# Patient Record
Sex: FEMALE | Race: WHITE | NOT HISPANIC OR LATINO | Employment: OTHER | URBAN - METROPOLITAN AREA
[De-identification: names, ages, dates, MRNs, and addresses within clinical notes are randomized per-mention and may not be internally consistent; named-entity substitution may affect disease eponyms.]

---

## 2017-05-08 PROBLEM — M79.7 FIBROMYALGIA: Status: ACTIVE | Noted: 2017-05-08

## 2017-05-08 PROBLEM — R45.89 ANXIETY ABOUT HEALTH: Status: ACTIVE | Noted: 2017-05-08

## 2018-04-02 DIAGNOSIS — M54.50 LOW BACK PAIN: ICD-10-CM

## 2018-04-09 ENCOUNTER — HOSPITAL ENCOUNTER (OUTPATIENT)
Dept: RADIOLOGY | Facility: HOSPITAL | Age: 63
Discharge: HOME | End: 2018-04-09
Attending: OBSTETRICS & GYNECOLOGY
Payer: COMMERCIAL

## 2018-04-09 DIAGNOSIS — M54.50 LOW BACK PAIN: ICD-10-CM

## 2018-04-09 PROCEDURE — 72148 MRI LUMBAR SPINE W/O DYE: CPT | Mod: 26,,, | Performed by: RADIOLOGY

## 2018-04-09 PROCEDURE — 72148 MRI LUMBAR SPINE W/O DYE: CPT | Mod: TC

## 2018-05-04 DIAGNOSIS — M79.7 FIBROMYALGIA: Primary | ICD-10-CM

## 2018-05-08 ENCOUNTER — OFFICE VISIT (OUTPATIENT)
Dept: SURGERY | Facility: CLINIC | Age: 63
End: 2018-05-08
Payer: COMMERCIAL

## 2018-05-08 VITALS
RESPIRATION RATE: 18 BRPM | TEMPERATURE: 97 F | HEIGHT: 59 IN | OXYGEN SATURATION: 95 % | WEIGHT: 155 LBS | DIASTOLIC BLOOD PRESSURE: 74 MMHG | HEART RATE: 71 BPM | BODY MASS INDEX: 31.25 KG/M2 | SYSTOLIC BLOOD PRESSURE: 119 MMHG

## 2018-05-08 DIAGNOSIS — R10.84 GENERALIZED ABDOMINAL PAIN: Primary | ICD-10-CM

## 2018-05-08 PROCEDURE — 99203 OFFICE O/P NEW LOW 30 MIN: CPT | Mod: S$GLB,,, | Performed by: SURGERY

## 2018-05-08 RX ORDER — PANTOPRAZOLE SODIUM 40 MG/1
40 TABLET, DELAYED RELEASE ORAL DAILY
Qty: 30 TABLET | Refills: 11 | Status: SHIPPED | OUTPATIENT
Start: 2018-05-08 | End: 2019-10-28 | Stop reason: SDUPTHER

## 2018-05-16 NOTE — PROGRESS NOTES
Subjective:       Patient ID: Teresa Clay is a 62 y.o. female.    Chief Complaint: Consult (referred by YARI Salazar ABD pain)      HPI:  Ms. Clay presents today as a consult from [facility] with complaints of generalized abdominal pain.  She has a history of diverticulitis requiring antibiotic therapy on several occasions in the past.  Pain is predominantly in the left lower quadrant.  No nausea or vomiting.  No diarrhea or constipation.  No fevers, chills, jaundice, biliuria, acholic, night sweats, weight loss, change in bowel habits, change in stool characteristics, etc.  Pain is described as constant, moderate, dull, and nonradiating.        Allergies & Meds:  Review of patient's allergies indicates:  No Known Allergies    Current Outpatient Prescriptions   Medication Sig Dispense Refill    lisinopril-hydrochlorothiazide (PRINZIDE,ZESTORETIC) 10-12.5 mg per tablet TAKE ONE TABLET BY MOUTH ONCE DAILY 30 tablet 11    metoprolol succinate (TOPROL-XL) 25 MG 24 hr tablet TAKE ONE TABLET BY MOUTH ONCE DAILY 30 tablet 11    pantoprazole (PROTONIX) 40 MG tablet Take 1 tablet (40 mg total) by mouth once daily. Take in the morning before breakfast.  Wait 30 minutes before eating or drinking anything 30 tablet 11     No current facility-administered medications for this visit.        PMFSHx:  Past Medical History:   Diagnosis Date    Diverticulitis     Encounter for long-term (current) use of medications     Hypertension        Past Surgical History:   Procedure Laterality Date    APPENDECTOMY      CHOLECYSTECTOMY      COLONOSCOPY      GALLBLADDER SURGERY      TUBAL LIGATION         Family History   Problem Relation Age of Onset    Diabetes Brother     Cancer Brother        Social History   Substance Use Topics    Smoking status: Current Every Day Smoker     Packs/day: 0.50     Types: Cigarettes    Smokeless tobacco: Never Used    Alcohol use No       Review of Systems   Constitutional:  Negative for appetite change, chills, fatigue, fever and unexpected weight change.   HENT: Negative for congestion, dental problem, ear pain, mouth sores, postnasal drip, rhinorrhea, sore throat, tinnitus, trouble swallowing and voice change.    Eyes: Negative for photophobia, pain, discharge and visual disturbance.   Respiratory: Negative for cough, chest tightness, shortness of breath and wheezing.    Cardiovascular: Negative for chest pain, palpitations and leg swelling.   Gastrointestinal: Negative for abdominal pain, blood in stool, constipation, diarrhea, nausea and vomiting.   Endocrine: Negative for cold intolerance, heat intolerance, polydipsia, polyphagia and polyuria.   Genitourinary: Negative for difficulty urinating, dysuria, flank pain, frequency, hematuria and urgency.   Musculoskeletal: Negative for arthralgias, joint swelling and myalgias.   Skin: Negative for color change and rash.   Allergic/Immunologic: Negative for immunocompromised state.   Neurological: Negative for dizziness, tremors, seizures, syncope, speech difficulty, weakness, numbness and headaches.   Hematological: Negative for adenopathy. Does not bruise/bleed easily.   Psychiatric/Behavioral: Negative for agitation, confusion, hallucinations, self-injury and suicidal ideas. The patient is not nervous/anxious.        Objective:      Physical Exam   Constitutional: She is oriented to person, place, and time. She appears well-developed and well-nourished. She is active.  Non-toxic appearance. No distress.   HENT:   Head: Normocephalic and atraumatic.   Right Ear: Hearing and external ear normal. No drainage or tenderness.   Left Ear: Hearing and external ear normal. No drainage or tenderness.   Nose: Nose normal. No rhinorrhea. No epistaxis.   Mouth/Throat: Uvula is midline, oropharynx is clear and moist and mucous membranes are normal. Mucous membranes are not pale, not dry and not cyanotic. No oropharyngeal exudate.   Eyes:  Conjunctivae and lids are normal. Pupils are equal, round, and reactive to light. Right eye exhibits no discharge and no exudate. Left eye exhibits no discharge and no exudate. Right conjunctiva is not injected. Right eye exhibits no nystagmus. Left eye exhibits no nystagmus.   Neck: Trachea normal, full passive range of motion without pain and phonation normal. No JVD present. Carotid bruit is not present. No tracheal deviation present. No thyroid mass and no thyromegaly present.   Cardiovascular: Normal rate, regular rhythm, S1 normal, S2 normal, normal heart sounds and intact distal pulses.  PMI is not displaced.  Exam reveals no gallop and no friction rub.    No murmur heard.  Pulmonary/Chest: Effort normal and breath sounds normal. No accessory muscle usage. No respiratory distress. She exhibits no mass, no tenderness and no crepitus. Right breast exhibits no inverted nipple, no mass, no nipple discharge, no skin change and no tenderness. Left breast exhibits no inverted nipple, no mass, no nipple discharge, no skin change and no tenderness. Breasts are symmetrical.   Abdominal: Soft. Normal appearance and bowel sounds are normal. She exhibits no distension, no fluid wave, no abdominal bruit and no mass. There is no hepatosplenomegaly. There is no tenderness. There is no rebound, no guarding and negative Martin's sign. No hernia. Hernia confirmed negative in the right inguinal area and confirmed negative in the left inguinal area.   Genitourinary: Rectum normal and vagina normal. There is no rash or lesion on the right labia. There is no rash or lesion on the left labia. Right adnexum displays no mass. Left adnexum displays no mass. No vaginal discharge found.   Musculoskeletal:        Cervical back: Normal.        Thoracic back: Normal.        Lumbar back: Normal.        Right upper arm: Normal.        Left upper arm: Normal.        Right forearm: Normal.        Left forearm: Normal.        Right hand: Normal.         Left hand: Normal.        Right upper leg: Normal.        Left upper leg: Normal.        Right lower leg: Normal.        Left lower leg: Normal.        Right foot: Normal.        Left foot: Normal.   Lymphadenopathy:        Head (right side): No submental, no submandibular and no posterior auricular adenopathy present.        Head (left side): No submental, no submandibular and no posterior auricular adenopathy present.     She has no cervical adenopathy.     She has no axillary adenopathy.        Right: No inguinal and no supraclavicular adenopathy present.        Left: No inguinal and no supraclavicular adenopathy present.   Neurological: She is alert and oriented to person, place, and time. She has normal strength. No cranial nerve deficit or sensory deficit. Coordination and gait normal. GCS eye subscore is 4. GCS verbal subscore is 5. GCS motor subscore is 6.   Skin: Skin is warm, dry and intact. No rash noted. No cyanosis. Nails show no clubbing.   Psychiatric: She has a normal mood and affect. Her speech is normal. Judgment and thought content normal. Her mood appears not anxious. Her affect is not inappropriate. She is not actively hallucinating. She does not exhibit a depressed mood.         Test Results:    Assessment:     Generalized abdominal pain.  Differential diagnosis includes but is not limited to reflux disease, gastritis, ulcer disease, inflammatory bowel disease, diverticulitis, diverticulosis, pancreatitis, etc.  Further evaluation warranted with laboratory studies and a CT scan.  Further management will depend upon these results.  1. Generalized abdominal pain        Plan:   Generalized abdominal pain  -     CT Abdomen Pelvis With Contrast; Future; Expected date: 05/08/2018  -     CBC auto differential; Future; Expected date: 05/08/2018  -     Comprehensive metabolic panel; Future; Expected date: 05/08/2018  -     Amylase; Future; Expected date: 05/08/2018  -     Lipase; Future; Expected  date: 05/08/2018    Other orders  -     pantoprazole (PROTONIX) 40 MG tablet; Take 1 tablet (40 mg total) by mouth once daily. Take in the morning before breakfast.  Wait 30 minutes before eating or drinking anything  Dispense: 30 tablet; Refill: 11        Follow-up in about 2 weeks (around 5/22/2018) for reviewing of CT results and lab results.

## 2018-05-21 ENCOUNTER — DOCUMENTATION ONLY (OUTPATIENT)
Dept: REHABILITATION | Facility: HOSPITAL | Age: 63
End: 2018-05-21

## 2018-05-21 ENCOUNTER — OFFICE VISIT (OUTPATIENT)
Dept: PODIATRY | Facility: CLINIC | Age: 63
End: 2018-05-21
Payer: COMMERCIAL

## 2018-05-21 ENCOUNTER — HOSPITAL ENCOUNTER (OUTPATIENT)
Dept: RADIOLOGY | Facility: HOSPITAL | Age: 63
Discharge: HOME | End: 2018-05-21
Attending: SURGERY
Payer: COMMERCIAL

## 2018-05-21 VITALS
TEMPERATURE: 96 F | HEART RATE: 73 BPM | HEIGHT: 59 IN | DIASTOLIC BLOOD PRESSURE: 62 MMHG | WEIGHT: 155 LBS | SYSTOLIC BLOOD PRESSURE: 104 MMHG | BODY MASS INDEX: 31.25 KG/M2

## 2018-05-21 DIAGNOSIS — G57.51 TARSAL TUNNEL SYNDROME OF RIGHT SIDE: ICD-10-CM

## 2018-05-21 DIAGNOSIS — M72.2 PLANTAR FASCIITIS: Primary | ICD-10-CM

## 2018-05-21 DIAGNOSIS — R10.84 GENERALIZED ABDOMINAL PAIN: ICD-10-CM

## 2018-05-21 PROCEDURE — 25500020 PHARM REV CODE 255: Performed by: SURGERY

## 2018-05-21 PROCEDURE — 74177 CT ABD & PELVIS W/CONTRAST: CPT | Mod: TC

## 2018-05-21 PROCEDURE — 99203 OFFICE O/P NEW LOW 30 MIN: CPT | Mod: S$PBB,,, | Performed by: PODIATRIST

## 2018-05-21 PROCEDURE — 99213 OFFICE O/P EST LOW 20 MIN: CPT | Mod: 25,PBBFAC | Performed by: PODIATRIST

## 2018-05-21 PROCEDURE — 99999 PR PBB SHADOW E&M-EST. PATIENT-LVL III: CPT | Mod: 25,PBBFAC,, | Performed by: PODIATRIST

## 2018-05-21 PROCEDURE — 74177 CT ABD & PELVIS W/CONTRAST: CPT | Mod: 26,,, | Performed by: RADIOLOGY

## 2018-05-21 RX ORDER — DICLOFENAC SODIUM 75 MG/1
75 TABLET, DELAYED RELEASE ORAL 2 TIMES DAILY
Qty: 60 TABLET | Refills: 0 | Status: SHIPPED | OUTPATIENT
Start: 2018-05-21 | End: 2018-06-20

## 2018-05-21 RX ADMIN — IOHEXOL 75 ML: 350 INJECTION, SOLUTION INTRAVENOUS at 10:05

## 2018-05-21 RX ADMIN — IOHEXOL 30 ML: 300 INJECTION, SOLUTION INTRAVENOUS at 10:05

## 2018-05-21 NOTE — LETTER
May 24, 2018      MD Wilfrid Atkins III[address]  [facility] [place] [address]           [facility] - Podiatry/Wound [facility]  [address] [facility] [place] [address]  Phone: [phone]  Fax: [phone]          Patient: Teresa Clay   MR Number: [id number]   YOB: 1955   Date of Visit: 5/21/2018       Dear Dr. Willie Jerome III:    Thank you for referring Teresa Clay to me for evaluation. Attached you will find relevant portions of my assessment and plan of care.    If you have questions, please do not hesitate to call me. I look forward to following Teresa Clay along with you.    Sincerely,    Bertha Harris  CC:  No Recipients    If you would like to receive this communication electronically, please contact [email or web address] or [phone] to request more information on [employer] access.    For providers and/or their staff who would like to refer a patient to [facility], please contact us through our one-stop-shop provider referral line, [facility], at [phone].    If you feel you have received this communication in error or would no longer like to receive these types of communications, please e-mail [email or web address]

## 2018-05-21 NOTE — PT/OT/SLP PROGRESS
Patient scheduled for Physical Therapy Evaluation this morning at 10:00 am. Patient did not show-up for her appointment, and did not call to cancel/reschedule.

## 2018-05-22 ENCOUNTER — OFFICE VISIT (OUTPATIENT)
Dept: SURGERY | Facility: CLINIC | Age: 63
End: 2018-05-22
Payer: COMMERCIAL

## 2018-05-22 VITALS
TEMPERATURE: 97 F | DIASTOLIC BLOOD PRESSURE: 71 MMHG | WEIGHT: 155 LBS | HEART RATE: 68 BPM | SYSTOLIC BLOOD PRESSURE: 117 MMHG | HEIGHT: 59 IN | RESPIRATION RATE: 18 BRPM | OXYGEN SATURATION: 92 % | BODY MASS INDEX: 31.25 KG/M2

## 2018-05-22 DIAGNOSIS — K57.92 DIVERTICULITIS: ICD-10-CM

## 2018-05-22 DIAGNOSIS — K29.50 CHRONIC GASTRITIS WITHOUT BLEEDING, UNSPECIFIED GASTRITIS TYPE: Primary | ICD-10-CM

## 2018-05-22 PROCEDURE — 99213 OFFICE O/P EST LOW 20 MIN: CPT | Mod: S$GLB,,, | Performed by: SURGERY

## 2018-05-22 NOTE — PROGRESS NOTES
"Subjective:       Patient ID: Teresa Clay is a 62 y.o. female.    Chief Complaint: Follow-up (ct and labs 5-21-18) and Results      HPI:  Ms. Clay returns today with no complaints.  She was last seen on 5/8/18 with left lower quadrant abdominal pain.  Laboratory studies and a CT scan were ordered.  Protonix was prescribed.  In the interval since her last visit the pain has completely resolved.  Pain resolved about two or three days after beginning the Protonix.  No nausea or vomiting.  No fevers, chills, jaundice, biliuria, acholia, diarrhea, constipation, melena, hematochezia, hematemesis, change in bowel habits, change in stool characteristics, etc.  Today she feels "great".        Allergies & Meds:  Review of patient's allergies indicates:   Allergen Reactions    Codeine        Current Outpatient Prescriptions   Medication Sig Dispense Refill    lisinopril-hydrochlorothiazide (PRINZIDE,ZESTORETIC) 10-12.5 mg per tablet TAKE ONE TABLET BY MOUTH ONCE DAILY 30 tablet 11    metoprolol succinate (TOPROL-XL) 25 MG 24 hr tablet TAKE ONE TABLET BY MOUTH ONCE DAILY 30 tablet 11    pantoprazole (PROTONIX) 40 MG tablet Take 1 tablet (40 mg total) by mouth once daily. Take in the morning before breakfast.  Wait 30 minutes before eating or drinking anything 30 tablet 11    diclofenac (VOLTAREN) 75 MG EC tablet Take 1 tablet (75 mg total) by mouth 2 (two) times daily. 60 tablet 0     No current facility-administered medications for this visit.        PMFSHx:  Past medical history, surgical history, family history, social history reviewed and no changes noted.        Review of Systems   Constitutional: Negative for appetite change, chills, fatigue, fever and unexpected weight change.   HENT: Negative for congestion, dental problem, ear pain, mouth sores, postnasal drip, rhinorrhea, sore throat, tinnitus, trouble swallowing and voice change.    Eyes: Negative for photophobia, pain, discharge and visual disturbance. "   Respiratory: Negative for cough, chest tightness, shortness of breath and wheezing.    Cardiovascular: Negative for chest pain, palpitations and leg swelling.   Gastrointestinal: Negative for abdominal pain, blood in stool, constipation, diarrhea, nausea and vomiting.   Endocrine: Negative for cold intolerance, heat intolerance, polydipsia, polyphagia and polyuria.   Genitourinary: Negative for difficulty urinating, dysuria, flank pain, frequency, hematuria and urgency.   Musculoskeletal: Negative for arthralgias, joint swelling and myalgias.   Skin: Negative for color change and rash.   Allergic/Immunologic: Negative for immunocompromised state.   Neurological: Negative for dizziness, tremors, seizures, syncope, speech difficulty, weakness, numbness and headaches.   Hematological: Negative for adenopathy. Does not bruise/bleed easily.   Psychiatric/Behavioral: Negative for agitation, confusion, hallucinations, self-injury and suicidal ideas. The patient is not nervous/anxious.        Objective:      Physical Exam   Constitutional: She appears well-developed and well-nourished.  Non-toxic appearance. She does not appear ill. No distress.   Cardiovascular: Normal rate, regular rhythm and normal heart sounds.  PMI is not displaced.  Exam reveals no gallop.    No murmur heard.  Pulmonary/Chest: Effort normal and breath sounds normal. No accessory muscle usage. No tachypnea. No respiratory distress.   Abdominal: Soft. Normal appearance and bowel sounds are normal. There is no tenderness. No hernia.   Skin: Skin is warm, dry and intact. No rash noted.         Test Results:  Lab results were reviewed from yesterday.  CBC shows no evidence of leukocytosis, anemia, or thrombocytopenia.  Electrolytes revealed a mild hypokalemia and a mild hypercarbia.  BUN and creatinine showed no evidence of renal dysfunction.  Glucose was minimally elevated but without gross suspicion of diabetes.  Liver profile showed no evidence of  hepatocellular disease or biliary obstruction.  Amylase and lipase showed no evidence of pancreatitis.    CT scan of the abdomen and pelvis with contrast films and report reviewed from 5/21/18.  There was evidence of hepatosteatosis, postoperative changes consistent with a cholecystectomy, and mild uncomplicated diverticulitis.        Assessment:        Chronic gastritis, improved with PPI therapy.  Diverticulitis, spontaneously resolved.  Clinically there is no indication for antibiotic therapy as the diverticulitis appears clinically to have resolved and is now likely diverticulosis only.      1. Chronic gastritis without bleeding, unspecified gastritis type    2. Diverticulitis        Plan:   Chronic gastritis without bleeding, unspecified gastritis type    Diverticulitis        Follow-up for any concerns or questions as needed, resume care with PCP.    Counseling/Medical Decision Making:      Ms. Clay and I had a long conversation regarding the diagnosis of diverticulosis and diverticulitis. The [employer] publication pertaining to the subject was provided to her as well. The complications of diverticulosis including diverticulitis and hemorrhage were discussed in great detail. The signs and symptoms of these complications were explained explicitly including but not limited to: bleeding, abdominal pain, fever, nausea, vomiting, diarrhea, constipation, etc. She was instructed to seek immediate medical attention should any of the signs or symptoms develop. The importance of avoiding constipation was explained. The benefits of a 40 g high fiber diet, fiber supplements, stool softeners, and increased free water intake were also explained. Questions were solicited and answered. Entire conversation was held in layman's terms. At the conclusion of the conversation she voiced complete understanding of all we discussed and satisfaction that all questions were fully answered.

## 2018-05-27 NOTE — PROGRESS NOTES
Subjective:       Patient ID: Teresa Clay is a 62 y.o. female.    Chief Complaint: Foot Pain    HPI patient presents with a complaint of bilateral foot pain ×6 months right greater than left.  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  Patient states her first steps out of bed in the morning are extremely painful and uncomfortable or after she's been on her feet for long period of the day she has discomfort.  Review of Systems   Musculoskeletal: Positive for arthralgias and joint swelling.   All other systems reviewed and are negative.      Objective:      Foot Exam    General  General Appearance: appears stated age and healthy   Orientation: alert and oriented to person, place, and time   Affect: appropriate   Gait: unimpaired       Right Foot/Ankle     Inspection and Palpation  Ecchymosis: none  Tenderness: plantar fascia   Swelling: none   Arch: normal  Hammertoes: absent  Claw Toes: absent  Hallux valgus: no  Hallux limitus: no  Skin Exam: erythema;     Neurovascular  Dorsalis pedis: 2+  Posterior tibial: 2+  Saphenous nerve sensation: normal  Tibial nerve sensation: normal  Superficial peroneal nerve sensation: normal  Deep peroneal nerve sensation: normal  Sural nerve sensation: normal  Achilles reflex: 2+  Babinski reflex: 2+      Left Foot/Ankle      Inspection and Palpation  Ecchymosis: none  Tenderness: plantar fascia   Swelling: none   Arch: normal  Hammertoes: absent  Claw toes: absent  Hallux valgus: no  Hallux limitus: no  Skin Exam: erythema;     Neurovascular  Dorsalis pedis: 2+  Posterior tibial: 2+  Saphenous nerve sensation: normal  Tibial nerve sensation: normal  Superficial peroneal nerve sensation: normal  Deep peroneal nerve sensation: normal  Sural nerve sensation: normal  Achilles reflex: 2+  Babinski reflex: 2+              Assessment:       1.  Plantar fasciitis    2. Tarsal tunnel syndrome of right side        Plan:       The etiology and pathology were discussed in detail with the patient in regards to plantar fasciitis. Educational material regarding plantar fasciitis was discussed. Patient was advised to discontinue all barefoot walking and start wearing power step arch supports which the patient was recommended to purchase at all times of weightbearing. Patient is to begin wearing her shoes upon first gets out of bed in the morning.patient was advised that the reason that she has plantar fasciitis is her significantly elevated arch is obviously she's not getting enough arch support I did recommend power step arch supports for the patient these are the one at all times weight-bearing.  Patient is having findings of plantar fasciitis bilateral right greater than left start patient with power step control arch supports to be worn at all times of weightbearing and started the patient on diclofenac and see how the patient is doing at follow-up in 3 weeks she does have significantly elevated arches and may require additional arch support.

## 2018-06-18 ENCOUNTER — OFFICE VISIT (OUTPATIENT)
Dept: PODIATRY | Facility: CLINIC | Age: 63
End: 2018-06-18
Payer: COMMERCIAL

## 2018-06-18 VITALS
SYSTOLIC BLOOD PRESSURE: 113 MMHG | RESPIRATION RATE: 18 BRPM | HEART RATE: 72 BPM | DIASTOLIC BLOOD PRESSURE: 65 MMHG | TEMPERATURE: 96 F

## 2018-06-18 DIAGNOSIS — M72.2 PLANTAR FASCIITIS: Primary | ICD-10-CM

## 2018-06-18 DIAGNOSIS — M79.671 FOOT PAIN, BILATERAL: ICD-10-CM

## 2018-06-18 DIAGNOSIS — M79.672 FOOT PAIN, BILATERAL: ICD-10-CM

## 2018-06-18 PROCEDURE — 96372 THER/PROPH/DIAG INJ SC/IM: CPT | Mod: S$PBB,,, | Performed by: PODIATRIST

## 2018-06-18 PROCEDURE — 99999 PR PBB SHADOW E&M-EST. PATIENT-LVL III: CPT | Mod: 25,PBBFAC,, | Performed by: PODIATRIST

## 2018-06-18 PROCEDURE — 99213 OFFICE O/P EST LOW 20 MIN: CPT | Mod: 25,S$PBB,, | Performed by: PODIATRIST

## 2018-06-18 PROCEDURE — 99213 OFFICE O/P EST LOW 20 MIN: CPT | Mod: 25,PBBFAC | Performed by: PODIATRIST

## 2018-06-18 PROCEDURE — 96372 THER/PROPH/DIAG INJ SC/IM: CPT | Mod: PBBFAC

## 2018-06-18 RX ORDER — BETAMETHASONE SODIUM PHOSPHATE AND BETAMETHASONE ACETATE 3; 3 MG/ML; MG/ML
18 INJECTION, SUSPENSION INTRA-ARTICULAR; INTRALESIONAL; INTRAMUSCULAR; SOFT TISSUE
Status: COMPLETED | OUTPATIENT
Start: 2018-06-18 | End: 2018-06-18

## 2018-06-18 RX ADMIN — BETAMETHASONE SODIUM PHOSPHATE AND BETAMETHASONE ACETATE 18 MG: 3; 3 INJECTION, SUSPENSION INTRA-ARTICULAR; INTRALESIONAL; INTRAMUSCULAR at 02:06

## 2018-06-19 ENCOUNTER — TELEPHONE (OUTPATIENT)
Dept: PODIATRY | Facility: CLINIC | Age: 63
End: 2018-06-19

## 2018-06-19 NOTE — TELEPHONE ENCOUNTER
----- Message from Sherley Olivier sent at 6/19/2018  1:53 PM CDT -----  Contact: self [phone]  She wants to know if it is usual for her face and neck to be red after receiving the steroid shot yesterday.  Please call her.  Thank you!

## 2018-06-19 NOTE — TELEPHONE ENCOUNTER
Spoke with patient.  C/O redness in face, coughing up mucus and slight headache.  Pt denied trouble breathing or itching. Notified Dr. Ca.  Advised pt to take OTC benadryl as directed and OTC tylenol or ibuprofen for headache.  Pt advised she would  meds on the way home from work today.  Asked patient to call office back with any further concerns or worsening conditions.

## 2018-06-21 ENCOUNTER — TELEPHONE (OUTPATIENT)
Dept: PODIATRY | Facility: CLINIC | Age: 63
End: 2018-06-21

## 2018-06-21 RX ORDER — TRAMADOL HYDROCHLORIDE 50 MG/1
50 TABLET ORAL EVERY 12 HOURS PRN
Qty: 40 TABLET | Refills: 1 | Status: SHIPPED | OUTPATIENT
Start: 2018-06-21 | End: 2018-07-01

## 2018-06-21 NOTE — TELEPHONE ENCOUNTER
Pts  came in stating pt was still flushed from steroid injection.  Called pt, no answer and left vm to return call to office.  Add betamethasone injection to list of allergies?

## 2018-06-22 NOTE — PROGRESS NOTES
Subjective:       Patient ID: Teresa Clay is a 62 y.o. female.    Chief Complaint: Follow-up    HPI patient presents with a complaint of bilateral foot pain ×6 months right greater than left.  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  Patient states her first steps out of bed in the morning are extremely painful and uncomfortable or after she's been on her feet for long period of the day she has discomfort.  Review of Systems   Musculoskeletal: Positive for arthralgias and joint swelling.   All other systems reviewed and are negative.      Objective:      Foot Exam    General  General Appearance: appears stated age and healthy   Orientation: alert and oriented to person, place, and time   Affect: appropriate   Gait: unimpaired       Right Foot/Ankle     Inspection and Palpation  Ecchymosis: none  Tenderness: plantar fascia   Swelling: none   Arch: normal  Hammertoes: absent  Claw Toes: absent  Hallux valgus: no  Hallux limitus: no  Skin Exam: erythema;     Neurovascular  Dorsalis pedis: 2+  Posterior tibial: 2+  Saphenous nerve sensation: normal  Tibial nerve sensation: normal  Superficial peroneal nerve sensation: normal  Deep peroneal nerve sensation: normal  Sural nerve sensation: normal  Achilles reflex: 2+  Babinski reflex: 2+      Left Foot/Ankle      Inspection and Palpation  Ecchymosis: none  Tenderness: plantar fascia   Swelling: none   Arch: normal  Hammertoes: absent  Claw toes: absent  Hallux valgus: no  Hallux limitus: no  Skin Exam: erythema;     Neurovascular  Dorsalis pedis: 2+  Posterior tibial: 2+  Saphenous nerve sensation: normal  Tibial nerve sensation: normal  Superficial peroneal nerve sensation: normal  Deep peroneal nerve sensation: normal  Sural nerve sensation: normal  Achilles reflex: 2+  Babinski reflex: 2+              Assessment:       1.  Plantar fasciitis    2. Foot pain, bilateral        Plan:         On evaluation patient states that she has had some improvement in her plantar fascial pain in the tarsal tunnel pain right foot however she still having considerable discomfort.  Following examination I have recommended adding additional arch support to the patient's power steps of added a soft blue arch pad to the plantar arch bilateral additionally I did administer Stephanie tone injection 3 cc IM to the patient patient was not able to take the diclofenac due to diverticulitis I feel that the IM steroid injection should significantly help to reduce her inflammation plan follow-up will be 2-3 weeks.  I did subsequently give the patient a prescription for Ultram due to the continued discomfort she is having.

## 2018-07-16 ENCOUNTER — OFFICE VISIT (OUTPATIENT)
Dept: PODIATRY | Facility: CLINIC | Age: 63
End: 2018-07-16
Payer: COMMERCIAL

## 2018-07-16 VITALS
HEART RATE: 70 BPM | BODY MASS INDEX: 30.04 KG/M2 | HEIGHT: 59 IN | SYSTOLIC BLOOD PRESSURE: 109 MMHG | WEIGHT: 149 LBS | DIASTOLIC BLOOD PRESSURE: 57 MMHG

## 2018-07-16 DIAGNOSIS — M72.2 PLANTAR FASCIITIS: Primary | ICD-10-CM

## 2018-07-16 DIAGNOSIS — G57.50 TARSAL TUNNEL SYNDROME, UNSPECIFIED LATERALITY: ICD-10-CM

## 2018-07-16 PROCEDURE — 99999 PR PBB SHADOW E&M-EST. PATIENT-LVL III: CPT | Mod: PBBFAC,,, | Performed by: PODIATRIST

## 2018-07-16 PROCEDURE — 99213 OFFICE O/P EST LOW 20 MIN: CPT | Mod: S$PBB,,, | Performed by: PODIATRIST

## 2018-07-16 PROCEDURE — 99213 OFFICE O/P EST LOW 20 MIN: CPT | Mod: PBBFAC | Performed by: PODIATRIST

## 2018-07-16 RX ORDER — GABAPENTIN 300 MG/1
300 CAPSULE ORAL NIGHTLY
Qty: 30 CAPSULE | Refills: 3 | Status: SHIPPED | OUTPATIENT
Start: 2018-07-16 | End: 2018-12-15

## 2018-07-20 PROBLEM — M72.2 PLANTAR FASCIITIS: Status: ACTIVE | Noted: 2018-07-20

## 2018-07-21 NOTE — PROGRESS NOTES
Subjective:       Patient ID: Teresa Clay is a 62 y.o. female.    Chief Complaint: Follow-up; Foot Pain; and Foot Problem    Foot Pain   Associated symptoms include arthralgias and joint swelling.    patient presents with a complaint of bilateral foot pain ×6 months right greater than left.  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  Patient states her first steps out of bed in the morning are extremely painful and uncomfortable or after she's been on her feet for long period of the day she has discomfort.  Review of Systems   Musculoskeletal: Positive for arthralgias and joint swelling.   All other systems reviewed and are negative.      Objective:      Foot Exam    General  General Appearance: appears stated age and healthy   Orientation: alert and oriented to person, place, and time   Affect: appropriate   Gait: unimpaired       Right Foot/Ankle     Inspection and Palpation  Ecchymosis: none  Tenderness: plantar fascia   Swelling: none   Arch: normal  Hammertoes: absent  Claw Toes: absent  Hallux valgus: no  Hallux limitus: no  Skin Exam: erythema;     Neurovascular  Dorsalis pedis: 2+  Posterior tibial: 2+  Saphenous nerve sensation: normal  Tibial nerve sensation: normal  Superficial peroneal nerve sensation: normal  Deep peroneal nerve sensation: normal  Sural nerve sensation: normal  Achilles reflex: 2+  Babinski reflex: 2+      Left Foot/Ankle      Inspection and Palpation  Ecchymosis: none  Tenderness: plantar fascia   Swelling: none   Arch: normal  Hammertoes: absent  Claw toes: absent  Hallux valgus: no  Hallux limitus: no  Skin Exam: erythema;     Neurovascular  Dorsalis pedis: 2+  Posterior tibial: 2+  Saphenous nerve sensation: normal  Tibial nerve sensation: normal  Superficial peroneal nerve sensation: normal  Deep peroneal nerve sensation: normal  Sural  nerve sensation: normal  Achilles reflex: 2+  Babinski reflex: 2+              Assessment:       1. Plantar fasciitis    2. Tarsal tunnel syndrome, unspecified laterality        Plan:           Patient presents for follow-up plantar fasciitis bilateral right greater than left with associated tarsal tunnel syndrome right.  Patient states that her feet are cramping a lot at night she is having a lot of discomfort at night she relates the recent injection we gave her did help quite a bit.  Patient was advised I would recommend additional arch support to her power steps I want see how she responds to this I feel that these will help quite a bit to reduce her discomfort I have also started the patient on gabapentin 300 mg at bedtime to help with the cramping that she is having at night I am going to see the patient for follow-up in 3 weeks she has been advised to contact me with any problems questions or concerns prior to follow-up.

## 2018-07-26 ENCOUNTER — HOSPITAL ENCOUNTER (EMERGENCY)
Facility: HOSPITAL | Age: 63
Discharge: HOME | End: 2018-07-26
Attending: EMERGENCY MEDICINE
Payer: COMMERCIAL

## 2018-07-26 VITALS
SYSTOLIC BLOOD PRESSURE: 128 MMHG | DIASTOLIC BLOOD PRESSURE: 73 MMHG | HEIGHT: 59 IN | OXYGEN SATURATION: 97 % | BODY MASS INDEX: 30.04 KG/M2 | TEMPERATURE: 98 F | HEART RATE: 74 BPM | WEIGHT: 149 LBS | RESPIRATION RATE: 16 BRPM

## 2018-07-26 DIAGNOSIS — M54.2 NECK PAIN: Primary | ICD-10-CM

## 2018-07-26 PROCEDURE — 25000242 PHARM REV CODE 250 ALT 637 W/ HCPCS: Performed by: EMERGENCY MEDICINE

## 2018-07-26 PROCEDURE — 94640 AIRWAY INHALATION TREATMENT: CPT

## 2018-07-26 PROCEDURE — 99284 EMERGENCY DEPT VISIT MOD MDM: CPT | Mod: 25

## 2018-07-26 PROCEDURE — 63600175 PHARM REV CODE 636 W HCPCS: Performed by: EMERGENCY MEDICINE

## 2018-07-26 PROCEDURE — 72050 X-RAY EXAM NECK SPINE 4/5VWS: CPT | Mod: TC,FY

## 2018-07-26 PROCEDURE — 72050 X-RAY EXAM NECK SPINE 4/5VWS: CPT | Mod: 26,,, | Performed by: RADIOLOGY

## 2018-07-26 PROCEDURE — 94761 N-INVAS EAR/PLS OXIMETRY MLT: CPT

## 2018-07-26 RX ORDER — METHYLPREDNISOLONE 4 MG/1
TABLET ORAL
Qty: 1 PACKAGE | Refills: 0 | Status: SHIPPED | OUTPATIENT
Start: 2018-07-26 | End: 2018-07-26

## 2018-07-26 RX ORDER — METHYLPREDNISOLONE 4 MG/1
TABLET ORAL
Qty: 1 PACKAGE | Refills: 0 | Status: SHIPPED | OUTPATIENT
Start: 2018-07-26 | End: 2018-08-16

## 2018-07-26 RX ORDER — TIZANIDINE 4 MG/1
4 TABLET ORAL EVERY 8 HOURS PRN
Qty: 12 TABLET | Refills: 0 | Status: SHIPPED | OUTPATIENT
Start: 2018-07-26 | End: 2018-12-15

## 2018-07-26 RX ORDER — PREDNISONE 10 MG/1
60 TABLET ORAL
Status: COMPLETED | OUTPATIENT
Start: 2018-07-26 | End: 2018-07-26

## 2018-07-26 RX ORDER — IPRATROPIUM BROMIDE AND ALBUTEROL SULFATE 2.5; .5 MG/3ML; MG/3ML
3 SOLUTION RESPIRATORY (INHALATION)
Status: COMPLETED | OUTPATIENT
Start: 2018-07-26 | End: 2018-07-26

## 2018-07-26 RX ADMIN — PREDNISONE 60 MG: 10 TABLET ORAL at 05:07

## 2018-07-26 RX ADMIN — IPRATROPIUM BROMIDE AND ALBUTEROL SULFATE 3 ML: .5; 3 SOLUTION RESPIRATORY (INHALATION) at 05:07
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
## 2018-07-26 NOTE — DISCHARGE INSTRUCTIONS
Return to ER if condition changes or worsens.  Take medications as directed.  Follow up with her primary care provider in 2-3 days if symptoms not relieved.

## 2018-07-27 NOTE — ED NOTES
Reviewed discharge plan with pt. Pt verbalizes understanding of discharge instructions. Pt appears in no acute distress. resp even and unlabored. Pt ambulatory to discharge window.

## 2018-07-30 ENCOUNTER — OFFICE VISIT (OUTPATIENT)
Dept: PODIATRY | Facility: CLINIC | Age: 63
End: 2018-07-30
Payer: COMMERCIAL

## 2018-07-30 VITALS
SYSTOLIC BLOOD PRESSURE: 101 MMHG | HEART RATE: 77 BPM | WEIGHT: 149 LBS | TEMPERATURE: 97 F | DIASTOLIC BLOOD PRESSURE: 53 MMHG | HEIGHT: 59 IN | BODY MASS INDEX: 30.04 KG/M2

## 2018-07-30 DIAGNOSIS — M79.7 FIBROMYALGIA: ICD-10-CM

## 2018-07-30 DIAGNOSIS — G25.81 RESTLESS LEG SYNDROME: ICD-10-CM

## 2018-07-30 DIAGNOSIS — M72.2 PLANTAR FASCIITIS: Primary | ICD-10-CM

## 2018-07-30 PROCEDURE — 99999 PR PBB SHADOW E&M-EST. PATIENT-LVL III: CPT | Mod: PBBFAC,,, | Performed by: PODIATRIST

## 2018-07-30 PROCEDURE — 99213 OFFICE O/P EST LOW 20 MIN: CPT | Mod: S$PBB,,, | Performed by: PODIATRIST

## 2018-07-30 PROCEDURE — 99213 OFFICE O/P EST LOW 20 MIN: CPT | Mod: PBBFAC | Performed by: PODIATRIST

## 2018-08-04 NOTE — PROGRESS NOTES
Subjective:       Patient ID: Teresa Clay is a 62 y.o. female.    Chief Complaint: Foot Pain and Foot Problem    Foot Pain   Associated symptoms include arthralgias and joint swelling.    patient presents with a complaint of bilateral foot pain ×6 months right greater than left.  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  Patient states her first steps out of bed in the morning are extremely painful and uncomfortable or after she's been on her feet for long period of the day she has discomfort.  Review of Systems   Musculoskeletal: Positive for arthralgias and joint swelling.   All other systems reviewed and are negative.      Objective:      Foot Exam    General  General Appearance: appears stated age and healthy   Orientation: alert and oriented to person, place, and time   Affect: appropriate   Gait: unimpaired       Right Foot/Ankle     Inspection and Palpation  Ecchymosis: none  Tenderness: plantar fascia   Swelling: none   Arch: normal  Hammertoes: absent  Claw Toes: absent  Hallux valgus: no  Hallux limitus: no  Skin Exam: erythema;     Neurovascular  Dorsalis pedis: 2+  Posterior tibial: 2+  Saphenous nerve sensation: normal  Tibial nerve sensation: normal  Superficial peroneal nerve sensation: normal  Deep peroneal nerve sensation: normal  Sural nerve sensation: normal  Achilles reflex: 2+  Babinski reflex: 2+      Left Foot/Ankle      Inspection and Palpation  Ecchymosis: none  Tenderness: plantar fascia   Swelling: none   Arch: normal  Hammertoes: absent  Claw toes: absent  Hallux valgus: no  Hallux limitus: no  Skin Exam: erythema;     Neurovascular  Dorsalis pedis: 2+  Posterior tibial: 2+  Saphenous nerve sensation: normal  Tibial nerve sensation: normal  Superficial peroneal nerve sensation: normal  Deep peroneal nerve sensation: normal  Sural nerve  sensation: normal  Achilles reflex: 2+  Babinski reflex: 2+              Assessment:       1. Plantar fasciitis    2. Fibromyalgia    3. Restless leg syndrome        Plan:           Patient presents for follow-up plantar fasciitis bilateral.    Patient states that actually her left foot has begun to bother her more than the right which is different the patient's right foot had been worse now with the left.  Patient states the gabapentin 300 mg at bedtime is helping a little bit but not a lot.  Patient relates she still having a lot of tingling at bedtime she is also having cramping in her legs.  Patient states the arch supports have helped quite a bit and are very comfortable.  On evaluation the patient's arch supports appear to be fitting the patient very well giving her enough support I did make some minor adjustments to them however I have recommended increasing the patient scab a bent pen she has been taking 300 mg at bedtime I am going to increase this to 600 mg at bedtime and I want see how she responds to this I have advised her if the 300 was not enough and the 600 is too much she will likely need a prescription for 400 mg Lyrica gabapentin but I want to see how she does with the 600 mg 1st at night feel this will likely be the correct dose for the patient.  Total face-to-face time including discussion evaluation treatment and adjustments arch supports equaled 20 min..

## 2018-08-08 ENCOUNTER — TELEPHONE (OUTPATIENT)
Dept: PODIATRY | Facility: CLINIC | Age: 63
End: 2018-08-08

## 2018-08-08 DIAGNOSIS — M72.2 PLANTAR FASCIITIS: Primary | ICD-10-CM

## 2018-08-08 RX ORDER — TRAMADOL HYDROCHLORIDE 50 MG/1
50 TABLET ORAL EVERY 6 HOURS PRN
Qty: 40 TABLET | Refills: 2 | Status: SHIPPED | OUTPATIENT
Start: 2018-08-08 | End: 2018-08-18

## 2018-08-08 NOTE — TELEPHONE ENCOUNTER
Pt is requesting refill on tramadol. Pt last seen 7/30 med last filled in June for 10 days do you want to refill

## 2018-12-15 ENCOUNTER — HOSPITAL ENCOUNTER (EMERGENCY)
Facility: HOSPITAL | Age: 63
Discharge: HOME | End: 2018-12-15
Attending: FAMILY MEDICINE
Payer: COMMERCIAL

## 2018-12-15 VITALS
SYSTOLIC BLOOD PRESSURE: 118 MMHG | TEMPERATURE: 98 F | WEIGHT: 149 LBS | OXYGEN SATURATION: 95 % | HEIGHT: 59 IN | DIASTOLIC BLOOD PRESSURE: 67 MMHG | HEART RATE: 60 BPM | BODY MASS INDEX: 30.04 KG/M2 | RESPIRATION RATE: 16 BRPM

## 2018-12-15 DIAGNOSIS — M62.838 MUSCLE SPASM: ICD-10-CM

## 2018-12-15 PROCEDURE — 96372 THER/PROPH/DIAG INJ SC/IM: CPT

## 2018-12-15 PROCEDURE — 25000003 PHARM REV CODE 250: Performed by: FAMILY MEDICINE

## 2018-12-15 PROCEDURE — 99284 EMERGENCY DEPT VISIT MOD MDM: CPT | Mod: 25

## 2018-12-15 PROCEDURE — 63600175 PHARM REV CODE 636 W HCPCS: Performed by: FAMILY MEDICINE

## 2018-12-15 RX ORDER — KETOROLAC TROMETHAMINE 30 MG/ML
30 INJECTION, SOLUTION INTRAMUSCULAR; INTRAVENOUS
Status: COMPLETED | OUTPATIENT
Start: 2018-12-15 | End: 2018-12-15

## 2018-12-15 RX ORDER — LORAZEPAM 0.5 MG/1
TABLET ORAL
Status: DISCONTINUED
Start: 2018-12-15 | End: 2018-12-15 | Stop reason: HOSPADM

## 2018-12-15 RX ORDER — IBUPROFEN 800 MG/1
800 TABLET ORAL 3 TIMES DAILY
COMMUNITY
End: 2019-06-18

## 2018-12-15 RX ORDER — KETOROLAC TROMETHAMINE 10 MG/1
10 TABLET, FILM COATED ORAL EVERY 6 HOURS PRN
Qty: 12 TABLET | Refills: 0 | Status: SHIPPED | OUTPATIENT
Start: 2018-12-15 | End: 2019-02-01

## 2018-12-15 RX ORDER — LORAZEPAM 0.5 MG/1
0.5 TABLET ORAL
Status: COMPLETED | OUTPATIENT
Start: 2018-12-15 | End: 2018-12-15

## 2018-12-15 RX ORDER — BACLOFEN 10 MG/1
10 TABLET ORAL 3 TIMES DAILY PRN
Qty: 15 TABLET | Refills: 11 | Status: ON HOLD | OUTPATIENT
Start: 2018-12-15 | End: 2019-02-04

## 2018-12-15 RX ADMIN — LORAZEPAM 0.5 MG: 0.5 TABLET ORAL at 07:12

## 2018-12-15 RX ADMIN — KETOROLAC TROMETHAMINE 30 MG: 30 INJECTION, SOLUTION INTRAMUSCULAR at 07:12

## 2018-12-16 NOTE — DISCHARGE INSTRUCTIONS
TakeMedications as directed.  Return to the ER at any time if condition changes or worsens.  May use gentle massage and warm moist heat to area to help relax muscle and increase speed of healing.  Follow-up with primary care provider in the next 3-4 days for recheck.

## 2018-12-16 NOTE — ED NOTES
DISCHARGE INSTRUCTIONS GIVEN, DISCUSSED MEDICATIONS AND FOLLOW UP CARE, PATIENT VERBALIZED UNDERSTANDING, NO QUESTIONS OR COMPLAINTS AT TIME, ENCOURAGED TO RETURN FOR NEW OR WORSENING SYMPTOMS. PATIENT ESCORTED TO REGISTRATION W/O MAGALI. XIN HUSAIN RN

## 2018-12-16 NOTE — ED TRIAGE NOTES
Patient reports left scapular pain x 3 days. Pain has moves to left chest wall and under the left breast this am.   Patient rates the pain as a 7/10 level.

## 2018-12-16 NOTE — ED PROVIDER NOTES
Encounter Date: 12/15/2018       History     Chief Complaint   Patient presents with    Shoulder Pain     Patient complains of pain to left shoulder blade area for the past 3-4 days, states pain began to radiate around the left side of her chest today and she became concerned and presented to the ED.  Denies any nausea or vomiting.  Denies any shortness of breath.  Denies any dizziness or palpitations.  No rash.  Denies fever or chills          Review of patient's allergies indicates:   Allergen Reactions    Betamethasone acet,sod phos     Codeine      Past Medical History:   Diagnosis Date    Diverticulitis     Encounter for long-term (current) use of medications     Hypertension      Past Surgical History:   Procedure Laterality Date    APPENDECTOMY      CHOLECYSTECTOMY      COLONOSCOPY      GALLBLADDER SURGERY      TUBAL LIGATION       Family History   Problem Relation Age of Onset    Diabetes Brother     Cancer Brother      Social History     Tobacco Use    Smoking status: Current Every Day Smoker     Packs/day: 0.50     Types: Cigarettes    Smokeless tobacco: Never Used   Substance Use Topics    Alcohol use: No    Drug use: No     Review of Systems   Constitutional: Negative.    HENT: Negative.    Eyes: Negative.    Respiratory: Negative.    Cardiovascular: Negative.    Gastrointestinal: Negative.    Endocrine: Negative.    Genitourinary: Negative.    Musculoskeletal:        Refer to HPI   Allergic/Immunologic: Negative.    Neurological: Negative.    Hematological: Negative.    Psychiatric/Behavioral: Negative.        Physical Exam     Initial Vitals [12/15/18 1828]   BP Pulse Resp Temp SpO2   119/75 70 18 98.4 °F (36.9 °C) 97 %      MAP       --         Physical Exam    Nursing note and vitals reviewed.  Constitutional: She appears well-developed and well-nourished. She is not diaphoretic. No distress.   HENT:   Head: Normocephalic and atraumatic.   Eyes: Conjunctivae and EOM are normal.  Pupils are equal, round, and reactive to light.   Neck: Normal range of motion. Neck supple.   Cardiovascular: Normal rate, regular rhythm, normal heart sounds and intact distal pulses. Exam reveals no gallop and no friction rub.    No murmur heard.  Pulmonary/Chest: Breath sounds normal. No respiratory distress. She has no wheezes. She has no rales.   Abdominal: Soft. Bowel sounds are normal. She exhibits no distension. There is no tenderness.   Musculoskeletal: Normal range of motion. She exhibits tenderness. She exhibits no edema.   Tenderness noted over left trapezius/rhomboid area.  No rash noted. Significant spasm over left trapezius and rhomboid area as well.   Neurological: She is alert and oriented to person, place, and time. She has normal strength.   Skin: Skin is warm and dry. Capillary refill takes less than 2 seconds. No rash noted. No erythema.   Psychiatric: She has a normal mood and affect. Her behavior is normal. Judgment and thought content normal.         ED Course   Procedures  Labs Reviewed - No data to display  EKG Readings: (Independently Interpreted)   Rhythm: Normal Sinus Rhythm. Ectopy: No Ectopy. Conduction: Normal. ST Segments: Normal ST Segments. T Waves: Normal. Clinical Impression: Normal Sinus Rhythm       Imaging Results    None          Medical Decision Making:   Clinical Tests:   Medical Tests: Reviewed  ED Management:  Explained situation at length with both patient and her , patient relates that she has taken Ativan in the past for this and this has worked well for her.    2000-pt states discomfort is diminished.                       Clinical Impression:   The encounter diagnosis was Muscle spasm.                             Stefani Valle MD  12/15/18 1959

## 2019-01-07 ENCOUNTER — TELEPHONE (OUTPATIENT)
Dept: PAIN MEDICINE | Facility: CLINIC | Age: 64
End: 2019-01-07

## 2019-01-07 NOTE — TELEPHONE ENCOUNTER
----- Message from Diya Price LPN sent at 1/7/2019  3:25 PM CST -----  Contact: pt       ----- Message -----  From: Jennifer De  Sent: 1/7/2019  11:48 AM  To: [facility] Staff    Name of Who is Calling: LIA TIERNEY [[id number]]    What is the request in detail: Patient is requesting to speak with staff in regards to a referrel that was sent over in regards to pain management....Please contact to further discuss and advise      Can the clinic reply by [facility]: No     [facility]: [phone]

## 2019-01-15 ENCOUNTER — OFFICE VISIT (OUTPATIENT)
Dept: PAIN MEDICINE | Facility: CLINIC | Age: 64
End: 2019-01-15
Payer: COMMERCIAL

## 2019-01-15 VITALS
WEIGHT: 149 LBS | SYSTOLIC BLOOD PRESSURE: 130 MMHG | HEIGHT: 59 IN | BODY MASS INDEX: 30.04 KG/M2 | HEART RATE: 57 BPM | DIASTOLIC BLOOD PRESSURE: 70 MMHG

## 2019-01-15 DIAGNOSIS — M79.7 FIBROMYALGIA: ICD-10-CM

## 2019-01-15 DIAGNOSIS — M54.16 LUMBAR RADICULITIS: ICD-10-CM

## 2019-01-15 DIAGNOSIS — M51.36 DDD (DEGENERATIVE DISC DISEASE), LUMBAR: Primary | ICD-10-CM

## 2019-01-15 PROCEDURE — 99204 PR OFFICE/OUTPT VISIT, NEW, LEVL IV, 45-59 MIN: ICD-10-PCS | Mod: S$GLB,,, | Performed by: ANESTHESIOLOGY

## 2019-01-15 PROCEDURE — 99999 PR PBB SHADOW E&M-EST. PATIENT-LVL III: ICD-10-PCS | Mod: PBBFAC,,, | Performed by: ANESTHESIOLOGY

## 2019-01-15 PROCEDURE — 99999 PR PBB SHADOW E&M-EST. PATIENT-LVL III: CPT | Mod: PBBFAC,,, | Performed by: ANESTHESIOLOGY

## 2019-01-15 PROCEDURE — 99204 OFFICE O/P NEW MOD 45 MIN: CPT | Mod: S$GLB,,, | Performed by: ANESTHESIOLOGY

## 2019-01-15 RX ORDER — DICLOFENAC SODIUM 10 MG/G
GEL TOPICAL
Qty: 1 TUBE | Refills: 2 | Status: SHIPPED | OUTPATIENT
Start: 2019-01-15 | End: 2019-03-28 | Stop reason: SDUPTHER

## 2019-01-15 RX ORDER — DICLOFENAC SODIUM 10 MG/G
GEL TOPICAL
Refills: 0 | COMMUNITY
Start: 2018-12-06 | End: 2019-01-15 | Stop reason: SDUPTHER

## 2019-01-15 RX ORDER — FLUTICASONE PROPIONATE 50 MCG
SPRAY, SUSPENSION (ML) NASAL
COMMUNITY
End: 2021-11-01

## 2019-01-15 NOTE — PROGRESS NOTES
This note was completed with dictation software and grammatical errors may exist.    Referring Physician: Mariel Canela    PCP: Willie Jerome III, MD      CC:  Low back and leg pain    HPI:   Teresa Clay is a 63 y.o. female referred to us low back leg pain.  Patient with significant history of fibromyalgia, generalized anxiety.  She presents to us lower back pain calf so.  Pain worsens with sitting, standing, bending, flexing, lifting getting up.  Pain improves with rest.  She has tried physical therapy with benefit.  She recently had a lumbar MRI.  She has not had any interventional procedures.  She takes NSAIDs Voltaren gel, Baclofen.  She denies any worsening weakness.  No bowel.    ROS:  CONSTITUTIONAL: No fevers, chills, night sweats, wt. loss, appetite changes  SKIN: no rashes or itching  ENT: No headaches, head trauma, vision changes, or eye pain  LYMPH NODES: None noticed   CV: No chest pain, palpitations.   RESP: No shortness of breath, dyspnea on exertion, cough, wheezing, or hemoptysis  GI: No nausea, emesis, diarrhea, constipation, melena, hematochezia, pain.    : No dysuria, hematuria, urgency, or frequency   HEME: No easy bruising, bleeding problems  PSYCHIATRIC: No depression, anxiety, psychosis, hallucinations.  NEURO: No seizures, memory loss, dizziness or difficulty sleeping  MSK:  Positive HPI      Past Medical History:   Diagnosis Date    Diverticulitis     Encounter for long-term (current) use of medications     Hypertension      Past Surgical History:   Procedure Laterality Date    APPENDECTOMY      CHOLECYSTECTOMY      COLONOSCOPY      GALLBLADDER SURGERY      TUBAL LIGATION       Family History   Problem Relation Age of Onset    Diabetes Brother     Cancer Brother      Social History     Socioeconomic History    Marital status:      Spouse name: None    Number of children: None    Years of education: None    Highest education level: None   Social Needs     "Financial resource strain: None    Food insecurity - worry: None    Food insecurity - inability: None    Transportation needs - medical: None    Transportation needs - non-medical: None   Occupational History    None   Tobacco Use    Smoking status: Current Every Day Smoker     Packs/day: 0.50     Types: Cigarettes    Smokeless tobacco: Never Used   Substance and Sexual Activity    Alcohol use: No    Drug use: No    Sexual activity: None   Other Topics Concern    None   Social History Narrative    None         Medications/Allergies: See med card    Vitals:    01/15/19 0905   BP: 130/70   Pulse: (!) 57   Weight: 67.6 kg (149 lb)   Height: 4' 11" (1.499 m)   PainSc:   7   PainLoc: Back         Physical exam:    GENERAL: A and O x3, the patient appears well groomed and is in no acute distress.  Skin: No rashes or obvious lesions  HEENT: normocephalic, atraumatic  CARDIOVASCULAR:  Palpable peripheral pulses  LUNGS: easy work of breathing  ABDOMEN: soft, nontender   UPPER EXTREMITIES: Normal alignment, normal range of motion, no atrophy, no skin changes,  hair growth and nail growth normal and equal bilaterally. No swelling, no tenderness.    LOWER EXTREMITIES:  Normal alignment, normal range of motion, no atrophy, no skin changes,  hair growth and nail growth normal and equal bilaterally. No swelling, no tenderness.    LUMBAR SPINE  Lumbar spine: ROM is full with flexion extension and oblique extension with moderate increased pain.    Osman's test causes no increased pain on either side.    Supine straight leg raise is negative bilaterally.  +femoral stretch bilaterally  Internal and external rotation of the hip causes no increased pain on either side.  Myofascial exam: Moderate tenderness to palpation across lumbar paraspinous muscles.      MENTAL STATUS: normal orientation, speech, language, and fund of knowledge for social situation.  Emotional state appropriate.    CRANIAL NERVES:  II:  PERRL " bilaterally,   III,IV,VI: EOMI.    V:  Facial sensation equal bilaterally  VII:  Facial motor function normal.  VIII:  Hearing equal to finger rub bilaterally  IX/X: Gag normal, palate symmetric  XI:  Shoulder shrug equal, head turn equal  XII:  Tongue midline without fasciculations      MOTOR: Tone and bulk: normal bilateral upper and lower Strength: normal   Delt Bi Tri WE WF     R 5 5 5 5 5 5   L 5 5 5 5 5 5     IP ADD ABD Quad TA Gas HAM  R 5 5 5 5 5 5 5  L 5 5 5 5 5 5 5    SENSATION: Light touch and pinprick intact bilaterally  REFLEXES: normal, symmetric, nonbrisk.  Toes down, no clonus. No hoffmans.  GAIT: normal rise, base, steps, and arm swing.        Imaging:  MRI L-spine 4/2018  Moderate disc desiccation is present.  There is mild loss of disc height throughout the lumbar spine.  Moderate facet arthropathy is present from L2 through S1.    L4-5: There is moderate broad-based posterior disc bulging, with associated annular fissure of the disc.  This along with bilateral facet arthropathy contributes to moderate spinal canal narrowing and mild bilateral neuroforaminal narrowing.    L5-S1: Minimal broad-based posterior bulging of the disc is present without spinal canal or neuroforaminal narrowing.    Assessment:  Patient referred low and leg pain  1. DDD (degenerative disc disease), lumbar    2. Lumbar radiculitis    3. Fibromyalgia          Plan:  1. I have stressed the importance of physical activity and exercise to improve overall health  2. Reviewed pertinent imaging and records with patient  3. I think that the patient's back pain and radicular leg symptoms are due to degenerative disc disease and have recommended a lumbar epidural steroid injection to the Bilateral L4-5 level(s).  4. Follow up after procedure      Thank you for referring this interesting patient, and I look forward to continuing to collaborate in her care.

## 2019-01-18 DIAGNOSIS — M54.16 LUMBAR RADICULOPATHY: Primary | ICD-10-CM

## 2019-02-04 ENCOUNTER — HOSPITAL ENCOUNTER (OUTPATIENT)
Facility: AMBULARY SURGERY CENTER | Age: 64
Discharge: HOME | End: 2019-02-04
Attending: ANESTHESIOLOGY | Admitting: ANESTHESIOLOGY
Payer: COMMERCIAL

## 2019-02-04 DIAGNOSIS — M54.16 LUMBAR RADICULITIS: Primary | ICD-10-CM

## 2019-02-04 PROCEDURE — 99152 PR MOD CONSCIOUS SEDATION, SAME PHYS, 5+ YRS, FIRST 15 MIN: ICD-10-PCS | Mod: S$GLB,,, | Performed by: ANESTHESIOLOGY

## 2019-02-04 PROCEDURE — 64483 NJX AA&/STRD TFRM EPI L/S 1: CPT | Mod: 50,S$GLB,, | Performed by: ANESTHESIOLOGY

## 2019-02-04 PROCEDURE — 64483 PR EPIDURAL INJ, ANES/STEROID, TRANSFORAMINAL, LUMB/SACR, SNGL LEVL: ICD-10-PCS | Mod: 50,S$GLB,, | Performed by: ANESTHESIOLOGY

## 2019-02-04 PROCEDURE — 99152 MOD SED SAME PHYS/QHP 5/>YRS: CPT | Mod: S$GLB,,, | Performed by: ANESTHESIOLOGY

## 2019-02-04 PROCEDURE — 64483 NJX AA&/STRD TFRM EPI L/S 1: CPT | Mod: RT | Performed by: ANESTHESIOLOGY

## 2019-02-04 RX ORDER — MIDAZOLAM HYDROCHLORIDE 1 MG/ML
INJECTION INTRAMUSCULAR; INTRAVENOUS
Status: SHIPPED | OUTPATIENT
Start: 2019-02-04

## 2019-02-04 RX ORDER — FENTANYL CITRATE 50 UG/ML
INJECTION, SOLUTION INTRAMUSCULAR; INTRAVENOUS
Status: SHIPPED | OUTPATIENT
Start: 2019-02-04

## 2019-02-04 RX ORDER — DEXAMETHASONE SODIUM PHOSPHATE 10 MG/ML
INJECTION INTRAMUSCULAR; INTRAVENOUS
Status: DISCONTINUED | OUTPATIENT
Start: 2019-02-04 | End: 2019-02-04 | Stop reason: HOSPADM

## 2019-02-04 RX ORDER — MIDAZOLAM HYDROCHLORIDE 1 MG/ML
INJECTION INTRAMUSCULAR; INTRAVENOUS
Status: DISCONTINUED
Start: 2019-02-04 | End: 2019-02-04 | Stop reason: HOSPADM

## 2019-02-04 RX ORDER — FENTANYL CITRATE 50 UG/ML
INJECTION, SOLUTION INTRAMUSCULAR; INTRAVENOUS
Status: DISCONTINUED
Start: 2019-02-04 | End: 2019-02-04 | Stop reason: HOSPADM

## 2019-02-04 RX ORDER — BUPIVACAINE HYDROCHLORIDE 2.5 MG/ML
INJECTION, SOLUTION EPIDURAL; INFILTRATION; INTRACAUDAL
Status: DISCONTINUED | OUTPATIENT
Start: 2019-02-04 | End: 2019-02-04 | Stop reason: HOSPADM

## 2019-02-04 RX ORDER — LIDOCAINE HYDROCHLORIDE 10 MG/ML
INJECTION, SOLUTION EPIDURAL; INFILTRATION; INTRACAUDAL; PERINEURAL
Status: DISCONTINUED | OUTPATIENT
Start: 2019-02-04 | End: 2019-02-04 | Stop reason: HOSPADM

## 2019-02-04 RX ORDER — DEXAMETHASONE SODIUM PHOSPHATE 10 MG/ML
INJECTION INTRAMUSCULAR; INTRAVENOUS
Status: DISCONTINUED
Start: 2019-02-04 | End: 2019-02-04 | Stop reason: HOSPADM

## 2019-02-04 RX ORDER — SODIUM CHLORIDE, SODIUM LACTATE, POTASSIUM CHLORIDE, CALCIUM CHLORIDE 600; 310; 30; 20 MG/100ML; MG/100ML; MG/100ML; MG/100ML
INJECTION, SOLUTION INTRAVENOUS ONCE AS NEEDED
Status: COMPLETED | OUTPATIENT
Start: 2019-02-04 | End: 2019-02-04

## 2019-02-04 RX ADMIN — SODIUM CHLORIDE, SODIUM LACTATE, POTASSIUM CHLORIDE, CALCIUM CHLORIDE: 600; 310; 30; 20 INJECTION, SOLUTION INTRAVENOUS at 12:02

## 2019-02-04 NOTE — OP NOTE
PROCEDURE DATE: 2/4/2019    PROCEDURE: Bilateral L4-5 transforaminal epidural steroid injection under fluoroscopy    DIAGNOSIS: Lumbar disc displacement without myelopathy  Post op diagnosis: Same    PHYSICIAN: Trey Dueñas MD    MEDICATIONS INJECTED:  Dexamethasone 5mg (0.5ml) and 1.5ml 0.25% bupivicaine at each nerve root.     LOCAL ANESTHETIC INJECTED:  Lidocaine 1%. 2 ml per site.    SEDATION MEDICATIONS: RN Iv sedation    ESTIMATED BLOOD LOSS:  None    COMPLICATIONS:  None    TECHNIQUE:   A time-out was taken to identify patient and procedure side prior to starting the procedure. The patient was placed in a prone position, prepped and draped in the usual sterile fashion using ChloraPrep and sterile towels.  The area to be injected was determined under fluoroscopic guidance in AP and oblique view.  Local anesthetic was given by raising a wheal and going down to the hub of a 25-gauge 1.5 inch needle.  In oblique view, a 3.5 inch 22-gauge bent-tip spinal needle was introduced towards 6 oclock position of the pedicle of each above named nerve root level.  The needle was walked medially then hinged into the neural foramen and position was confirmed in AP and lateral views.  1ml contrast dye was injected to confirm appropriate placement and that there was no vascular uptake.  After negative aspiration for blood or CSF, the medication was then injected. This was performed at the bilateral L4-5 level(s). The patient tolerated the procedure well.    The patient was monitored after the procedure.  Patient was given post procedure and discharge instructions to follow at home. The patient was discharged in a stable condition.

## 2019-02-04 NOTE — DISCHARGE SUMMARY
[facility]  Discharge Note  Short Stay    Admit Date: 2/4/2019    Discharge Date and Time: 2/4/2019    Attending Physician: Trey Dueñas MD     Discharge Provider: Trey Dueñas    Diagnoses:  Active Hospital Problems    Diagnosis  POA    *Lumbar radiculitis [M54.16]  Yes      Resolved Hospital Problems   No resolved problems to display.       Hospital Course: Lumbar DEAN  Discharged Condition: Good    Final Diagnoses:   Active Hospital Problems    Diagnosis  POA    *Lumbar radiculitis [M54.16]  Yes      Resolved Hospital Problems   No resolved problems to display.       Disposition: Home or Self Care    Follow up/Patient Instructions:    Medications:  Reconciled Home Medications:      Medication List      CONTINUE taking these medications    diclofenac sodium 1 % Gel  Commonly known as:  VOLTAREN  APPLY 4 GRAMS TOPICALLY  AA QID AS DIRECTED     fluticasone 50 mcg/actuation nasal spray  Commonly known as:  FLONASE  fluticasone 50 mcg/actuation nasal spray,suspension     ibuprofen 800 MG tablet  Commonly known as:  ADVIL,MOTRIN  Take 800 mg by mouth 3 (three) times daily.     lisinopril-hydrochlorothiazide 10-12.5 mg per tablet  Commonly known as:  PRINZIDE,ZESTORETIC  TAKE ONE TABLET BY MOUTH ONCE DAILY     metoprolol succinate 25 MG 24 hr tablet  Commonly known as:  TOPROL-XL  TAKE ONE TABLET BY MOUTH ONCE DAILY     pantoprazole 40 MG tablet  Commonly known as:  PROTONIX  Take 1 tablet (40 mg total) by mouth once daily. Take in the morning before breakfast.  Wait 30 minutes before eating or drinking anything          Discharge Procedure Orders   Call MD for:  temperature >100.4     Call MD for:  persistent nausea and vomiting or diarrhea     Call MD for:  severe uncontrolled pain     Call MD for:  redness, tenderness, or signs of infection (pain, swelling, redness, odor or green/yellow discharge around incision site)     Call MD for:  difficulty breathing or increased cough     Call MD for:  severe  persistent headache        Follow up with MD in 2-3 weeks    Discharge Procedure Orders (must include Diet, Follow-up, Activity):   Discharge Procedure Orders (must include Diet, Follow-up, Activity)   Call MD for:  temperature >100.4     Call MD for:  persistent nausea and vomiting or diarrhea     Call MD for:  severe uncontrolled pain     Call MD for:  redness, tenderness, or signs of infection (pain, swelling, redness, odor or green/yellow discharge around incision site)     Call MD for:  difficulty breathing or increased cough     Call MD for:  severe persistent headache

## 2019-02-06 ENCOUNTER — HOSPITAL ENCOUNTER (EMERGENCY)
Facility: HOSPITAL | Age: 64
Discharge: HOME | End: 2019-02-06
Attending: INTERNAL MEDICINE
Payer: COMMERCIAL

## 2019-02-06 ENCOUNTER — NURSE TRIAGE (OUTPATIENT)
Dept: ADMINISTRATIVE | Facility: CLINIC | Age: 64
End: 2019-02-06

## 2019-02-06 VITALS
BODY MASS INDEX: 30.24 KG/M2 | TEMPERATURE: 98 F | WEIGHT: 150 LBS | HEART RATE: 59 BPM | HEIGHT: 59 IN | SYSTOLIC BLOOD PRESSURE: 132 MMHG | OXYGEN SATURATION: 92 % | RESPIRATION RATE: 18 BRPM | DIASTOLIC BLOOD PRESSURE: 74 MMHG

## 2019-02-06 DIAGNOSIS — R42 VERTIGO: ICD-10-CM

## 2019-02-06 PROCEDURE — 25000003 PHARM REV CODE 250: Performed by: INTERNAL MEDICINE

## 2019-02-06 PROCEDURE — 99283 EMERGENCY DEPT VISIT LOW MDM: CPT

## 2019-02-06 RX ORDER — MECLIZINE HCL 12.5 MG 12.5 MG/1
25 TABLET ORAL
Status: COMPLETED | OUTPATIENT
Start: 2019-02-06 | End: 2019-02-06

## 2019-02-06 RX ADMIN — MECLIZINE HCL 25 MG: 12.5 TABLET ORAL at 01:02

## 2019-02-06 NOTE — TELEPHONE ENCOUNTER
"trans foraminal epidural steroid injection on Monday.  She is having dizziness and feeling funny to the point where she's leaving work early.  Blood pressure is wnl for her.      Reason for Disposition   RN needs further essential information from caller in order to complete triage    Answer Assessment - Initial Assessment Questions  1. REASON FOR CALL or QUESTION: "What is your reason for calling today?" or "How can I best help you?" or "What question do you have that I can help answer?"      Caller is not with the patient now, he will have her calll me when she arrives at home    Protocols used: ST INFORMATION ONLY CALL-A-AH      "

## 2019-02-06 NOTE — ED TRIAGE NOTES
Patient arrives to ER#5 via gurney by [facility]. Patient complains of dizziness when she went to work this morning. Patient reports she had epidural injection to her back on Monday in [place]. Denies any pain at this time. Respirations even and unlabored. No distress noted.

## 2019-02-06 NOTE — TELEPHONE ENCOUNTER
"  Reason for Disposition   New neurologic deficit that is present now: * Weakness of the face, arm, or leg on one side of the body * Numbness of the face, arm, or leg on one side of the body * Loss of speech or garbled speech    Protocols used: ST DIZZINESS-A-OH     states pt c/o dizziness, described as "feel weird" that began today after 9:30 today.   states she doesn't think pt has room spinning sensation b/c she drove herself home from work. Pt received injection from pain management Monday.  states pt is sensitive to pain medication. Pt c/o L arm numbness that began "a little while ago."  advised per protocol and  verbalizes understanding.   "

## 2019-02-06 NOTE — ED PROVIDER NOTES
Encounter Date: 2/6/2019       History     Chief Complaint   Patient presents with    Dizziness     ONSET THIS MORNING WHILE AT WORK. PATIENT REPORTS EPIDURAL INJECTION TO BACK ON MONDAY IN [place].      Patient onset of dizziness this morning while at work.  She had a spinal epidural yesterday with administration of medication which probably included steroids and other pain meds.  She also had pain meds administered peripherally.  She woke this morning and went about her normal activity was at work when she had a sense of the room moving.  She was off balance.  She called her provider who performed the epidural and they recommended she come to the ER just to get checked out.  She has no other signs and symptoms of organ systems contributing to vertigo.  No headache. She has no loss of motor or neurological function otherwise.          Review of patient's allergies indicates:   Allergen Reactions    Betamethasone acet,sod phos     Codeine      Past Medical History:   Diagnosis Date    Chronic back pain     Diverticulitis     Encounter for long-term (current) use of medications     Hypertension      Past Surgical History:   Procedure Laterality Date    APPENDECTOMY      CHOLECYSTECTOMY      COLONOSCOPY      GALLBLADDER SURGERY      Injection,steroid,epidural,transforaminal approach L4-5 Bilateral 2/4/2019    [facility] at [facility]    TUBAL LIGATION       Family History   Problem Relation Age of Onset    Diabetes Brother     Cancer Brother      Social History     Tobacco Use    Smoking status: Current Every Day Smoker     Packs/day: 0.50     Types: Cigarettes    Smokeless tobacco: Never Used   Substance Use Topics    Alcohol use: No    Drug use: No     Review of Systems   Constitutional: Negative for fever.   HENT: Negative for sore throat.    Respiratory: Negative for shortness of breath.    Cardiovascular: Negative for chest pain.   Gastrointestinal: Negative for nausea.    Genitourinary: Negative for dysuria.   Musculoskeletal: Negative for back pain.   Skin: Negative for rash.   Neurological: Positive for dizziness. Negative for weakness.   Hematological: Does not bruise/bleed easily.   All other systems reviewed and are negative.      Physical Exam     Initial Vitals [02/06/19 1231]   BP Pulse Resp Temp SpO2   132/74 60 18 98.4 °F (36.9 °C) (!) 94 %      MAP       --         Physical Exam    Nursing note and vitals reviewed.  Constitutional: Vital signs are normal. She appears well-developed and well-nourished. She is active and cooperative.   HENT:   Head: Normocephalic and atraumatic.   Right Ear: External ear normal.   Left Ear: External ear normal.   Nose: Nose normal.   Mouth/Throat: Oropharynx is clear and moist.   Eyes: Conjunctivae, EOM and lids are normal. Pupils are equal, round, and reactive to light. Lids are everted and swept, no foreign bodies found.   Neck: Trachea normal, normal range of motion and full passive range of motion without pain. Neck supple.   Cardiovascular: Normal rate, regular rhythm, S1 normal, S2 normal, normal heart sounds, intact distal pulses and normal pulses.  No extrasystoles are present.    Pulmonary/Chest: Breath sounds normal.   Abdominal: Soft. Normal appearance and bowel sounds are normal.   Musculoskeletal: Normal range of motion.   Neurological: She is alert and oriented to person, place, and time. She has normal strength and normal reflexes. GCS score is 15. GCS eye subscore is 4. GCS verbal subscore is 5. GCS motor subscore is 6.   Patient neurological is negative. She has full range of motion she has no nystagmus no some pupillary abnormalities she has normal coordination is walking normally.   Skin: Skin is warm, dry and intact. Capillary refill takes less than 2 seconds.   Psychiatric: She has a normal mood and affect. Her speech is normal and behavior is normal. Judgment and thought content normal. Cognition and memory are  normal.         ED Course   Procedures  Labs Reviewed - No data to display  EKG Readings: (Independently Interpreted)   EKG performed per the ambulance was normal at was reviewed his 12 lead and no abnormalities normal sinus rhythm has with no ST-T changes or arrhythmias.       Imaging Results    None          Medical Decision Making:   ED Management:  Patient presents with vertigo.  Physical exam and contributory findings were negative. Is felt the patient as having sequelae to her medications yesterday.  She has used Antivert p.r.n. and it should resolve.                      Clinical Impression:   The encounter diagnosis was Vertigo.      Disposition:   Disposition: Discharged  Condition: Stable                        Conrad Abdullahi MD  02/06/19 0420

## 2019-02-07 VITALS
TEMPERATURE: 98 F | HEART RATE: 68 BPM | SYSTOLIC BLOOD PRESSURE: 105 MMHG | DIASTOLIC BLOOD PRESSURE: 64 MMHG | HEIGHT: 59 IN | OXYGEN SATURATION: 91 % | WEIGHT: 149 LBS | RESPIRATION RATE: 20 BRPM | BODY MASS INDEX: 30.04 KG/M2

## 2019-02-12 DIAGNOSIS — Z12.39 SCREENING BREAST EXAMINATION: Primary | ICD-10-CM

## 2019-02-12 DIAGNOSIS — Z72.0 TOBACCO ABUSE: ICD-10-CM

## 2019-02-15 ENCOUNTER — HOSPITAL ENCOUNTER (OUTPATIENT)
Dept: RADIOLOGY | Facility: HOSPITAL | Age: 64
Discharge: HOME | End: 2019-02-15
Attending: NURSE PRACTITIONER
Payer: COMMERCIAL

## 2019-02-15 VITALS — BODY MASS INDEX: 30.24 KG/M2 | WEIGHT: 150 LBS | HEIGHT: 59 IN

## 2019-02-15 DIAGNOSIS — Z72.0 TOBACCO ABUSE: ICD-10-CM

## 2019-02-15 DIAGNOSIS — Z12.39 SCREENING BREAST EXAMINATION: ICD-10-CM

## 2019-02-15 PROCEDURE — 77067 MAMMO DIGITAL SCREENING BILAT WITH CAD: ICD-10-PCS | Mod: 26,,, | Performed by: RADIOLOGY

## 2019-02-15 PROCEDURE — 77067 SCR MAMMO BI INCL CAD: CPT | Mod: TC

## 2019-02-15 PROCEDURE — 71046 XR CHEST PA AND LATERAL: ICD-10-PCS | Mod: 26,,, | Performed by: RADIOLOGY

## 2019-02-15 PROCEDURE — 71046 X-RAY EXAM CHEST 2 VIEWS: CPT | Mod: 26,,, | Performed by: RADIOLOGY

## 2019-02-15 PROCEDURE — 77067 SCR MAMMO BI INCL CAD: CPT | Mod: 26,,, | Performed by: RADIOLOGY

## 2019-02-15 PROCEDURE — 71046 X-RAY EXAM CHEST 2 VIEWS: CPT | Mod: TC,FY

## 2019-02-25 ENCOUNTER — TELEPHONE (OUTPATIENT)
Dept: PAIN MEDICINE | Facility: CLINIC | Age: 64
End: 2019-02-25

## 2019-02-25 NOTE — TELEPHONE ENCOUNTER
Spoke with  Obed advised him that the Pain Management staff is gone for the day and that this message would be addressed in the AM he voiced understanding would like a call back in the morning.

## 2019-02-25 NOTE — TELEPHONE ENCOUNTER
----- Message from Juan Carlos Fregoso sent at 2/25/2019  4:24 PM CST -----  Contact: Gustavo Clay (Spouse)  Type: Needs Medical Advice    Who Called:  Gustavo Clay (Spouse)  Symptoms (please be specific):  Bilateral leg and right hip pain  Pharmacy name and phone #:    [facility] [address] [place] [address] AT [address]  [address] [place] [id number]  Phone: [phone] Fax: [phone]  Best Call Back Number: [phone] or [phone]  Additional Information: Patient had injection on 2/4/19 and she is still experiencing severe pain. Please advise patient

## 2019-02-26 ENCOUNTER — TELEPHONE (OUTPATIENT)
Dept: PAIN MEDICINE | Facility: CLINIC | Age: 64
End: 2019-02-26

## 2019-02-26 NOTE — TELEPHONE ENCOUNTER
Pt stated she had 100% pain relief for 2 weeks following injection on 2/4/19. She now rates her pain a 7/10 and requesting a second injection.

## 2019-03-01 DIAGNOSIS — M54.16 LUMBAR RADICULOPATHY: Primary | ICD-10-CM

## 2019-03-13 ENCOUNTER — TELEPHONE (OUTPATIENT)
Dept: PAIN MEDICINE | Facility: CLINIC | Age: 64
End: 2019-03-13

## 2019-03-13 RX ORDER — TRAMADOL HYDROCHLORIDE 50 MG/1
50 TABLET ORAL EVERY 6 HOURS PRN
Qty: 40 TABLET | Refills: 0 | Status: SHIPPED | OUTPATIENT
Start: 2019-03-13 | End: 2019-03-28

## 2019-03-13 NOTE — TELEPHONE ENCOUNTER
----- Message from Patricia Rosario sent at 3/13/2019 10:55 AM CDT -----  Type: Needs Medical Advice    Who Called:  Patient  Best Call Back Number: [phone] work  Additional Information: patient states her insurance will not cover her injection stating it is to soon and would like a return call to be advised what she can do until they will cover it.     Thank you

## 2019-03-13 NOTE — TELEPHONE ENCOUNTER
Informed patient that Rx was sent to the pharmacy and that it does require a PA. Informed patient that PA was submitted and I am waiting on a response. Patient accepted and voiced understanding.

## 2019-03-14 ENCOUNTER — TELEPHONE (OUTPATIENT)
Dept: PAIN MEDICINE | Facility: CLINIC | Age: 64
End: 2019-03-14

## 2019-03-27 ENCOUNTER — TELEPHONE (OUTPATIENT)
Dept: PAIN MEDICINE | Facility: CLINIC | Age: 64
End: 2019-03-27

## 2019-03-27 NOTE — TELEPHONE ENCOUNTER
Spoke with patients  and he stated his wife is c/o neck pain. This is a new problem and she will need to be seen in the office. She will call back if she wants to be seen tomorrow.

## 2019-03-27 NOTE — TELEPHONE ENCOUNTER
----- Message from Jaison Pemberton sent at 3/27/2019 12:26 PM CDT -----  Contact: pt , Gustavo  Type: Needs Medical Advice    Who Called:  Gustavo  Symptoms (please be specific):  Neck pain (muscle spasm / possible pinched nerve)  How long has patient had these symptoms:  This morning (ongoing pain in shoulder area)  Pharmacy name and phone #:    [facility] [address] [place] [address] AT [facility] [address]  [address] [place] [id number]  Phone: [phone] Fax: [phone]    Best Call Back Number: [phone]  Additional Information: pt is requesting a call back to discuss. Pt is not taking any Advil  ~

## 2019-03-28 ENCOUNTER — OFFICE VISIT (OUTPATIENT)
Dept: PAIN MEDICINE | Facility: CLINIC | Age: 64
End: 2019-03-28
Payer: COMMERCIAL

## 2019-03-28 VITALS
HEIGHT: 59 IN | SYSTOLIC BLOOD PRESSURE: 140 MMHG | BODY MASS INDEX: 30.04 KG/M2 | WEIGHT: 149 LBS | HEART RATE: 68 BPM | DIASTOLIC BLOOD PRESSURE: 78 MMHG

## 2019-03-28 DIAGNOSIS — M79.7 FIBROMYALGIA: ICD-10-CM

## 2019-03-28 DIAGNOSIS — M54.2 CERVICALGIA: Primary | ICD-10-CM

## 2019-03-28 DIAGNOSIS — M54.16 LUMBAR RADICULITIS: ICD-10-CM

## 2019-03-28 DIAGNOSIS — M50.30 DDD (DEGENERATIVE DISC DISEASE), CERVICAL: ICD-10-CM

## 2019-03-28 DIAGNOSIS — M51.36 DDD (DEGENERATIVE DISC DISEASE), LUMBAR: ICD-10-CM

## 2019-03-28 PROCEDURE — 96372 PR INJECTION,THERAP/PROPH/DIAG2ST, IM OR SUBCUT: ICD-10-PCS | Mod: S$GLB,,, | Performed by: ANESTHESIOLOGY

## 2019-03-28 PROCEDURE — 99999 PR PBB SHADOW E&M-EST. PATIENT-LVL IV: ICD-10-PCS | Mod: PBBFAC,,, | Performed by: ANESTHESIOLOGY

## 2019-03-28 PROCEDURE — 99214 OFFICE O/P EST MOD 30 MIN: CPT | Mod: 25,S$GLB,, | Performed by: ANESTHESIOLOGY

## 2019-03-28 PROCEDURE — 99214 PR OFFICE/OUTPT VISIT, EST, LEVL IV, 30-39 MIN: ICD-10-PCS | Mod: 25,S$GLB,, | Performed by: ANESTHESIOLOGY

## 2019-03-28 PROCEDURE — 99999 PR PBB SHADOW E&M-EST. PATIENT-LVL IV: CPT | Mod: PBBFAC,,, | Performed by: ANESTHESIOLOGY

## 2019-03-28 PROCEDURE — 96372 THER/PROPH/DIAG INJ SC/IM: CPT | Mod: S$GLB,,, | Performed by: ANESTHESIOLOGY

## 2019-03-28 RX ORDER — METOPROLOL TARTRATE 25 MG/1
TABLET, FILM COATED ORAL
Status: ON HOLD | COMMUNITY
Start: 2019-02-11 | End: 2019-05-06

## 2019-03-28 RX ORDER — HYDROCHLOROTHIAZIDE 25 MG/1
TABLET ORAL
Refills: 2 | COMMUNITY
Start: 2019-03-18 | End: 2021-01-19

## 2019-03-28 RX ORDER — KETOROLAC TROMETHAMINE 30 MG/ML
30 INJECTION, SOLUTION INTRAMUSCULAR; INTRAVENOUS ONCE
Status: COMPLETED | OUTPATIENT
Start: 2019-03-28 | End: 2019-03-28

## 2019-03-28 RX ORDER — BUPROPION HYDROCHLORIDE 150 MG/1
TABLET, EXTENDED RELEASE ORAL
Refills: 2 | COMMUNITY
Start: 2019-02-15 | End: 2019-05-27 | Stop reason: SDUPTHER

## 2019-03-28 RX ORDER — ALBUTEROL SULFATE 90 UG/1
AEROSOL, METERED RESPIRATORY (INHALATION)
COMMUNITY
Start: 2019-02-15 | End: 2019-07-16 | Stop reason: SDUPTHER

## 2019-03-28 RX ORDER — ALBUTEROL SULFATE 90 UG/1
AEROSOL, METERED RESPIRATORY (INHALATION)
COMMUNITY
Start: 2019-02-15 | End: 2019-09-30

## 2019-03-28 RX ORDER — TIOTROPIUM BROMIDE 18 UG/1
1 CAPSULE ORAL; RESPIRATORY (INHALATION)
COMMUNITY
Start: 2019-02-15

## 2019-03-28 RX ORDER — BUPROPION HYDROCHLORIDE 150 MG/1
TABLET, EXTENDED RELEASE ORAL
COMMUNITY
Start: 2019-02-15 | End: 2019-09-30

## 2019-03-28 RX ORDER — ERGOCALCIFEROL 1.25 MG/1
1 CAPSULE ORAL
COMMUNITY
Start: 2019-02-13 | End: 2020-03-25 | Stop reason: SDUPTHER

## 2019-03-28 RX ORDER — TRAMADOL HYDROCHLORIDE 50 MG/1
50 TABLET ORAL EVERY 8 HOURS PRN
Qty: 90 TABLET | Refills: 1 | Status: SHIPPED | OUTPATIENT
Start: 2019-03-28 | End: 2019-07-22

## 2019-03-28 RX ORDER — DICLOFENAC SODIUM 10 MG/G
GEL TOPICAL
Qty: 2 TUBE | Refills: 2 | Status: SHIPPED | OUTPATIENT
Start: 2019-03-28 | End: 2019-07-22 | Stop reason: SDUPTHER

## 2019-03-28 RX ORDER — LISINOPRIL 10 MG/1
TABLET ORAL
Refills: 2 | Status: ON HOLD | COMMUNITY
Start: 2019-02-11 | End: 2019-05-06

## 2019-03-28 RX ADMIN — KETOROLAC TROMETHAMINE 30 MG: 30 INJECTION, SOLUTION INTRAMUSCULAR; INTRAVENOUS at 11:03

## 2019-03-28 NOTE — PROGRESS NOTES
This note was completed with dictation software and grammatical errors may exist.    Referring Physician: No ref. provider found    PCP: Maite Villatoro NP      CC: neck pain,  Low back and leg pain    Interval history: Patient returns to our clinic. She is s/p bilateral L4-5 TFESI on 2/4/19 and reports 50% relief from the procedure.  She is scheduled for repeat procedure for further compounded benefit. She presents to us with worsening neck that has increased in the past 2 weeks.  She states having history of cervical DDD.  She has not tried PT for her neck.  She has not had any interventions.  Denies any radicular arm pain.  No weakness. No bowel or bladder changes.  Prior HPI:   Teresa Clay is a 63 y.o. female referred to us low back leg pain.  Patient with significant history of fibromyalgia, generalized anxiety.  She presents to us lower back pain calf so.  Pain worsens with sitting, standing, bending, flexing, lifting getting up.  Pain improves with rest.  She has tried physical therapy with benefit.  She recently had a lumbar MRI.  She has not had any interventional procedures.  She takes NSAIDs Voltaren gel, Baclofen.  She denies any worsening weakness.  No bowel.    Interventional history: s/p bilateral L4-5 TFESI on 2/4/19 with 50% relief    ROS:  CONSTITUTIONAL: No fevers, chills, night sweats, wt. loss, appetite changes  SKIN: no rashes or itching  ENT: No headaches, head trauma, vision changes, or eye pain  LYMPH NODES: None noticed   CV: No chest pain, palpitations.   RESP: No shortness of breath, dyspnea on exertion, cough, wheezing, or hemoptysis  GI: No nausea, emesis, diarrhea, constipation, melena, hematochezia, pain.    : No dysuria, hematuria, urgency, or frequency   HEME: No easy bruising, bleeding problems  PSYCHIATRIC: No depression, anxiety, psychosis, hallucinations.  NEURO: No seizures, memory loss, dizziness or difficulty sleeping  MSK:  Positive HPI      Past Medical History:  "  Diagnosis Date    Chronic back pain     Diverticulitis     Encounter for long-term (current) use of medications     Hypertension      Past Surgical History:   Procedure Laterality Date    APPENDECTOMY      CHOLECYSTECTOMY      COLONOSCOPY      GALLBLADDER SURGERY      Injection,steroid,epidural,transforaminal approach L4-5 Bilateral 2/4/2019    Performed by [facility]as [facility] at [facility] OR    TUBAL LIGATION       Family History   Problem Relation Age of Onset    Diabetes Brother     Cancer Brother     Breast cancer Maternal Aunt      Social History     Socioeconomic History    Marital status:      Spouse name: None    Number of children: None    Years of education: None    Highest education level: None   Occupational History    None   Social Needs    Financial resource strain: None    Food insecurity:     Worry: None     Inability: None    Transportation needs:     Medical: None     Non-medical: None   Tobacco Use    Smoking status: Current Every Day Smoker     Packs/day: 0.50     Types: Cigarettes    Smokeless tobacco: Never Used   Substance and Sexual Activity    Alcohol use: No    Drug use: No    Sexual activity: Yes   Lifestyle    Physical activity:     Days per week: None     Minutes per session: None    Stress: None   Relationships    Social connections:     Talks on phone: None     Gets together: None     Attends Congregational service: None     Active member of club or organization: None     Attends meetings of clubs or organizations: None     Relationship status: None    Intimate partner violence:     Fear of current or ex partner: None     Emotionally abused: None     Physically abused: None     Forced sexual activity: None   Other Topics Concern    None   Social History Narrative    None         Medications/Allergies: See med card    Vitals:    03/28/19 1053   BP: (!) 140/78   Pulse: 68   Weight: 67.6 kg (149 lb)   Height: 4' 11" (1.499 m)   PainSc:   8   PainLoc: Neck "         Physical exam:    GENERAL: A and O x3, the patient appears well groomed and is in no acute distress.  Skin: No rashes or obvious lesions  HEENT: normocephalic, atraumatic  CARDIOVASCULAR:  Palpable peripheral pulses  LUNGS: easy work of breathing  ABDOMEN: soft, nontender   UPPER EXTREMITIES: Normal alignment, normal range of motion, no atrophy, no skin changes,  hair growth and nail growth normal and equal bilaterally. No swelling, no tenderness.    LOWER EXTREMITIES:  Normal alignment, normal range of motion, no atrophy, no skin changes,  hair growth and nail growth normal and equal bilaterally. No swelling, no tenderness.    Cervical Spine:  Cervical spine: ROM is full in flexion, extension and lateral rotation with moderate increased pain.  Spurling's maneuver causes no neck pain to either side.  Myofascial exam: MOderate Tenderness to palpation across cervical paraspinous region bilaterally.    LUMBAR SPINE  Lumbar spine: ROM is full with flexion extension and oblique extension with moderate increased pain.    Osman's test causes no increased pain on either side.    Supine straight leg raise is negative bilaterally.  +femoral stretch bilaterally  Internal and external rotation of the hip causes no increased pain on either side.  Myofascial exam: Moderate tenderness to palpation across lumbar paraspinous muscles.      MENTAL STATUS: normal orientation, speech, language, and fund of knowledge for social situation.  Emotional state appropriate.    CRANIAL NERVES:  II:  PERRL bilaterally,   III,IV,VI: EOMI.    V:  Facial sensation equal bilaterally  VII:  Facial motor function normal.  VIII:  Hearing equal to finger rub bilaterally  IX/X: Gag normal, palate symmetric  XI:  Shoulder shrug equal, head turn equal  XII:  Tongue midline without fasciculations      MOTOR: Tone and bulk: normal bilateral upper and lower Strength: normal    Delt Bi Tri WE WF     R 5 5 5 5 5 5   L 5 5 5 5 5 5     IP ADD ABD Quad TA Gas HAM  R 5 5 5 5 5 5 5  L 5 5 5 5 5 5 5    SENSATION: Light touch and pinprick intact bilaterally  REFLEXES: normal, symmetric, nonbrisk.  Toes down, no clonus. No hoffmans.  GAIT: normal rise, base, steps, and arm swing.        Imaging:  Xray C-spine 7/2018  1. Straightening of the normal cervical lordosis.  2. Mild multilevel degenerative disc disease resulting in mild to moderate bilateral neural foraminal narrowing from C3 through C6.  3. No significant prevertebral soft tissue swelling.    MRI L-spine 4/2018  Moderate disc desiccation is present.  There is mild loss of disc height throughout the lumbar spine.  Moderate facet arthropathy is present from L2 through S1.    L4-5: There is moderate broad-based posterior disc bulging, with associated annular fissure of the disc.  This along with bilateral facet arthropathy contributes to moderate spinal canal narrowing and mild bilateral neuroforaminal narrowing.    L5-S1: Minimal broad-based posterior bulging of the disc is present without spinal canal or neuroforaminal narrowing.    Assessment:  Patient referred low and leg pain  1. Cervicalgia    2. DDD (degenerative disc disease), cervical    3. DDD (degenerative disc disease), lumbar    4. Lumbar radiculitis    5. Fibromyalgia          Plan:  1. I have stressed the importance of physical activity and exercise to improve overall health  2. Reviewed pertinent imaging and records with patient  3. Plan for repeat lumbar DEAN to help further with her back and leg pain  4. Ordered PT to help with her neck pain  5. May consider cervical MRI if neck pain does not improve  6. She can continue Tramadol as needed for pain  7. Follow up after procedure

## 2019-03-29 ENCOUNTER — TELEPHONE (OUTPATIENT)
Dept: PAIN MEDICINE | Facility: CLINIC | Age: 64
End: 2019-03-29

## 2019-03-29 NOTE — TELEPHONE ENCOUNTER
PA submitted for Diclofenac 1%  waiting for response. Patient notified. Will notify patient and pharmacy [facility] at [phone] of outcome.

## 2019-04-01 ENCOUNTER — TELEPHONE (OUTPATIENT)
Dept: PAIN MEDICINE | Facility: CLINIC | Age: 64
End: 2019-04-01

## 2019-04-03 ENCOUNTER — CLINICAL SUPPORT (OUTPATIENT)
Dept: REHABILITATION | Facility: HOSPITAL | Age: 64
End: 2019-04-03
Payer: COMMERCIAL

## 2019-04-03 DIAGNOSIS — M54.2 CERVICALGIA: ICD-10-CM

## 2019-04-03 PROCEDURE — 97161 PT EVAL LOW COMPLEX 20 MIN: CPT

## 2019-04-03 PROCEDURE — 97140 MANUAL THERAPY 1/> REGIONS: CPT

## 2019-04-03 NOTE — PROGRESS NOTES
[facility]  Physical Therapy Initial Evaluation    Name: Teresa Clay  Clinic Number: [id number]    Therapy Diagnosis:   Encounter Diagnosis   Name Primary?    Cervicalgia      Physician: Trey Dueñas MD    Physician Orders: PT Eval and Treat cervical  Medical Diagnosis: cervicalgia, DDD of cervical spine  Evaluation Date: 4/3/2019  Authorization period Expiration: 7/3/19   Plan of Care Certification Period: 7/3/19    Visit #: 1/ Visits authorized: 8  Time In: 9:00 am  Time Out: 9:40 am  Total Billable Time: 40 minutes    Precautions: Standard    Subjective   Date of onset: 3/11/19  Date of Surgery: none    Past Medical History:   Diagnosis Date    Chronic back pain     Diverticulitis     Encounter for long-term (current) use of medications     Hypertension      Teresa Clay  has a past surgical history that includes Appendectomy; Gallbladder surgery; Cholecystectomy; Tubal ligation; and Colonoscopy.    Teresa has a current medication list which includes the following prescription(s): albuterol, albuterol, bupropion, bupropion, diclofenac sodium, ergocalciferol, fluticasone, hydrochlorothiazide, ibuprofen, lisinopril, lisinopril-hydrochlorothiazide, metoprolol succinate, metoprolol tartrate, pantoprazole, tiotropium, and tramadol, and the following Facility-Administered Medications: fentanyl and midazolam.    Review of patient's allergies indicates:   Allergen Reactions    Betamethasone acet,sod phos     Codeine       Imaging, none:     Prior Therapy: none for current condition  Occupation: , works full time  Prior Level of Function: no limitations due to neck pain, able to lift items, no limitations with her job2  Current Level of Function: missed a week of work due to neck pain, unable to lift anything overhead, R shoulder pain limiting functional use of R UE.     Pain:  Current 6/10, worst 10/10, best 6/10   Location: neck  Bilateral, right worse than  "left  Description: Aching, Grabbing, Sharp and Pulling and tight  Aggravating Factors: Lifting and moving neck, prolonged postures  Easing Factors: nothing      Onset/BENNY: insidious and sudden    History of current condition - Fabiola reports: 3 week history of symptoms, including bilateral neck pain, right more than left with radicular symptoms down into the thoracic region. Pt reports chronic neck pain on and off for "a while" but the intense pain only recent. Pt noticed loss of motion. Pt denies injury to aggravate symptoms, stating she woke up with the pain and it never went away. Pt denies prior treatment for neck pain. Denies n/t into either arm or hand but states she gets numbness in her lips.     Pt with time constraint this date.    Pts goals: to relieve pain so she can keep working.         Objective     Posture:     -       Affected scapula elevated (Right, due to increased tone in UT)    Cervical Range of Motion:    Degrees Pain   Flexion 28    Extension 32    Right Rotation 32    Left Rotation 65    Right Side Bending 8    Left Side Bending 14       Shoulder Range of Motion:   Shoulder Left Right   Flexion WNL WNL   Abduction WNL WNL   ER WNL WNL   IR WNL WNL     Strength:   Left Right   DNF 3/5    Upper trap 5/5 5/5   Mid trap 4/5 4/5   Lower trap 3/5 3/5   Rhomboids 4/5 4/5      Upper Extremity Strength  (R) UE  (L) UE    Shoulder flexion: 4+/5 Shoulder flexion: 4+/5   Shoulder Abduction: 4+/5 Shoulder abduction: 4+/5   Shoulder ER 4+/5 Shoulder ER 4+/5   Shoulder IR 4+/5 Shoulder IR 4+/5   Elbow flexion: 4+/5 Elbow flexion: 4+/5   Elbow extension: 4+/5 Elbow extension: 4+/5   Wrist flexion: 4+/5 Wrist flexion: 4+/5   Wrist extension: 4+/5 Wrist extension: 4+/5       Special Tests:  Distraction Positive   Compression Negative   Spurlings Positive, Right   Sharp-Donald Negative   VA test Negative   Lateral Flexion Alar Ligament Negative     Upper Limb Neurodynamic testing:   Right  Left   BPNT Negative  " Negative   UNT Negative  Negative   MNT Negative  Negative   RNT Negative  Negative         Joint Mobility: Csp,    PA's unrestricted,    Transverse glides restricted upper Csp with right rotation,      Thoracic mobility: restricted upper Tsp    Palpation: moderate tenderness to palpation at right suboccipitals and right csp ps mm    Sensation: intact    Flexibility:     Upper Trap = R moderate restriction, L minimal restriction   Scalenes: R moderate restriction, L minimal restriction   SCM: R significant restriction, L minimal restriction   Levator Scap: R moderate restriction, L minimal restriction      PT Evaluation Completed? Yes  Discussed Plan of Care with patient: Yes    TREATMENT     Fabiola received the following manual therapy techniques for 10  Minutes:  Suboccipital release bilateral, cervical upglides on R, OA mobilization, C2-C4 side glides, muscle energy with blocking of L TP to C2 and C3 with isometric hold right lateral side bend.   Patient with improved cervical rotation and side bend to right following manual tx, no change in pain levels    Home Exercises and Patient Education Provided    Education provided re:   - progress towards goals   - role of therapy in multi - disciplinary team, goals for therapy  Pt educated on condition, POC, and expectations in therapy.  No spiritual or educational barriers to learning provided    Home exercises:  Pt will be provided HEP during course of treatment with progressions as appropriate. Pt was advised to perform these exercises free of pain, and to stop performing them if pain occurs.   Fabiola demonstrated good  understanding of the education provided.     Functional Limitations Reports - G Codes  Category: Body position  Tool: NDI  Score: 32 (64%)    Assessment   Teresa is a 63 y.o. female referred to outpatient physical therapy and presents to PT with loss of cervical mobility due to pain. Patient with increased muscle tone in R cervical ps mm and UT  resulting in cervical mal-alignment. Patient with facet syndrome contributing to pain and mobility deficits. Patient with strength deficits in deep neck flexors, mid trap, lower trap and rhomboids, likely due to long term postural deficits. Patient to benefit from manual treatment to improve cervical alignment, cervical mobility, decrease pain initially, then incorporating core and periscapular strengthening program to maintain.   Patient demonstrates limitations as described in the problem list. Pt will benefit from physcial therapy services in order to maximize pain free mobility of Csp. The following goals were discussed with the patient and patient is in agreement with them as to be addressed in the treatment plan.   Pt prognosis is Excellent.   Pt will benefit from skilled outpatient Physical Therapy to address the deficits stated above and in the chart below, provide pt/family education, and to maximize pt's level of independence.     Plan of care discussed with patient: Yes  Pt's spiritual, cultural and educational needs considered and pt agreeable to plan of care and goals as stated below:     Anticipated Barriers for therapy: none identified at eval      GOALS:     Long Term Goals: 4 weeks  Pain: Decrease pain to 0/10 50% of the time, 2/10 with certain movements in Csp to allow for improved cervical mobility  Strength: Improve strength in core muscles (DNF, mid trap, rhomboids) to 4+/5 for improved cervical stability  ROM: Improve AROM to 75% of normal limits in Cervical spine   Functional scale: Improve score on NDI to 20% impairment  Lifting: Lift 8 lbs to waist level (BUE), 4 lbs to shoulder level (R UE), 3 lbs to overhead without pain or compensation  Postures: Increase standing duration while at work to 2 hours without pain and good body mechanics   Exercise: demonstrate independence with home exercise program to maintain gains made in therapy.          Plan   Certification Period: 4/3/2019 to  7/3/19.    Outpatient physical therapy 2 times weekly to include: Manual Therapy, Moist Heat/ Ice, Neuromuscular Re-ed, Therapeutic Activites and Therapeutic Exercise. Cont PT for 1 months.   Pt may be seen by PTA as part of the rehabilitation team.     I certify the need for these services furnished under this plan of treatment and while under my care.    Brandy Hicks, PT

## 2019-04-03 NOTE — PROGRESS NOTES
PT met face to face with Ovidio Garg PTA to discuss patient's treatment plan and progress towards established goals.  Treatment will be continued as described in initial report/eval and progress notes.  Patient will be seen by physical therapist every sixth visit and minimally once per month.    Additional information: n/a

## 2019-04-04 ENCOUNTER — TELEPHONE (OUTPATIENT)
Dept: PAIN MEDICINE | Facility: CLINIC | Age: 64
End: 2019-04-04

## 2019-04-04 NOTE — TELEPHONE ENCOUNTER
----- Message from Shila Azul sent at 4/4/2019  8:17 AM CDT -----  Contact: Gustavo  Type: Needs Medical Advice    Who Called:    Best Call Back Number: [phone]  Additional Information:  Scheduled for an procedure 4/12/19 and asking for first available Monday as Friday and Saturday are her busiest work days. Thanks!

## 2019-04-09 ENCOUNTER — CLINICAL SUPPORT (OUTPATIENT)
Dept: REHABILITATION | Facility: HOSPITAL | Age: 64
End: 2019-04-09
Payer: COMMERCIAL

## 2019-04-09 DIAGNOSIS — M54.2 CERVICALGIA: ICD-10-CM

## 2019-04-09 PROCEDURE — 97110 THERAPEUTIC EXERCISES: CPT

## 2019-04-09 PROCEDURE — 97140 MANUAL THERAPY 1/> REGIONS: CPT

## 2019-04-09 NOTE — PROGRESS NOTES
"                                                    Physical Therapy Daily Note     Name: Teresa Clay  Clinic Number: [id number]  Diagnosis:   Encounter Diagnosis   Name Primary?    Cervicalgia      Physician: Trey Dueñas MD  Precautions: standard  Visit #: 2 of 8  PTA Visit #: 0  Time In: 8:00 am   Time Out: 8:55 am    Subjective     Pt reports: her neck is a little better since eval. She continues with soreness on right side as well as headaches  Pain Scale: Teresa rates pain on a scale of 0-10 to be 5 currently.    Objective     Began with MHP to Csp in prep for manual tx x 10min    Teresa received individual therapeutic exercises to develop flexibility for 10 minutes including:    Date [birth year]       Exercise Sets x Reps Resistance    Sets x Reps Resistance    Sets x Reps Resistance    Sets x Reps Resistance    Sets x Reps Resistance      Upper trap stretch 20" x 2 ea       Levator scap str 20" x 2 ea       Seated csp retract x10                   Teresa received the following manual therapy techniques for 35 minutes including:  Cervical side glides upper Csp on R  STM to R Csp ps mm  Suboccipital release  Gr IV upper csp mobilization for cervical side flexion and rotation  Segmental extension t/o Csp  PROm Csp all planes  Manual traction 10" on/off x 1 min  Mobilization with movement for Cervical rotation to right    Home Exercises Provided: UT str, LS str, csp retractions in sitting    Pt demo good understanding of the education provided. Teresa demonstrated good return demonstration of activities.     Education provided re:  Teresa verbalized good understanding of education provided.   No spiritual or educational barriers to learning provided    Assessment     Patient tolerated treatment well.    Pt's cervical alignment within normal limits following manual treatment this date. Patient continues with tight paraspinal muscles bilaterally, right more than left. Pt's AROM improving this date, possibly due " to decreased pain levels, improved cervical alignment, increased confidence, or a combination of all.       Plan     Continue with established Plan of Care towards PT goals.    Therapist: Brandy Hicks, PT  4/9/2019

## 2019-04-16 ENCOUNTER — CLINICAL SUPPORT (OUTPATIENT)
Dept: REHABILITATION | Facility: HOSPITAL | Age: 64
End: 2019-04-16
Payer: COMMERCIAL

## 2019-04-16 DIAGNOSIS — M54.2 CERVICALGIA: ICD-10-CM

## 2019-04-16 PROCEDURE — 97110 THERAPEUTIC EXERCISES: CPT

## 2019-04-16 PROCEDURE — 97140 MANUAL THERAPY 1/> REGIONS: CPT

## 2019-04-16 NOTE — PROGRESS NOTES
"                                                    Physical Therapy Daily Note     Name: Teresa Clay  Clinic Number: [id number]  Diagnosis:   Encounter Diagnosis   Name Primary?    Cervicalgia      Physician: Trey Dueñas MD  Precautions: standard  Visit #: 3 of 8  PTA Visit #: 0  Time In: 8:00 am   Time Out: 8:55 am    Subjective     Pt reports: her neck is feeling better, but she's still very sore on the right side of her neck and upper trap  Pain Scale: Teresa rates pain on a scale of 0-10 to be 5 currently.    Objective     Began with MHP to Csp in prep for manual tx x 10min    Teresa received individual therapeutic exercises to develop flexibility for 10 minutes including:    Date 4/9/19 4/16/19      Exercise Sets x Reps Resistance    Sets x Reps Resistance    Sets x Reps Resistance    Sets x Reps Resistance    Sets x Reps Resistance      Upper trap stretch 20" x 2 ea 20" x 2      Levator scap str 20" x 2 ea 20" x 2      Seated csp retract x10 x15                  Teresa received the following manual therapy techniques for 40 minutes including:  Cervical side glides upper Csp on R  STM/IASTM to R Csp ps mm  Suboccipital release and IASTM to R suboccipitals  IASTM to R UT  Gr IV upper csp mobilization for cervical side flexion and rotation  Segmental extension t/o Csp  PROM Csp all planes  Manual traction 10" on/off x 1 min  Mobilization with movement for Cervical rotation to right    Home Exercises Provided: UT str, LS str, csp retractions in sitting    Pt demo good understanding of the education provided. Teresa demonstrated good return demonstration of activities.     Education provided re:  Teresa verbalized good understanding of education provided.   No spiritual or educational barriers to learning provided    Assessment     Patient tolerated treatment well.    Pt's cervical alignment within normal limits following manual treatment this date. Patient continues with tight paraspinal muscles " bilaterally, right more than left. Trigger points in SCM, Csp ps mm, and UT causing pain. Pt with improved segmental mobility in upper Csp.     Plan     Continue with established Plan of Care towards PT goals.    Therapist: Brandy Hicks, PT  4/16/2019

## 2019-04-17 ENCOUNTER — TELEPHONE (OUTPATIENT)
Dept: PAIN MEDICINE | Facility: CLINIC | Age: 64
End: 2019-04-17

## 2019-04-17 DIAGNOSIS — M50.30 DDD (DEGENERATIVE DISC DISEASE), CERVICAL: Primary | ICD-10-CM

## 2019-04-17 NOTE — TELEPHONE ENCOUNTER
----- Message from Elvia Reyes sent at 4/17/2019  4:13 PM CDT -----  Contact: pt  ..Type:  Patient Returning Call    Who Called:  pt  Who Left Message for Patient:  Sandy/Yoana VM  Does the patient know what this is regarding?: orders for MRI  Best Call Back Number:   Wk #  Additional Information: Pt returned a missed call  Please call to advise

## 2019-04-17 NOTE — TELEPHONE ENCOUNTER
Patient states that she has headache from the neck into the occipital area. Patient also complains of pain in neck and between shoulders. Also reports numbness in the facial area. Patient denies any facial drooping or speech changes. Patient is requesting an MRI if it would be recommended. Informed patient that I would discuss with Dr. Dueñas. Patient accepted and voice understanding.

## 2019-04-17 NOTE — TELEPHONE ENCOUNTER
----- Message from Shyla Garg sent at 4/17/2019  1:24 PM CDT -----  Contact: self   Patient want to speak with a nurse regarding still having headaches and discuss orders for MRI please call back at [phone] (home) [phone] (work)

## 2019-04-22 ENCOUNTER — TELEPHONE (OUTPATIENT)
Dept: PAIN MEDICINE | Facility: CLINIC | Age: 64
End: 2019-04-22

## 2019-05-06 ENCOUNTER — HOSPITAL ENCOUNTER (OUTPATIENT)
Facility: AMBULARY SURGERY CENTER | Age: 64
Discharge: HOME | End: 2019-05-06
Attending: ANESTHESIOLOGY | Admitting: ANESTHESIOLOGY
Payer: COMMERCIAL

## 2019-05-06 DIAGNOSIS — M54.16 LUMBAR RADICULITIS: Primary | ICD-10-CM

## 2019-05-06 PROCEDURE — 64484 NJX AA&/STRD TFRM EPI L/S EA: CPT | Mod: LT | Performed by: ANESTHESIOLOGY

## 2019-05-06 PROCEDURE — 99152 PR MOD CONSCIOUS SEDATION, SAME PHYS, 5+ YRS, FIRST 15 MIN: ICD-10-PCS | Mod: ,,, | Performed by: ANESTHESIOLOGY

## 2019-05-06 PROCEDURE — 99152 MOD SED SAME PHYS/QHP 5/>YRS: CPT | Mod: ,,, | Performed by: ANESTHESIOLOGY

## 2019-05-06 PROCEDURE — 64483 PR EPIDURAL INJ, ANES/STEROID, TRANSFORAMINAL, LUMB/SACR, SNGL LEVL: ICD-10-PCS | Mod: 50,,, | Performed by: ANESTHESIOLOGY

## 2019-05-06 PROCEDURE — 64483 NJX AA&/STRD TFRM EPI L/S 1: CPT | Mod: RT | Performed by: ANESTHESIOLOGY

## 2019-05-06 PROCEDURE — 64483 NJX AA&/STRD TFRM EPI L/S 1: CPT | Mod: 50,,, | Performed by: ANESTHESIOLOGY

## 2019-05-06 RX ORDER — FENTANYL CITRATE 50 UG/ML
INJECTION, SOLUTION INTRAMUSCULAR; INTRAVENOUS
Status: DISCONTINUED | OUTPATIENT
Start: 2019-05-06 | End: 2019-05-06 | Stop reason: HOSPADM

## 2019-05-06 RX ORDER — DEXAMETHASONE SODIUM PHOSPHATE 10 MG/ML
INJECTION INTRAMUSCULAR; INTRAVENOUS
Status: DISCONTINUED
Start: 2019-05-06 | End: 2019-05-06 | Stop reason: HOSPADM

## 2019-05-06 RX ORDER — BUPIVACAINE HYDROCHLORIDE 2.5 MG/ML
INJECTION, SOLUTION EPIDURAL; INFILTRATION; INTRACAUDAL
Status: DISCONTINUED | OUTPATIENT
Start: 2019-05-06 | End: 2019-05-06 | Stop reason: HOSPADM

## 2019-05-06 RX ORDER — LIDOCAINE HYDROCHLORIDE 10 MG/ML
INJECTION, SOLUTION EPIDURAL; INFILTRATION; INTRACAUDAL; PERINEURAL
Status: DISCONTINUED | OUTPATIENT
Start: 2019-05-06 | End: 2019-05-06 | Stop reason: HOSPADM

## 2019-05-06 RX ORDER — DEXAMETHASONE SODIUM PHOSPHATE 10 MG/ML
INJECTION INTRAMUSCULAR; INTRAVENOUS
Status: DISCONTINUED | OUTPATIENT
Start: 2019-05-06 | End: 2019-05-06 | Stop reason: HOSPADM

## 2019-05-06 RX ORDER — MIDAZOLAM HYDROCHLORIDE 1 MG/ML
INJECTION INTRAMUSCULAR; INTRAVENOUS
Status: DISCONTINUED | OUTPATIENT
Start: 2019-05-06 | End: 2019-05-06 | Stop reason: HOSPADM

## 2019-05-06 RX ORDER — SODIUM CHLORIDE, SODIUM LACTATE, POTASSIUM CHLORIDE, CALCIUM CHLORIDE 600; 310; 30; 20 MG/100ML; MG/100ML; MG/100ML; MG/100ML
INJECTION, SOLUTION INTRAVENOUS ONCE AS NEEDED
Status: COMPLETED | OUTPATIENT
Start: 2019-05-06 | End: 2019-05-06

## 2019-05-06 RX ADMIN — SODIUM CHLORIDE, SODIUM LACTATE, POTASSIUM CHLORIDE, CALCIUM CHLORIDE: 600; 310; 30; 20 INJECTION, SOLUTION INTRAVENOUS at 01:05

## 2019-05-06 NOTE — OP NOTE
PROCEDURE DATE: 5/6/2019    PROCEDURE: Bilateral L4-5 transforaminal epidural steroid injection under fluoroscopy    DIAGNOSIS: Lumbar disc displacement without myelopathy  Post op diagnosis: Same    PHYSICIAN: Trey Dueñas MD    MEDICATIONS INJECTED:  Dexamethasone 5mg (0.5ml) and 1.5ml 0.25% bupivicaine at each nerve root.     LOCAL ANESTHETIC INJECTED:  Lidocaine 1%. 2 ml per site.    SEDATION MEDICATIONS: RN IV sedation    ESTIMATED BLOOD LOSS:  None    COMPLICATIONS:  None    TECHNIQUE:   A time-out was taken to identify patient and procedure side prior to starting the procedure. The patient was placed in a prone position, prepped and draped in the usual sterile fashion using ChloraPrep and sterile towels.  The area to be injected was determined under fluoroscopic guidance in AP and oblique view.  Local anesthetic was given by raising a wheal and going down to the hub of a 25-gauge 1.5 inch needle.  In oblique view, a 3.5 inch 22-gauge bent-tip spinal needle was introduced towards 6 oclock position of the pedicle of each above named nerve root level.  The needle was walked medially then hinged into the neural foramen and position was confirmed in AP and lateral views.  1ml contrast dye was injected to confirm appropriate placement and that there was no vascular uptake.  After negative aspiration for blood or CSF, the medication was then injected. This was performed at the bilateral L4-5 level(s). The patient tolerated the procedure well.    The patient was monitored after the procedure.  Patient was given post procedure and discharge instructions to follow at home. The patient was discharged in a stable condition.

## 2019-05-06 NOTE — PLAN OF CARE
Pt states ready to go home , stable, kajal po fluids, ambulatory, denies pain, Leg raises performed in bed without diff and instructed on fall risk. PT and spouse verbalized understanding

## 2019-05-06 NOTE — DISCHARGE INSTRUCTIONS
Recovery After Procedural Sedation (Adult)  You have been given medicine by vein to make you sleep during your surgery. This may have included both a pain medicine and sleeping medicine. Most of the effects have worn off. But you may still have some drowsiness for the next 6 to 8 hours.  Home care  Follow these guidelines when you get home:  · For the next 8 hours, you should be watched by a responsible adult. This person should make sure your condition is not getting worse.  · Don't drink any alcohol for the next 24 hours.  · Don't drive, operate dangerous machinery, or make important business or personal decisions during the next 24 hours.  Note: Your healthcare provider may tell you not to take any medicine by mouth for pain or sleep in the next 4 hours. These medicines may react with the medicines you were given in the hospital. This could cause a much stronger response than usual.  Follow-up care  Follow up with your healthcare provider if you are not alert and back to your usual level of activity within 12 hours.  When to seek medical advice  Call your healthcare provider right away if any of these occur:  · Drowsiness gets worse  · Weakness or dizziness gets worse  · Repeated vomiting  · You can't be awakened   Date Last Reviewed: 10/18/2016  © 6022-3565 [employer]. [address] [place] [address], [place] [address]. All rights reserved. This information is not intended as a substitute for professional medical care. Always follow your healthcare professional's instructions.      Pain injection instructions:     This procedure may take a couple weeks to relieve pain  You may get some pain relief from the local anesthetic initally.    No driving for 24 hrs.   Activity as tolerated- gradually increase activities.  Dont lift over 10 lbs for 24 hrs   No heat at injection sites x 2 days. No heating pads, hot tubs, saunas, or swimming in any body of water or pool for 2 days.  Use ice pack for mild swelling  and for comfort , apply for 20 minutes, remove for 20 minute intervals. No direct contact of ice itself  to skin.  May shower today. No tub baths for two days.      Resume Aspirin, Plavix, or Coumadin the day after the procedure unless otherwise instructed.   If diabetic,monitor your glucose carefully as steroids can increase your glucose level    Seek immediate medical help for:   Severe increase in your usual pain or appearance of new pain.  Prolonged (mor than 8 hours) or increasing weakness or numbness in the legs or arms. Numbing medicine was injected and can affect the messages to and from the brain and legs or arms.  .    Fever above 101 ,Drainage,redness,active bleeding, or increased swelling at the injection site.  Headache, shortness of breath, chest pain, or breathing problems.    Before leaving, please make sure you have all your personal belongings such as glasses, purses, wallets, keys, cell phones, jewelry, jackets etc

## 2019-05-06 NOTE — H&P
CC: low back and leg pain    HPI: The patient is a 63 y.o. female with a history of lumbar DDD here for lumbar DEAN. There are no major changes in history and physical from 3/28/19 by myself.    Past Medical History:   Diagnosis Date    Chronic back pain     Diverticulitis     Encounter for long-term (current) use of medications     Hypertension        Past Surgical History:   Procedure Laterality Date    APPENDECTOMY      CHOLECYSTECTOMY      COLONOSCOPY      GALLBLADDER SURGERY      Injection,steroid,epidural,transforaminal approach L4-5 Bilateral 2/4/2019    [facility]    TUBAL LIGATION         Family History   Problem Relation Age of Onset    Diabetes Brother     Cancer Brother     Breast cancer Maternal Aunt        Social History     Socioeconomic History    Marital status:      Spouse name: Not on file    Number of children: Not on file    Years of education: Not on file    Highest education level: Not on file   Occupational History    Not on file   Social Needs    Financial resource strain: Not on file    Food insecurity:     Worry: Not on file     Inability: Not on file    Transportation needs:     Medical: Not on file     Non-medical: Not on file   Tobacco Use    Smoking status: Current Every Day Smoker     Packs/day: 0.50     Types: Cigarettes    Smokeless tobacco: Never Used   Substance and Sexual Activity    Alcohol use: No    Drug use: No    Sexual activity: Yes   Lifestyle    Physical activity:     Days per week: Not on file     Minutes per session: Not on file    Stress: Not on file   Relationships    Social connections:     Talks on phone: Not on file     Gets together: Not on file     Attends [employer] service: Not on file     Active member of club or organization: Not on file     Attends meetings of clubs or organizations: Not on file     Relationship status: Not on file   Other Topics Concern    Not on file   Social History Narrative  "   Not on file       Current Facility-Administered Medications   Medication Dose Route Frequency Provider Last Rate Last Dose    lactated ringers infusion   Intravenous Once PRN Trey Dueñas MD         Facility-Administered Medications Ordered in Other Encounters   Medication Dose Route Frequency Provider Last Rate Last Dose    fentaNYL injection    PRN Trey Dueñas MD   50 mcg at 02/04/19 1251    midazolam (VERSED) 1 mg/mL injection    PRN Trey Dueñas MD   2 mg at 02/04/19 1251       Review of patient's allergies indicates:   Allergen Reactions    Betamethasone acet,sod phos     Codeine        Vitals:    03/11/19 1326   Weight: 67.6 kg (149 lb)   Height: 4' 11" (1.499 m)       REVIEW OF SYSTEMS:     GENERAL: No weight loss, malaise or fevers.  HEENT:  No recent changes in vision or hearing  NECK: Negative for lumps, no difficulty with swallowing.  RESPIRATORY: Negative for cough, wheezing or shortness of breath, patient denies any recent URI.  CARDIOVASCULAR: Negative for chest pain, leg swelling or palpitations.  GI: Negative for abdominal discomfort, blood in stools or black stools or change in bowel habits.  MUSCULOSKELETAL: See HPI.  SKIN: Negative for lesions, rash, and itching.  PSYCH: No suicidal or homicidal ideations, no current mood disturbances.  HEMATOLOGY/LYMPHOLOGY: Negative for prolonged bleeding, bruising easily or swollen nodes. Patient is not currently taking any anti-coagulants  ENDO: No history of diabetes or thyroid dysfunction  NEURO: No history of syncope, paralysis, seizures or tremors.All other reviewed and negative other than HPI.    Physical exam:  Gen: A and O x3, pleasant, well-groomed  Skin: No rashes or obvious lesions  HEENT: PERRLA, no obvious deformities on ears or in canals. No thyroid masses, trachea midline, no palpable lymph nodes in neck, axilla.  CVS: Regular rate and rhythm, normal S1 and S2, no murmurs.  Resp: Clear to auscultation bilaterally.  Abdomen: Soft, NT/ND, " normal bowel sounds present.  Musculoskeletal/Neuro: Moving all extremities    Assessment:  Lumbar radiculitis  -     Place in Outpatient; Standing  -     Vital signs; Standing  -     Insert peripheral IV; Standing  -     Verify informed consent; Standing  -     Notify physician ; Standing  -     Notify physician ; Standing  -     Notify physician (specify); Standing  -     Diet NPO; Standing    Other orders  -     lactated ringers infusion  -     IP VTE LOW RISK PATIENT; Standing          PLAN: Lumbar Mark      This patient has been cleared for surgery in an ambulatory surgical facility    ASA 3,  Mallampatti Score 3  No history of anesthetic complications  Plan for RN IV sedation

## 2019-05-06 NOTE — DISCHARGE SUMMARY
[facility]  Discharge Note  Short Stay    Admit Date: 5/6/2019    Discharge Date and Time: 5/6/2019    Attending Physician: Trey Dueñas MD     Discharge Provider: Trey Dueñas    Diagnoses:  Active Hospital Problems    Diagnosis  POA    *Lumbar radiculitis [M54.16]  Yes      Resolved Hospital Problems   No resolved problems to display.       Hospital Course: Lumbar DEAN  Discharged Condition: Good    Final Diagnoses:   Active Hospital Problems    Diagnosis  POA    *Lumbar radiculitis [M54.16]  Yes      Resolved Hospital Problems   No resolved problems to display.       Disposition: Home or Self Care    Follow up/Patient Instructions:    Medications:  Reconciled Home Medications:      Medication List      CHANGE how you take these medications    metoprolol succinate 25 MG 24 hr tablet  Commonly known as:  TOPROL-XL  TAKE ONE TABLET BY MOUTH ONCE DAILY  What changed:    · how much to take  · how to take this  · when to take this        CONTINUE taking these medications    diclofenac sodium 1 % Gel  Commonly known as:  VOLTAREN  APPLY 4 GRAMS TOPICALLY   QID AS DIRECTED     ergocalciferol 50,000 unit Cap  Commonly known as:  ERGOCALCIFEROL  Take 1 capsule by mouth.     fluticasone propionate 50 mcg/actuation nasal spray  Commonly known as:  FLONASE  fluticasone 50 mcg/actuation nasal spray,suspension     hydroCHLOROthiazide 25 MG tablet  Commonly known as:  HYDRODIURIL  TK 1 T PO QD     ibuprofen 800 MG tablet  Commonly known as:  ADVIL,MOTRIN  Take 800 mg by mouth 3 (three) times daily.     pantoprazole 40 MG tablet  Commonly known as:  PROTONIX  Take 1 tablet (40 mg total) by mouth once daily. Take in the morning before breakfast.  Wait 30 minutes before eating or drinking anything     SPIRIVA WITH HANDIHALER 18 mcg inhalation capsule  Generic drug:  tiotropium  Inhale 1 capsule into the lungs.     traMADol 50 mg tablet  Commonly known as:  ULTRAM  Take 1 tablet (50 mg total) by mouth every 8 (eight) hours  as needed for Pain.     * VENTOLIN HFA 90 mcg/actuation inhaler  Generic drug:  albuterol  inhale 2 puff by inhalation route  every 4 - 6 hours as needed as needed     * albuterol 90 mcg/actuation inhaler  Commonly known as:  PROVENTIL/VENTOLIN HFA     * WELLBUTRIN  MG TBSR 12 hr tablet  Generic drug:  buPROPion  take 1 tablet by oral route every evening x 1 week then increase to 1 tab po bid -     * buPROPion 150 MG TBSR 12 hr tablet  Commonly known as:  WELLBUTRIN SR  TK 1 T PO QPM FOR 1 WEEK. INCREASE TO 1 T PO 2 XD -         * This list has 4 medication(s) that are the same as other medications prescribed for you. Read the directions carefully, and ask your doctor or other care provider to review them with you.              Discharge Procedure Orders   Call MD for:  temperature >100.4     Call MD for:  persistent nausea and vomiting or diarrhea     Call MD for:  severe uncontrolled pain     Call MD for:  redness, tenderness, or signs of infection (pain, swelling, redness, odor or green/yellow discharge around incision site)     Call MD for:  difficulty breathing or increased cough     Call MD for:  severe persistent headache        Follow up with MD in 2-3 weeks    Discharge Procedure Orders (must include Diet, Follow-up, Activity):   Discharge Procedure Orders (must include Diet, Follow-up, Activity)   Call MD for:  temperature >100.4     Call MD for:  persistent nausea and vomiting or diarrhea     Call MD for:  severe uncontrolled pain     Call MD for:  redness, tenderness, or signs of infection (pain, swelling, redness, odor or green/yellow discharge around incision site)     Call MD for:  difficulty breathing or increased cough     Call MD for:  severe persistent headache

## 2019-05-09 VITALS
BODY MASS INDEX: 30.04 KG/M2 | WEIGHT: 149 LBS | DIASTOLIC BLOOD PRESSURE: 70 MMHG | OXYGEN SATURATION: 95 % | SYSTOLIC BLOOD PRESSURE: 127 MMHG | RESPIRATION RATE: 18 BRPM | TEMPERATURE: 98 F | HEIGHT: 59 IN | HEART RATE: 68 BPM

## 2019-05-23 ENCOUNTER — TELEPHONE (OUTPATIENT)
Dept: PODIATRY | Facility: CLINIC | Age: 64
End: 2019-05-23

## 2019-05-23 NOTE — TELEPHONE ENCOUNTER
----- Message from Marely Baxter sent at 5/23/2019  3:38 PM CDT -----  Contact: Gustavo Clay (Spouse)  Type:  Same Day Appointment Request    Caller is requesting a same day appointment.  Caller declined first available appointment listed below.      Name of Caller:  Gustavo Clay (Spouse)  When is the first available appointment?  In June  Symptoms:  Pain on top of foot  Best Call Back Number:    Additional Information:   Calling to schedule a same day appt today. Please advise

## 2019-05-27 ENCOUNTER — OFFICE VISIT (OUTPATIENT)
Dept: PAIN MEDICINE | Facility: CLINIC | Age: 64
End: 2019-05-27
Payer: COMMERCIAL

## 2019-05-27 VITALS
HEIGHT: 59 IN | DIASTOLIC BLOOD PRESSURE: 75 MMHG | SYSTOLIC BLOOD PRESSURE: 124 MMHG | BODY MASS INDEX: 30.04 KG/M2 | HEART RATE: 56 BPM | WEIGHT: 149 LBS

## 2019-05-27 DIAGNOSIS — M79.7 FIBROMYALGIA: ICD-10-CM

## 2019-05-27 DIAGNOSIS — M53.3 SACROILIAC JOINT PAIN: ICD-10-CM

## 2019-05-27 DIAGNOSIS — M54.16 LUMBAR RADICULITIS: Primary | ICD-10-CM

## 2019-05-27 DIAGNOSIS — M51.36 DDD (DEGENERATIVE DISC DISEASE), LUMBAR: ICD-10-CM

## 2019-05-27 DIAGNOSIS — M50.30 DDD (DEGENERATIVE DISC DISEASE), CERVICAL: ICD-10-CM

## 2019-05-27 PROCEDURE — 99214 PR OFFICE/OUTPT VISIT, EST, LEVL IV, 30-39 MIN: ICD-10-PCS | Mod: S$GLB,,, | Performed by: PHYSICIAN ASSISTANT

## 2019-05-27 PROCEDURE — 99214 OFFICE O/P EST MOD 30 MIN: CPT | Mod: S$GLB,,, | Performed by: PHYSICIAN ASSISTANT

## 2019-05-27 PROCEDURE — 99999 PR PBB SHADOW E&M-EST. PATIENT-LVL IV: CPT | Mod: PBBFAC,,, | Performed by: PHYSICIAN ASSISTANT

## 2019-05-27 PROCEDURE — 99999 PR PBB SHADOW E&M-EST. PATIENT-LVL IV: ICD-10-PCS | Mod: PBBFAC,,, | Performed by: PHYSICIAN ASSISTANT

## 2019-05-27 RX ORDER — TRAMADOL HYDROCHLORIDE 50 MG/1
50 TABLET ORAL EVERY 8 HOURS PRN
Qty: 90 TABLET | Refills: 1 | Status: SHIPPED | OUTPATIENT
Start: 2019-05-27 | End: 2019-06-26

## 2019-05-27 NOTE — PROGRESS NOTES
Referring Physician: No ref. provider found    PCP: Maite Villatoro NP      CC: neck pain,  Low back and leg pain    Interval history: Teresa Clay is a 63 y.o. female with chronic widespread pain who presents today for f/u s/p repeat bilateral L4-5 TFESI. Reports 50% relief for a couple weeks. She reports bilateral leg heaviness and foot pain. She started PT for her neck but only went to a few visits before discontinuing.   She has not had any interventions for her neck.  Denies any radicular arm pain.  No weakness. No bowel or bladder changes. She does request a prescription for Tramadol. Pain today is rated 6/10.  Pt has been seen in the clinic before, however pt is new to me.     History below per Dr. Dueñas    Prior HPI:   Teresa Clay is a 63 y.o. female referred to us low back leg pain.  Patient with significant history of fibromyalgia, generalized anxiety.  She presents to us lower back pain calf so.  Pain worsens with sitting, standing, bending, flexing, lifting getting up.  Pain improves with rest.  She has tried physical therapy with benefit.  She recently had a lumbar MRI.  She has not had any interventional procedures.  She takes NSAIDs Voltaren gel, Baclofen.  She denies any worsening weakness.  No bowel.    Interventional history: s/p bilateral L4-5 TFESI on 2/4/19 with 50% relief  S/p bilateral L4-5 TF DEAN on 5/6/19 with 50% relief for a couple of weeks.  ROS:  CONSTITUTIONAL: No fevers, chills, night sweats, wt. loss, appetite changes  SKIN: no rashes or itching  ENT: No headaches, head trauma, vision changes, or eye pain  LYMPH NODES: None noticed   CV: No chest pain, palpitations.   RESP: No shortness of breath, dyspnea on exertion, cough, wheezing, or hemoptysis  GI: No nausea, emesis, diarrhea, constipation, melena, hematochezia, pain.    : No dysuria, hematuria, urgency, or frequency   HEME: No easy bruising, bleeding problems  PSYCHIATRIC: No depression, anxiety, psychosis,  hallucinations.  NEURO: No seizures, memory loss, dizziness or difficulty sleeping  MSK:  Positive HPI      Past Medical History:   Diagnosis Date    Chronic back pain     Diverticulitis     Encounter for long-term (current) use of medications     Hypertension      Past Surgical History:   Procedure Laterality Date    APPENDECTOMY      CHOLECYSTECTOMY      COLONOSCOPY      GALLBLADDER SURGERY      Injection,steroid,epidural,transforaminal approach Bilateral 5/6/2019    [facility]    Injection,steroid,epidural,transforaminal approach L4-5 Bilateral 2/4/2019    [facility]    TUBAL LIGATION       Family History   Problem Relation Age of Onset    Diabetes Brother     Cancer Brother     Breast cancer Maternal Aunt      Social History     Socioeconomic History    Marital status:      Spouse name: Not on file    Number of children: Not on file    Years of education: Not on file    Highest education level: Not on file   Occupational History    Not on file   Social Needs    Financial resource strain: Not on file    Food insecurity:     Worry: Not on file     Inability: Not on file    Transportation needs:     Medical: Not on file     Non-medical: Not on file   Tobacco Use    Smoking status: Current Every Day Smoker     Packs/day: 0.50     Types: Cigarettes    Smokeless tobacco: Never Used   Substance and Sexual Activity    Alcohol use: No    Drug use: No    Sexual activity: Yes   Lifestyle    Physical activity:     Days per week: Not on file     Minutes per session: Not on file    Stress: Not on file   Relationships    Social connections:     Talks on phone: Not on file     Gets together: Not on file     Attends Mandaen service: Not on file     Active member of club or organization: Not on file     Attends meetings of clubs or organizations: Not on file     Relationship status: Not on file   Other Topics Concern    Not on file  "  Social History Narrative    Not on file         Medications/Allergies: See med card    Vitals:    05/27/19 1032   BP: 124/75   Pulse: (!) 56   Weight: 67.6 kg (149 lb)   Height: 4' 11" (1.499 m)   PainSc:   6   PainLoc: Back         Physical exam:    GENERAL: A and O x3, the patient appears well groomed and is in no acute distress.  Skin: No rashes or obvious lesions  HEENT: normocephalic, atraumatic  CARDIOVASCULAR:  bradycardia  LUNGS: non labored breathing  ABDOMEN: soft, nontender   UPPER EXTREMITIES: Normal alignment, normal range of motion, no atrophy, no skin changes,  hair growth and nail growth normal and equal bilaterally. No swelling, no tenderness.    LOWER EXTREMITIES:  Normal alignment, normal range of motion, no atrophy, no skin changes,  hair growth and nail growth normal and equal bilaterally. No swelling, no tenderness.    Cervical Spine:  Cervical spine: ROM is full in flexion, extension and lateral rotation with moderate increased pain.  Spurling's maneuver causes no neck pain to either side.  Myofascial exam: Moderate Tenderness to palpation across cervical paraspinous region bilaterally.    LUMBAR SPINE  Lumbar spine: ROM is full with flexion extension and oblique extension with moderate increased pain.    Osman's test causes no increased pain on either side.    Supine straight leg raise is negative bilaterally.  +femoral stretch bilaterally  Internal and external rotation of the hip causes no increased pain on either side.  Myofascial exam: Moderate tenderness to palpation across lumbar paraspinous muscles.      MENTAL STATUS: normal orientation, speech, language, and fund of knowledge for social situation.  Emotional state appropriate.    CRANIAL NERVES:  II:  PERRL bilaterally,   III,IV,VI: EOMI.    V:  Facial sensation equal bilaterally  VII:  Facial motor function normal.  VIII:  Hearing equal to finger rub bilaterally  IX/X: Gag normal, palate symmetric  XI:  Shoulder shrug equal, " head turn equal  XII:  Tongue midline without fasciculations      MOTOR: Tone and bulk: normal bilateral upper and lower Strength: normal   Delt Bi Tri WE WF     R 5 5 5 5 5 5   L 5 5 5 5 5 5     IP ADD ABD Quad TA Gas HAM  R 5 5 5 5 5 5 5  L 5 5 5 5 5 5 5    SENSATION: Light touch and pinprick intact bilaterally  REFLEXES: normal, symmetric, nonbrisk.  Toes down, no clonus. No hoffmans.  GAIT: normal rise, base, steps, and arm swing.        Imaging:  Xray C-spine 7/2018  1. Straightening of the normal cervical lordosis.  2. Mild multilevel degenerative disc disease resulting in mild to moderate bilateral neural foraminal narrowing from C3 through C6.  3. No significant prevertebral soft tissue swelling.    MRI L-spine 4/2018  Moderate disc desiccation is present.  There is mild loss of disc height throughout the lumbar spine.  Moderate facet arthropathy is present from L2 through S1.    L4-5: There is moderate broad-based posterior disc bulging, with associated annular fissure of the disc.  This along with bilateral facet arthropathy contributes to moderate spinal canal narrowing and mild bilateral neuroforaminal narrowing.    L5-S1: Minimal broad-based posterior bulging of the disc is present without spinal canal or neuroforaminal narrowing.    Assessment:  Teresa Clay is a 63 y.o. female with  1. Lumbar radiculitis    2. Fibromyalgia    3. DDD (degenerative disc disease), lumbar    4. DDD (degenerative disc disease), cervical    5. Sacroiliac joint pain          Plan:  1. I have stressed the importance of physical activity and exercise to improve overall health  2. Reviewed pertinent imaging and records with patient  3. Schedule bilateral LE EMG to evaluate worsening leg symptoms  4. Ordered PT to help with her neck, hip, and back pain  5. May consider cervical MRI if neck pain does not improve  6. She can continue Tramadol as needed for pain  7. Follow up 2 months. UDS next clinic visit.

## 2019-05-27 NOTE — PROGRESS NOTES
Name: Teresa Clay 1955  MRN: [id number]   Date: 5/27/2019    Therapy Diagnosis:   Encounter Diagnosis   Name Primary?    Cervicalgia      Physician: Trey Dueñas MD      Subjective:  Patient Discharged from Physical Therapy  Patient discharged due to noncompliance with appointments. PT office has called patient repeatedly to re-schedule. Pt has repeatedly no-showed for appointments, therefore was taken off the schedule.        Objective:     No formal objectives due to patient no showing for appointments    Treatment :    Included:Therapeutic exercise, Joint mobilizations, Soft tissue mobilizations, PROM, AROM and Moist heat          Treatment today:  See Progress note on 4/16/19 for treatment notes          Assessment:  GOALS: No Goals met due to patient attending 3 treatment sessions.      Patient was discharged unexpectedly.  Information required to complete an accurate discharge summary is unknown.  Refer to therapy initial evaluation and last progress note for initial and most recent functional status and goal achievement.  Recommendations made may be found in medical record.      Discharge plan : Discharge due to noncompliance      Brandy Hicks, PT

## 2019-06-18 ENCOUNTER — OFFICE VISIT (OUTPATIENT)
Dept: SURGERY | Facility: CLINIC | Age: 64
End: 2019-06-18
Payer: COMMERCIAL

## 2019-06-18 VITALS
TEMPERATURE: 97 F | DIASTOLIC BLOOD PRESSURE: 74 MMHG | HEIGHT: 59 IN | HEART RATE: 66 BPM | RESPIRATION RATE: 18 BRPM | SYSTOLIC BLOOD PRESSURE: 123 MMHG | BODY MASS INDEX: 33.06 KG/M2 | OXYGEN SATURATION: 95 % | WEIGHT: 164 LBS

## 2019-06-18 DIAGNOSIS — R10.84 GENERALIZED ABDOMINAL PAIN: Primary | ICD-10-CM

## 2019-06-18 DIAGNOSIS — R19.5 HEME POSITIVE STOOL: ICD-10-CM

## 2019-06-18 PROBLEM — J44.9 CHRONIC OBSTRUCTIVE LUNG DISEASE: Status: ACTIVE | Noted: 2019-02-15

## 2019-06-18 PROCEDURE — 99215 OFFICE O/P EST HI 40 MIN: CPT | Mod: S$GLB,,, | Performed by: SURGERY

## 2019-06-18 PROCEDURE — 99215 PR OFFICE/OUTPT VISIT, EST, LEVL V, 40-54 MIN: ICD-10-PCS | Mod: S$GLB,,, | Performed by: SURGERY

## 2019-06-18 RX ORDER — SODIUM CHLORIDE, SODIUM LACTATE, POTASSIUM CHLORIDE, CALCIUM CHLORIDE 600; 310; 30; 20 MG/100ML; MG/100ML; MG/100ML; MG/100ML
INJECTION, SOLUTION INTRAVENOUS CONTINUOUS
Status: CANCELLED | OUTPATIENT
Start: 2019-06-18

## 2019-06-18 RX ORDER — LIDOCAINE HYDROCHLORIDE 10 MG/ML
1 INJECTION, SOLUTION EPIDURAL; INFILTRATION; INTRACAUDAL; PERINEURAL ONCE
Status: CANCELLED | OUTPATIENT
Start: 2019-06-18 | End: 2019-06-18

## 2019-06-18 RX ORDER — SUCRALFATE 1 G/1
1 TABLET ORAL 4 TIMES DAILY
Qty: 120 TABLET | Refills: 1 | Status: SHIPPED | OUTPATIENT
Start: 2019-06-18 | End: 2019-08-19 | Stop reason: SDUPTHER

## 2019-06-18 RX ORDER — SODIUM, POTASSIUM,MAG SULFATES 17.5-3.13G
SOLUTION, RECONSTITUTED, ORAL ORAL
Qty: 2 BOTTLE | Refills: 0 | Status: ON HOLD | OUTPATIENT
Start: 2019-06-18 | End: 2019-07-03 | Stop reason: HOSPADM

## 2019-06-18 NOTE — LETTER
June 18, 2019      Maite Villatoro, NP  [address] [place] [address] [address]  [place] [address]           [facility]  [address] [facility] [place] [address]  Phone: [phone]  Fax: [phone]          Patient: Teresa Clay   MR Number: [id number]   YOB: 1955   Date of Visit: 6/18/2019       Dear Dr. Maite Villatoro:    Thank you for referring Teresa Clay to me for evaluation. Attached you will find relevant portions of my assessment and plan of care.    If you have questions, please do not hesitate to call me. I look forward to following Teresa Clay along with you.    Sincerely,    Jean Jamison MD    Enclosure  CC:  No Recipients    If you would like to receive this communication electronically, please contact [email or web address] or [phone] to request more information on [employer] access.    For providers and/or their staff who would like to refer a patient to [facility], please contact us through our one-stop-shop provider referral line, [facility], at [phone].    If you feel you have received this communication in error or would no longer like to receive these types of communications, please e-mail [email or web address]

## 2019-06-18 NOTE — H&P
"[facility] - General Surgery  General Surgery  H&P      Patient Name: Teresa Clay  MRN: [id number]     Chief Complaint:  I am being scoped today      HPI:  Ms. Clay presents today as a consult from [facility].  She was seen in the past with left lower quadrant pain.  Today she returns with left upper quadrant pain that radiates to the epigastrium, retrosternally, and supraumbilical.  She does experience heartburn.  Recently treated for Helicobacter without improvement.  No melena or hematochezia.  No hematemesis.  No nausea or vomiting.  No diarrhea or constipation.  No fever or chills.  No night sweats.  No weight loss.  No aggravating factors.  No alleviating factors.  No association with eating.  No food intolerance.  No other associated symptoms.      Allergies & Meds:     Allergen Reactions    Betamethasone acet,sod phos Other (See Comments)     " Makes me feel flushed!"    Codeine Rash       Current Outpatient Medications   Medication Sig Dispense Refill    albuterol (PROVENTIL/VENTOLIN HFA) 90 mcg/actuation inhaler       albuterol (VENTOLIN HFA) 90 mcg/actuation inhaler inhale 2 puff by inhalation route  every 4 - 6 hours as needed as needed      buPROPion (WELLBUTRIN SR) 150 MG TBSR 12 hr tablet take 1 tablet by oral route every evening x 1 week then increase to 1 tab po bid -      diclofenac sodium (VOLTAREN) 1 % Gel APPLY 4 GRAMS TOPICALLY   QID AS DIRECTED 2 Tube 2    ergocalciferol (ERGOCALCIFEROL) 50,000 unit Cap Take 1 capsule by mouth.      fluticasone (FLONASE) 50 mcg/actuation nasal spray fluticasone 50 mcg/actuation nasal spray,suspension      hydroCHLOROthiazide (HYDRODIURIL) 25 MG tablet TK 1 T PO QD  2    metoprolol succinate (TOPROL-XL) 25 MG 24 hr tablet TAKE ONE TABLET BY MOUTH ONCE DAILY (Patient taking differently: TAKE HALF TABLET BY MOUTH ONCE DAILY) 30 tablet 11    tiotropium (SPIRIVA WITH HANDIHALER) 18 mcg inhalation capsule Inhale 1 " capsule into the lungs.      traMADol (ULTRAM) 50 mg tablet Take 1 tablet (50 mg total) by mouth every 8 (eight) hours as needed for Pain. 90 tablet 1    traMADol (ULTRAM) 50 mg tablet Take 1 tablet (50 mg total) by mouth every 8 (eight) hours as needed for Pain. 90 tablet 1    pantoprazole (PROTONIX) 40 MG tablet Take 1 tablet (40 mg total) by mouth once daily. Take in the morning before breakfast.  Wait 30 minutes before eating or drinking anything 30 tablet 11    sodium,potassium,mag sulfates (SUPREP BOWEL PREP KIT) 17.5-3.13-1.6 gram SolR Follow written instructions provided by Clinic 2 Bottle 0    sucralfate (CARAFATE) 1 gram tablet Take 1 tablet (1 g total) by mouth 4 (four) times daily. 120 tablet 1       PMFSHx:  Past Medical History:   Diagnosis Date    Chronic back pain     Diverticulitis     Encounter for long-term (current) use of medications     Hypertension        Past Surgical History:   Procedure Laterality Date    APPENDECTOMY      CHOLECYSTECTOMY      COLONOSCOPY      Injection,steroid,epidural,transforaminal approach Bilateral 5/6/2019    Performed by Trey Due[facility]as [facility] at [facility] OR    Injection,steroid,epidural,transforaminal approach L4-5 Bilateral 2/4/2019    Performed by Trey [facility]as MD at [facility] OR    TUBAL LIGATION         Family History   Problem Relation Age of Onset    Diabetes Brother     Cancer Brother     Breast cancer Maternal Aunt        Social History     Tobacco Use    Smoking status: Current Every Day Smoker     Packs/day: 0.50     Types: Cigarettes    Smokeless tobacco: Never Used   Substance Use Topics    Alcohol use: No    Drug use: No       Review of Systems   Constitutional: Negative for appetite change, chills, fatigue, fever and unexpected weight change.   HENT: Negative for congestion, dental problem, ear pain, mouth sores, postnasal drip, rhinorrhea, sore throat, tinnitus, trouble swallowing and voice change.    Eyes: Negative for photophobia, pain, discharge  and visual disturbance.   Respiratory: Negative for cough, chest tightness, shortness of breath and wheezing.    Cardiovascular: Negative for chest pain, palpitations and leg swelling.   Gastrointestinal: Negative for blood in stool, constipation, diarrhea, nausea and vomiting.   Endocrine: Negative for cold intolerance, heat intolerance, polydipsia, polyphagia and polyuria.   Genitourinary: Negative for difficulty urinating, dysuria, flank pain, frequency, hematuria and urgency.   Musculoskeletal: Negative for arthralgias, joint swelling and myalgias.   Skin: Negative for color change and rash.   Allergic/Immunologic: Negative for immunocompromised state.   Neurological: Negative for dizziness, tremors, seizures, syncope, speech difficulty, weakness, numbness and headaches.   Hematological: Negative for adenopathy. Does not bruise/bleed easily.   Psychiatric/Behavioral: Negative for agitation, confusion, hallucinations, self-injury and suicidal ideas. The patient is not nervous/anxious.           Physical Exam   Constitutional: She is oriented to person, place, and time. She appears well-developed and well-nourished. She is active.  Non-toxic appearance. No distress. Body mass index is 33.12 kg/m².   HENT: Head: Normocephalic and atraumatic.   Right Ear: Hearing and external ear normal. No drainage or tenderness.   Left Ear: Hearing and external ear normal. No drainage or tenderness.   Nose: Nose normal. No rhinorrhea. No epistaxis.   Mouth/Throat: Uvula is midline, oropharynx is clear and moist and mucous membranes are normal. Mucous membranes are not pale, not dry and not cyanotic. No oropharyngeal exudate.   Eyes: Pupils are equal, round, and reactive to light. Conjunctivae and lids are normal. Right eye exhibits no discharge and no exudate. Left eye exhibits no discharge and no exudate. Right conjunctiva is not injected. Right eye exhibits no nystagmus. Left eye exhibits no nystagmus.   Neck: Trachea normal,  full passive range of motion without pain and phonation normal. No JVD present. Carotid bruit is not present. No tracheal deviation present. No thyroid mass and no thyromegaly present.   Cardiovascular: Normal rate, regular rhythm, S1 normal, S2 normal, normal heart sounds and intact distal pulses. PMI is not displaced. Exam reveals no gallop and no friction rub.   No murmur heard.  Pulmonary/Chest: Effort normal and breath sounds normal. No accessory muscle usage. No respiratory distress. She exhibits no mass, no tenderness and no crepitus. Right breast exhibits no inverted nipple, no mass, no nipple discharge, no skin change and no tenderness. Left breast exhibits no inverted nipple, no mass, no nipple discharge, no skin change and no tenderness. Breasts are symmetrical.   Abdominal: Soft. Normal appearance and bowel sounds are normal. She exhibits no distension, no fluid wave, no abdominal bruit and no mass. There is no hepatosplenomegaly. There is no tenderness. There is no rebound, no guarding and negative Martin's sign. No hernia. Hernia confirmed negative in the right inguinal area and confirmed negative in the left inguinal area.   Genitourinary: Vagina normal. Rectal exam shows guaiac positive stool. Rectal exam shows no external hemorrhoid, no internal hemorrhoid, no fissure, no mass, no tenderness and anal tone normal. There is no rash or lesion on the right labia. There is no rash or lesion on the left labia. Right adnexum displays no mass. Left adnexum displays no mass. No vaginal discharge found.   Musculoskeletal:        Cervical back: Normal.        Thoracic back: Normal.        Lumbar back: Normal.        Right upper arm: Normal.        Left upper arm: Normal.        Right forearm: Normal.        Left forearm: Normal.        Right hand: Normal.        Left hand: Normal.        Right upper leg: Normal.        Left upper leg: Normal.        Right lower leg: Normal.        Left lower leg: Normal.         Right foot: Normal.        Left foot: Normal.   Lymphadenopathy:        Head (right side): No submental, no submandibular and no posterior auricular adenopathy present.        Head (left side): No submental, no submandibular and no posterior auricular adenopathy present.     She has no cervical adenopathy.     She has no axillary adenopathy.        Right: No inguinal and no supraclavicular adenopathy present.        Left: No inguinal and no supraclavicular adenopathy present.   Neurological: She is alert and oriented to person, place, and time. She has normal strength. No cranial nerve deficit or sensory deficit. Coordination and gait normal. GCS eye subscore is 4. GCS verbal subscore is 5. GCS motor subscore is 6.   Skin: Skin is warm, dry and intact. No rash noted. No cyanosis. Nails show no clubbing.   Psychiatric: She has a normal mood and affect. Her speech is normal. Judgment and thought content normal. Her mood appears not anxious. Her affect is not inappropriate. She is not actively hallucinating. She does not exhibit a depressed mood.         Test Results:  Pending    Assessment:       Generalized abdominal pain. Heartburn.  Diverticulosis.Heme positive stool.  Differential diagnosis includes but is not limited to esophageal neoplasm, esophagitis, esophageal ulcer, gastroesophageal reflux disease, gastritis, gastric ulcer, gastric cancer, duodenitis, duodenal ulcer, inflammatory bowel disease, diverticulosis, hemorrhoids, gastrointestinal angiodysplasias, benign gastrointestinal neoplasm, colon cancer, etc.  Options at this time include but are not limited to endoscopy, second opinion, capsule endoscopy, radiologic studies, observation, etc.  Multiple comorbid issues ( HTN, chronic pain, COPD ) increase the complexity of required perioperative evaluation & management, risks of complications, and likely will increase the difficulty and complexity of postoperative care, etc.  These issues must be factored in  to preoperative evaluation and perioperative management.    1. Generalized abdominal pain    2. Heme positive stool        Plan:   Generalized abdominal pain    Heme positive stool    Other orders  -     sodium,potassium,mag sulfates (SUPREP BOWEL PREP KIT) 17.5-3.13-1.6 gram SolR; Follow written instructions provided by Clinic  Dispense: 2 Bottle; Refill: 0  -     sucralfate (CARAFATE) 1 gram tablet; Take 1 tablet (1 g total) by mouth 4 (four) times daily.  Dispense: 120 tablet; Refill: 1        Follow up around 7/30/2019 for routine post-endosocpy visit.    Counseling/Medical Decision Making:  Teresa Clay was counseled regarding the results of the evaluation thus far and the differential diagnosis.  Diagnostic and therapeutic options were discussed in detail along with the risks, benefits, possible complications, details of, and indications for each option.  Diagnoses discussed included but were not limited to: GB disease, GERD, hiatal hernia, PUD, gastritis, duodenitis, Sphincter of Oddi dysfunction, hemorrhoids, diverticulosis, cancer, IBD, IBS, benign tumors, angiodysplasias, ischemic disease.  Options discussed included but were not limited to: EGD, colonoscopy, proctoscopy, anoscopy, sigmoidoscopy, radiologic studies, PillCam, empiric therapy, second opinion, etc. Possible complications of endoscopy discussed included but were not limited to: bleeding, infections, endocarditis, injury to internal organs, incomplete exam, failure to diagnose cancer or other serious condition, need for emergency surgery, need for a temporary colostomy, need for additional operations or procedures, etc.  Entire conversation was held in layman's terms.  All unfamiliar terms were defined.  Questions solicited and answered.  I read the consent form to her verbatim.  Krames booklets were provided on EGD, GERD, GB disease / surgery, colonoscopy, polyps, diverticulosis, hemorrhoids, cancer, etc.  A copy of the consent form was  provided for her review outside the office / hospital prior to the procedures.  At the conclusion of the conversation she voiced complete understanding of all we discussed and satisfaction that all of her questions had been answered to her full understanding.  She stated that she felt fully informed and completely capable of making an informed decision.  She requests and desires to proceed with EGD and colonoscopy.

## 2019-06-18 NOTE — PROGRESS NOTES
"Subjective:       Patient ID: Teresa Clay is a 63 y.o. female.    Chief Complaint: Consult ([id number])      HPI:  Ms. Clay presents today as a consult from [facility].  She was seen in the past with left lower quadrant pain.  Today she returns with left upper quadrant pain that radiates to the epigastrium, retrosternally, and supraumbilical.  She does experience heartburn.  Recently treated for Helicobacter without improvement.  No melena or hematochezia.  No hematemesis.  No nausea or vomiting.  No diarrhea or constipation.  No fever or chills.  No night sweats.  No weight loss.  No aggravating factors.  No alleviating factors.  No association with eating.  No food intolerance.  No other associated symptoms.      Allergies & Meds:  Review of patient's allergies indicates:   Allergen Reactions    Betamethasone acet,sod phos Other (See Comments)     " Makes me feel flushed!"    Codeine Rash       Current Outpatient Medications   Medication Sig Dispense Refill    albuterol (PROVENTIL/VENTOLIN HFA) 90 mcg/actuation inhaler       albuterol (VENTOLIN HFA) 90 mcg/actuation inhaler inhale 2 puff by inhalation route  every 4 - 6 hours as needed as needed      buPROPion (WELLBUTRIN SR) 150 MG TBSR 12 hr tablet take 1 tablet by oral route every evening x 1 week then increase to 1 tab po bid -      diclofenac sodium (VOLTAREN) 1 % Gel APPLY 4 GRAMS TOPICALLY   QID AS DIRECTED 2 Tube 2    ergocalciferol (ERGOCALCIFEROL) 50,000 unit Cap Take 1 capsule by mouth.      fluticasone (FLONASE) 50 mcg/actuation nasal spray fluticasone 50 mcg/actuation nasal spray,suspension      hydroCHLOROthiazide (HYDRODIURIL) 25 MG tablet TK 1 T PO QD  2    metoprolol succinate (TOPROL-XL) 25 MG 24 hr tablet TAKE ONE TABLET BY MOUTH ONCE DAILY (Patient taking differently: TAKE HALF TABLET BY MOUTH ONCE DAILY) 30 tablet 11    tiotropium (SPIRIVA WITH HANDIHALER) 18 mcg inhalation capsule Inhale 1 capsule into the " lungs.      traMADol (ULTRAM) 50 mg tablet Take 1 tablet (50 mg total) by mouth every 8 (eight) hours as needed for Pain. 90 tablet 1    traMADol (ULTRAM) 50 mg tablet Take 1 tablet (50 mg total) by mouth every 8 (eight) hours as needed for Pain. 90 tablet 1    pantoprazole (PROTONIX) 40 MG tablet Take 1 tablet (40 mg total) by mouth once daily. Take in the morning before breakfast.  Wait 30 minutes before eating or drinking anything 30 tablet 11    sodium,potassium,mag sulfates (SUPREP BOWEL PREP KIT) 17.5-3.13-1.6 gram SolR Follow written instructions provided by Clinic 2 Bottle 0    sucralfate (CARAFATE) 1 gram tablet Take 1 tablet (1 g total) by mouth 4 (four) times daily. 120 tablet 1     No current facility-administered medications for this visit.      Facility-Administered Medications Ordered in Other Visits   Medication Dose Route Frequency Provider Last Rate Last Dose    fentaNYL injection    PRN Trey Dueñas MD   50 mcg at 02/04/19 1251    midazolam (VERSED) 1 mg/mL injection    PRN Trey Dueñas MD   2 mg at 02/04/19 1251       PMFSHx:  Past Medical History:   Diagnosis Date    Chronic back pain     Diverticulitis     Encounter for long-term (current) use of medications     Hypertension        Past Surgical History:   Procedure Laterality Date    APPENDECTOMY      CHOLECYSTECTOMY      COLONOSCOPY      Injection,steroid,epidural,transforaminal approach Bilateral 5/6/2019    [facility]    Injection,steroid,epidural,transforaminal approach L4-5 Bilateral 2/4/2019    [facility] at [facility]    TUBAL LIGATION         Family History   Problem Relation Age of Onset    Diabetes Brother     Cancer Brother     Breast cancer Maternal Aunt        Social History     Tobacco Use    Smoking status: Current Every Day Smoker     Packs/day: 0.50     Types: Cigarettes    Smokeless tobacco: Never Used   Substance Use Topics    Alcohol use: No    Drug use: No        Review of Systems   Constitutional: Negative for appetite change, chills, fatigue, fever and unexpected weight change.   HENT: Negative for congestion, dental problem, ear pain, mouth sores, postnasal drip, rhinorrhea, sore throat, tinnitus, trouble swallowing and voice change.    Eyes: Negative for photophobia, pain, discharge and visual disturbance.   Respiratory: Negative for cough, chest tightness, shortness of breath and wheezing.    Cardiovascular: Negative for chest pain, palpitations and leg swelling.   Gastrointestinal: Negative for blood in stool, constipation, diarrhea, nausea and vomiting.   Endocrine: Negative for cold intolerance, heat intolerance, polydipsia, polyphagia and polyuria.   Genitourinary: Negative for difficulty urinating, dysuria, flank pain, frequency, hematuria and urgency.   Musculoskeletal: Negative for arthralgias, joint swelling and myalgias.   Skin: Negative for color change and rash.   Allergic/Immunologic: Negative for immunocompromised state.   Neurological: Negative for dizziness, tremors, seizures, syncope, speech difficulty, weakness, numbness and headaches.   Hematological: Negative for adenopathy. Does not bruise/bleed easily.   Psychiatric/Behavioral: Negative for agitation, confusion, hallucinations, self-injury and suicidal ideas. The patient is not nervous/anxious.        Objective:      Physical Exam   Constitutional: She is oriented to person, place, and time. She appears well-developed and well-nourished. She is active.  Non-toxic appearance. No distress.   Body mass index is 33.12 kg/m².     HENT:   Head: Normocephalic and atraumatic.   Right Ear: Hearing and external ear normal. No drainage or tenderness.   Left Ear: Hearing and external ear normal. No drainage or tenderness.   Nose: Nose normal. No rhinorrhea. No epistaxis.   Mouth/Throat: Uvula is midline, oropharynx is clear and moist and mucous membranes are normal. Mucous membranes are not pale, not dry  and not cyanotic. No oropharyngeal exudate.   Eyes: Pupils are equal, round, and reactive to light. Conjunctivae and lids are normal. Right eye exhibits no discharge and no exudate. Left eye exhibits no discharge and no exudate. Right conjunctiva is not injected. Right eye exhibits no nystagmus. Left eye exhibits no nystagmus.   Neck: Trachea normal, full passive range of motion without pain and phonation normal. No JVD present. Carotid bruit is not present. No tracheal deviation present. No thyroid mass and no thyromegaly present.   Cardiovascular: Normal rate, regular rhythm, S1 normal, S2 normal, normal heart sounds and intact distal pulses. PMI is not displaced. Exam reveals no gallop and no friction rub.   No murmur heard.  Pulmonary/Chest: Effort normal and breath sounds normal. No accessory muscle usage. No respiratory distress. She exhibits no mass, no tenderness and no crepitus. Right breast exhibits no inverted nipple, no mass, no nipple discharge, no skin change and no tenderness. Left breast exhibits no inverted nipple, no mass, no nipple discharge, no skin change and no tenderness. Breasts are symmetrical.   Abdominal: Soft. Normal appearance and bowel sounds are normal. She exhibits no distension, no fluid wave, no abdominal bruit and no mass. There is no hepatosplenomegaly. There is no tenderness. There is no rebound, no guarding and negative Martin's sign. No hernia. Hernia confirmed negative in the right inguinal area and confirmed negative in the left inguinal area.   Genitourinary: Vagina normal. Rectal exam shows guaiac positive stool. Rectal exam shows no external hemorrhoid, no internal hemorrhoid, no fissure, no mass, no tenderness and anal tone normal. There is no rash or lesion on the right labia. There is no rash or lesion on the left labia. Right adnexum displays no mass. Left adnexum displays no mass. No vaginal discharge found.   Musculoskeletal:        Cervical back: Normal.         Thoracic back: Normal.        Lumbar back: Normal.        Right upper arm: Normal.        Left upper arm: Normal.        Right forearm: Normal.        Left forearm: Normal.        Right hand: Normal.        Left hand: Normal.        Right upper leg: Normal.        Left upper leg: Normal.        Right lower leg: Normal.        Left lower leg: Normal.        Right foot: Normal.        Left foot: Normal.   Lymphadenopathy:        Head (right side): No submental, no submandibular and no posterior auricular adenopathy present.        Head (left side): No submental, no submandibular and no posterior auricular adenopathy present.     She has no cervical adenopathy.     She has no axillary adenopathy.        Right: No inguinal and no supraclavicular adenopathy present.        Left: No inguinal and no supraclavicular adenopathy present.   Neurological: She is alert and oriented to person, place, and time. She has normal strength. No cranial nerve deficit or sensory deficit. Coordination and gait normal. GCS eye subscore is 4. GCS verbal subscore is 5. GCS motor subscore is 6.   Skin: Skin is warm, dry and intact. No rash noted. No cyanosis. Nails show no clubbing.   Psychiatric: She has a normal mood and affect. Her speech is normal. Judgment and thought content normal. Her mood appears not anxious. Her affect is not inappropriate. She is not actively hallucinating. She does not exhibit a depressed mood.         Test Results:  Pending    Assessment:       Generalized abdominal pain. Heartburn.  Diverticulosis.Heme positive stool.  Differential diagnosis includes but is not limited to esophageal neoplasm, esophagitis, esophageal ulcer, gastroesophageal reflux disease, gastritis, gastric ulcer, gastric cancer, duodenitis, duodenal ulcer, inflammatory bowel disease, diverticulosis, hemorrhoids, gastrointestinal angiodysplasias, benign gastrointestinal neoplasm, colon cancer, etc.  Options at this time include but are not  limited to endoscopy, second opinion, capsule endoscopy, radiologic studies, observation, etc.  Multiple comorbid issues ( HTN, chronic pain, COPD ) increase the complexity of required perioperative evaluation & management, risks of complications, and likely will increase the difficulty and complexity of postoperative care, etc.  These issues must be factored in to preoperative evaluation and perioperative management.    1. Generalized abdominal pain    2. Heme positive stool        Plan:   Generalized abdominal pain    Heme positive stool    Other orders  -     sodium,potassium,mag sulfates (SUPREP BOWEL PREP KIT) 17.5-3.13-1.6 gram SolR; Follow written instructions provided by Clinic  Dispense: 2 Bottle; Refill: 0  -     sucralfate (CARAFATE) 1 gram tablet; Take 1 tablet (1 g total) by mouth 4 (four) times daily.  Dispense: 120 tablet; Refill: 1        Follow up in about 6 weeks (around 7/30/2019) for routine post-endosocpy visit.    Counseling/Medical Decision Making:  Teresa Clay was counseled regarding the results of the evaluation thus far and the differential diagnosis.  Diagnostic and therapeutic options were discussed in detail along with the risks, benefits, possible complications, details of, and indications for each option.  Diagnoses discussed included but were not limited to: GB disease, GERD, hiatal hernia, PUD, gastritis, duodenitis, Sphincter of Oddi dysfunction, hemorrhoids, diverticulosis, cancer, IBD, IBS, benign tumors, angiodysplasias, ischemic disease.  Options discussed included but were not limited to: EGD, colonoscopy, proctoscopy, anoscopy, sigmoidoscopy, radiologic studies, PillCam, empiric therapy, second opinion, etc. Possible complications of endoscopy discussed included but were not limited to: bleeding, infections, endocarditis, injury to internal organs, incomplete exam, failure to diagnose cancer or other serious condition, need for emergency surgery, need for a temporary  colostomy, need for additional operations or procedures, etc.  Entire conversation was held in layman's terms.  All unfamiliar terms were defined.  Questions solicited and answered.  I read the consent form to her verbatim.  [facility] booklets were provided on EGD, GERD, GB disease / surgery, colonoscopy, polyps, diverticulosis, hemorrhoids, cancer, etc.  A copy of the consent form was provided for her review outside the office / hospital prior to the procedures.  At the conclusion of the conversation she voiced complete understanding of all we discussed and satisfaction that all of her questions had been answered to her full understanding.  She stated that she felt fully informed and completely capable of making an informed decision.  She requests and desires to proceed with EGD and colonoscopy.    Total face-to-face encounter time was 60 minutes, 40 minutes spent counseling as outlined/summarized above.

## 2019-06-18 NOTE — H&P (VIEW-ONLY)
"[facility] - General Surgery  General Surgery  H&P      Patient Name: Teresa Clay  MRN: [id number]     Chief Complaint:  I am being scoped today      HPI:  Ms. Clay presents today as a consult from [facility].  She was seen in the past with left lower quadrant pain.  Today she returns with left upper quadrant pain that radiates to the epigastrium, retrosternally, and supraumbilical.  She does experience heartburn.  Recently treated for Helicobacter without improvement.  No melena or hematochezia.  No hematemesis.  No nausea or vomiting.  No diarrhea or constipation.  No fever or chills.  No night sweats.  No weight loss.  No aggravating factors.  No alleviating factors.  No association with eating.  No food intolerance.  No other associated symptoms.      Allergies & Meds:     Allergen Reactions    Betamethasone acet,sod phos Other (See Comments)     " Makes me feel flushed!"    Codeine Rash       Current Outpatient Medications   Medication Sig Dispense Refill    albuterol (PROVENTIL/VENTOLIN HFA) 90 mcg/actuation inhaler       albuterol (VENTOLIN HFA) 90 mcg/actuation inhaler inhale 2 puff by inhalation route  every 4 - 6 hours as needed as needed      buPROPion (WELLBUTRIN SR) 150 MG TBSR 12 hr tablet take 1 tablet by oral route every evening x 1 week then increase to 1 tab po bid -      diclofenac sodium (VOLTAREN) 1 % Gel APPLY 4 GRAMS TOPICALLY   QID AS DIRECTED 2 Tube 2    ergocalciferol (ERGOCALCIFEROL) 50,000 unit Cap Take 1 capsule by mouth.      fluticasone (FLONASE) 50 mcg/actuation nasal spray fluticasone 50 mcg/actuation nasal spray,suspension      hydroCHLOROthiazide (HYDRODIURIL) 25 MG tablet TK 1 T PO QD  2    metoprolol succinate (TOPROL-XL) 25 MG 24 hr tablet TAKE ONE TABLET BY MOUTH ONCE DAILY (Patient taking differently: TAKE HALF TABLET BY MOUTH ONCE DAILY) 30 tablet 11    tiotropium (SPIRIVA WITH HANDIHALER) 18 mcg inhalation capsule Inhale 1 " capsule into the lungs.      traMADol (ULTRAM) 50 mg tablet Take 1 tablet (50 mg total) by mouth every 8 (eight) hours as needed for Pain. 90 tablet 1    traMADol (ULTRAM) 50 mg tablet Take 1 tablet (50 mg total) by mouth every 8 (eight) hours as needed for Pain. 90 tablet 1    pantoprazole (PROTONIX) 40 MG tablet Take 1 tablet (40 mg total) by mouth once daily. Take in the morning before breakfast.  Wait 30 minutes before eating or drinking anything 30 tablet 11    sodium,potassium,mag sulfates (SUPREP BOWEL PREP KIT) 17.5-3.13-1.6 gram SolR Follow written instructions provided by Clinic 2 Bottle 0    sucralfate (CARAFATE) 1 gram tablet Take 1 tablet (1 g total) by mouth 4 (four) times daily. 120 tablet 1       PMFSHx:  Past Medical History:   Diagnosis Date    Chronic back pain     Diverticulitis     Encounter for long-term (current) use of medications     Hypertension        Past Surgical History:   Procedure Laterality Date    APPENDECTOMY      CHOLECYSTECTOMY      COLONOSCOPY      Injection,steroid,epidural,transforaminal approach Bilateral 5/6/2019    [facility] at [facility]    Injection,steroid,epidural,transforaminal approach L4-5 Bilateral 2/4/2019    [facility] at [facility] OR    TUBAL LIGATION         Family History   Problem Relation Age of Onset    Diabetes Brother     Cancer Brother     Breast cancer Maternal Aunt        Social History     Tobacco Use    Smoking status: Current Every Day Smoker     Packs/day: 0.50     Types: Cigarettes    Smokeless tobacco: Never Used   Substance Use Topics    Alcohol use: No    Drug use: No       Review of Systems   Constitutional: Negative for appetite change, chills, fatigue, fever and unexpected weight change.   HENT: Negative for congestion, dental problem, ear pain, mouth sores, postnasal drip, rhinorrhea, sore throat, tinnitus, trouble swallowing and voice change.    Eyes: Negative for photophobia, pain, discharge  and visual disturbance.   Respiratory: Negative for cough, chest tightness, shortness of breath and wheezing.    Cardiovascular: Negative for chest pain, palpitations and leg swelling.   Gastrointestinal: Negative for blood in stool, constipation, diarrhea, nausea and vomiting.   Endocrine: Negative for cold intolerance, heat intolerance, polydipsia, polyphagia and polyuria.   Genitourinary: Negative for difficulty urinating, dysuria, flank pain, frequency, hematuria and urgency.   Musculoskeletal: Negative for arthralgias, joint swelling and myalgias.   Skin: Negative for color change and rash.   Allergic/Immunologic: Negative for immunocompromised state.   Neurological: Negative for dizziness, tremors, seizures, syncope, speech difficulty, weakness, numbness and headaches.   Hematological: Negative for adenopathy. Does not bruise/bleed easily.   Psychiatric/Behavioral: Negative for agitation, confusion, hallucinations, self-injury and suicidal ideas. The patient is not nervous/anxious.           Physical Exam   Constitutional: She is oriented to person, place, and time. She appears well-developed and well-nourished. She is active.  Non-toxic appearance. No distress. Body mass index is 33.12 kg/m².   HENT: Head: Normocephalic and atraumatic.   Right Ear: Hearing and external ear normal. No drainage or tenderness.   Left Ear: Hearing and external ear normal. No drainage or tenderness.   Nose: Nose normal. No rhinorrhea. No epistaxis.   Mouth/Throat: Uvula is midline, oropharynx is clear and moist and mucous membranes are normal. Mucous membranes are not pale, not dry and not cyanotic. No oropharyngeal exudate.   Eyes: Pupils are equal, round, and reactive to light. Conjunctivae and lids are normal. Right eye exhibits no discharge and no exudate. Left eye exhibits no discharge and no exudate. Right conjunctiva is not injected. Right eye exhibits no nystagmus. Left eye exhibits no nystagmus.   Neck: Trachea normal,  full passive range of motion without pain and phonation normal. No JVD present. Carotid bruit is not present. No tracheal deviation present. No thyroid mass and no thyromegaly present.   Cardiovascular: Normal rate, regular rhythm, S1 normal, S2 normal, normal heart sounds and intact distal pulses. PMI is not displaced. Exam reveals no gallop and no friction rub.   No murmur heard.  Pulmonary/Chest: Effort normal and breath sounds normal. No accessory muscle usage. No respiratory distress. She exhibits no mass, no tenderness and no crepitus. Right breast exhibits no inverted nipple, no mass, no nipple discharge, no skin change and no tenderness. Left breast exhibits no inverted nipple, no mass, no nipple discharge, no skin change and no tenderness. Breasts are symmetrical.   Abdominal: Soft. Normal appearance and bowel sounds are normal. She exhibits no distension, no fluid wave, no abdominal bruit and no mass. There is no hepatosplenomegaly. There is no tenderness. There is no rebound, no guarding and negative Martin's sign. No hernia. Hernia confirmed negative in the right inguinal area and confirmed negative in the left inguinal area.   Genitourinary: Vagina normal. Rectal exam shows guaiac positive stool. Rectal exam shows no external hemorrhoid, no internal hemorrhoid, no fissure, no mass, no tenderness and anal tone normal. There is no rash or lesion on the right labia. There is no rash or lesion on the left labia. Right adnexum displays no mass. Left adnexum displays no mass. No vaginal discharge found.   Musculoskeletal:        Cervical back: Normal.        Thoracic back: Normal.        Lumbar back: Normal.        Right upper arm: Normal.        Left upper arm: Normal.        Right forearm: Normal.        Left forearm: Normal.        Right hand: Normal.        Left hand: Normal.        Right upper leg: Normal.        Left upper leg: Normal.        Right lower leg: Normal.        Left lower leg: Normal.         Right foot: Normal.        Left foot: Normal.   Lymphadenopathy:        Head (right side): No submental, no submandibular and no posterior auricular adenopathy present.        Head (left side): No submental, no submandibular and no posterior auricular adenopathy present.     She has no cervical adenopathy.     She has no axillary adenopathy.        Right: No inguinal and no supraclavicular adenopathy present.        Left: No inguinal and no supraclavicular adenopathy present.   Neurological: She is alert and oriented to person, place, and time. She has normal strength. No cranial nerve deficit or sensory deficit. Coordination and gait normal. GCS eye subscore is 4. GCS verbal subscore is 5. GCS motor subscore is 6.   Skin: Skin is warm, dry and intact. No rash noted. No cyanosis. Nails show no clubbing.   Psychiatric: She has a normal mood and affect. Her speech is normal. Judgment and thought content normal. Her mood appears not anxious. Her affect is not inappropriate. She is not actively hallucinating. She does not exhibit a depressed mood.         Test Results:  Pending    Assessment:       Generalized abdominal pain. Heartburn.  Diverticulosis.Heme positive stool.  Differential diagnosis includes but is not limited to esophageal neoplasm, esophagitis, esophageal ulcer, gastroesophageal reflux disease, gastritis, gastric ulcer, gastric cancer, duodenitis, duodenal ulcer, inflammatory bowel disease, diverticulosis, hemorrhoids, gastrointestinal angiodysplasias, benign gastrointestinal neoplasm, colon cancer, etc.  Options at this time include but are not limited to endoscopy, second opinion, capsule endoscopy, radiologic studies, observation, etc.  Multiple comorbid issues ( HTN, chronic pain, COPD ) increase the complexity of required perioperative evaluation & management, risks of complications, and likely will increase the difficulty and complexity of postoperative care, etc.  These issues must be factored in  to preoperative evaluation and perioperative management.    1. Generalized abdominal pain    2. Heme positive stool        Plan:   Generalized abdominal pain    Heme positive stool    Other orders  -     sodium,potassium,mag sulfates (SUPREP BOWEL PREP KIT) 17.5-3.13-1.6 gram SolR; Follow written instructions provided by Clinic  Dispense: 2 Bottle; Refill: 0  -     sucralfate (CARAFATE) 1 gram tablet; Take 1 tablet (1 g total) by mouth 4 (four) times daily.  Dispense: 120 tablet; Refill: 1        Follow up around 7/30/2019 for routine post-endosocpy visit.    Counseling/Medical Decision Making:  Teresa Clay was counseled regarding the results of the evaluation thus far and the differential diagnosis.  Diagnostic and therapeutic options were discussed in detail along with the risks, benefits, possible complications, details of, and indications for each option.  Diagnoses discussed included but were not limited to: GB disease, GERD, hiatal hernia, PUD, gastritis, duodenitis, Sphincter of Oddi dysfunction, hemorrhoids, diverticulosis, cancer, IBD, IBS, benign tumors, angiodysplasias, ischemic disease.  Options discussed included but were not limited to: EGD, colonoscopy, proctoscopy, anoscopy, sigmoidoscopy, radiologic studies, PillCam, empiric therapy, second opinion, etc. Possible complications of endoscopy discussed included but were not limited to: bleeding, infections, endocarditis, injury to internal organs, incomplete exam, failure to diagnose cancer or other serious condition, need for emergency surgery, need for a temporary colostomy, need for additional operations or procedures, etc.  Entire conversation was held in layman's terms.  All unfamiliar terms were defined.  Questions solicited and answered.  I read the consent form to her verbatim.  Krames booklets were provided on EGD, GERD, GB disease / surgery, colonoscopy, polyps, diverticulosis, hemorrhoids, cancer, etc.  A copy of the consent form was  provided for her review outside the office / hospital prior to the procedures.  At the conclusion of the conversation she voiced complete understanding of all we discussed and satisfaction that all of her questions had been answered to her full understanding.  She stated that she felt fully informed and completely capable of making an informed decision.  She requests and desires to proceed with EGD and colonoscopy.

## 2019-06-20 ENCOUNTER — CLINICAL SUPPORT (OUTPATIENT)
Dept: REHABILITATION | Facility: HOSPITAL | Age: 64
End: 2019-06-20
Payer: COMMERCIAL

## 2019-06-20 DIAGNOSIS — M54.50 CHRONIC RIGHT-SIDED LOW BACK PAIN WITHOUT SCIATICA: Primary | ICD-10-CM

## 2019-06-20 DIAGNOSIS — M25.551 RIGHT HIP PAIN: ICD-10-CM

## 2019-06-20 DIAGNOSIS — G89.29 CHRONIC RIGHT-SIDED LOW BACK PAIN WITHOUT SCIATICA: Primary | ICD-10-CM

## 2019-06-20 PROCEDURE — 97161 PT EVAL LOW COMPLEX 20 MIN: CPT

## 2019-06-20 NOTE — PLAN OF CARE
"[facility]  Physical Therapy Initial Evaluation    Name: Teresa Clay  Clinic Number: [id number]    Therapy Diagnosis:   Encounter Diagnoses   Name Primary?    Chronic right-sided low back pain without sciatica Yes    Right hip pain      Physician: Miranda Duran PA*    Physician Orders: PT Eval and Treat Lsp  Medical Diagnosis: DDD lumbar, SIJ pain  Evaluation Date: 6/20/2019  Authorization period Expiration: 9/20/19  Plan of Care Certification Period: 9/20/19    Visit #: 1/ Visits authorized: 10  Time In:9:30 a   Time Out: 10:00 a  Total Billable Time: 30 minutes    Precautions: Standard    Subjective   Date of onset: chronic  Date of Surgery: n/a    Past Medical History:   Diagnosis Date    Chronic back pain     Diverticulitis     Encounter for long-term (current) use of medications     Hypertension      Teresa Clay  has a past surgical history that includes Appendectomy; Cholecystectomy; Tubal ligation; Colonoscopy; and Transforaminal epidural injection of steroid (Bilateral, 5/6/2019).    Teresa has a current medication list which includes the following prescription(s): albuterol, albuterol, bupropion, diclofenac sodium, ergocalciferol, fluticasone propionate, hydrochlorothiazide, metoprolol succinate, pantoprazole, sodium,potassium,mag sulfates, sucralfate, tiotropium, tramadol, and tramadol, and the following Facility-Administered Medications: fentanyl and midazolam.    Review of patient's allergies indicates:   Allergen Reactions    Betamethasone acet,sod phos Other (See Comments)     " Makes me feel flushed!"    Codeine Rash        Imaging, MRI studies: see imaging  Prior Therapy: yes, for current condition, patient only attending 4 sessions then no-showed for follow up appointments  Social History: Patient lives in a single level home with 18 steps to enter   Occupation:   Prior Level of Function: patient ambulating without AD, independent with ADLs " and working daily  Current Level of Function: patient with limited ambulation distance due to pain in her legs.       Pain: current 6/10, worst 6/10, best 6/10,   Aching, Sharp and Shooting, constant  Radicular symptoms: yes, into right leg down to the toes  Aggravating Factors: Laying and sitting  Easing Factors: topical creams      Onset/BENNY: insidious onset with falls exacerbating    History of current condition - Fabiola reports: long term history of symptoms, including heaviness in LEs, low back pain and right hip pain, radicular symptoms into RLE all the way to the toes. Pt reports she does stretching exercises at home every morning which helps her to get moving in the morning. Patient reports she is unable to walk long distances due to onset of leg pain/ heaviness. Patient has had 2 epidural injections without success. Patient states she is scheduled for an EMG study on 7/2/19.    Pts goals: to be able to walk one lap on the walking track and feel better in general      Objective     Posture: lateral shift to right noted    Lumbar Range of Motion:    Degrees Pain   Flexion 60%           Extension 50%         Left Side Bending 80%         Right Side Bending 80%         Left rotation   50%         Right Rotation   30%         Hip flexion 110 deg bilat    Hip IR 35 deg bilat    Hip ER 40 deg bilat       Lower Extremity Strength  Right LE  Left LE    Knee extension: 4+/5 Knee extension: 5/5   Knee flexion: 4/5 Knee flexion: 4/5   Hip flexion: 4+/5 Hip flexion: 4+/5   Hip extension:  4/5 Hip extension: 4/5   Hip abduction: 3+/5 Hip abduction: 3+/5   Upper abdominals 4-/5     Lower abdominals 3+/5     Back extensors 3+/5       Special Tests:    Repeated Flexion Negative   Repeated Extension Negative   Prone Instability Negative   Straight Leg Raise Negative   Slump Negative   Quadrant Negative   Femoral nerve test Negative       DTR:   Right Left Comment   Patellar (L3-4) 2+ 2+    Achilles (S1) 2+ 2+        Joint  Mobility:   Lumbar: CPA unrestricted,   RPA unrestricted  LPA unrestricted    Thoracic: restricted  Scoliosis noted    Palpation: minimal tenderness to palpation at Lsp SP and TPs R>L    Sensation: intact    Flexibility:     90/90 SLR = R moderate restriction, L moderate restriction   Ely's test: R moderate restriction, L moderate restriction   Ragini's test: R no restriction, L no restriction   Fernando test: R no restriction, L no restriction      Functional Limitations Reports - G Codes  Category: Mobility, Body position, Carrying, Self care, Other  Tool: SHI  Score: 35 (70%)    PT Evaluation Completed: Yes  Discussed Plan of Care with patient: Yes    TREATMENT:  Eval only due to patient arriving 30 mins late for appt.      Home Exercises and Patient Education Provided  Education provided re:   - progress towards goals   - role of therapy in multi - disciplinary team, goals for therapy  Pt educated on condition, POC, and expectations in therapy.  No spiritual or educational barriers to learning provided    Home exercises:  Pt will be provided HEP during course of treatment with progressions as appropriate. Pt was advised to perform these exercises free of pain, and to stop performing them if pain occurs.   Fabiola demonstrated good  understanding of the education provided.     Assessment     Teresa is a 63 y.o. female referred to outpatient physical therapy and presents to PT with pain limiting function and core strength deficits contributing to pain and lumbopelvic instability. Patient with negative neurotension tests and negative repeated flexion/extension in standing, yet complaints consistent with likely disc protrusion. Patient with severe core and gluteal strength deficits which contribute to pain and limited functional mobility. Patient to benefit from skilled therapy with focus on strength, flexibility, and ROM to improve mobility. Patient has not tolerated manual treatment in the past, therefore PT will  defer manual during this course of therapy.   Patient demonstrates limitations as described in the problem list. Pt will benefit from physcial therapy services in order to maximize pain free and/or functional use of bilateral LEs and overall mobility. The following goals were discussed with the patient and patient is in agreement with them as to be addressed in the treatment plan.     Anticipated barriers to physical therapy: poor compliance with prior therapy session    Medical necessity is demonstrated by the following IMPAIRMENTS/PROBLEM LIST:    weakness, impaired functional mobility, decreased lower extremity function, pain, decreased ROM and impaired muscle length        GOALS:    Long Term Goals: 5 weeks    Pain: Decrease pain to 0/10 25% of the time to allow for improved function  Strength: Improve strength in core muscles to 4/5 for improved lumbopelvic stability  ROM: Improve ROM to 100% of normal limits in trunk without pain   Functional scale: Improve score on SHI to 35% impairment  Walking: Increase walking distance to 1 lap on outdoor walking track in [place], [place]    Exercise: demonstrate independence with home exercise program to maintain gains made in therapy.        Plan     Certification Period: 6/20/2019 to 9/20/19.    Outpatient physical therapy 2 times weekly to include: Gait Training, Moist Heat/ Ice, Neuromuscular Re-ed, Therapeutic Activites and Therapeutic Exercise. Cont PT for 5 weeks.   Pt may be seen by PTA as part of the rehabilitation team.     I certify the need for these services furnished under this plan of treatment and while under my care.    Brandy Hicks, PT          Attestation:   I have seen the patient, reviewed the therapist's plan of care, and I agree with the plan of care.   I certify the need for these services furnished under this plan of treatment and while under my care.         _______________            ________                                                _____________________  Physician/Referring Practitioner                                                            Date of Signature

## 2019-06-20 NOTE — PROGRESS NOTES
"[facility]  Physical Therapy Initial Evaluation    Name: Teresa Clay  Clinic Number: [id number]    Therapy Diagnosis:   Encounter Diagnoses   Name Primary?    Chronic right-sided low back pain without sciatica Yes    Right hip pain      Physician: Miranda Duran PA*    Physician Orders: PT Eval and Treat Lsp  Medical Diagnosis: DDD lumbar, SIJ pain  Evaluation Date: 6/20/2019  Authorization period Expiration: 9/20/19  Plan of Care Certification Period: 9/20/19    Visit #: 1/ Visits authorized: 10  Time In:9:30 a   Time Out: 10:00 a  Total Billable Time: 30 minutes    Precautions: Standard    Subjective   Date of onset: chronic  Date of Surgery: n/a    Past Medical History:   Diagnosis Date    Chronic back pain     Diverticulitis     Encounter for long-term (current) use of medications     Hypertension      Teresa Clay  has a past surgical history that includes Appendectomy; Cholecystectomy; Tubal ligation; Colonoscopy; and Transforaminal epidural injection of steroid (Bilateral, 5/6/2019).    Teresa has a current medication list which includes the following prescription(s): albuterol, albuterol, bupropion, diclofenac sodium, ergocalciferol, fluticasone propionate, hydrochlorothiazide, metoprolol succinate, pantoprazole, sodium,potassium,mag sulfates, sucralfate, tiotropium, tramadol, and tramadol, and the following Facility-Administered Medications: fentanyl and midazolam.    Review of patient's allergies indicates:   Allergen Reactions    Betamethasone acet,sod phos Other (See Comments)     " Makes me feel flushed!"    Codeine Rash        Imaging, MRI studies: see imaging  Prior Therapy: yes, for current condition, patient only attending 4 sessions then no-showed for follow up appointments  Social History: Patient lives in a single level home with 18 steps to enter   Occupation:   Prior Level of Function: patient ambulating without AD, independent with ADLs " and working daily  Current Level of Function: patient with limited ambulation distance due to pain in her legs.       Pain: current 6/10, worst 6/10, best 6/10,   Aching, Sharp and Shooting, constant  Radicular symptoms: yes, into right leg down to the toes  Aggravating Factors: Laying and sitting  Easing Factors: topical creams      Onset/BENNY: insidious onset with falls exacerbating    History of current condition - Fabiola reports: long term history of symptoms, including heaviness in LEs, low back pain and right hip pain, radicular symptoms into RLE all the way to the toes. Pt reports she does stretching exercises at home every morning which helps her to get moving in the morning. Patient reports she is unable to walk long distances due to onset of leg pain/ heaviness. Patient has had 2 epidural injections without success. Patient states she is scheduled for an EMG study on 7/2/19.    Pts goals: to be able to walk one lap on the walking track and feel better in general      Objective     Posture: lateral shift to right noted    Lumbar Range of Motion:    Degrees Pain   Flexion 60%           Extension 50%         Left Side Bending 80%         Right Side Bending 80%         Left rotation   50%         Right Rotation   30%         Hip flexion 110 deg bilat    Hip IR 35 deg bilat    Hip ER 40 deg bilat       Lower Extremity Strength  Right LE  Left LE    Knee extension: 4+/5 Knee extension: 5/5   Knee flexion: 4/5 Knee flexion: 4/5   Hip flexion: 4+/5 Hip flexion: 4+/5   Hip extension:  4/5 Hip extension: 4/5   Hip abduction: 3+/5 Hip abduction: 3+/5   Upper abdominals 4-/5     Lower abdominals 3+/5     Back extensors 3+/5       Special Tests:    Repeated Flexion Negative   Repeated Extension Negative   Prone Instability Negative   Straight Leg Raise Negative   Slump Negative   Quadrant Negative   Femoral nerve test Negative       DTR:   Right Left Comment   Patellar (L3-4) 2+ 2+    Achilles (S1) 2+ 2+        Joint  Mobility:   Lumbar: CPA unrestricted,   RPA unrestricted  LPA unrestricted    Thoracic: restricted  Scoliosis noted    Palpation: minimal tenderness to palpation at Lsp SP and TPs R>L    Sensation: intact    Flexibility:     90/90 SLR = R moderate restriction, L moderate restriction   Ely's test: R moderate restriction, L moderate restriction   Ragini's test: R no restriction, L no restriction   Fernando test: R no restriction, L no restriction      Functional Limitations Reports - G Codes  Category: Mobility, Body position, Carrying, Self care, Other  Tool: SHI  Score: 35 (70%)    PT Evaluation Completed: Yes  Discussed Plan of Care with patient: Yes    TREATMENT:  Eval only due to patient arriving 30 mins late for appt.      Home Exercises and Patient Education Provided  Education provided re:   - progress towards goals   - role of therapy in multi - disciplinary team, goals for therapy  Pt educated on condition, POC, and expectations in therapy.  No spiritual or educational barriers to learning provided    Home exercises:  Pt will be provided HEP during course of treatment with progressions as appropriate. Pt was advised to perform these exercises free of pain, and to stop performing them if pain occurs.   Fabiola demonstrated good  understanding of the education provided.     Assessment     Teresa is a 63 y.o. female referred to outpatient physical therapy and presents to PT with pain limiting function and core strength deficits contributing to pain and lumbopelvic instability. Patient with negative neurotension tests and negative repeated flexion/extension in standing, yet complaints consistent with likely disc protrusion. Patient with severe core and gluteal strength deficits which contribute to pain and limited functional mobility. Patient to benefit from skilled therapy with focus on strength, flexibility, and ROM to improve mobility. Patient has not tolerated manual treatment in the past, therefore PT will  defer manual during this course of therapy.   Patient demonstrates limitations as described in the problem list. Pt will benefit from physcial therapy services in order to maximize pain free and/or functional use of bilateral LEs and overall mobility. The following goals were discussed with the patient and patient is in agreement with them as to be addressed in the treatment plan.     Anticipated barriers to physical therapy: poor compliance with prior therapy session    Medical necessity is demonstrated by the following IMPAIRMENTS/PROBLEM LIST:    weakness, impaired functional mobility, decreased lower extremity function, pain, decreased ROM and impaired muscle length        GOALS:    Long Term Goals: 5 weeks    Pain: Decrease pain to 0/10 25% of the time to allow for improved function  Strength: Improve strength in core muscles to 4/5 for improved lumbopelvic stability  ROM: Improve ROM to 100% of normal limits in trunk without pain   Functional scale: Improve score on SHI to 35% impairment  Walking: Increase walking distance to 1 lap on outdoor walking track in [place], [place]    Exercise: demonstrate independence with home exercise program to maintain gains made in therapy.        Plan     Certification Period: 6/20/2019 to 9/20/19.    Outpatient physical therapy 2 times weekly to include: Gait Training, Moist Heat/ Ice, Neuromuscular Re-ed, Therapeutic Activites and Therapeutic Exercise. Cont PT for 5 weeks.   Pt may be seen by PTA as part of the rehabilitation team.     I certify the need for these services furnished under this plan of treatment and while under my care.    Brandy Hicks, PT          Attestation:   I have seen the patient, reviewed the therapist's plan of care, and I agree with the plan of care.   I certify the need for these services furnished under this plan of treatment and while under my care.         _______________            ________                                                _____________________  Physician/Referring Practitioner                                                            Date of Signature

## 2019-06-27 ENCOUNTER — CLINICAL SUPPORT (OUTPATIENT)
Dept: REHABILITATION | Facility: HOSPITAL | Age: 64
End: 2019-06-27
Payer: COMMERCIAL

## 2019-06-27 DIAGNOSIS — G89.29 CHRONIC RIGHT-SIDED LOW BACK PAIN WITHOUT SCIATICA: ICD-10-CM

## 2019-06-27 DIAGNOSIS — M54.50 CHRONIC RIGHT-SIDED LOW BACK PAIN WITHOUT SCIATICA: ICD-10-CM

## 2019-06-27 DIAGNOSIS — M25.551 RIGHT HIP PAIN: ICD-10-CM

## 2019-06-27 PROCEDURE — 97110 THERAPEUTIC EXERCISES: CPT

## 2019-06-27 NOTE — PROGRESS NOTES
Physical Therapy Daily Note     Name: Teresa Clay  Clinic Number: [id number]  Diagnosis:   Encounter Diagnoses   Name Primary?    Chronic right-sided low back pain without sciatica     Right hip pain      Physician: Miranda Duran PA*  Precautions: standard  Visit #: 2 of 10  PTA Visit #: 0  Time In: 0900am  Time Out: 0950am    Subjective     Pt reports: she's not feeling good today; she's in a lot of pain  Pain Scale: Teresa rates pain on a scale of 0-10 to be 7 currently.    Objective     Teresa received individual therapeutic exercises to develop strength, ROM and posture for 45 minutes including:      Date 6/27/19       Exercise Sets x Reps/  Resistance Sets x Reps/  Resistance Sets x Reps/  Resistance Sets x Reps/  Resistance Sets x Reps/  Resistance   Seated fwd flex w SB 2 min       Sit to stand from mat x10       LAQ 2x10       HS curls 2x10       LTR with SB 2 min       DKTC with SB 2 min       bridges x15       SLR 2x10       Supine clams RTB x 20       Scap retractions BTB x 30       Side steps 3 laps         Home Exercises Provided: SLR, LTR    Pt demo good understanding of the education provided. Teresa demonstrated good return demonstration of activities.     Education provided re:  Teresa verbalized good understanding of education provided.   No spiritual or educational barriers to learning provided    Assessment     Patient tolerated treatment well. Pt completing treatment without complication.    Patient complaining of global pain throughout session. PT attempted to modify exercises and positions to alleviate pain, but was not successful. Patient was able to complete exercises despite complaints; patient was given the option to cease activity if the pain was too much.       Plan     Continue with established Plan of Care towards PT goals.    Therapist: Brandy Hicks, PT  6/27/2019

## 2019-07-01 ENCOUNTER — HOSPITAL ENCOUNTER (OUTPATIENT)
Dept: CARDIOLOGY | Facility: HOSPITAL | Age: 64
Discharge: HOME | End: 2019-07-01
Attending: SURGERY
Payer: COMMERCIAL

## 2019-07-01 DIAGNOSIS — R19.5 HEME POSITIVE STOOL: ICD-10-CM

## 2019-07-01 PROCEDURE — 93005 ELECTROCARDIOGRAM TRACING: CPT

## 2019-07-02 ENCOUNTER — OFFICE VISIT (OUTPATIENT)
Dept: PHYSICAL MEDICINE AND REHAB | Facility: CLINIC | Age: 64
End: 2019-07-02
Payer: COMMERCIAL

## 2019-07-02 DIAGNOSIS — M54.16 LUMBAR RADICULITIS: ICD-10-CM

## 2019-07-02 PROCEDURE — 99499 UNLISTED E&M SERVICE: CPT | Mod: S$GLB,,, | Performed by: PHYSICAL MEDICINE & REHABILITATION

## 2019-07-02 PROCEDURE — 99499 NO LOS: ICD-10-PCS | Mod: S$GLB,,, | Performed by: PHYSICAL MEDICINE & REHABILITATION

## 2019-07-02 NOTE — LETTER
July 2, 2019      Miranda Duran PA-C  [address] [place] [address] Dr  [address]  [place] [id number]           [facility]  [address] [place] [address] Dr [address]  [place] [address]  Phone: [phone]  Fax: [phone]          Patient: Teresa Clay   MR Number: [id number]   YOB: 1955   Date of Visit: 7/2/2019       Dear Miranda Duran:    Thank you for referring Teresa Clay to me for evaluation. Attached you will find relevant portions of my assessment and plan of care.    If you have questions, please do not hesitate to call me. I look forward to following Teresa Clay along with you.    Sincerely,    Edita Desir,     Enclosure  CC:  No Recipients    If you would like to receive this communication electronically, please contact [email or web address] or [phone] to request more information on [employer] Link access.    For providers and/or their staff who would like to refer a patient to [facility], please contact us through our one-stop-shop provider referral line, [facility], at [phone].    If you feel you have received this communication in error or would no longer like to receive these types of communications, please e-mail [email or web address]

## 2019-07-03 ENCOUNTER — HOSPITAL ENCOUNTER (OUTPATIENT)
Facility: HOSPITAL | Age: 64
Discharge: HOME | End: 2019-07-03
Attending: SURGERY | Admitting: SURGERY
Payer: COMMERCIAL

## 2019-07-03 ENCOUNTER — ANESTHESIA (OUTPATIENT)
Dept: SURGERY | Facility: HOSPITAL | Age: 64
End: 2019-07-03
Payer: COMMERCIAL

## 2019-07-03 ENCOUNTER — ANESTHESIA EVENT (OUTPATIENT)
Dept: SURGERY | Facility: HOSPITAL | Age: 64
End: 2019-07-03
Payer: COMMERCIAL

## 2019-07-03 DIAGNOSIS — R10.84 GENERALIZED ABDOMINAL PAIN: ICD-10-CM

## 2019-07-03 DIAGNOSIS — R19.5 HEME POSITIVE STOOL: Primary | ICD-10-CM

## 2019-07-03 PROBLEM — K57.30 DIVERTICULOSIS OF COLON: Status: ACTIVE | Noted: 2019-07-03

## 2019-07-03 PROBLEM — K29.50 CHRONIC GASTRITIS: Status: ACTIVE | Noted: 2019-07-03

## 2019-07-03 PROCEDURE — S0028 INJECTION, FAMOTIDINE, 20 MG: HCPCS

## 2019-07-03 PROCEDURE — 88342 IMHCHEM/IMCYTCHM 1ST ANTB: CPT | Mod: 26,,, | Performed by: PATHOLOGY

## 2019-07-03 PROCEDURE — 37000009 HC ANESTHESIA EA ADD 15 MINS: Performed by: SURGERY

## 2019-07-03 PROCEDURE — 43239 PR EGD, FLEX, W/BIOPSY, SGL/MULTI: ICD-10-PCS | Mod: 51,,, | Performed by: SURGERY

## 2019-07-03 PROCEDURE — 63600175 PHARM REV CODE 636 W HCPCS: Performed by: NURSE ANESTHETIST, CERTIFIED REGISTERED

## 2019-07-03 PROCEDURE — 88305 TISSUE EXAM BY PATHOLOGIST: CPT | Mod: 26,,, | Performed by: PATHOLOGY

## 2019-07-03 PROCEDURE — 27201012 HC FORCEPS, HOT/COLD, DISP: Performed by: SURGERY

## 2019-07-03 PROCEDURE — 88305 TISSUE EXAM BY PATHOLOGIST: CPT | Performed by: PATHOLOGY

## 2019-07-03 PROCEDURE — D9220A PRA ANESTHESIA: ICD-10-PCS | Mod: ,,, | Performed by: ANESTHESIOLOGY

## 2019-07-03 PROCEDURE — 43239 EGD BIOPSY SINGLE/MULTIPLE: CPT | Mod: 51,,, | Performed by: SURGERY

## 2019-07-03 PROCEDURE — 45378 PR COLONOSCOPY,DIAGNOSTIC: ICD-10-PCS | Mod: ,,, | Performed by: SURGERY

## 2019-07-03 PROCEDURE — 88342 TISSUE SPECIMEN TO PATHOLOGY - SURGERY: ICD-10-PCS | Mod: 26,,, | Performed by: PATHOLOGY

## 2019-07-03 PROCEDURE — 37000008 HC ANESTHESIA 1ST 15 MINUTES: Performed by: SURGERY

## 2019-07-03 PROCEDURE — D9220A PRA ANESTHESIA: Mod: ,,, | Performed by: ANESTHESIOLOGY

## 2019-07-03 PROCEDURE — 25000003 PHARM REV CODE 250: Performed by: SURGERY

## 2019-07-03 PROCEDURE — 25000003 PHARM REV CODE 250

## 2019-07-03 PROCEDURE — 45378 DIAGNOSTIC COLONOSCOPY: CPT | Mod: ,,, | Performed by: SURGERY

## 2019-07-03 PROCEDURE — 45378 DIAGNOSTIC COLONOSCOPY: CPT | Performed by: SURGERY

## 2019-07-03 PROCEDURE — 43239 EGD BIOPSY SINGLE/MULTIPLE: CPT | Performed by: SURGERY

## 2019-07-03 PROCEDURE — 88342 IMHCHEM/IMCYTCHM 1ST ANTB: CPT | Performed by: PATHOLOGY

## 2019-07-03 PROCEDURE — 88305 TISSUE SPECIMEN TO PATHOLOGY - SURGERY: ICD-10-PCS | Mod: 26,,, | Performed by: PATHOLOGY

## 2019-07-03 RX ORDER — SODIUM CHLORIDE, SODIUM LACTATE, POTASSIUM CHLORIDE, CALCIUM CHLORIDE 600; 310; 30; 20 MG/100ML; MG/100ML; MG/100ML; MG/100ML
125 INJECTION, SOLUTION INTRAVENOUS CONTINUOUS
Status: DISCONTINUED | OUTPATIENT
Start: 2019-07-03 | End: 2019-07-03 | Stop reason: HOSPADM

## 2019-07-03 RX ORDER — FAMOTIDINE 10 MG/ML
20 INJECTION INTRAVENOUS ONCE
Status: COMPLETED | OUTPATIENT
Start: 2019-07-03 | End: 2019-07-03

## 2019-07-03 RX ORDER — LIDOCAINE HYDROCHLORIDE 10 MG/ML
1 INJECTION, SOLUTION EPIDURAL; INFILTRATION; INTRACAUDAL; PERINEURAL ONCE
Status: DISCONTINUED | OUTPATIENT
Start: 2019-07-03 | End: 2019-07-03 | Stop reason: HOSPADM

## 2019-07-03 RX ORDER — MIDAZOLAM HYDROCHLORIDE 1 MG/ML
INJECTION, SOLUTION INTRAMUSCULAR; INTRAVENOUS
Status: DISCONTINUED | OUTPATIENT
Start: 2019-07-03 | End: 2019-07-03

## 2019-07-03 RX ORDER — SODIUM CHLORIDE, SODIUM LACTATE, POTASSIUM CHLORIDE, CALCIUM CHLORIDE 600; 310; 30; 20 MG/100ML; MG/100ML; MG/100ML; MG/100ML
INJECTION, SOLUTION INTRAVENOUS CONTINUOUS
Status: DISCONTINUED | OUTPATIENT
Start: 2019-07-03 | End: 2019-07-03 | Stop reason: HOSPADM

## 2019-07-03 RX ORDER — FAMOTIDINE 10 MG/ML
INJECTION INTRAVENOUS
Status: COMPLETED
Start: 2019-07-03 | End: 2019-07-03

## 2019-07-03 RX ORDER — PROPOFOL 10 MG/ML
VIAL (ML) INTRAVENOUS
Status: DISCONTINUED | OUTPATIENT
Start: 2019-07-03 | End: 2019-07-03

## 2019-07-03 RX ORDER — ONDANSETRON 2 MG/ML
4 INJECTION INTRAMUSCULAR; INTRAVENOUS DAILY PRN
Status: DISCONTINUED | OUTPATIENT
Start: 2019-07-03 | End: 2019-07-03 | Stop reason: HOSPADM

## 2019-07-03 RX ORDER — DIPHENHYDRAMINE HYDROCHLORIDE 50 MG/ML
12.5 INJECTION INTRAMUSCULAR; INTRAVENOUS
Status: DISCONTINUED | OUTPATIENT
Start: 2019-07-03 | End: 2019-07-03 | Stop reason: HOSPADM

## 2019-07-03 RX ADMIN — PROPOFOL 20 MG: 10 INJECTION, EMULSION INTRAVENOUS at 09:07

## 2019-07-03 RX ADMIN — FAMOTIDINE 20 MG: 10 INJECTION INTRAVENOUS at 08:07

## 2019-07-03 RX ADMIN — MIDAZOLAM HYDROCHLORIDE 1 MG: 1 INJECTION, SOLUTION INTRAMUSCULAR; INTRAVENOUS at 09:07

## 2019-07-03 RX ADMIN — MIDAZOLAM HYDROCHLORIDE 1 MG: 1 INJECTION, SOLUTION INTRAMUSCULAR; INTRAVENOUS at 08:07

## 2019-07-03 RX ADMIN — PROPOFOL 30 MG: 10 INJECTION, EMULSION INTRAVENOUS at 09:07

## 2019-07-03 RX ADMIN — SODIUM CHLORIDE, POTASSIUM CHLORIDE, SODIUM LACTATE AND CALCIUM CHLORIDE: 600; 310; 30; 20 INJECTION, SOLUTION INTRAVENOUS at 08:07

## 2019-07-03 NOTE — DISCHARGE SUMMARY
[facility]  Discharge Note  Short Stay    Admit Date: 7/3/2019    Discharge Date and Time: No discharge date for patient encounter.     Attending Physician: Jean Jamison MD     Discharge Provider: Jean Jamison    Diagnoses:  Active Hospital Problems    Diagnosis  POA    Generalized abdominal pain [R10.84]  Yes    Heme positive stool [R19.5]  Yes    Diverticulosis of colon [K57.30]  Yes    Chronic gastritis [K29.50]  Yes      Resolved Hospital Problems   No resolved problems to display.       Discharged Condition: good    Hospital Course: Patient was admitted for an outpatient procedure and tolerated the procedure well with no complications.    Final Diagnoses: Same as principal problem.    Disposition: Home or Self Care    Follow up/Patient Instructions:    Medications:  Reconciled Home Medications:      Medication List      CHANGE how you take these medications    metoprolol succinate 25 MG 24 hr tablet  Commonly known as:  TOPROL-XL  TAKE ONE TABLET BY MOUTH ONCE DAILY  What changed:    · how much to take  · how to take this  · when to take this        CONTINUE taking these medications    diclofenac sodium 1 % Gel  Commonly known as:  VOLTAREN  APPLY 4 GRAMS TOPICALLY   QID AS DIRECTED     ergocalciferol 50,000 unit Cap  Commonly known as:  ERGOCALCIFEROL  Take 1 capsule by mouth.     fluticasone propionate 50 mcg/actuation nasal spray  Commonly known as:  FLONASE  fluticasone 50 mcg/actuation nasal spray,suspension     hydroCHLOROthiazide 25 MG tablet  Commonly known as:  HYDRODIURIL  TK 1 T PO QD     pantoprazole 40 MG tablet  Commonly known as:  PROTONIX  Take 1 tablet (40 mg total) by mouth once daily. Take in the morning before breakfast.  Wait 30 minutes before eating or drinking anything     SPIRIVA WITH HANDIHALER 18 mcg inhalation capsule  Generic drug:  tiotropium  Inhale 1 capsule into the lungs.     sucralfate 1 gram tablet  Commonly known as:  CARAFATE  Take 1 tablet (1 g total)  by mouth 4 (four) times daily.     traMADol 50 mg tablet  Commonly known as:  ULTRAM  Take 1 tablet (50 mg total) by mouth every 8 (eight) hours as needed for Pain.     * VENTOLIN HFA 90 mcg/actuation inhaler  Generic drug:  albuterol  inhale 2 puff by inhalation route  every 4 - 6 hours as needed as needed     * albuterol 90 mcg/actuation inhaler  Commonly known as:  PROVENTIL/VENTOLIN HFA     WELLBUTRIN  MG TBSR 12 hr tablet  Generic drug:  buPROPion  take 1 tablet by oral route every evening x 1 week then increase to 1 tab po bid -         * This list has 2 medication(s) that are the same as other medications prescribed for you. Read the directions carefully, and ask your doctor or other care provider to review them with you.            STOP taking these medications    sodium,potassium,mag sulfates 17.5-3.13-1.6 gram Solr  Commonly known as:  SUPREP BOWEL PREP KIT          Discharge Procedure Orders   Diet Adult Regular     Order Specific Question Answer Comments   Additional restrictions: High Fiber      No driving until:     Order Specific Question Answer Comments   Date: 7/4/2019      Follow-up Information     Jean Jamison MD In 2 weeks.    Specialty:  General Surgery  Why:  Routine Post Endoscopy Visit  Contact information:  [address]  [place] [facility] [address] [phone]                   Discharge Procedure Orders (must include Diet, Follow-up, Activity):   Discharge Procedure Orders (must include Diet, Follow-up, Activity)   Diet Adult Regular     Order Specific Question Answer Comments   Additional restrictions: High Fiber      No driving until:     Order Specific Question Answer Comments   Date: 7/4/2019

## 2019-07-03 NOTE — DISCHARGE INSTRUCTIONS
Diverticulosis    Diverticulosis means that small pouches have formed in the wall of your large intestine (colon). Most often, this problem causes no symptoms and is common as people age. But the pouches in the colon are at risk of becoming infected. When this happens, the condition is called diverticulitis. Although most people with diverticulosis never develop diverticulitis, it is still not uncommon. Rectal bleeding can also occur and in less common situations, a type of colon inflammation called colitis.  While most people do not have symptoms, some people with diverticulosis may have:  · Abdominal cramps and pain  · Bloating  · Constipation  · Change in bowel habits  Causes  The exact cause of diverticulosis (and diverticulitis) has not been proved, but a few things are associated with the condition:  · Low-fiber diet  · Constipation  · Lack of exercise  Your healthcare provider will talk with you about how to manage your condition. Diet changes may be all that are needed to help control diverticulosis and prevent progression to diverticulitis. If you develop diverticulitis, you will likely need other treatments.  Home care  You may be told to take fiber supplements daily. Fiber adds bulk to the stool so that it passes through the colon more easily. Stool softeners may be recommended. You may also be given medications for pain relief. Be sure to take all medications as directed.  In the past, people were told to avoid corn, nuts, and seeds. This is no longer necessary.  Follow these guidelines when caring for yourself at home:  · Eat unprocessed foods that are high in fiber. Whole grains, fruits, and vegetables are good choices.  · Drink 6 to 8 glasses of water every day unless your healthcare provider has you limit how much fluid you should have.  · Watch for changes in your bowel movements. Tell your provider if you notice any changes.  · Begin an exercise program. Ask your provider how to get started.  Generally, walking is the best.  · Get plenty of rest and sleep.  Follow-up care  Follow up with your healthcare provider, or as advised. Regular visits may be needed to check on your health. Sometimes special procedures such as colonoscopy, are needed after an episode of diverticulitis or blooding. Be sure to keep all your appointments.  If a stool sample was taken, or cultures were done, you should be told if they are positive, or if your treatment needs to be changed. You can call as directed for the results.  If X-rays were done, a radiologist will look at them. You will be told if there is a change in your treatment.  If antibiotics were prescribed, be sure to finish them all.  When to seek medical advice  Call your healthcare provider right away if any of these occur:  · Fever of 100.4°F (38°C) or higher, or as directed by your healthcare provider  · Severe cramps in the lower left side of the abdomen or pain that is getting worse  · Tenderness in the lower left side of the abdomen or worsening pain throughout the abdomen  · Diarrhea or constipation that doesn't get better within 24 hours  · Nausea and vomiting  · Bleeding from the rectum  Call 911  Call emergency services if any of the following occur:  · Trouble breathing  · Confusion  · Very drowsy or trouble awakening  · Fainting or loss of consciousness  · Rapid heart rate  · Chest pain  Date Last Reviewed: 12/30/2015 © 2000-2017 [employer]. [address] [place] [address]. All rights reserved. This information is not intended as a substitute for professional medical care. Always follow your healthcare professional's instructions.        4 Steps for Eating Healthier  Changing the way you eat can improve your health. It can lower your cholesterol and blood pressure, and help you stay at a healthy weight. Your diet doesnt have to be bland and boring to be healthy. Just watch your calories and follow these steps:    1. Eat fewer  unhealthy fats  · Choose more fish and lean meats instead of fatty cuts of meat.  · Skip butter and lard, and use less margarine.  · Pass on foods that have palm, coconut, or hydrogenated oils.  · Eat fewer high-fat dairy foods like cheese, ice cream, and whole milk.  · Get a heart-healthy cookbook and try some low-fat recipes.  2. Go light on salt  · Keep the saltshaker off the table.  · Limit high-salt ingredients, such as soy sauce, bouillon, and garlic salt.  · Instead of adding salt when cooking, season your food with herbs and flavorings. Try lemon, garlic, and onion.  · Limit convenience foods, such as boxed or canned foods and restaurant food.  · Read food labels and choose lower-sodium options.  3. Limit sugar  · Pause before you add sugars to pancakes, cereal, coffee, or tea. This includes white and brown table sugar, syrup, honey, and molasses. Cut your usual amount by half.  · Use non-sugar sweeteners. Stevia, aspartame, and sucralose can satisfy a sweet tooth without adding calories.  · Swap out sugar-filled soda and other drinks. Buy sugar-free or low-calorie beverages. Remember water is always the best choice.  · Read labels and choose foods with less added sugar. Keep in mind that dairy foods and foods with fruit will have some natural sugar.  · Cut the sugar in recipes by 1/3 to 1/2. Boost the flavor with extracts like almond, vanilla, or orange. Or add spices such as cinnamon or nutmeg.  4. Eat more fiber  · Eat fresh fruits and vegetables every day.  · Boost your diet with whole grains. Go for oats, whole-grain rice, and bran.  · Add beans and lentils to your meals.  · Drink more water to match your fiber increase. This is to help prevent constipation.  Date Last Reviewed: 5/11/2015  © 2853-6114 [employer]. [address] [place] [address], [place] [address]. All rights reserved. This information is not intended as a substitute for professional medical care. Always follow your healthcare  professional's instructions.        Discharge Instructions: Eating a High-Fiber Diet  Your health care provider has prescribed a high-fiber diet for you. Fiber is what gives strength and structure to plants. Most grains, beans, vegetables, and fruits contain fiber. Foods rich in fiber are often low in calories and fat, but they fill you up more. These foods may also reduce the risk of certain health problems.  There are two types of fiber:  · Insoluble fiber. This is found in whole grains, cereals, and certain fruits and vegetables (such as apple skins, corn, and beans). Insoluble fiber is made up mainly of plant cell walls. It may prevent constipation and reduce the risk of certain types of cancer.  · Soluble fiber. This type of fiber is found in oats, beans, nuts, and certain fruits and vegetables (such as strawberries and peas). Soluble fiber turns to gel in the digestive system, slowing the movement of the digestive tract. It helps control blood sugar levels and can reduce cholesterol, which may help lower the risk of heart disease. Soluble fiber can also help control appetite.     Home care  · Know how much fiber you need a day. The recommended daily amount of fiber is 25 grams for women and 38 grams for men. After age 50, daily fiber needs drop to 21 grams for women and 30 grams for men.  · Ask your doctor about a fiber supplement. (Always take fiber supplements with a large glass of water.)  · Keep track of how much fiber you eat.  · Eat a variety of foods high in fiber.  · Learn to read and understand food labels.  · Ask your healthcare provider how much water you should be drinking.  · Look for these high-fiber foods:  ¨ Whole-grain breads and cereals  § 6 ounces a day give you about 18 grams of fiber (1 ounce is equal to 1 slice of bread, 1 cup of dry cereal, or 1/2 cup of cooked rice).  § Include wheat and oat bran cereals, whole-wheat muffins or toast, and corn tortillas in your meals.  ¨ Fruits   § 2  cups a day give you about 8 grams of fiber.  § Apples, oranges, strawberries, pears, and bananas are good sources.  § Fruit juice does not contain as much fiber as the fruit it was made from.  ¨ Vegetables  § 2½ cups a day give you about 11 grams of fiber. Add asparagus, carrots, broccoli, peas, and corn to your meals.  ¨ Legumes  § 1/4 cup a day (in place of meat) gives you about 4 grams of fiber. Try navy beans, lentils, chickpeas, and soybeans.  ¨ Seeds   § A small handful of seeds gives you about 3 grams of fiber. Try sunflower seeds.  Follow-up  Make a follow-up appointment with a nutritionist as directed by our staff.  Date Last Reviewed: 6/1/2015  © 8830-5393 [employer]. [address] [place] [address], [place] [address]. All rights reserved. This information is not intended as a substitute for professional medical care. Always follow your healthcare professional's instructions.

## 2019-07-03 NOTE — ANESTHESIA PREPROCEDURE EVALUATION
07/03/2019  Teresa Clay is a 63 y.o., female.    Anesthesia Evaluation    I have reviewed the Patient Summary Reports.    I have reviewed the Nursing Notes.   I have reviewed the Medications.     Review of Systems  Social:  Former Smoker    Cardiovascular:   Hypertension    Pulmonary:   COPD    Musculoskeletal:  Spine Disorders: cervical    Neurological:   Neuromuscular Disease,   Chronic Pain Syndrome       Physical Exam  General:  Well nourished    Airway/Jaw/Neck:  Airway Findings: Mouth Opening: Normal Tongue: Normal  General Airway Assessment: Adult  Mallampati: III  TM Distance: 4 - 6 cm  Jaw/Neck Findings:  Neck ROM: Extension Decreased, Mild       Chest/Lungs:  Chest/Lungs Findings: Clear to auscultation     Heart/Vascular:  Heart Findings: Rate: Normal  Rhythm: Regular Rhythm        Mental Status:  Mental Status Findings:  Cooperative, Alert and Oriented         Anesthesia Plan  Type of Anesthesia, risks & benefits discussed:  Anesthesia Type:  general  Patient's Preference:   Intra-op Monitoring Plan: standard ASA monitors  Intra-op Monitoring Plan Comments:   Post Op Pain Control Plan: IV/PO Opioids PRN  Post Op Pain Control Plan Comments:   Induction:   IV  Beta Blocker:  Patient is not currently on a Beta-Blocker (No further documentation required).       Informed Consent: Patient understands risks and agrees with Anesthesia plan.  Questions answered. Anesthesia consent signed with patient.  ASA Score: 3     Day of Surgery Review of History & Physical: I have interviewed and examined the patient. I have reviewed the patient's H&P dated:            Ready For Surgery From Anesthesia Perspective.

## 2019-07-03 NOTE — ANESTHESIA POSTPROCEDURE EVALUATION
Anesthesia Post Evaluation    Patient: Teresa Clay    Procedure(s) Performed: Procedure(s) (LRB):  COLONOSCOPY (N/A)  ESOPHAGOGASTRODUODENOSCOPY (EGD) (N/A)    Final Anesthesia Type: general  Patient location during evaluation: PACU  Patient participation: Yes- Able to Participate  Level of consciousness: awake and alert  Post-procedure vital signs: reviewed and stable  Pain management: adequate  Airway patency: patent  PONV status at discharge: No PONV  Anesthetic complications: no      Cardiovascular status: blood pressure returned to baseline  Respiratory status: unassisted  Hydration status: euvolemic  Follow-up not needed.          Vitals Value Taken Time   /66 7/3/2019 10:08 AM   Temp 36.5 °C (97.7 °F) 7/3/2019  8:18 AM   Pulse 64 7/3/2019 10:08 AM   Resp 16 7/3/2019 10:08 AM   SpO2 95 % 7/3/2019 10:08 AM   Vitals shown include unvalidated device data.      Event Time     Out of Recovery 10:00:00          Pain/Melisa Score: Melisa Score: 10 (7/3/2019  9:50 AM)

## 2019-07-03 NOTE — [employer] PATIENT INSTRUCTIONS
Discharge Summary/Instructions after an Endoscopic Procedure  Patient Name: Teresa Clay  Patient MRN: [id number]  Patient YOB: 1955  Wednesday, July 03, 2019  Jean Jamison MD  RESTRICTIONS:  During your procedure today, you received medications for sedation.  These   medications may affect your judgment, balance and coordination.  Therefore,   for 24 hours, you have the following restrictions:   - DO NOT drive a car, operate machinery, make legal/financial decisions,   sign important papers or drink alcohol.    ACTIVITY:  Today: no heavy lifting, straining or running due to procedural   sedation/anesthesia.  The following day: return to full activity including work.  DIET:  Eat and drink normally unless instructed otherwise.     TREATMENT FOR COMMON SIDE EFFECTS:  - Mild abdominal pain, nausea, belching, bloating or excessive gas:  rest,   eat lightly and use a heating pad.  - Sore Throat: treat with throat lozenges and/or gargle with warm salt   water.  - Because air was used during the procedure, expelling large amounts of air   from your rectum or belching is normal.  - If a bowel prep was taken, you may not have a bowel movement for 1-3 days.    This is normal.  SYMPTOMS TO WATCH FOR AND REPORT TO YOUR PHYSICIAN:  1. Abdominal pain or bloating, other than gas cramps.  2. Chest pain.  3. Back pain.  4. Signs of infection such as: chills or fever occurring within 24 hours   after the procedure.  5. Rectal bleeding, which would show as bright red, maroon, or black stools.   (A tablespoon of blood from the rectum is not serious, especially if   hemorrhoids are present.)  6. Vomiting.  7. Weakness or dizziness.  GO DIRECTLY TO THE NEAREST EMERGENCY ROOM IF YOU HAVE ANY OF THE FOLLOWING:      Difficulty breathing              Chills and/or fever over 101 F   Persistent vomiting and/or vomiting blood   Severe abdominal pain   Severe chest pain   Black, tarry stools   Bleeding- more than one  tablespoon   Any other symptom or condition that you feel may need urgent attention  Your doctor recommends these additional instructions:  If any biopsies were taken, your doctors clinic will contact you in 1 to 2   weeks with any results.  - Discharge patient to home.   - High fiber diet.   - Continue present medications.   - Diverticulosis and high-fiber diet educational information provided to   patient prior to discharge  - Further recommendations will depend on clinical course  - See EGD Report  - Return to my office in 2 weeks.  For questions, problems or results please call your physician - Jean Jamison MD at Work:  [phone].  [facility] EMERGENCY ROOM PHONE NUMBER: [phone]  IF A COMPLICATION OR EMERGENCY SITUATION ARISES AND YOU ARE UNABLE TO REACH   YOUR PHYSICIAN - GO DIRECTLY TO THE EMERGENCY ROOM.  MD Jean Richard MD  7/3/2019 9:53:30 AM  This report has been verified and signed electronically.  [employer]

## 2019-07-03 NOTE — INTERVAL H&P NOTE
The patient has been examined and the H&P has been reviewed:    I concur with the findings and no changes have occurred since H&P was written.     Lab results reviewed from 7/1/2019.  CBC showed no evidence of leukocytosis, anemia, or thrombocytopenia.  Electrolytes were all in the range of normal. BUN and creatinine showed no evidence of renal dysfunction.  Glucose showed no suspicion diabetes.  Liver profile showed no evidence of hepatocellular disease or biliary obstruction.  Lab results were reviewed from 5/21/2019.  CBC again showed no evidence of leukocytosis, anemia, or thrombocytopenia.  Electrolytes revealed a mild hypokalemia and hypercarbia otherwise all electrolytes were in the range of normal.  BUN and creatinine showed no evidence of renal dysfunction.  Glucose was minimally elevated.  Liver profile showed no evidence of hepatocellular disease or biliary obstruction.  Amylase and lipase showed no evidence of pancreatitis.    EKG reviewed from 7/1/2019.  Twelve lead tracing showed a normal sinus rhythm with a first-degree AV block but no evidence of any acute ischemic changes.    Surgery risks, benefits and alternative options discussed and understood by patient/family.    No evident contraindications to proceeding with planned endoscopy today.      There are no hospital problems to display for this patient.

## 2019-07-03 NOTE — TRANSFER OF CARE
"Anesthesia Transfer of Care Note    Patient: Teresa Clay    Procedure(s) Performed: Procedure(s) (LRB):  COLONOSCOPY (N/A)  ESOPHAGOGASTRODUODENOSCOPY (EGD) (N/A)    Patient location: PACU    Anesthesia Type: general    Transport from OR: Transported from OR on room air with adequate spontaneous ventilation    Post pain: adequate analgesia    Post assessment: no apparent anesthetic complications and tolerated procedure well    Post vital signs: stable    Level of consciousness: awake, alert and oriented    Nausea/Vomiting: no nausea/vomiting    Complications: none    Transfer of care protocol was followed      Last vitals:   Visit Vitals  /80 (BP Location: Right arm, Patient Position: Lying)   Pulse 71   Temp 36.5 °C (97.7 °F)   Resp 12   Ht 4' 11" (1.499 m)   Wt 72.6 kg (160 lb)   SpO2 95%   Breastfeeding? No   BMI 32.32 kg/m²     "

## 2019-07-03 NOTE — PROVATION PATIENT INSTRUCTIONS
Discharge Summary/Instructions after an Endoscopic Procedure  Patient Name: Teresa Clay  Patient MRN: [id number]  Patient YOB: 1955  Wednesday, July 03, 2019  Jean Jamison MD  RESTRICTIONS:  During your procedure today, you received medications for sedation.  These   medications may affect your judgment, balance and coordination.  Therefore,   for 24 hours, you have the following restrictions:   - DO NOT drive a car, operate machinery, make legal/financial decisions,   sign important papers or drink alcohol.    ACTIVITY:  Today: no heavy lifting, straining or running due to procedural   sedation/anesthesia.  The following day: return to full activity including work.  DIET:  Eat and drink normally unless instructed otherwise.     TREATMENT FOR COMMON SIDE EFFECTS:  - Mild abdominal pain, nausea, belching, bloating or excessive gas:  rest,   eat lightly and use a heating pad.  - Sore Throat: treat with throat lozenges and/or gargle with warm salt   water.  - Because air was used during the procedure, expelling large amounts of air   from your rectum or belching is normal.  - If a bowel prep was taken, you may not have a bowel movement for 1-3 days.    This is normal.  SYMPTOMS TO WATCH FOR AND REPORT TO YOUR PHYSICIAN:  1. Abdominal pain or bloating, other than gas cramps.  2. Chest pain.  3. Back pain.  4. Signs of infection such as: chills or fever occurring within 24 hours   after the procedure.  5. Rectal bleeding, which would show as bright red, maroon, or black stools.   (A tablespoon of blood from the rectum is not serious, especially if   hemorrhoids are present.)  6. Vomiting.  7. Weakness or dizziness.  GO DIRECTLY TO THE NEAREST EMERGENCY ROOM IF YOU HAVE ANY OF THE FOLLOWING:      Difficulty breathing              Chills and/or fever over 101 F   Persistent vomiting and/or vomiting blood   Severe abdominal pain   Severe chest pain   Black, tarry stools   Bleeding- more than one  tablespoon   Any other symptom or condition that you feel may need urgent attention  Your doctor recommends these additional instructions:  If any biopsies were taken, your doctors clinic will contact you in 1 to 2   weeks with any results.  - Discharge patient to home.   - Resume previous diet.   - Continue present medications.   - Continue Protonix and Carafate  - Await pathology results.   - Treat Helicobacter if present  - Review Colonoscopy Report  - Further recommendations will depend on clinical course  - Return to my office in 2 weeks.  For questions, problems or results please call your physician - Jean Jamison MD at Work:  [phone].  [facility] EMERGENCY ROOM PHONE NUMBER: [phone]  IF A COMPLICATION OR EMERGENCY SITUATION ARISES AND YOU ARE UNABLE TO REACH   YOUR PHYSICIAN - GO DIRECTLY TO THE EMERGENCY ROOM.  MD Jean Richard MD  7/3/2019 9:50:01 AM  This report has been verified and signed electronically.  [employer]

## 2019-07-09 ENCOUNTER — CLINICAL SUPPORT (OUTPATIENT)
Dept: REHABILITATION | Facility: HOSPITAL | Age: 64
End: 2019-07-09
Payer: COMMERCIAL

## 2019-07-09 DIAGNOSIS — M25.551 RIGHT HIP PAIN: ICD-10-CM

## 2019-07-09 DIAGNOSIS — G89.29 CHRONIC RIGHT-SIDED LOW BACK PAIN WITHOUT SCIATICA: ICD-10-CM

## 2019-07-09 DIAGNOSIS — M54.50 CHRONIC RIGHT-SIDED LOW BACK PAIN WITHOUT SCIATICA: ICD-10-CM

## 2019-07-09 PROCEDURE — 97110 THERAPEUTIC EXERCISES: CPT

## 2019-07-09 NOTE — PROGRESS NOTES
"                                                    Physical Therapy Daily Note     Name: Teresa Clay  Clinic Number: [id number]  Diagnosis:   Encounter Diagnoses   Name Primary?    Chronic right-sided low back pain without sciatica     Right hip pain      Physician: Miranda Duran PA*  Precautions: standard  Visit #: 3 of 10  PTA Visit #: 1  Time In: 0900am  Time Out: 0930am    Subjective     Pt reports: she hurts all the time. Pain is worse at night. Mostly has pain in the hips and legs. Medication she takes at night doesn't really help. It just helps put her to sleep. Pain in both hips, but it is worse in R hip. Hurts under left breast all the time.  Pain Scale: Teresa rates pain on a scale of 0-10 to be 5 currently.    Objective     Teresa received individual therapeutic exercises to develop strength, ROM and posture for 45 minutes including:      Date 6/27/19 7/9/19      Exercise Sets x Reps/  Resistance Sets x Reps/  Resistance Sets x Reps/  Resistance Sets x Reps/  Resistance Sets x Reps/  Resistance   Seated fwd flex w SB 2 min 2 min      Sit to stand from mat x10 x10      LAQ 2x10 10 ea side 3" hold      HS curls with GTB 2x10 10 ea leg      LTR with SB 2 min 2 min      DKTC with SB 2 min 2 min      bridges x15 x15      SLR 2x10 10 on ea leg      Supine clams RTB x 20 RTB x 20      Scap retractions BTB x 30 BTB x 20      Side steps 3 laps 3 laps          Home Exercises Provided: SLR, LTR    Pt demo good understanding of the education provided. Teresa demonstrated good return demonstration of activities.     Education provided re:  Teresa verbalized good understanding of education provided.   No spiritual or educational barriers to learning provided    Assessment     Patient tolerated treatment well. Pt completed treatment with little difficulty. Pt seemed to be in a bit of pain. She  decreased reps from last rx date. Did not perform 2 min exercises for full time. Complained of pain below L " breast, that she recently got treatment for awaiting results. Attempted to progress pt, but pt stated she had to go to work., therefore shortening treatment 30 min       Plan     Continue with established Plan of Care towards PT goals.    Therapist: Ovidio Garg, PTA  7/9/2019       I certify that I was present in the room directing the student in service delivery and guiding them using my skilled judgment. As the co-signing therapist I have reviewed the students documentation and am responsible for the treatment, assessment, and plan.       Yuridia Rivera, SPTA

## 2019-07-11 ENCOUNTER — CLINICAL SUPPORT (OUTPATIENT)
Dept: REHABILITATION | Facility: HOSPITAL | Age: 64
End: 2019-07-11
Payer: COMMERCIAL

## 2019-07-11 DIAGNOSIS — M54.50 CHRONIC RIGHT-SIDED LOW BACK PAIN WITHOUT SCIATICA: ICD-10-CM

## 2019-07-11 DIAGNOSIS — G89.29 CHRONIC RIGHT-SIDED LOW BACK PAIN WITHOUT SCIATICA: ICD-10-CM

## 2019-07-11 DIAGNOSIS — M25.551 RIGHT HIP PAIN: ICD-10-CM

## 2019-07-11 PROCEDURE — 97110 THERAPEUTIC EXERCISES: CPT

## 2019-07-11 NOTE — PROGRESS NOTES
"                                                    Physical Therapy Daily Note     Name: Teresa Clay  Clinic Number: [id number]  Diagnosis:   Encounter Diagnoses   Name Primary?    Chronic right-sided low back pain without sciatica     Right hip pain      Physician: Miranda Duran PA*  Precautions: standard  Visit #: 4 of 10  PTA Visit #: 2  Time In: 0900am  Time Out: 0940am    Subjective     Pt reports: Pain occurs in posterior thigh and goes down to her feet/toes. Pain gets worse at night. Nothing that alleviates pain. Pain is consistent. Pt stretches at night.   Pain Scale: Teresa rates pain on a scale of 0-10 to be 4 currently.    Objective     Teresa received individual therapeutic exercises to develop strength, ROM and posture for 45 minutes including:      Date 6/27/19 7/9/19 7/11/19     Exercise Sets x Reps/  Resistance Sets x Reps/  Resistance Sets x Reps/  Resistance Sets x Reps/  Resistance Sets x Reps/  Resistance   Seated fwd flex w SB 2 min 2 min 2 min     Sit to stand from mat x10 x10 x15     LAQ 2x10 10 ea side 3" hold x15 ea leg 3" hold     HS curls with GTB 2x10 10 ea leg x15 ea leg     LTR with SB 2 min 2 min 2 min     DKTC with SB 2 min 2 min 2 min     bridges x15 x15 x20     SLR 2x10 10 on ea leg x10 ea leg     Supine clams RTB x 20 RTB x 20 RTB x20     Scap retractions BTB x 30 BTB x 20 BTB x20     Side steps 3 laps 3 laps 3 laps         Home Exercises Provided: SLR, LTR    Pt demo good understanding of the education provided. Teresa demonstrated good return demonstration of activities.     Education provided re:  Teresa verbalized good understanding of education provided.   No spiritual or educational barriers to learning provided    Assessment     Patient tolerated treatment well. She was able to progress within POC. Seemed to increase hip flexion ROM with SLR. Had very little c/o pain. Goes back to see doctor on July 29th; may suggest surgical intervention on back if PT doesn't " work. Pt had to go to work - preventing additional exercises.       Plan     Continue with established Plan of Care towards PT goals.    Therapist: Ovidio Garg, PTA  7/11/2019       I certify that I was present in the room directing the student in service delivery and guiding them using my skilled judgment. As the co-signing therapist I have reviewed the students documentation and am responsible for the treatment, assessment, and plan.       FELIZ Fortune

## 2019-07-16 ENCOUNTER — CLINICAL SUPPORT (OUTPATIENT)
Dept: REHABILITATION | Facility: HOSPITAL | Age: 64
End: 2019-07-16
Payer: COMMERCIAL

## 2019-07-16 ENCOUNTER — OFFICE VISIT (OUTPATIENT)
Dept: SURGERY | Facility: CLINIC | Age: 64
End: 2019-07-16
Payer: COMMERCIAL

## 2019-07-16 VITALS
BODY MASS INDEX: 33.67 KG/M2 | DIASTOLIC BLOOD PRESSURE: 71 MMHG | TEMPERATURE: 98 F | WEIGHT: 167 LBS | SYSTOLIC BLOOD PRESSURE: 121 MMHG | HEIGHT: 59 IN | HEART RATE: 58 BPM | RESPIRATION RATE: 18 BRPM | OXYGEN SATURATION: 95 %

## 2019-07-16 DIAGNOSIS — M25.551 RIGHT HIP PAIN: ICD-10-CM

## 2019-07-16 DIAGNOSIS — M54.50 CHRONIC RIGHT-SIDED LOW BACK PAIN WITHOUT SCIATICA: ICD-10-CM

## 2019-07-16 DIAGNOSIS — K29.50 OTHER CHRONIC GASTRITIS WITHOUT HEMORRHAGE: Primary | ICD-10-CM

## 2019-07-16 DIAGNOSIS — K57.30 DIVERTICULOSIS OF COLON: ICD-10-CM

## 2019-07-16 DIAGNOSIS — G89.29 CHRONIC RIGHT-SIDED LOW BACK PAIN WITHOUT SCIATICA: ICD-10-CM

## 2019-07-16 PROCEDURE — 99213 OFFICE O/P EST LOW 20 MIN: CPT | Mod: S$GLB,,, | Performed by: SURGERY

## 2019-07-16 PROCEDURE — 99213 PR OFFICE/OUTPT VISIT, EST, LEVL III, 20-29 MIN: ICD-10-PCS | Mod: S$GLB,,, | Performed by: SURGERY

## 2019-07-16 PROCEDURE — 97110 THERAPEUTIC EXERCISES: CPT

## 2019-07-16 RX ORDER — METOCLOPRAMIDE 10 MG/1
10 TABLET ORAL 4 TIMES DAILY
Qty: 120 TABLET | Refills: 0 | Status: SHIPPED | OUTPATIENT
Start: 2019-07-16 | End: 2019-08-19 | Stop reason: SDUPTHER

## 2019-07-16 RX ORDER — DOCUSATE SODIUM 100 MG/1
100 CAPSULE, LIQUID FILLED ORAL 2 TIMES DAILY
Qty: 60 CAPSULE | Refills: 11 | Status: SHIPPED | OUTPATIENT
Start: 2019-07-16 | End: 2019-09-03 | Stop reason: SDUPTHER

## 2019-07-16 NOTE — PROGRESS NOTES
"Subjective:       Patient ID: Teresa Clay is a 63 y.o. female.    Chief Complaint: Follow-up (EGD/colonoscopy, pt would like a referral to Bariatrics)      HPI:  Ms. Clay presents today following a recent outpatient EGD and colonoscopy  She presents today with no new complaints.  No nausea, vomiting, fevers, chills, diarrhea, constipation, melena, hematochezia, hematemesis, etc.  Appetite is excellent.  Weight is stable.  Activity tolerance is normal.  Overall she feels "great".  Colonoscopy revealed diverticulosis, mild of the sigmoid colon.  There was a mild stenosis of the colon created by the diverticulosis.  EGD revealed bile reflux gastritis, Helicobacter negative.  She is still experiencing left upper quadrant intermittent pain.    EGD, colonoscopy, and pathology reports reviewed from 7/3/2019 with the above findings confirmed.    Ms. Clay also registered a request for referral to a bariatric surgeon.      Allergies & Meds:  Review of patient's allergies indicates:   Allergen Reactions    Betamethasone acet,sod phos Other (See Comments)     " Makes me feel flushed!"    Codeine Rash       Current Outpatient Medications   Medication Sig Dispense Refill    albuterol (VENTOLIN HFA) 90 mcg/actuation inhaler inhale 2 puff by inhalation route  every 4 - 6 hours as needed as needed      diclofenac sodium (VOLTAREN) 1 % Gel APPLY 4 GRAMS TOPICALLY   QID AS DIRECTED 2 Tube 2    ergocalciferol (ERGOCALCIFEROL) 50,000 unit Cap Take 1 capsule by mouth.      fluticasone (FLONASE) 50 mcg/actuation nasal spray fluticasone 50 mcg/actuation nasal spray,suspension      hydroCHLOROthiazide (HYDRODIURIL) 25 MG tablet TK 1 T PO QD  2    metoprolol succinate (TOPROL-XL) 25 MG 24 hr tablet TAKE ONE TABLET BY MOUTH ONCE DAILY (Patient taking differently: TAKE HALF TABLET BY MOUTH ONCE DAILY) 30 tablet 11    sucralfate (CARAFATE) 1 gram tablet Take 1 tablet (1 g total) by mouth 4 (four) times daily. 120 tablet 1    " traMADol (ULTRAM) 50 mg tablet Take 1 tablet (50 mg total) by mouth every 8 (eight) hours as needed for Pain. 90 tablet 1    buPROPion (WELLBUTRIN SR) 150 MG TBSR 12 hr tablet take 1 tablet by oral route every evening x 1 week then increase to 1 tab po bid -      docusate sodium (COLACE) 100 MG capsule Take 1 capsule (100 mg total) by mouth 2 (two) times daily. Drink a 12 oz glass of water with each dose. 60 capsule 11    metoclopramide HCl (REGLAN) 10 MG tablet Take 1 tablet (10 mg total) by mouth 4 (four) times daily. 120 tablet 0    pantoprazole (PROTONIX) 40 MG tablet Take 1 tablet (40 mg total) by mouth once daily. Take in the morning before breakfast.  Wait 30 minutes before eating or drinking anything 30 tablet 11    tiotropium (SPIRIVA WITH HANDIHALER) 18 mcg inhalation capsule Inhale 1 capsule into the lungs.       No current facility-administered medications for this visit.      Facility-Administered Medications Ordered in Other Visits   Medication Dose Route Frequency Provider Last Rate Last Dose    fentaNYL injection    PRN Trey Dueñas MD   50 mcg at 02/04/19 1251    midazolam (VERSED) 1 mg/mL injection    PRN Trey Dueñas MD   2 mg at 02/04/19 1251       PMFSHx:  Past medical history, surgical history, family history, social history reviewed and no changes noted.        Review of Systems   Constitutional: Negative for appetite change, chills, fatigue, fever and unexpected weight change.   HENT: Negative for congestion, dental problem, ear pain, mouth sores, postnasal drip, rhinorrhea, sore throat, tinnitus, trouble swallowing and voice change.    Eyes: Negative for photophobia, pain, discharge and visual disturbance.   Respiratory: Negative for cough, chest tightness, shortness of breath and wheezing.    Cardiovascular: Negative for chest pain, palpitations and leg swelling.   Gastrointestinal: Negative for abdominal pain, blood in stool, constipation, diarrhea, nausea and vomiting.   Endocrine:  Negative for cold intolerance, heat intolerance, polydipsia, polyphagia and polyuria.   Genitourinary: Negative for difficulty urinating, dysuria, flank pain, frequency, hematuria and urgency.   Musculoskeletal: Negative for arthralgias, joint swelling and myalgias.   Skin: Negative for color change and rash.   Allergic/Immunologic: Negative for immunocompromised state.   Neurological: Negative for dizziness, tremors, seizures, syncope, speech difficulty, weakness, numbness and headaches.   Hematological: Negative for adenopathy. Does not bruise/bleed easily.   Psychiatric/Behavioral: Negative for agitation, confusion, hallucinations, self-injury and suicidal ideas. The patient is not nervous/anxious.        Objective:      Physical Exam   Constitutional: She appears well-developed and well-nourished.  Non-toxic appearance. She does not appear ill. No distress.   Cardiovascular: Normal rate, regular rhythm and normal heart sounds. PMI is not displaced. Exam reveals no gallop.   No murmur heard.  Pulmonary/Chest: Effort normal and breath sounds normal. No accessory muscle usage. No tachypnea. No respiratory distress.   Abdominal: Soft. Normal appearance and bowel sounds are normal. There is no tenderness. No hernia.   Skin: Skin is warm, dry and intact. No rash noted.         Test Results:  See above    Assessment:       Chronic bile reflux gastritis, persistent symptoms despite Protonix and Carafate.  Trial of  Reglan warranted.  Diverticulosis with colonic stenosis.    1. Other chronic gastritis without hemorrhage    2. Diverticulosis of colon    3. BMI 33.0-33.9,adult        Plan:   Other chronic gastritis without hemorrhage    Diverticulosis of colon    BMI 33.0-33.9,adult  -     Ambulatory Referral to Bariatric Medicine    Other orders  -     metoclopramide HCl (REGLAN) 10 MG tablet; Take 1 tablet (10 mg total) by mouth 4 (four) times daily.  Dispense: 120 tablet; Refill: 0  -     docusate sodium (COLACE) 100 MG  capsule; Take 1 capsule (100 mg total) by mouth 2 (two) times daily. Drink a 12 oz glass of water with each dose.  Dispense: 60 capsule; Refill: 11     Begin Reglan and Colace as prescribed.    Follow up in about 1 month (around 8/16/2019).    Counseling/Medical Decision Making:  Teresa Clay and I had a long conversation regarding the diagnosis of diverticulosis and diverticulitis. The [employer] publication pertaining to the subject was provided to her as well. The complications of diverticulosis including diverticulitis and hemorrhage were discussed in great detail. The signs and symptoms of these complications were explained explicitly including but not limited to: bleeding, abdominal pain, fever, nausea, vomiting, diarrhea, constipation, etc. She was instructed to seek immediate medical attention should any of the signs or symptoms develop. The importance of avoiding constipation was explained. The benefits of a 40 g high fiber diet, fiber supplements, stool softeners, and increased free water intake were also explained. Questions were solicited and answered. Entire conversation was held in layman's terms. At the conclusion of the conversation she voiced complete understanding of all we discussed and satisfaction that all questions were fully answered.

## 2019-07-16 NOTE — PROGRESS NOTES
"                                                    Physical Therapy Daily Note     Name: Teresa Clay  Clinic Number: [id number]  Diagnosis:   Encounter Diagnoses   Name Primary?    Chronic right-sided low back pain without sciatica     Right hip pain      Physician: Miranda Duran PA*  Precautions: standard  Visit #: 5of 10  PTA Visit #: 3  Time In: 0905am  Time Out: 0940am    Subjective     Pt reports: Pain is still consistent "like a toothache". "It's in my hips." going to dr castillo today to see about this pain in my abdomen, i'm not feeling well today.   Pain Scale: Teresa rates pain on a scale of 0-10 to be 5 currently.    7/Objective     Teresa received individual therapeutic exercises to develop strength, ROM and posture for 45 minutes including:      Date 6/27/19 7/9/19 7/11/19 7/16/19    Exercise Sets x Reps/  Resistance Sets x Reps/  Resistance Sets x Reps/  Resistance Sets x Reps/  Resistance Sets x Reps/  Resistance   Seated fwd flex w SB 2 min 2 min 2 min 2 min    Sit to stand from mat x10 x10 x15 x15    LAQ 2x10 10 ea side 3" hold x15 ea leg 3" hold x15 ea leg    HS curls with GTB 2x10 10 ea leg x15 ea leg x15 ea leg    LTR with SB 2 min 2 min 2 min 2 min    DKTC with SB 2 min 2 min 2 min 2 min    bridges x15 x15 x20 x20    SLR 2x10 10 on ea leg x10 ea leg x10 ea leg    Supine clams RTB x 20 RTB x 20 RTB x20 RTB x20    Scap retractions BTB x 30 BTB x 20 BTB x20 BTB x 20    Side steps 3 laps 3 laps 3 laps 3 laps        Home Exercises Provided: SLR, LTR    Pt demo good understanding of the education provided. Teresa demonstrated good return demonstration of activities.     Education provided re:  Teresa verbalized good understanding of education provided.   No spiritual or educational barriers to learning provided    Assessment     Patient tolerated treatment well. She was able to complete all established therex before stating " I have to leave for work that's all I have time for today." " patient apprehensive with all therex stating she hurts at L epigastric region (which she sees dr castillo about today). Unable to progress secondary to pain levels.       Plan     Continue with established Plan of Care towards PT goals.    Therapist: Ovidio Garg, PTA  7/16/2019       I certify that I was present in the room directing the student in service delivery and guiding them using my skilled judgment. As the co-signing therapist I have reviewed the students documentation and am responsible for the treatment, assessment, and plan.       Yuridia Rivera, JULYA

## 2019-07-17 ENCOUNTER — TELEPHONE (OUTPATIENT)
Dept: BARIATRICS | Facility: CLINIC | Age: 64
End: 2019-07-17

## 2019-07-17 NOTE — TELEPHONE ENCOUNTER
----- Message from Kylah Hickman sent at 7/17/2019  2:04 PM CDT -----  Contact: [phone]  Does Dr Chung take Ambetter?  Please call [phone]  Patient stated she was referred over by Dr Jamison.  Reason:  Stated its medical, didn't want to disclose any further info.

## 2019-07-18 ENCOUNTER — CLINICAL SUPPORT (OUTPATIENT)
Dept: REHABILITATION | Facility: HOSPITAL | Age: 64
End: 2019-07-18
Payer: COMMERCIAL

## 2019-07-18 DIAGNOSIS — M25.551 RIGHT HIP PAIN: ICD-10-CM

## 2019-07-18 DIAGNOSIS — M54.50 CHRONIC RIGHT-SIDED LOW BACK PAIN WITHOUT SCIATICA: ICD-10-CM

## 2019-07-18 DIAGNOSIS — G89.29 CHRONIC RIGHT-SIDED LOW BACK PAIN WITHOUT SCIATICA: ICD-10-CM

## 2019-07-18 PROCEDURE — 97110 THERAPEUTIC EXERCISES: CPT

## 2019-07-18 NOTE — PROGRESS NOTES
"                                                    Physical Therapy Daily Note     Name: Teresa Clay  Clinic Number: [id number]  Diagnosis:   Encounter Diagnoses   Name Primary?    Chronic right-sided low back pain without sciatica     Right hip pain      Physician: Miranda Duran PA*  Precautions: standard  Visit #: 6 of 10  PTA Visit #: 4  Time In: 0900  Time Out: 0930    Subjective     Pt reports: "I took Advil this morning." no serious pain at the moment  Pain Scale: Teresa rates pain on a scale of 0-10 to be 3 currently.    7/Objective     Teresa received individual therapeutic exercises to develop strength, ROM and posture for 45 minutes including:      Date 6/27/19 7/9/19 7/11/19 7/16/19 7/18/19   Exercise Sets x Reps/  Resistance Sets x Reps/  Resistance Sets x Reps/  Resistance Sets x Reps/  Resistance Sets x Reps/  Resistance   Seated fwd flex w SB 2 min 2 min 2 min 2 min 2 min   Sit to stand from mat x10 x10 x15 x15 x15   LAQ 2x10 10 ea side 3" hold x15 ea leg 3" hold x15 ea leg 2x10 ea leg   HS curls with GTB 2x10 10 ea leg x15 ea leg x15 ea leg 2x10 ea leg   LTR with SB 2 min 2 min 2 min 2 min 2 min   DKTC with SB 2 min 2 min 2 min 2 min 2 min   bridges x15 x15 x20 x20 x20   SLR 2x10 10 on ea leg x10 ea leg x10 ea leg x10 ea leg   Supine clams RTB x 20 RTB x 20 RTB x20 RTB x20 RTB x20   Scap retractions BTB x 30 BTB x 20 BTB x20 BTB x 20 BTB x15   Side steps 3 laps 3 laps 3 laps 3 laps 3 laps       Home Exercises Provided: SLR, LTR    Pt demo good understanding of the education provided. Teresa demonstrated good return demonstration of activities.     Education provided re:  Teresa verbalized good understanding of education provided.   No spiritual or educational barriers to learning provided    Assessment     Pt tolerated rx well, overall. Unable to complete all reps of scap retractions due to pain. Was able to increase reps on other TherEx. Pt participates in HEP; helps ease TherEx during " PT. Slight pauses during LTR due to R hip pain. Despite fatigue, was able to complete TherEx. PTA issued pt exercise packet to perform at home. Slight increase in pain post TherEx secondary to soreness. Has an appt with doctor on Monday (July 22nd).      Plan     Continue with established Plan of Care towards PT goals.    Therapist: Ovidio Garg, PTA  7/18/2019       I certify that I was present in the room directing the student in service delivery and guiding them using my skilled judgment. As the co-signing therapist I have reviewed the students documentation and am responsible for the treatment, assessment, and plan.       FELIZ Fortune

## 2019-07-22 ENCOUNTER — OFFICE VISIT (OUTPATIENT)
Dept: PAIN MEDICINE | Facility: CLINIC | Age: 64
End: 2019-07-22
Payer: COMMERCIAL

## 2019-07-22 VITALS
HEIGHT: 59 IN | BODY MASS INDEX: 32.25 KG/M2 | DIASTOLIC BLOOD PRESSURE: 70 MMHG | RESPIRATION RATE: 17 BRPM | OXYGEN SATURATION: 97 % | TEMPERATURE: 98 F | WEIGHT: 160 LBS | HEART RATE: 68 BPM | SYSTOLIC BLOOD PRESSURE: 109 MMHG

## 2019-07-22 VITALS
BODY MASS INDEX: 33.67 KG/M2 | SYSTOLIC BLOOD PRESSURE: 122 MMHG | HEIGHT: 59 IN | WEIGHT: 167 LBS | DIASTOLIC BLOOD PRESSURE: 80 MMHG | HEART RATE: 61 BPM

## 2019-07-22 DIAGNOSIS — R53.81 PHYSICAL DECONDITIONING: ICD-10-CM

## 2019-07-22 DIAGNOSIS — Z79.891 CHRONIC USE OF OPIATE DRUGS THERAPEUTIC PURPOSES: Primary | ICD-10-CM

## 2019-07-22 DIAGNOSIS — M47.896 OTHER SPONDYLOSIS, LUMBAR REGION: ICD-10-CM

## 2019-07-22 DIAGNOSIS — M79.7 FIBROMYALGIA: ICD-10-CM

## 2019-07-22 PROCEDURE — 99999 PR PBB SHADOW E&M-EST. PATIENT-LVL III: CPT | Mod: PBBFAC,,, | Performed by: ANESTHESIOLOGY

## 2019-07-22 PROCEDURE — 99214 PR OFFICE/OUTPT VISIT, EST, LEVL IV, 30-39 MIN: ICD-10-PCS | Mod: S$GLB,,, | Performed by: ANESTHESIOLOGY

## 2019-07-22 PROCEDURE — 99999 PR PBB SHADOW E&M-EST. PATIENT-LVL III: ICD-10-PCS | Mod: PBBFAC,,, | Performed by: ANESTHESIOLOGY

## 2019-07-22 PROCEDURE — 99214 OFFICE O/P EST MOD 30 MIN: CPT | Mod: S$GLB,,, | Performed by: ANESTHESIOLOGY

## 2019-07-22 RX ORDER — TRAMADOL HYDROCHLORIDE 50 MG/1
50 TABLET ORAL EVERY 8 HOURS PRN
Qty: 30 TABLET | Refills: 0 | Status: SHIPPED | OUTPATIENT
Start: 2019-07-22 | End: 2019-08-19

## 2019-07-22 RX ORDER — DULOXETIN HYDROCHLORIDE 30 MG/1
30 CAPSULE, DELAYED RELEASE ORAL DAILY
Qty: 30 CAPSULE | Refills: 0 | Status: SHIPPED | OUTPATIENT
Start: 2019-07-22 | End: 2019-09-30

## 2019-07-22 RX ORDER — DICLOFENAC SODIUM 10 MG/G
GEL TOPICAL
Qty: 2 TUBE | Refills: 2 | Status: SHIPPED | OUTPATIENT
Start: 2019-07-22 | End: 2020-03-17

## 2019-07-22 NOTE — PROGRESS NOTES
Referring Physician: No ref. provider found    PCP: Maite Villatoro NP      CC:  Low back and leg pain    Interval history: Teresa Clay is a 63 y.o. female with chronic widespread pain who returns to our clinic.  In addition to her widespread pain, she had worsening low back and bilateral leg pain. She underwent lumbar DEAN with short-lived relief.  She was scheduled for EMG study but was unable to proceed with the study due to tolerance of the procedure. She continues to be fit in physical therapy which provided mild benefits.  She has been on gabapentin with minimal benefit.  Lyrica was not improved.  She has not tried Cymbalta.  She takes tramadol but notices tramadol does make her more sensitive to pain.  She denies any worsening weakness.  No bowel bladder changes.     Prior HPI:   Teresa Clay is a 63 y.o. female referred to us low back leg pain.  Patient with significant history of fibromyalgia, generalized anxiety.  She presents to us lower back pain calf so.  Pain worsens with sitting, standing, bending, flexing, lifting getting up.  Pain improves with rest.  She has tried physical therapy with benefit.  She recently had a lumbar MRI.  She has not had any interventional procedures.  She takes NSAIDs Voltaren gel, Baclofen.  She denies any worsening weakness.  No bowel.    Interventional history: s/p bilateral L4-5 TFESI on 2/4/19 with 50% relief  S/p bilateral L4-5 TF DEAN on 5/6/19 with 50% relief for a couple of weeks.      ROS:  CONSTITUTIONAL: No fevers, chills, night sweats, wt. loss, appetite changes  SKIN: no rashes or itching  ENT: No headaches, head trauma, vision changes, or eye pain  LYMPH NODES: None noticed   CV: No chest pain, palpitations.   RESP: No shortness of breath, dyspnea on exertion, cough, wheezing, or hemoptysis  GI: No nausea, emesis, diarrhea, constipation, melena, hematochezia, pain.    : No dysuria, hematuria, urgency, or frequency   HEME: No easy bruising, bleeding  problems  PSYCHIATRIC: No depression, anxiety, psychosis, hallucinations.  NEURO: No seizures, memory loss, dizziness or difficulty sleeping  MSK:  Positive HPI      Past Medical History:   Diagnosis Date    Chronic back pain     Diverticulitis     Encounter for long-term (current) use of medications     Hypertension      Past Surgical History:   Procedure Laterality Date    APPENDECTOMY      CHOLECYSTECTOMY      COLONOSCOPY      COLONOSCOPY N/A 7/3/2019    [facility]    ESOPHAGOGASTRODUODENOSCOPY (EGD) N/A 7/3/2019    [facility] ENDO    Injection,steroid,epidural,transforaminal approach Bilateral 5/6/2019    [facility] at [facility]    Injection,steroid,epidural,transforaminal approach L4-5 Bilateral 2/4/2019    [facility]    TUBAL LIGATION       Family History   Problem Relation Age of Onset    Diabetes Brother     Cancer Brother     Breast cancer Maternal Aunt      Social History     Socioeconomic History    Marital status:      Spouse name: Not on file    Number of children: Not on file    Years of education: Not on file    Highest education level: Not on file   Occupational History    Not on file   Social Needs    Financial resource strain: Not on file    Food insecurity:     Worry: Not on file     Inability: Not on file    Transportation needs:     Medical: Not on file     Non-medical: Not on file   Tobacco Use    Smoking status: Former Smoker     Packs/day: 0.50     Types: Cigarettes    Smokeless tobacco: Never Used    Tobacco comment: stop approx. 6 months ago   Substance and Sexual Activity    Alcohol use: No    Drug use: No    Sexual activity: Yes   Lifestyle    Physical activity:     Days per week: Not on file     Minutes per session: Not on file    Stress: Not on file   Relationships    Social connections:     Talks on phone: Not on file     Gets together: Not  "on file     Attends Religion service: Not on file     Active member of club or organization: Not on file     Attends meetings of clubs or organizations: Not on file     Relationship status: Not on file   Other Topics Concern    Not on file   Social History Narrative    Not on file         Medications/Allergies: See med card    Vitals:    07/22/19 1106   BP: 122/80   Pulse: 61   Weight: 75.8 kg (167 lb)   Height: 4' 11" (1.499 m)   PainSc:   6   PainLoc: Back         Physical exam:    GENERAL: A and O x3, the patient appears well groomed and is in no acute distress.  Skin: No rashes or obvious lesions  HEENT: normocephalic, atraumatic  CARDIOVASCULAR:  bradycardia  LUNGS: non labored breathing  ABDOMEN: soft, nontender   UPPER EXTREMITIES: Normal alignment, normal range of motion, no atrophy, no skin changes,  hair growth and nail growth normal and equal bilaterally. No swelling, no tenderness.    LOWER EXTREMITIES:  Normal alignment, normal range of motion, no atrophy, no skin changes,  hair growth and nail growth normal and equal bilaterally. No swelling, no tenderness.    Cervical Spine:  Cervical spine: ROM is full in flexion, extension and lateral rotation with moderate increased pain.  Spurling's maneuver causes no neck pain to either side.  Myofascial exam: Moderate Tenderness to palpation across cervical paraspinous region bilaterally.    LUMBAR SPINE  Lumbar spine: ROM is full with flexion extension and oblique extension with moderate increased pain.    Osman's test causes no increased pain on either side.    Supine straight leg raise is negative bilaterally.  +femoral stretch bilaterally  Internal and external rotation of the hip causes no increased pain on either side.  Myofascial exam: Moderate tenderness to palpation across lumbar paraspinous muscles.      MENTAL STATUS: normal orientation, speech, language, and fund of knowledge for social situation.  Emotional state appropriate.    CRANIAL " NERVES:  II:  PERRL bilaterally,   III,IV,VI: EOMI.    V:  Facial sensation equal bilaterally  VII:  Facial motor function normal.  VIII:  Hearing equal to finger rub bilaterally  IX/X: Gag normal, palate symmetric  XI:  Shoulder shrug equal, head turn equal  XII:  Tongue midline without fasciculations      MOTOR: Tone and bulk: normal bilateral upper and lower Strength: normal   Delt Bi Tri WE WF     R 5 5 5 5 5 5   L 5 5 5 5 5 5     IP ADD ABD Quad TA Gas HAM  R 5 5 5 5 5 5 5  L 5 5 5 5 5 5 5    SENSATION: Light touch and pinprick intact bilaterally  REFLEXES: normal, symmetric, nonbrisk.  Toes down, no clonus. No hoffmans.  GAIT: normal rise, base, steps, and arm swing.        Imaging:  Xray C-spine 7/2018  1. Straightening of the normal cervical lordosis.  2. Mild multilevel degenerative disc disease resulting in mild to moderate bilateral neural foraminal narrowing from C3 through C6.  3. No significant prevertebral soft tissue swelling.    MRI L-spine 4/2018  Moderate disc desiccation is present.  There is mild loss of disc height throughout the lumbar spine.  Moderate facet arthropathy is present from L2 through S1.    L4-5: There is moderate broad-based posterior disc bulging, with associated annular fissure of the disc.  This along with bilateral facet arthropathy contributes to moderate spinal canal narrowing and mild bilateral neuroforaminal narrowing.    L5-S1: Minimal broad-based posterior bulging of the disc is present without spinal canal or neuroforaminal narrowing.    Assessment:  Teresa Clay is a 63 y.o. female with  1. Chronic use of opiate drugs therapeutic purposes    2. Fibromyalgia    3. Other spondylosis, lumbar region    4. Physical deconditioning          Plan:  1. I have stressed the importance of physical activity and exercise to improve overall health  2. Reviewed pertinent imaging and records with patient  3.  Unable to tolerate EMG study to further evaluate leg pain.  Patient has  had short-lived relief of lumbar DEAN.  Although she may have a component of lumbar radiculopathy, I believe majority of her issues is related to her fibromyalgia.  Recommend continued physical therapy.  Also implement an aerobic exercise program as well.  Patient expressed understanding.  4.  Continue diclofenac ointment as needed.  5.  Start Cymbalta 30 mg daily as well. Wean off tramadol as well. Script provided today.  Patient understands she will wean off her tramadol.  6.  She will follow-up as needed.

## 2019-08-19 ENCOUNTER — OFFICE VISIT (OUTPATIENT)
Dept: SURGERY | Facility: CLINIC | Age: 64
End: 2019-08-19
Payer: COMMERCIAL

## 2019-08-19 VITALS
BODY MASS INDEX: 32.86 KG/M2 | OXYGEN SATURATION: 97 % | WEIGHT: 163 LBS | RESPIRATION RATE: 17 BRPM | DIASTOLIC BLOOD PRESSURE: 76 MMHG | HEART RATE: 71 BPM | SYSTOLIC BLOOD PRESSURE: 128 MMHG | HEIGHT: 59 IN

## 2019-08-19 DIAGNOSIS — K29.50 OTHER CHRONIC GASTRITIS WITHOUT HEMORRHAGE: Primary | ICD-10-CM

## 2019-08-19 DIAGNOSIS — K57.30 DIVERTICULOSIS OF COLON: ICD-10-CM

## 2019-08-19 PROBLEM — R10.84 GENERALIZED ABDOMINAL PAIN: Status: RESOLVED | Noted: 2019-07-03 | Resolved: 2019-08-19

## 2019-08-19 PROCEDURE — 99213 OFFICE O/P EST LOW 20 MIN: CPT | Mod: S$GLB,,, | Performed by: SURGERY

## 2019-08-19 PROCEDURE — 99213 PR OFFICE/OUTPT VISIT, EST, LEVL III, 20-29 MIN: ICD-10-PCS | Mod: S$GLB,,, | Performed by: SURGERY

## 2019-08-19 RX ORDER — SUCRALFATE 1 G/1
1 TABLET ORAL 4 TIMES DAILY
Qty: 120 TABLET | Refills: 1 | Status: SHIPPED | OUTPATIENT
Start: 2019-08-19 | End: 2019-09-30

## 2019-08-19 RX ORDER — METOCLOPRAMIDE 10 MG/1
10 TABLET ORAL 4 TIMES DAILY
Qty: 120 TABLET | Refills: 11 | Status: SHIPPED | OUTPATIENT
Start: 2019-08-19 | End: 2019-09-30

## 2019-08-19 NOTE — PROGRESS NOTES
"Subjective:       Patient ID: Teresa Clay is a 63 y.o. female.    Chief Complaint: Follow-up (1 mo Chronic Gastritis )      HPI:  Ms. Clay returns today with no new complaints.  She feels considerably improved since last visit.  She was experiencing left-sided abdominal pain.  Recent endoscopy revealed evidence of bile reflux gastritis and diverticulosis.  Reglan was initiated for the bile reflux gastritis.  She is tolerating this therapy well with no adverse effects and she has noted considerable benefit.  Stool softeners were recommended for the left-sided abdominal pain and diverticulosis.  She is currently taking 1 capsule twice daily with a large glass of water and has noted considerable improvement but not complete resolution of her discomfort.  She still experiences some left-sided discomfort at night before falling asleep.  No nausea or vomiting.  No diarrhea or constipation.  No melena, hematochezia, hematemesis.    Ms. Clay previously requested a referral to bariatric surgery. Her insurance denied the referral.  She is still interested in seeking the opinion of a qualified bariatric surgeon.      Allergies & Meds:  Review of patient's allergies indicates:   Allergen Reactions    Betamethasone acet,sod phos Other (See Comments)     " Makes me feel flushed!"    Codeine Rash       Current Outpatient Medications   Medication Sig Dispense Refill    albuterol (VENTOLIN HFA) 90 mcg/actuation inhaler inhale 2 puff by inhalation route  every 4 - 6 hours as needed as needed      buPROPion (WELLBUTRIN SR) 150 MG TBSR 12 hr tablet take 1 tablet by oral route every evening x 1 week then increase to 1 tab po bid -      diclofenac sodium (VOLTAREN) 1 % Gel APPLY 4 GRAMS TOPICALLY   QID AS DIRECTED 2 Tube 2    docusate sodium (COLACE) 100 MG capsule Take 1 capsule (100 mg total) by mouth 2 (two) times daily. Drink a 12 oz glass of water with each dose. 60 capsule 11    DULoxetine (CYMBALTA) 30 MG capsule Take 1 " capsule (30 mg total) by mouth once daily. 30 capsule 0    ergocalciferol (ERGOCALCIFEROL) 50,000 unit Cap Take 1 capsule by mouth.      fluticasone (FLONASE) 50 mcg/actuation nasal spray fluticasone 50 mcg/actuation nasal spray,suspension      hydroCHLOROthiazide (HYDRODIURIL) 25 MG tablet TK 1 T PO QD  2    metoclopramide HCl (REGLAN) 10 MG tablet Take 1 tablet (10 mg total) by mouth 4 (four) times daily. 120 tablet 11    metoprolol succinate (TOPROL-XL) 25 MG 24 hr tablet TAKE ONE TABLET BY MOUTH ONCE DAILY (Patient taking differently: TAKE HALF TABLET BY MOUTH ONCE DAILY) 30 tablet 11    sucralfate (CARAFATE) 1 gram tablet Take 1 tablet (1 g total) by mouth 4 (four) times daily. 120 tablet 1    tiotropium (SPIRIVA WITH HANDIHALER) 18 mcg inhalation capsule Inhale 1 capsule into the lungs.      pantoprazole (PROTONIX) 40 MG tablet Take 1 tablet (40 mg total) by mouth once daily. Take in the morning before breakfast.  Wait 30 minutes before eating or drinking anything 30 tablet 11     No current facility-administered medications for this visit.      Facility-Administered Medications Ordered in Other Visits   Medication Dose Route Frequency Provider Last Rate Last Dose    fentaNYL injection    PRN Trey Dueñas MD   50 mcg at 02/04/19 1251    midazolam (VERSED) 1 mg/mL injection    PRN Trey Dueñas MD   2 mg at 02/04/19 1251       PMFSHx:  Past medical history, surgical history, family history, social history reviewed and no changes noted.        Review of Systems   Constitutional: Negative for appetite change, chills, fatigue, fever and unexpected weight change.   HENT: Negative for congestion, dental problem, ear pain, mouth sores, postnasal drip, rhinorrhea, sore throat, tinnitus, trouble swallowing and voice change.    Eyes: Negative for photophobia, pain, discharge and visual disturbance.   Respiratory: Negative for cough, chest tightness, shortness of breath and wheezing.    Cardiovascular: Negative  for chest pain, palpitations and leg swelling.   Gastrointestinal: Negative for abdominal distention, anal bleeding, blood in stool, constipation, diarrhea, nausea and vomiting.   Endocrine: Negative for cold intolerance, heat intolerance, polydipsia, polyphagia and polyuria.   Genitourinary: Negative for difficulty urinating, dysuria, flank pain, frequency, hematuria and urgency.   Musculoskeletal: Negative for arthralgias, joint swelling and myalgias.   Skin: Negative for color change and rash.   Allergic/Immunologic: Negative for immunocompromised state.   Neurological: Negative for dizziness, tremors, seizures, syncope, speech difficulty, weakness, numbness and headaches.   Hematological: Negative for adenopathy. Does not bruise/bleed easily.   Psychiatric/Behavioral: Negative for agitation, confusion, hallucinations, self-injury and suicidal ideas. The patient is not nervous/anxious.        Objective:      Physical Exam   Constitutional: She appears well-developed and well-nourished.  Non-toxic appearance. She does not appear ill. No distress.   Cardiovascular: Normal rate, regular rhythm and normal heart sounds. PMI is not displaced. Exam reveals no gallop.   No murmur heard.  Pulmonary/Chest: Effort normal and breath sounds normal. No accessory muscle usage. No tachypnea. No respiratory distress.   Abdominal: Soft. Normal appearance and bowel sounds are normal. There is no tenderness. No hernia.   Skin: Skin is warm, dry and intact. No rash noted.           Assessment:       1. Other chronic gastritis without hemorrhage    2. Diverticulosis of colon        Plan:   Other chronic gastritis without hemorrhage    Diverticulosis of colon    Other orders  -     metoclopramide HCl (REGLAN) 10 MG tablet; Take 1 tablet (10 mg total) by mouth 4 (four) times daily.  Dispense: 120 tablet; Refill: 11  -     sucralfate (CARAFATE) 1 gram tablet; Take 1 tablet (1 g total) by mouth 4 (four) times daily.  Dispense: 120 tablet;  Refill: 1     Continue Protonix, Carafate, Raglan, and Colace.    Re-attempt bariatric referral after next visit.    Follow up in about 3 months (around 11/19/2019).

## 2019-08-26 ENCOUNTER — TELEPHONE (OUTPATIENT)
Dept: PAIN MEDICINE | Facility: CLINIC | Age: 64
End: 2019-08-26

## 2019-08-26 NOTE — TELEPHONE ENCOUNTER
Spoke with patient, advised that Dr. Cohen will review chart and office will contact patient to let her know if we are able to schedule here. Patient voiced understanding.      ----- Message from Kylah Hickman sent at 8/26/2019 12:23 PM CDT -----  Contact: [phone]  Patient is returning nurse's phone call.  Please call patient back at [phone].

## 2019-08-26 NOTE — TELEPHONE ENCOUNTER
LVM advising patient that Dr. Cohen would need to approve transfer from Dr. Dueñas's office prior to scheduled.    Please see previous OV notes from Dr. Dueñas. Thanks!!    ----- Message -----  From: Josep Tineo  Sent: 8/7/2019   4:25 PM  To: V[facility] Staff    Type:  Patient Returning Call    Who Called:pt   Who Left Message for Patient: nurse   Does the patient know what this is regarding?:scheduling an appt   Would the patient rather a call back or a response via [facility]? Phone   Best Call Back Number:[phone] or [phone]  Additional Information:

## 2019-09-03 RX ORDER — DOCUSATE SODIUM 100 MG/1
100 CAPSULE, LIQUID FILLED ORAL 2 TIMES DAILY
Qty: 60 CAPSULE | Refills: 11 | Status: SHIPPED | OUTPATIENT
Start: 2019-09-03 | End: 2020-07-22 | Stop reason: SDUPTHER

## 2019-09-03 NOTE — TELEPHONE ENCOUNTER
----- Message from Gurinder Moise sent at 9/3/2019  8:42 AM CDT -----  Contact: Patient  Type:  RX Refill Request    Who Called:  Patient  Refill or New Rx:  Refill  RX Name and Strength:  Unknown name - described as a stool softener  How is the patient currently taking it? (ex. 1XDay):  2 in morning, 2 in evening  Is this a 30 day or 90 day RX:  90  Preferred Pharmacy with phone number:    [id number] [place] [address] [address]  [address] [place] [id number]  Phone: [phone] Fax: [phone]  Local or Mail Order:  Local  Ordering Provider:  Dr. Shaheen Kidd Call Back Number:  [phone]  Additional Information:  NA

## 2019-09-04 ENCOUNTER — TELEPHONE (OUTPATIENT)
Dept: SURGERY | Facility: CLINIC | Age: 64
End: 2019-09-04

## 2019-09-04 NOTE — TELEPHONE ENCOUNTER
----- Message from Shyla Garg sent at 9/4/2019  8:27 AM CDT -----  Contact: , Gustavo  Patient need a refill on stool softner please send to [facility], any questions please call back at [phone] (home) [phone] (work)    [facility] #[id number] [place] [address] AT [facility] [address] [address]  Phone: [phone] Fax: [phone]

## 2019-09-04 NOTE — TELEPHONE ENCOUNTER
Phoned pt's spouse, Gustavo, and advised him that Dr. Jamison sent Ms. Clay's stool softener in to [facility] yesterday.  He states that they were told that it can not be filled until the 8th- 4 days from now.  Advised them to buy Colace over-the-counter for now until the prescription can be filled on the 8th.  They verbalize understanding.

## 2019-09-04 NOTE — TELEPHONE ENCOUNTER
Returned pt's spouse's phone call. Advised him of the medications that the pt should be taken as well as the dosages and frequency.  He verbalizes understanding.

## 2019-09-04 NOTE — TELEPHONE ENCOUNTER
----- Message from Filiberto Krause sent at 9/4/2019  8:58 AM CDT -----  Type: Needs Medical Advice    Who Called:  Gustavo Clay (Spouse)    Pharmacy name and phone #:    [id number] - [place], [place] - [address]  [address]  [place] [address]  Phone: [phone] Fax: [phone]      Best Call Back Number: [phone]  Additional Information: Caller states that he would like a callback regarding questions about the increase of dosage of the patient's docusate sodium (COLACE) 100 MG capsule .  Caller states that it's supposed to be 4XDay and the dosage is written as 2XDay

## 2019-09-12 ENCOUNTER — HOSPITAL ENCOUNTER (EMERGENCY)
Facility: HOSPITAL | Age: 64
Discharge: HOME | End: 2019-09-12
Attending: FAMILY MEDICINE
Payer: COMMERCIAL

## 2019-09-12 VITALS
HEIGHT: 59 IN | OXYGEN SATURATION: 98 % | WEIGHT: 160 LBS | SYSTOLIC BLOOD PRESSURE: 139 MMHG | TEMPERATURE: 98 F | HEART RATE: 76 BPM | DIASTOLIC BLOOD PRESSURE: 81 MMHG | BODY MASS INDEX: 32.25 KG/M2 | RESPIRATION RATE: 16 BRPM

## 2019-09-12 DIAGNOSIS — B02.8 HERPES ZOSTER WITH COMPLICATION: Primary | ICD-10-CM

## 2019-09-12 PROCEDURE — 25000003 PHARM REV CODE 250: Performed by: FAMILY MEDICINE

## 2019-09-12 PROCEDURE — 99284 EMERGENCY DEPT VISIT MOD MDM: CPT

## 2019-09-12 RX ORDER — ACYCLOVIR 200 MG/1
800 CAPSULE ORAL
Status: COMPLETED | OUTPATIENT
Start: 2019-09-12 | End: 2019-09-12

## 2019-09-12 RX ORDER — ACYCLOVIR 400 MG/1
800 TABLET ORAL 4 TIMES DAILY
Qty: 70 TABLET | Refills: 0 | Status: SHIPPED | OUTPATIENT
Start: 2019-09-12 | End: 2019-12-31 | Stop reason: ALTCHOICE

## 2019-09-12 RX ORDER — HYDROCODONE BITARTRATE AND ACETAMINOPHEN 5; 325 MG/1; MG/1
1 TABLET ORAL EVERY 8 HOURS PRN
Qty: 12 TABLET | Refills: 0 | Status: SHIPPED | OUTPATIENT
Start: 2019-09-12 | End: 2019-09-30

## 2019-09-12 RX ADMIN — ACYCLOVIR 800 MG: 200 CAPSULE ORAL at 10:09

## 2019-09-13 NOTE — ED PROVIDER NOTES
"Encounter Date: 9/12/2019       History     Chief Complaint   Patient presents with    Eye Problem     Struck right eye brow several days ago. Now c/o swelling and itching to right upper eye.     Patient complains of pain over the right eye, they were burning at 1st and now the rash is itchy she denies any pain to the eye proper there is no pain or lesion on the tip of the nose, the so-called Sharpe sign, no pain in the ear the pain has been increasing over the past 3-4 days she has no known history of shingles and does think she had chickenpox as a youth        Review of patient's allergies indicates:   Allergen Reactions    Betamethasone acet,sod phos Other (See Comments)     " Makes me feel flushed!"    Codeine Rash     Past Medical History:   Diagnosis Date    Chronic back pain     Diverticulitis     Encounter for long-term (current) use of medications     Hypertension      Past Surgical History:   Procedure Laterality Date    APPENDECTOMY      CHOLECYSTECTOMY      COLONOSCOPY      COLONOSCOPY N/A 7/3/2019    Performed by Jean [facility]ison [facility] at [facility]    ESOPHAGOGASTRODUODENOSCOPY (EGD) N/A 7/3/2019    Performed by Jean [facility]ison [facility] at [facility] ENDO    Injection,steroid,epidural,transforaminal approach Bilateral 5/6/2019    Performed by [facility]rey [facility]as [facility] at [facility] OR    Injection,steroid,epidural,transforaminal approach L4-5 Bilateral 2/4/2019    Performed by [facility] at [facility] OR    TUBAL LIGATION       Family History   Problem Relation Age of Onset    Diabetes Brother     Cancer Brother     Breast cancer Maternal Aunt      Social History     Tobacco Use    Smoking status: Former Smoker     Packs/day: 0.50     Types: Cigarettes    Smokeless tobacco: Never Used    Tobacco comment: stop approx. 6 months ago   Substance Use Topics    Alcohol use: No    Drug use: No     Review of Systems   Constitutional: Negative for fever.   HENT: Negative for sore throat.    Eyes: Negative for " photophobia, pain, discharge, redness, itching and visual disturbance.   Respiratory: Negative for shortness of breath.    Cardiovascular: Negative for chest pain.   Gastrointestinal: Negative for nausea.   Genitourinary: Negative for dysuria.   Musculoskeletal: Negative for back pain.   Skin: Positive for rash.   Neurological: Negative for weakness.   Hematological: Does not bruise/bleed easily.       Physical Exam     Initial Vitals [09/12/19 2140]   BP Pulse Resp Temp SpO2   139/81 76 16 97.6 °F (36.4 °C) 98 %      MAP       --         Physical Exam    Nursing note and vitals reviewed.  Constitutional: She appears well-developed and well-nourished. She is not diaphoretic. No distress.   HENT:   Head: Normocephalic and atraumatic.   Right Ear: External ear normal.   Left Ear: External ear normal.   Eyes: Pupils are equal, round, and reactive to light. Right eye exhibits no discharge. Left eye exhibits no discharge.   Neck: No tracheal deviation present. No JVD present.   Cardiovascular: Exam reveals no friction rub.    No murmur heard.  Pulmonary/Chest: No stridor. No respiratory distress. She has no wheezes. She has no rales.   Abdominal: Bowel sounds are normal. She exhibits no distension.   Musculoskeletal: Normal range of motion.   Neurological: She is alert.   Skin: Skin is warm.   Faint fascicular rash over the right eye has the appearance of early shingles, there is no involvement of the cornea the upper lid or the tip of the nose   Psychiatric: She has a normal mood and affect.         ED Course   Procedures  Labs Reviewed - No data to display       Imaging Results    None                               Clinical Impression:       ICD-10-CM ICD-9-CM   1. Herpes zoster with complication B02.8 053.8                                Nehemiah Hoffman MD  09/13/19 0005       Nehemiah Hoffman MD  09/13/19 0006

## 2019-09-27 ENCOUNTER — TELEPHONE (OUTPATIENT)
Dept: PAIN MEDICINE | Facility: CLINIC | Age: 64
End: 2019-09-27

## 2019-09-27 NOTE — TELEPHONE ENCOUNTER
KENAM to confirm appointment with Dr. Cohen on 9/30/19. Provided address and contact information for clinic.

## 2019-09-30 ENCOUNTER — OFFICE VISIT (OUTPATIENT)
Dept: PAIN MEDICINE | Facility: CLINIC | Age: 64
End: 2019-09-30
Payer: COMMERCIAL

## 2019-09-30 DIAGNOSIS — R53.81 PHYSICAL DECONDITIONING: ICD-10-CM

## 2019-09-30 DIAGNOSIS — M79.7 FIBROMYALGIA: ICD-10-CM

## 2019-09-30 DIAGNOSIS — G89.4 CHRONIC PAIN DISORDER: Primary | ICD-10-CM

## 2019-09-30 DIAGNOSIS — M51.36 DDD (DEGENERATIVE DISC DISEASE), LUMBAR: ICD-10-CM

## 2019-09-30 DIAGNOSIS — M47.896 OTHER SPONDYLOSIS, LUMBAR REGION: ICD-10-CM

## 2019-09-30 DIAGNOSIS — M54.16 LUMBAR RADICULITIS: ICD-10-CM

## 2019-09-30 DIAGNOSIS — M79.18 MYOFASCIAL PAIN: ICD-10-CM

## 2019-09-30 DIAGNOSIS — Z79.891 CHRONIC USE OF OPIATE DRUGS THERAPEUTIC PURPOSES: ICD-10-CM

## 2019-09-30 PROCEDURE — 99999 PR PBB SHADOW E&M-EST. PATIENT-LVL V: ICD-10-PCS | Mod: PBBFAC,,, | Performed by: ANESTHESIOLOGY

## 2019-09-30 PROCEDURE — 99999 PR PBB SHADOW E&M-EST. PATIENT-LVL V: CPT | Mod: PBBFAC,,, | Performed by: ANESTHESIOLOGY

## 2019-09-30 PROCEDURE — 99214 PR OFFICE/OUTPT VISIT, EST, LEVL IV, 30-39 MIN: ICD-10-PCS | Mod: S$GLB,,, | Performed by: ANESTHESIOLOGY

## 2019-09-30 PROCEDURE — 99214 OFFICE O/P EST MOD 30 MIN: CPT | Mod: S$GLB,,, | Performed by: ANESTHESIOLOGY

## 2019-09-30 RX ORDER — METHOCARBAMOL 500 MG/1
500-1000 TABLET, FILM COATED ORAL NIGHTLY PRN
Qty: 30 TABLET | Refills: 3 | Status: SHIPPED | OUTPATIENT
Start: 2019-09-30 | End: 2019-10-28

## 2019-09-30 NOTE — PROGRESS NOTES
Subjective:     Patient ID: Teresa Clay is a 63 y.o. female.    Chief Complaint: Pain    Consulted by: Self     Disclaimer: This note was generated using voice recognition software.  There may be typographical errors that were missed during proofreading.    HPI:    Teresa Clay is a 63 y.o. female who presents today with chronic low back pain, fibromyalgia, generalized anxiety. This pain started in 2012 as a result of a fall down the stairs.  She did not seek treatment until this year.  She was treating herself with ibuprofen for the past 7 years.  She then sought treatment this past year when it started going into her legs, R>L.  She describes a deep, aching pain that is located on either side of her low back and radiates down the posterolateral aspect of her legs to the tops of her feet.  This pain is described in detail below.    Aggravating factors:  Sitting, standing, walking, lifting, lying down, exercise, morning, getting up out of bed/chair    Mitigating factors:  Rest    Previously seeing: Dr. Trey Dueñas    Physical Therapy:  Yes, she went for 4-6 weeks.  She does the HEP, stretching.      Non-pharmacologic Treatment:     · Ice/Heat: Ice is helpful  · TENS: Not tried  · Massage: Hurt but helpful  · Chiropractic care: Yes, helps a little  · Acupuncture: Not tried  · Other: Support pillow between her legs at night         Pain Medications:         · Currently taking:  Voltaren gel (help), Wellbutrin 150 mg BID, ibuprofen 800 mg BID-TID (helpful)    · Has tried in the past:    · Opioids: tramadol, Norco  · NSAIDS: Maybe  · Tylenol: Not tried  · Muscle relaxants:  Baclofen (can't remember benefit)  · TCAs: Not tried  · SNRIs: Cymbalta (prescribed but did not receive)  · Anticonvulsants:  Gabapentin (minimal benefit), Lyrica (no help)  · topical creams: As above  · Other: Ativan for anxiety in the past    Blood thinners: None    Interventional Therapies:   · bilateral L4-5 TFESI on 2/4/19 with 50%  relief  · bilateral L4-5 TF DEAN on 5/6/19 with 50% relief for a couple of weeks.    Relevant Surgeries: None    Affecting sleep? Yes    Affecting daily activities? Yes    Depressive symptoms? Yes, she has been treated in the past          · SI/HI? No    Work status: , she has to stand on her feet all day.      Prescription Monitoring Program database:  Reviewed and consistent with medication use as prescribed.    Last 3 PDI Scores 9/30/2019 7/22/2019 5/27/2019   Pain Disability Index (PDI) 41 [phone] 47       Opioid Risk Score       Value Time User    Opioid Risk Score  1 1/15/2019  9:05 AM Henrietta Fernandez          GENERAL:  Positive for weight gain.  No weight loss, malaise or fevers.  HEENT:   No recent changes in vision or hearing  NECK:  Negative for lumps, no difficulty with swallowing.  RESPIRATORY:  Negative for cough, wheezing or shortness of breath, patient denies any recent URI.  CARDIOVASCULAR:  Negative for chest pain, leg swelling or palpitations.  GI:  Positive for stomach pain.  Negative for abdominal discomfort, blood in stools or black stools or change in bowel habits.  MUSCULOSKELETAL:  See HPI.  SKIN:  Negative for lesions, rash, and itching.  PSYCH:  Positive for anxiety, difficulty sleeping.  She has been treated depression in the past.  No other mood disorder or recent psychosocial stressors.    HEMATOLOGY/LYMPHOLOGY:  Negative for prolonged bleeding, bruising easily or swollen nodes.    ENDO: No history of diabetes or thyroid dysfunction  NEURO:   No history of headaches, syncope, paralysis, seizures or tremors.  All other reviewed and negative other than HPI.          Past Medical History:   Diagnosis Date    Chronic back pain     Diverticulitis     Encounter for long-term (current) use of medications     Hypertension        Past Surgical History:   Procedure Laterality Date    APPENDECTOMY      CHOLECYSTECTOMY      COLONOSCOPY      COLONOSCOPY N/A 7/3/2019    Procedure:  "COLONOSCOPY;  Surgeon: Jean Jamison MD;  Location: [facility] ENDO;  Service: General;  Laterality: N/A;    ESOPHAGOGASTRODUODENOSCOPY N/A 7/3/2019    Procedure: ESOPHAGOGASTRODUODENOSCOPY (EGD);  Surgeon: Jean Jamison MD;  Location: [facility] ENDO;  Service: General;  Laterality: N/A;    TRANSFORAMINAL EPIDURAL INJECTION OF STEROID Bilateral 5/6/2019    Procedure: Injection,steroid,epidural,transforaminal approach;  Surgeon: Trey Dueñas MD;  Location: [facility] OR;  Service: Pain Management;  Laterality: Bilateral;  L4-5    TUBAL LIGATION         Review of patient's allergies indicates:   Allergen Reactions    Betamethasone acet,sod phos Other (See Comments)     " Makes me feel flushed!"    Codeine Rash       Current Outpatient Medications   Medication Sig Dispense Refill    acyclovir (ZOVIRAX) 400 MG tablet Take 2 tablets (800 mg total) by mouth 4 (four) times daily. for 7 days 70 tablet 0    albuterol (VENTOLIN HFA) 90 mcg/actuation inhaler inhale 2 puff by inhalation route  every 4 - 6 hours as needed as needed      buPROPion (WELLBUTRIN SR) 150 MG TBSR 12 hr tablet take 1 tablet by oral route every evening x 1 week then increase to 1 tab po bid -      diclofenac sodium (VOLTAREN) 1 % Gel APPLY 4 GRAMS TOPICALLY   QID AS DIRECTED 2 Tube 2    docusate sodium (COLACE) 100 MG capsule Take 1 capsule (100 mg total) by mouth 2 (two) times daily. Drink a 12 oz glass of water with each dose. 60 capsule 11    DULoxetine (CYMBALTA) 30 MG capsule Take 1 capsule (30 mg total) by mouth once daily. 30 capsule 0    ergocalciferol (ERGOCALCIFEROL) 50,000 unit Cap Take 1 capsule by mouth.      fluticasone (FLONASE) 50 mcg/actuation nasal spray fluticasone 50 mcg/actuation nasal spray,suspension      hydroCHLOROthiazide (HYDRODIURIL) 25 MG tablet TK 1 T PO QD  2    HYDROcodone-acetaminophen (NORCO) 5-325 mg per tablet Take 1 tablet by mouth every 8 (eight) hours as needed for Pain. 12 tablet 0    metoclopramide HCl " (REGLAN) 10 MG tablet Take 1 tablet (10 mg total) by mouth 4 (four) times daily. 120 tablet 11    metoprolol succinate (TOPROL-XL) 25 MG 24 hr tablet TAKE ONE TABLET BY MOUTH ONCE DAILY (Patient taking differently: TAKE HALF TABLET BY MOUTH ONCE DAILY) 30 tablet 11    pantoprazole (PROTONIX) 40 MG tablet Take 1 tablet (40 mg total) by mouth once daily. Take in the morning before breakfast.  Wait 30 minutes before eating or drinking anything 30 tablet 11    sucralfate (CARAFATE) 1 gram tablet Take 1 tablet (1 g total) by mouth 4 (four) times daily. 120 tablet 1    tiotropium (SPIRIVA WITH HANDIHALER) 18 mcg inhalation capsule Inhale 1 capsule into the lungs.       No current facility-administered medications for this visit.      Facility-Administered Medications Ordered in Other Visits   Medication Dose Route Frequency Provider Last Rate Last Dose    fentaNYL injection    PRN Trey Dueñas MD   50 mcg at 02/04/19 1251    midazolam (VERSED) 1 mg/mL injection    PRN Trey Dueñas MD   2 mg at 02/04/19 1251       Family History   Problem Relation Age of Onset    Diabetes Brother     Cancer Brother     Breast cancer Maternal Aunt        Social History     Socioeconomic History    Marital status:      Spouse name: Not on file    Number of children: Not on file    Years of education: Not on file    Highest education level: Not on file   Occupational History    Not on file   Social Needs    Financial resource strain: Not on file    Food insecurity:     Worry: Not on file     Inability: Not on file    Transportation needs:     Medical: Not on file     Non-medical: Not on file   Tobacco Use    Smoking status: Former Smoker     Packs/day: 0.50     Types: Cigarettes    Smokeless tobacco: Never Used    Tobacco comment: stop approx. 6 months ago   Substance and Sexual Activity    Alcohol use: No    Drug use: No    Sexual activity: Yes   Lifestyle    Physical activity:     Days per week: Not on file      "Minutes per session: Not on file    Stress: Not on file   Relationships    Social connections:     Talks on phone: Not on file     Gets together: Not on file     Attends [employer] service: Not on file     Active member of club or organization: Not on file     Attends meetings of clubs or organizations: Not on file     Relationship status: Not on file   Other Topics Concern    Not on file   Social History Narrative    Not on file       Objective:     Vitals:    09/30/19 1306   BP: 121/72   Pulse: 68   Temp: 98.6 °F (37 °C)   SpO2: 97%   Weight: 72.1 kg (159 lb)   Height: 4' 11" (1.499 m)   PainSc:   6   PainLoc: Back       GEN:  Well developed, well nourished.  No acute distress. No pain behavior.  HEENT:  No trauma.  Mucous membranes moist.  Nares patent bilaterally.  PSYCH: Normal affect. Thought content appropriate.  CHEST:  Breathing symmetric.  No audible wheezing.  ABD: Soft, non-distended.  SKIN:  Warm, pink, dry.  No rash on exposed areas.    EXT:  No cyanosis, clubbing, or edema.  No color change or changes in nail or hair growth.  NEURO/MUSCULOSKELETAL:  Fully alert, oriented, and appropriate. Speech normal tyson. No cranial nerve deficits.   Gait:  Antalgic.  Mildly present trendelenburg sign bilaterally.   Motor Strength: 5/5 motor strength throughout lower extremities.   Sensory:  No sensory deficit in the lower extremities.   Reflexes:  2+ and symmetric throughout.  Downgoing Babinski's bilaterally.  No clonus or spasticity.  L-Spine:  Decreased ROM with pain on flexion. Negative pain with axial/facet loading bilaterally.  Negative SLR bilaterally, though exam was limited by hamstring tightness to about 30 degrees.    SI Joint/Hip:  Negative SIN bilaterally.  Negative FADIR bilaterally.  Mild TTP over lumbar paraspinals.  TTP over bilateral GTB, R>L    Widespread pain index:  10  Symptom severity score:  5    Imaging:        The imaging studies listed below were independently reviewed by me, and " I agree with the findings as documented below.     Narrative     EXAMINATION:  MRI LUMBAR SPINE WITHOUT CONTRAST    CLINICAL HISTORY:  Low back pain, <6wks, no red flags, no prior management; Low back pain    TECHNIQUE:  Multiplanar, multisequence MR images were acquired from the thoracolumbar junction to the sacrum without the administration of contrast.    COMPARISON:  None.    FINDINGS:  Vertebral body height and alignment are anatomic.  Marrow signal is normal.  The conus terminates at T12-L1.  There is no epidural mass or collection.    Moderate disc desiccation is present.  There is mild loss of disc height throughout the lumbar spine.  Moderate facet arthropathy is present from L2 through S1.    L4-5: There is moderate broad-based posterior disc bulging, with associated annular fissure of the disc.  This along with bilateral facet arthropathy contributes to moderate spinal canal narrowing and mild bilateral neuroforaminal narrowing.    L5-S1: Minimal broad-based posterior bulging of the disc is present without spinal canal or neuroforaminal narrowing.      Impression       Degenerative findings most pronounced at the L4-5 level where there is moderate spinal canal and mild bilateral neuroforaminal narrowing.      Electronically signed by: Crispin Escoto MD  Date: 04/09/2018  Time: 09:34         Assessment:     Encounter Diagnoses   Name Primary?    Chronic pain disorder Yes    Fibromyalgia     Other spondylosis, lumbar region     DDD (degenerative disc disease), lumbar     Lumbar radiculitis     Physical deconditioning     Chronic use of opiate drugs therapeutic purposes        Plan:     Teresa was seen today for consult.    Diagnoses and all orders for this visit:    Chronic pain disorder  -     Ambulatory consult to Physical Therapy  -     methocarbamol (ROBAXIN) 500 MG Tab; Take 1-2 tablets (500-1,000 mg total) by mouth nightly as needed (pain).    Fibromyalgia  -     Ambulatory consult to  Physical Therapy    Other spondylosis, lumbar region  -     Ambulatory consult to Physical Therapy    DDD (degenerative disc disease), lumbar  -     Ambulatory consult to Physical Therapy  -     Case Request Operating Room: Injection, Steroid, Epidural - L5/S1 LUMBAR EPIDURAL STEROID INJECTION    Lumbar radiculitis  -     Ambulatory consult to Physical Therapy    Physical deconditioning  -     Ambulatory consult to Physical Therapy    Chronic use of opiate drugs therapeutic purposes    Myofascial pain  -     methocarbamol (ROBAXIN) 500 MG Tab; Take 1-2 tablets (500-1,000 mg total) by mouth nightly as needed (pain).         Her pain is consistent with the above.      Of note, she does meet the diagnostic criteria for fibromyalgia, though with this does not appear to be the predominant feature of her pain.  We will work on some basic symptom management and get her into some physical therapy for the treatment of her fibromyalgia.  We can consider targeted medications for fibromyalgia in the future, but she does not like to take sedating medications as she has to work.  I would consider amitriptyline as a 1st step    We discussed the assessment and recommendations.  All available images were reviewed. We discussed the disease process, prognosis, treatment plan, and risks and benefits. The patient is aware of the risks and benefits of the medications being prescribed, common side effects, and proper usage. The following is the plan we agreed on:     1. Schedule for L5/S1 interlaminar DEAN. The procedure was explained in detail, including risks, benefits, and alternatives.  All questions answered.  Consent obtained today.  2. Trial methocarbamol 500-1000 mg QHS PRN. Stay at most comfortable dose.  Take medication with meals.  If you experience any upset stomach, nausea, or vomiting, stop taking this medication. Patient denies history of kidney problems or ulcers.  3. Continue ibuprofen 800 mg BID-TID PRN  4. Referral to  Physical therapy.  We had an extensive discussion regarding the expected time course for PT for chronic pain vs after a surgery.  Specifically, we discussed that PT for chronic pain almost always causes a worsening of pain before any improvement, which is generally not seen for 3-6 months, in which time she will likely already be home doing home exercises given that a formal course of PT generally lasts anywhere from 6-12 weeks.  5. I do not recommend opioids for the chronic treatment of her non-malignant pain at this time.  Opioids for chronic pain is not associated with improved outcomes and is associated with high risk.  Short courses for acute flares are appropriate in some instances.  6. RTC in 3-6 weeks or sooner if needed.    Thank you for allowing me to participate in the care of this patient.   Please do not hesitate to call me at [phone] with any questions or concerns.    Gail Cohen MD  09/29/2019     The above plan and management options were discussed at length with patient. Patient is in agreement with the above and verbalized understanding. It will be communicated with the referring physician via electronic record, fax, or mail.

## 2019-09-30 NOTE — H&P (VIEW-ONLY)
Subjective:     Patient ID: Teresa Clay is a 63 y.o. female.    Chief Complaint: Pain    Consulted by: Self     Disclaimer: This note was generated using voice recognition software.  There may be typographical errors that were missed during proofreading.    HPI:    Teresa Clay is a 63 y.o. female who presents today with chronic low back pain, fibromyalgia, generalized anxiety. This pain started in 2012 as a result of a fall down the stairs.  She did not seek treatment until this year.  She was treating herself with ibuprofen for the past 7 years.  She then sought treatment this past year when it started going into her legs, R>L.  She describes a deep, aching pain that is located on either side of her low back and radiates down the posterolateral aspect of her legs to the tops of her feet.  This pain is described in detail below.    Aggravating factors:  Sitting, standing, walking, lifting, lying down, exercise, morning, getting up out of bed/chair    Mitigating factors:  Rest    Previously seeing: Dr. Trey Dueñas    Physical Therapy:  Yes, she went for 4-6 weeks.  She does the HEP, stretching.      Non-pharmacologic Treatment:     · Ice/Heat: Ice is helpful  · TENS: Not tried  · Massage: Hurt but helpful  · Chiropractic care: Yes, helps a little  · Acupuncture: Not tried  · Other: Support pillow between her legs at night         Pain Medications:         · Currently taking:  Voltaren gel (help), Wellbutrin 150 mg BID, ibuprofen 800 mg BID-TID (helpful)    · Has tried in the past:    · Opioids: tramadol, Norco  · NSAIDS: Maybe  · Tylenol: Not tried  · Muscle relaxants:  Baclofen (can't remember benefit)  · TCAs: Not tried  · SNRIs: Cymbalta (prescribed but did not receive)  · Anticonvulsants:  Gabapentin (minimal benefit), Lyrica (no help)  · topical creams: As above  · Other: Ativan for anxiety in the past    Blood thinners: None    Interventional Therapies:   · bilateral L4-5 TFESI on 2/4/19 with 50%  relief  · bilateral L4-5 TF DEAN on 5/6/19 with 50% relief for a couple of weeks.    Relevant Surgeries: None    Affecting sleep? Yes    Affecting daily activities? Yes    Depressive symptoms? Yes, she has been treated in the past          · SI/HI? No    Work status: , she has to stand on her feet all day.      Prescription Monitoring Program database:  Reviewed and consistent with medication use as prescribed.    Last 3 PDI Scores 9/30/2019 7/22/2019 5/27/2019   Pain Disability Index (PDI) 41 [phone] 47       Opioid Risk Score       Value Time User    Opioid Risk Score  1 1/15/2019  9:05 AM Henrietta Fernandez          GENERAL:  Positive for weight gain.  No weight loss, malaise or fevers.  HEENT:   No recent changes in vision or hearing  NECK:  Negative for lumps, no difficulty with swallowing.  RESPIRATORY:  Negative for cough, wheezing or shortness of breath, patient denies any recent URI.  CARDIOVASCULAR:  Negative for chest pain, leg swelling or palpitations.  GI:  Positive for stomach pain.  Negative for abdominal discomfort, blood in stools or black stools or change in bowel habits.  MUSCULOSKELETAL:  See HPI.  SKIN:  Negative for lesions, rash, and itching.  PSYCH:  Positive for anxiety, difficulty sleeping.  She has been treated depression in the past.  No other mood disorder or recent psychosocial stressors.    HEMATOLOGY/LYMPHOLOGY:  Negative for prolonged bleeding, bruising easily or swollen nodes.    ENDO: No history of diabetes or thyroid dysfunction  NEURO:   No history of headaches, syncope, paralysis, seizures or tremors.  All other reviewed and negative other than HPI.          Past Medical History:   Diagnosis Date    Chronic back pain     Diverticulitis     Encounter for long-term (current) use of medications     Hypertension        Past Surgical History:   Procedure Laterality Date    APPENDECTOMY      CHOLECYSTECTOMY      COLONOSCOPY      COLONOSCOPY N/A 7/3/2019    Procedure:  "COLONOSCOPY;  Surgeon: Jean Jamison MD;  Location: [facility] ENDO;  Service: General;  Laterality: N/A;    ESOPHAGOGASTRODUODENOSCOPY N/A 7/3/2019    Procedure: ESOPHAGOGASTRODUODENOSCOPY (EGD);  Surgeon: Jean Jamison MD;  Location: [facility] ENDO;  Service: General;  Laterality: N/A;    TRANSFORAMINAL EPIDURAL INJECTION OF STEROID Bilateral 5/6/2019    Procedure: Injection,steroid,epidural,transforaminal approach;  Surgeon: Trey Dueñas MD;  Location: [facility] OR;  Service: Pain Management;  Laterality: Bilateral;  L4-5    TUBAL LIGATION         Review of patient's allergies indicates:   Allergen Reactions    Betamethasone acet,sod phos Other (See Comments)     " Makes me feel flushed!"    Codeine Rash       Current Outpatient Medications   Medication Sig Dispense Refill    acyclovir (ZOVIRAX) 400 MG tablet Take 2 tablets (800 mg total) by mouth 4 (four) times daily. for 7 days 70 tablet 0    albuterol (VENTOLIN HFA) 90 mcg/actuation inhaler inhale 2 puff by inhalation route  every 4 - 6 hours as needed as needed      buPROPion (WELLBUTRIN SR) 150 MG TBSR 12 hr tablet take 1 tablet by oral route every evening x 1 week then increase to 1 tab po bid -      diclofenac sodium (VOLTAREN) 1 % Gel APPLY 4 GRAMS TOPICALLY   QID AS DIRECTED 2 Tube 2    docusate sodium (COLACE) 100 MG capsule Take 1 capsule (100 mg total) by mouth 2 (two) times daily. Drink a 12 oz glass of water with each dose. 60 capsule 11    DULoxetine (CYMBALTA) 30 MG capsule Take 1 capsule (30 mg total) by mouth once daily. 30 capsule 0    ergocalciferol (ERGOCALCIFEROL) 50,000 unit Cap Take 1 capsule by mouth.      fluticasone (FLONASE) 50 mcg/actuation nasal spray fluticasone 50 mcg/actuation nasal spray,suspension      hydroCHLOROthiazide (HYDRODIURIL) 25 MG tablet TK 1 T PO QD  2    HYDROcodone-acetaminophen (NORCO) 5-325 mg per tablet Take 1 tablet by mouth every 8 (eight) hours as needed for Pain. 12 tablet 0    metoclopramide HCl " (REGLAN) 10 MG tablet Take 1 tablet (10 mg total) by mouth 4 (four) times daily. 120 tablet 11    metoprolol succinate (TOPROL-XL) 25 MG 24 hr tablet TAKE ONE TABLET BY MOUTH ONCE DAILY (Patient taking differently: TAKE HALF TABLET BY MOUTH ONCE DAILY) 30 tablet 11    pantoprazole (PROTONIX) 40 MG tablet Take 1 tablet (40 mg total) by mouth once daily. Take in the morning before breakfast.  Wait 30 minutes before eating or drinking anything 30 tablet 11    sucralfate (CARAFATE) 1 gram tablet Take 1 tablet (1 g total) by mouth 4 (four) times daily. 120 tablet 1    tiotropium (SPIRIVA WITH HANDIHALER) 18 mcg inhalation capsule Inhale 1 capsule into the lungs.       No current facility-administered medications for this visit.      Facility-Administered Medications Ordered in Other Visits   Medication Dose Route Frequency Provider Last Rate Last Dose    fentaNYL injection    PRN Trey Dueñas MD   50 mcg at 02/04/19 1251    midazolam (VERSED) 1 mg/mL injection    PRN Trey Dueñas MD   2 mg at 02/04/19 1251       Family History   Problem Relation Age of Onset    Diabetes Brother     Cancer Brother     Breast cancer Maternal Aunt        Social History     Socioeconomic History    Marital status:      Spouse name: Not on file    Number of children: Not on file    Years of education: Not on file    Highest education level: Not on file   Occupational History    Not on file   Social Needs    Financial resource strain: Not on file    Food insecurity:     Worry: Not on file     Inability: Not on file    Transportation needs:     Medical: Not on file     Non-medical: Not on file   Tobacco Use    Smoking status: Former Smoker     Packs/day: 0.50     Types: Cigarettes    Smokeless tobacco: Never Used    Tobacco comment: stop approx. 6 months ago   Substance and Sexual Activity    Alcohol use: No    Drug use: No    Sexual activity: Yes   Lifestyle    Physical activity:     Days per week: Not on file      "Minutes per session: Not on file    Stress: Not on file   Relationships    Social connections:     Talks on phone: Not on file     Gets together: Not on file     Attends Yarsanism service: Not on file     Active member of club or organization: Not on file     Attends meetings of clubs or organizations: Not on file     Relationship status: Not on file   Other Topics Concern    Not on file   Social History Narrative    Not on file       Objective:     Vitals:    09/30/19 1306   BP: 121/72   Pulse: 68   Temp: 98.6 °F (37 °C)   SpO2: 97%   Weight: 72.1 kg (159 lb)   Height: 4' 11" (1.499 m)   PainSc:   6   PainLoc: Back       GEN:  Well developed, well nourished.  No acute distress. No pain behavior.  HEENT:  No trauma.  Mucous membranes moist.  Nares patent bilaterally.  PSYCH: Normal affect. Thought content appropriate.  CHEST:  Breathing symmetric.  No audible wheezing.  ABD: Soft, non-distended.  SKIN:  Warm, pink, dry.  No rash on exposed areas.    EXT:  No cyanosis, clubbing, or edema.  No color change or changes in nail or hair growth.  NEURO/MUSCULOSKELETAL:  Fully alert, oriented, and appropriate. Speech normal tyson. No cranial nerve deficits.   Gait:  Antalgic.  Mildly present trendelenburg sign bilaterally.   Motor Strength: 5/5 motor strength throughout lower extremities.   Sensory:  No sensory deficit in the lower extremities.   Reflexes:  2+ and symmetric throughout.  Downgoing Babinski's bilaterally.  No clonus or spasticity.  L-Spine:  Decreased ROM with pain on flexion. Negative pain with axial/facet loading bilaterally.  Negative SLR bilaterally, though exam was limited by hamstring tightness to about 30 degrees.    SI Joint/Hip:  Negative SIN bilaterally.  Negative FADIR bilaterally.  Mild TTP over lumbar paraspinals.  TTP over bilateral GTB, R>L    Widespread pain index:  10  Symptom severity score:  5    Imaging:        The imaging studies listed below were independently reviewed by me, and " I agree with the findings as documented below.     Narrative     EXAMINATION:  MRI LUMBAR SPINE WITHOUT CONTRAST    CLINICAL HISTORY:  Low back pain, <6wks, no red flags, no prior management; Low back pain    TECHNIQUE:  Multiplanar, multisequence MR images were acquired from the thoracolumbar junction to the sacrum without the administration of contrast.    COMPARISON:  None.    FINDINGS:  Vertebral body height and alignment are anatomic.  Marrow signal is normal.  The conus terminates at T12-L1.  There is no epidural mass or collection.    Moderate disc desiccation is present.  There is mild loss of disc height throughout the lumbar spine.  Moderate facet arthropathy is present from L2 through S1.    L4-5: There is moderate broad-based posterior disc bulging, with associated annular fissure of the disc.  This along with bilateral facet arthropathy contributes to moderate spinal canal narrowing and mild bilateral neuroforaminal narrowing.    L5-S1: Minimal broad-based posterior bulging of the disc is present without spinal canal or neuroforaminal narrowing.      Impression       Degenerative findings most pronounced at the L4-5 level where there is moderate spinal canal and mild bilateral neuroforaminal narrowing.      Electronically signed by: Crispin Escoto MD  Date: 04/09/2018  Time: 09:34         Assessment:     Encounter Diagnoses   Name Primary?    Chronic pain disorder Yes    Fibromyalgia     Other spondylosis, lumbar region     DDD (degenerative disc disease), lumbar     Lumbar radiculitis     Physical deconditioning     Chronic use of opiate drugs therapeutic purposes        Plan:     Teresa was seen today for consult.    Diagnoses and all orders for this visit:    Chronic pain disorder  -     Ambulatory consult to Physical Therapy  -     methocarbamol (ROBAXIN) 500 MG Tab; Take 1-2 tablets (500-1,000 mg total) by mouth nightly as needed (pain).    Fibromyalgia  -     Ambulatory consult to  Physical Therapy    Other spondylosis, lumbar region  -     Ambulatory consult to Physical Therapy    DDD (degenerative disc disease), lumbar  -     Ambulatory consult to Physical Therapy  -     Case Request Operating Room: Injection, Steroid, Epidural - L5/S1 LUMBAR EPIDURAL STEROID INJECTION    Lumbar radiculitis  -     Ambulatory consult to Physical Therapy    Physical deconditioning  -     Ambulatory consult to Physical Therapy    Chronic use of opiate drugs therapeutic purposes    Myofascial pain  -     methocarbamol (ROBAXIN) 500 MG Tab; Take 1-2 tablets (500-1,000 mg total) by mouth nightly as needed (pain).         Her pain is consistent with the above.      Of note, she does meet the diagnostic criteria for fibromyalgia, though with this does not appear to be the predominant feature of her pain.  We will work on some basic symptom management and get her into some physical therapy for the treatment of her fibromyalgia.  We can consider targeted medications for fibromyalgia in the future, but she does not like to take sedating medications as she has to work.  I would consider amitriptyline as a 1st step    We discussed the assessment and recommendations.  All available images were reviewed. We discussed the disease process, prognosis, treatment plan, and risks and benefits. The patient is aware of the risks and benefits of the medications being prescribed, common side effects, and proper usage. The following is the plan we agreed on:     1. Schedule for L5/S1 interlaminar DEAN. The procedure was explained in detail, including risks, benefits, and alternatives.  All questions answered.  Consent obtained today.  2. Trial methocarbamol 500-1000 mg QHS PRN. Stay at most comfortable dose.  Take medication with meals.  If you experience any upset stomach, nausea, or vomiting, stop taking this medication. Patient denies history of kidney problems or ulcers.  3. Continue ibuprofen 800 mg BID-TID PRN  4. Referral to  Physical therapy.  We had an extensive discussion regarding the expected time course for PT for chronic pain vs after a surgery.  Specifically, we discussed that PT for chronic pain almost always causes a worsening of pain before any improvement, which is generally not seen for 3-6 months, in which time she will likely already be home doing home exercises given that a formal course of PT generally lasts anywhere from 6-12 weeks.  5. I do not recommend opioids for the chronic treatment of her non-malignant pain at this time.  Opioids for chronic pain is not associated with improved outcomes and is associated with high risk.  Short courses for acute flares are appropriate in some instances.  6. RTC in 3-6 weeks or sooner if needed.    Thank you for allowing me to participate in the care of this patient.   Please do not hesitate to call me at [phone] with any questions or concerns.    Gail Cohen MD  09/29/2019     The above plan and management options were discussed at length with patient. Patient is in agreement with the above and verbalized understanding. It will be communicated with the referring physician via electronic record, fax, or mail.

## 2019-09-30 NOTE — LETTER
October 1, 2019      Maite Villatoro, [address]  [address] [address] [place] [address]           [facility]  [address]  [place] [address]  Phone: [phone]  Fax: [phone]          Patient: Teresa Clay   MR Number: [id number]   YOB: 1955   Date of Visit: 9/30/2019       Dear Dr. Maite Villatoro:    Thank you for referring Teresa Clay to me for evaluation. Attached you will find relevant portions of my assessment and plan of care.    If you have questions, please do not hesitate to call me. I look forward to following Teresa Clay along with you.    Sincerely,    Gail Cohen MD    Enclosure  CC:  No Recipients    If you would like to receive this communication electronically, please contact [email or web address] or [phone] to request more information on [employer] access.    For providers and/or their staff who would like to refer a patient to [facility], please contact us through our one-stop-shop provider referral line, [facility], at [phone].    If you feel you have received this communication in error or would no longer like to receive these types of communications, please e-mail [email or web address]

## 2019-09-30 NOTE — PATIENT INSTRUCTIONS
Recommend starting methocarbamol 500 mg, 1-2 tablets, at bedtime for pain as needed for muscle spasm/pain. This medication may make you sleepy, so do not drive until you know how it affect you.      I have referred you to our physical therapists here at [facility]  You can give them a call to get this scheduled.  Their number is [phone].

## 2019-10-01 VITALS
HEART RATE: 68 BPM | WEIGHT: 159 LBS | SYSTOLIC BLOOD PRESSURE: 121 MMHG | TEMPERATURE: 99 F | OXYGEN SATURATION: 97 % | DIASTOLIC BLOOD PRESSURE: 72 MMHG | HEIGHT: 59 IN | BODY MASS INDEX: 32.05 KG/M2

## 2019-10-03 ENCOUNTER — CLINICAL SUPPORT (OUTPATIENT)
Dept: REHABILITATION | Facility: HOSPITAL | Age: 64
End: 2019-10-03
Attending: ANESTHESIOLOGY
Payer: COMMERCIAL

## 2019-10-03 DIAGNOSIS — M54.10 RADICULAR LOW BACK PAIN: ICD-10-CM

## 2019-10-03 DIAGNOSIS — M51.36 DDD (DEGENERATIVE DISC DISEASE), LUMBAR: ICD-10-CM

## 2019-10-03 DIAGNOSIS — M25.551 RIGHT HIP PAIN: ICD-10-CM

## 2019-10-03 PROCEDURE — 97162 PT EVAL MOD COMPLEX 30 MIN: CPT | Mod: PN

## 2019-10-03 NOTE — PLAN OF CARE
"OUTPATIENT THERAPY A  PHYSICAL THERAPY INITIAL EVALUATION    Name: Teresa Clay  Clinic Number: [id number]    Evaluation Date: 10/3/2019  Visit #: 1 / 18  Authorization period Expiration: none  Plan of Care Expiration: 1/3/19    Diagnosis: DDD, fibromyalgia, lumbar radiculitis  Physician: Gail Cohen MD  Treatment Orders: Please evaluate and treat for strength and conditioning for chronic low back and leg pain.  Please include the development of an HEP.  Modalities as indicated.  Recommend water therapy  Past Medical History:   Diagnosis Date    Chronic back pain     Diverticulitis     Encounter for long-term (current) use of medications     Hypertension      has a current medication list which includes the following prescription(s): acyclovir, diclofenac sodium, docusate sodium, ergocalciferol, fluticasone propionate, hydrochlorothiazide, methocarbamol, metoprolol succinate, pantoprazole, and tiotropium, and the following Facility-Administered Medications: fentanyl and midazolam.  Review of patient's allergies indicates:   Allergen Reactions    Betamethasone acet,sod phos Other (See Comments)     " Makes me feel flushed!"    Codeine Rash       History   Prior Therapy: yes  Social History: cosmotology  Previous functional status:independent  Current functional status: ind  Work:requires standing for periods of time    Subjective   History of Present Illness: pt had a fall at over 5 yrs and has continue to worsen  DOI:over 5 yrs   Imaging, xray 4/2/19: Moderate disc desiccation is present.  There is mild loss of disc height throughout the lumbar spine.  Moderate facet arthropathy is present from L2 through S1.    L4-5: There is moderate broad-based posterior disc bulging, with associated annular fissure of the disc.  This along with bilateral facet arthropathy contributes to moderate spinal canal narrowing and mild bilateral neuroforaminal narrowing.    L5-S1: Minimal broad-based posterior bulging of " the disc is present without spinal canal or neuroforaminal narrowing.    Pain: current 5/10, worst 10/10, best 5/10, Aching, Dull, Burning, Tight, Tingling, Numb and Shooting, constant  Radicular symptoms: yes L5/S1 dermotome   Aggravating factors:prolonged position with standing causing numbness  Easing factors: ice and advil  Precautions no heavy lifting and no bending and twisting  Pts goals: to get out pain, loss weight     Objective   Mental status: alert  Posture/ Alignment: Fair; R thoracic lateral shift, asymmetrical shoulder height, rounded shoulde    Lumbar AROM: Pain/Dysfunction with Movement:   Flexion 50  Mod pain radicular pain   Extension >80% Stiffness w pain  In SI sunitha   Right side bending <50% R LE went numb   Left side bending >60 % Mod/sever pain no numbess   Right rotation full  Min pain in R hip   Left rotation full No pain     Hip  Right   Left  Pain/Dysfunction with Movement    AROM PROM MMT AROM PROM MMT    Flexion wfl wnl 3+/5 wfl wnl 3-/5    Extension wfl wnl 3+/5 wfl wnl 3/5    Abduction wfl wfl 4/5 wfl wnl 3+/5    Adduction wfl wfl 4/5 wfl wnl #+/5    Internal rotation Min imp tighness 4/5 wfl wnl 3+5    External rotation wfl wfl 4/5          L knee strength 3+/5; L great toe 3-/5   Reduced flexibility in R LE heel cords and HS    Special Tests:positive slump on R with knee ext, positive slump on L with DF  SLR +  On R LE, SLR weak + L LE  relief with lumbar traction    Palpation:severe TOP to gluts, paraspinals  Joint Play; flat back, hypomobile L4/L5 and L5/S1    Pt/family was provided educational information, including: role of PT, goals for PT, scheduling - pt verbalized understanding. Discussed insurance plan with pt.     Functional Limitations Reporting     Category: mobility  Tool:LEFS  36/80  Score: 45% Limitation     TREATMENT   Time In: 8:59 AM  Time Out:945    Discussed Plan of Care with patient: Yes    .     Assessment   Teresa is a 63 y.o. female referred to outpatient  physical therapy with a medical diagnosis of LE weakness, spinal stenosis and DDD . Demonstrates impairments including limitations as described in the problem list. Pt prognosis is Good. Positive prognostic factors include motivation. Negative prognostic factors include length of time since onset. Pt will benefit from skilled outpatient physical therapy to address the above stated deficits, provide pt/family education, and to maximize pt's level of independence.     History  Co-morbidities and personal factors that may impact the plan of care Examination  Body Structures and Functions, activity limitations and participation restrictions that may impact the plan of care    Clinical Presentation   Co-morbidities:   anxiety, chronic constipation, COPD/asthma, high BMI and HTN        Personal Factors:   no deficits Body Regions:   back  lower extremities    Body Systems:    gross symmetry  ROM  strength  transfers  transitions  motor control            Participation Restrictions:   None at this time     Activity limitations:   Learning and applying knowledge  no deficits    General Tasks and Commands  none    Communication  no deficits    Mobility  prolonged postitioning    Self care  no deficits    Domestic Life  shopping  cooking  doing house work (cleaning house, washing dishes, laundry)    Interactions/Relationships  no deficits    Life Areas      Community and Social Life  no deficits         unstable clinical presentation with unpredictable characteristics                      high   moderate  mod Decision Making/ Complexity Score:  mod         Anticipated Barriers for therapy:   Pt's spiritual, cultural and educational needs considered and pt agreeable to plan of care and goals as stated below:     Short Term GOALS: 3 weeks  In 3 weeks:  1.  Pt will decrease pain from 5/ 10 to 2/10 w reduction of radicular sx.  2. Pt will improve strength to improve tolerance and compliance with careplan.   3. Pt will be educated  in pain management,  posture, safety techniques and HEP.    Long Term GOALS:6-8 weeks  1. Pt will have 0-1/10 pain allowing pt to tolerate working a full day.   2. Pt will improve Index score to 20% or less.   3.   Pt will be independent with HEP and SX management     Plan   Certification Period: 10/3/2019 to 1/3/19    Outpatient physical therapy2- 3 times weekly to include: Aquatic Therapy, Cervical/Lumbar Traction, Electrical Stimulation IFC, Manual Therapy, Moist Heat/ Ice, Neuromuscular Re-ed, Orthotic Management and Training, Patient Education, Self Care, Therapeutic Activites, Therapeutic Exercise, Ultrasound and dry needling op prn. Cont PT for 3  Months. Will dec to 2 x week once condition/pain stable and managed.   Pt may be seen by PTA as part of the rehabilitation team. Supervised face to face visit with Yehuda Rey PTA to discuss, current level of function, POC and progress toward goals.     I certify the need for these services furnished under this plan of treatment and while under my care.    Mary Lawson, PT

## 2019-10-03 NOTE — PROGRESS NOTES
Name: Teresa Clay 1955  MRN: [id number]   Date: 10/3/2019    Therapy Diagnosis:   Encounter Diagnoses   Name Primary?    Chronic right-sided low back pain without sciatica     Right hip pain      Physician: Miranda Duran PA*      Subjective:  Patient Discharged from Physical Therapy.    Patient discharged due to noncompliance with appointments. PT office has called patient repeatedly to re-schedule. Pt has repeatedly no-showed for appointments, therefore was taken off the schedule.   Patient has a history of noncompliance with physical therapy and will therefore need a new referral from MD prior to resuming treatment.      Objective:     No formal objectives due to unexpected discharge       Treatment today:  See Progress note on 7/18/19 for treatment notes          Assessment:  GOALS:  Progress toward goals not assessed prior to self discharge      Patient was discharged unexpectedly.  Information required to complete an accurate discharge summary is unknown.  Refer to therapy initial evaluation and last progress note for initial and most recent functional status and goal achievement.  Recommendations made may be found in medical record.     Patient not seen by PT on final visit due to anticipation of further treatment    Discharge plan : Discharge due to noncompliance      Brandy Hicks, PT

## 2019-10-03 NOTE — PROGRESS NOTES
Evaluation complete. Please POC for further details.  Supervised face to face visit with Yehuda Rey PTA to discuss, current level of function, POC and progress toward goals.

## 2019-10-07 ENCOUNTER — HOSPITAL ENCOUNTER (OUTPATIENT)
Facility: HOSPITAL | Age: 64
Discharge: HOME | End: 2019-10-07
Attending: ANESTHESIOLOGY | Admitting: ANESTHESIOLOGY
Payer: COMMERCIAL

## 2019-10-07 VITALS
BODY MASS INDEX: 32.05 KG/M2 | OXYGEN SATURATION: 99 % | TEMPERATURE: 98 F | DIASTOLIC BLOOD PRESSURE: 70 MMHG | HEIGHT: 59 IN | HEART RATE: 77 BPM | WEIGHT: 159 LBS | SYSTOLIC BLOOD PRESSURE: 122 MMHG | RESPIRATION RATE: 15 BRPM

## 2019-10-07 DIAGNOSIS — M51.36 DDD (DEGENERATIVE DISC DISEASE), LUMBAR: ICD-10-CM

## 2019-10-07 DIAGNOSIS — M54.16 LUMBAR RADICULITIS: Primary | ICD-10-CM

## 2019-10-07 PROBLEM — M51.369 DDD (DEGENERATIVE DISC DISEASE), LUMBAR: Status: ACTIVE | Noted: 2019-10-07

## 2019-10-07 PROCEDURE — 62323 NJX INTERLAMINAR LMBR/SAC: CPT | Performed by: ANESTHESIOLOGY

## 2019-10-07 PROCEDURE — 62323 NJX INTERLAMINAR LMBR/SAC: CPT | Mod: ,,, | Performed by: ANESTHESIOLOGY

## 2019-10-07 PROCEDURE — 62323 PR INJ LUMBAR/SACRAL, W/IMAGING GUIDANCE: ICD-10-PCS | Mod: ,,, | Performed by: ANESTHESIOLOGY

## 2019-10-07 NOTE — OP NOTE
Date of Procedure: 10/07/2019    Procedure: L5/S1 Interlaminar Epidural Steroid Injection    Referring Provider: NOne    Pre-op diagnosis: DDD (degenerative disc disease), lumbar [M51.36]    Post-op diagnosis: DDD (degenerative disc disease), lumbar [M51.36]    Physician: Dr. Gail Cohen     Assistant: None    Anesthestia: local    EBL: None    Specimens: None    All medications, allergies, and relevant histories were reviewed. No recent antibiotics or infections.  A time-out was taken to verify the correct patient, procedure, laterality, and appropriate medications/allergies.    Fluoroscopically-Guided, Contrast-Controlled Lumbar Interlaminar Epidural Steroid Injection:     Following denial of allergy and review of potential side effects and complications including but not necessarily limited to infection, allergic reaction, local tissue breakdown, nerve injury, paresis, paralysis, spinal cord injury, and seizure, the patient indicated they understood and agreed to proceed.     Patient was placed in prone position. Lumbar area was prepped and draped in sterile manner. Local Xylocaine 1% was given subcutaneously at the level L5/S1. An 18-gauge Tuohy needle was introduced atraumatically in the interspinous space and advanced up to the epidural space using fluoroscopic guidance in the AP position. Loss of resistance to air was used to identify the epidural space using fluoroscopic guidance in the lateral position. Following negative aspiration for blood and CSF and confirming the absence of paresthesias, injection of approximately 1 cc of Omnipaque 300 demonstrated excellent epidural spread without vascular or intrathecal uptake. At this point, 2cc of 0.25% marcaine with 80 mg of Depo-Medrol was injected without complication. The needle was flushed with 1% lidocaine and withdrawn.     Patient tolerated the procedure well without any side effects. No noted complications.     The patient was followed post procedure and  discharged under their own power in excellent condition in the company of a responsible adult.     Future Management:   If helpful, can repeat as needed.    Follow up with my clinic in 3 weeks or sooner if needed

## 2019-10-07 NOTE — INTERVAL H&P NOTE
The patient has been examined and the H&P has been reviewed:    I concur with the findings and no changes have occurred since H&P was written.     No change in the location or quality of the pain since the most recent clinic visit.  No new symptoms.  She wishes to proceed with the procedure today.    PE, unchanged from previous:  CV:  RRR  Resp: unlabored, no wheezing.    NPO since MN.      Anesthesia/Surgery risks, benefits and alternative options discussed and understood by patient/family.          Active Hospital Problems    Diagnosis  POA    DDD (degenerative disc disease), lumbar [M51.36]  Yes      Resolved Hospital Problems   No resolved problems to display.

## 2019-10-10 ENCOUNTER — CLINICAL SUPPORT (OUTPATIENT)
Dept: REHABILITATION | Facility: HOSPITAL | Age: 64
End: 2019-10-10
Attending: ANESTHESIOLOGY
Payer: COMMERCIAL

## 2019-10-10 DIAGNOSIS — M25.551 RIGHT HIP PAIN: ICD-10-CM

## 2019-10-10 DIAGNOSIS — M54.16 LUMBAR RADICULITIS: ICD-10-CM

## 2019-10-10 PROCEDURE — 97110 THERAPEUTIC EXERCISES: CPT | Mod: PN

## 2019-10-10 PROCEDURE — 97140 MANUAL THERAPY 1/> REGIONS: CPT | Mod: PN

## 2019-10-10 NOTE — PROGRESS NOTES
Physical Therapy Daily Treatment Note   Name: Teresa Clay 1955  MRN: [id number]    Visit Date: 10/10/2019  Visit #: 2 / 18  Authorization period Expiration:    Plan of Care Expiration:1/3/20  Precautions: avoid prolonged flexion     Time In: 0900  Time Out: 955  Total 1:1 Treatment Time: 55 min    Treatment Diagnosis: DDD, fibromyalgia, lumbar radiculitis  Physician: Gail Cohen MD    Subjective   Pt reports: she had an injection with positive results     Pain Scale:  2/10 on VAS currently, 0/10 on VAS post treatment  Pain Location: right hip    Objective   Fabiola received therapeutic exercises to develop strengtg, core stabilization  for 30 minutes including:  Standing lumbar ext 2 x 10  Prone on elbows t.b pron ext 2 x 10  Stretch to heel cord, HS, gluts, piriformis,  n gliding sunitha le   abd bracing  abd bracing w pelvic tilt        Fabiola received the following manual therapy techniques: Myofacial release were applied to the:piriformis  for 15  minutes.         Home Exercises and Education Provided     Education provided re:   - progress towards goals   - role of therapy in multi - disciplinary team, goals for therapy  Pt educated on condition, POC, and expectations in therapy.  No spiritual or educational barriers to learning provided    Home exercises:handout 1 see media section  Pt will be provided HEP during course of treatment with progressions as appropriate. Pt was advised to perform these exercises free of pain, and to stop performing them if pain occurs.   Fabiola demonstrated good  understanding of the education provided.     Assessment   Fabiola is progressing well towards her goals and no updates to goals at this time.     Pt prognosis is Excellent. Pt will continue to benefit from skilled outpatient physical therapy to address the deficits listed in the problem list chart on initial evaluation, provide pt/family education and to maximize pt's level of  independence in the home and community environment.     Medical necessity is demonstrated by the impairments and functional limitations listed on the Initial Evaluation.     Anticipated barriers to physical therapy: none  Pt's spiritual, cultural and educational needs considered and pt agreeable to plan of care and goals.    Goals   Progressing not met      Plan   Continue with established Plan of Care towards Physical Therapy goals.   Discussed Plan of Care with patient: Yes    Mary Lawson, PT  10/10/2019

## 2019-10-14 ENCOUNTER — CLINICAL SUPPORT (OUTPATIENT)
Dept: REHABILITATION | Facility: HOSPITAL | Age: 64
End: 2019-10-14
Attending: ANESTHESIOLOGY
Payer: COMMERCIAL

## 2019-10-14 DIAGNOSIS — G89.29 CHRONIC BILATERAL LOW BACK PAIN WITH SCIATICA, SCIATICA LATERALITY UNSPECIFIED: ICD-10-CM

## 2019-10-14 DIAGNOSIS — M54.40 CHRONIC BILATERAL LOW BACK PAIN WITH SCIATICA, SCIATICA LATERALITY UNSPECIFIED: ICD-10-CM

## 2019-10-14 PROCEDURE — 97110 THERAPEUTIC EXERCISES: CPT | Mod: PN

## 2019-10-14 NOTE — PROGRESS NOTES
Physical Therapy Daily Treatment Note   Name: Teresa Clay 1955  MRN: [id number]    Visit Date: 10/14/2019  Visit #: 3 / 18  Authorization period Expiration:    Plan of Care Expiration:1/3/20  Precautions: avoid prolonged flexion     Time In: 100  Time Out: 200  Total 1:1 Treatment Time: 60 min    Treatment Diagnosis: DDD, fibromyalgia, lumbar radiculitis  Physician: Gail Cohen MD    Subjective   Pt reports:feels good since last session; positeve response to piriformis release  Pain Scale:  1/10 on VAS currently, 0/10 on VAS post treatment  Pain Location: low back    Objective   Fabiola received therapeutic exercises to develop strength, endurance, ROM, flexibility, posture and core stabilization for 40  minutes including:   all exes performed bilaterally 10x  With 5-10 sec hold  abd stab  abd stab with pelvic tilt  Bridging  Bent knee fall out  SLR's   Hip abd in sidelying  PATIENT BEGAN TO HAVE PAIN IN SUPINE W HIP/KNEES FLEXED SO SHE WAS INSTRUCTED TO LAY PRONE  Prone on elbows x 10   Alternating contralateral UE/LE ext In prone  Stretch to piriformis w assist x 3   Stretch to HS w assist x3    NOT PERFORMED THIS SESSION   Standing lumbar ext 2 x 10  Prone on elbows t.b pron ext 2 x 10  Stretch to heel cord, HS, gluts, piriformis,  n gliding sunitha le   abd bracing  abd bracing w pelvic tilt      NOT PERFORMED THIS SESSION  Fabiola received the following manual therapy techniques: Myofacial release were applied to the:piriformis  for 15  minutes.         Home Exercises and Education Provided     Education provided re:   - progress towards goals   - role of therapy in multi - disciplinary team, goals for therapy  Pt educated on condition, POC, and expectations in therapy.  No spiritual or educational barriers to learning provided    Home exercises:handout pg 2,3, see media section  Pt will be provided HEP during course of treatment with progressions as appropriate. Pt was  advised to perform these exercises free of pain, and to stop performing them if pain occurs.   Fabiola demonstrated good  understanding of the education provided.     Assessment   Fabiola is progressing well towards her goals and no updates to goals at this time. Extension responder    Pt prognosis is Excellent. Pt will continue to benefit from skilled outpatient physical therapy to address the deficits listed in the problem list chart on initial evaluation, provide pt/family education and to maximize pt's level of independence in the home and community environment.     Medical necessity is demonstrated by the impairments and functional limitations listed on the Initial Evaluation.     Anticipated barriers to physical therapy: none  Pt's spiritual, cultural and educational needs considered and pt agreeable to plan of care and goals.    Goals   Progressing not met      Plan   Continue with established Plan of Care towards Physical Therapy goals.   Discussed Plan of Care with patient: Yes    Mary Lawson, PT  10/14/2019

## 2019-10-17 ENCOUNTER — CLINICAL SUPPORT (OUTPATIENT)
Dept: REHABILITATION | Facility: HOSPITAL | Age: 64
End: 2019-10-17
Attending: ANESTHESIOLOGY
Payer: COMMERCIAL

## 2019-10-17 DIAGNOSIS — M54.40 BILATERAL LOW BACK PAIN WITH SCIATICA, SCIATICA LATERALITY UNSPECIFIED, UNSPECIFIED CHRONICITY: ICD-10-CM

## 2019-10-17 PROCEDURE — 97140 MANUAL THERAPY 1/> REGIONS: CPT | Mod: PN

## 2019-10-17 PROCEDURE — 97035 APP MDLTY 1+ULTRASOUND EA 15: CPT | Mod: PN

## 2019-10-17 PROCEDURE — 97110 THERAPEUTIC EXERCISES: CPT | Mod: PN

## 2019-10-17 NOTE — PROGRESS NOTES
Physical Therapy Daily Treatment Note   Name: Teresa Clay 1955  MRN: [id number]    Visit Date: 10/17/2019  Visit #: 4/ 18  Authorization period Expiration:    Plan of Care Expiration:1/3/20  Precautions: avoid prolonged flexion     Time In: 900  Time Out: 1000  Total 1:1 Treatment Time: 60 min    Treatment Diagnosis: DDD, fibromyalgia, lumbar radiculitis  Physician: Gail Cohen MD    Subjective   Pt reports: pt in good spirits, pleased with progress  Pain Scale:  2/10 on VAS currently, 0/10 on VAS post treatment  Pain Location: low back, post knees sunitha. piriformis    Objective   Fabiola received therapeutic exercises to develop strength, endurance, ROM, flexibility, posture and core stabilization for 40  minutes including:     aProne on elbows x 10   Alternating contralateral UE/LE ext In prone  Stretch to piriformis w assist x 3   Stretch to HS w assist x3  Standing lumbar ext 2 x 10  Prone on elbows t.b pron ext 2 x 10  Stretch to heel cord  Stretch hip flexor w strap,3 x 15-20 sec hold  abd bracing  abd bracing w pelvic tilt    US using thermal component at 100% 3 HZ 1.5 w/cm 2 to post knees sunitha  x 10 min. F/b stretching  NOT PERFORMED THIS SESSION  Fabiola received the following manual therapy techniques: Myofacial release were applied to the:L piriformis  for 8  minutes.       Home Exercises and Education Provided     Education provided re:   - progress towards goals   - role of therapy in multi - disciplinary team, goals for therapy  Pt educated on condition, POC, and expectations in therapy.  No spiritual or educational barriers to learning provided    Home exercises:handout pg 2,3, see media section  Pt will be provided HEP during course of treatment with progressions as appropriate. Pt was advised to perform these exercises free of pain, and to stop performing them if pain occurs.   Fabiola demonstrated good  understanding of the education provided.     Assessment    Fabiola is progressing well towards her goals and no updates to goals at this time. Extension responder    Pt prognosis is Excellent. Pt will continue to benefit from skilled outpatient physical therapy to address the deficits listed in the problem list chart on initial evaluation, provide pt/family education and to maximize pt's level of independence in the home and community environment.     Medical necessity is demonstrated by the impairments and functional limitations listed on the Initial Evaluation.     Anticipated barriers to physical therapy: none  Pt's spiritual, cultural and educational needs considered and pt agreeable to plan of care and goals.    Goals   Progressing not met  In 3 weeks:  1.  Pt will decrease pain from 5/ 10 to 2/10 w reduction of radicular sx.  2. Pt will improve strength to improve tolerance and compliance with careplan.   3. Pt will be educated in pain management,  posture, safety techniques and HEP.     Long Term GOALS:6-8 weeks  1. Pt will have 0-1/10 pain allowing pt to tolerate working a full day.   2. Pt will improve Index score to 20% or less.   3.   Pt will be independent with HEP and SX management       Plan   Continue with established Plan of Care towards Physical Therapy goals.   Discussed Plan of Care with patient: Yes    Mary Lawson, PT  10/17/2019

## 2019-10-21 ENCOUNTER — CLINICAL SUPPORT (OUTPATIENT)
Dept: REHABILITATION | Facility: HOSPITAL | Age: 64
End: 2019-10-21
Attending: ANESTHESIOLOGY
Payer: COMMERCIAL

## 2019-10-21 PROCEDURE — 97140 MANUAL THERAPY 1/> REGIONS: CPT | Mod: PN

## 2019-10-21 PROCEDURE — 97110 THERAPEUTIC EXERCISES: CPT | Mod: PN

## 2019-10-21 NOTE — PROGRESS NOTES
Physical Therapy Daily Treatment Note   Name: Teresa Clay 1955  MRN: [id number]    Visit Date: 10/21/2019  Visit #: 5/ 18  Authorization period Expiration:    Plan of Care Expiration:1/3/20  Precautions: avoid prolonged flexion     Time In: 900  Time Out: 1000  Total 1:1 Treatment Time: 60 min    Treatment Diagnosis: DDD, fibromyalgia, lumbar radiculitis  Physician: Gail Cohen MD    Subjective   Pt reports:some soreness in low back from over working this weekend. Pt wants to incorportate yoga into her sessions.  Pain Scale:  2/10 on VAS currently, 1/10 on VAS post treatment  Pain Location: low back,r hip    Objective   Fabiola received therapeutic exercises to develop strength, endurance, ROM, flexibility, posture and core stabilization for 45  minutes including:   nustep x 10 min  Single knee to chest stretch 3 x 30  Double knee to chest stretch 3 x 30  aProne on elbows x 10   Alternating contralateral UE/LE ext In prone x5  Stretch to piriformisx 3   Stretch to HS wx3  Standing lumbar ext 2 x 10  Prone  ext 2 x 10      NOT PERFORMED THIS SESSION  Stretch hip flexor w strap,3 x 15-20 sec hold  abd bracing  abd bracing w pelvic tilt      NOT PERFORMED THIS SESSION  Fabiola received the following manual therapy techniques: Myofacial release were applied to the:paraspinals along the length of the spine sunitha, f/b TrP release in gluts, quadratus lumborum x 15 min     Home Exercises and Education Provided     Education provided re: discussed the benefits of yoga and the type of modifications we ll have to incorporate.  Pt given handout on type of breathing used with vinyasa flow yoga (Ujjayi)  - progress towards goals   - role of therapy in multi - disciplinary team, goals for therapy  Pt educated on condition, POC, and expectations in therapy.  No spiritual or educational barriers to learning provided    Home exercises:handout pg 2,3, see media section  Pt will be provided HEP  during course of treatment with progressions as appropriate. Pt was advised to perform these exercises free of pain, and to stop performing them if pain occurs.   Fabiola demonstrated good  understanding of the education provided.     Assessment   Pain did inc in middle of session likely due to flexion stretches and nustep.  Was able to reduce pain with prone ext with pillow under low abd then pillow removed f/ ext.   AVOID FLEXION EXES/STRETCHES AND NUSTEP    Pt prognosis is Excellent. Pt will continue to benefit from skilled outpatient physical therapy to address the deficits listed in the problem list chart on initial evaluation, provide pt/family education and to maximize pt's level of independence in the home and community environment.     Medical necessity is demonstrated by the impairments and functional limitations listed on the Initial Evaluation.     Anticipated barriers to physical therapy: none  Pt's spiritual, cultural and educational needs considered and pt agreeable to plan of care and goals.    Goals   Progressing not met  In 3 weeks:  1.  Pt will decrease pain from 5/ 10 to 2/10 w reduction of radicular sx.  2. Pt will improve strength to improve tolerance and compliance with careplan.   3. Pt will be educated in pain management,  posture, safety techniques and HEP.     Long Term GOALS:6-8 weeks  1. Pt will have 0-1/10 pain allowing pt to tolerate working a full day.   2. Pt will improve Index score to 20% or less.   3.   Pt will be independent with HEP and SX management       Plan   Continue with established Plan of Care towards Physical Therapy goals.   Discussed Plan of Care with patient: Yes    Mary Lawson, PT  10/21/2019

## 2019-10-28 ENCOUNTER — CLINICAL SUPPORT (OUTPATIENT)
Dept: REHABILITATION | Facility: HOSPITAL | Age: 64
End: 2019-10-28
Attending: ANESTHESIOLOGY
Payer: COMMERCIAL

## 2019-10-28 ENCOUNTER — OFFICE VISIT (OUTPATIENT)
Dept: PAIN MEDICINE | Facility: CLINIC | Age: 64
End: 2019-10-28
Payer: COMMERCIAL

## 2019-10-28 VITALS
HEIGHT: 59 IN | TEMPERATURE: 98 F | WEIGHT: 159 LBS | HEART RATE: 64 BPM | DIASTOLIC BLOOD PRESSURE: 83 MMHG | SYSTOLIC BLOOD PRESSURE: 132 MMHG | BODY MASS INDEX: 32.05 KG/M2 | OXYGEN SATURATION: 95 %

## 2019-10-28 DIAGNOSIS — M79.18 MYOFASCIAL PAIN: ICD-10-CM

## 2019-10-28 DIAGNOSIS — M79.7 FIBROMYALGIA: ICD-10-CM

## 2019-10-28 DIAGNOSIS — M54.40 BILATERAL LOW BACK PAIN WITH SCIATICA, SCIATICA LATERALITY UNSPECIFIED, UNSPECIFIED CHRONICITY: ICD-10-CM

## 2019-10-28 DIAGNOSIS — M62.838 CERVICAL PARASPINAL MUSCLE SPASM: Primary | ICD-10-CM

## 2019-10-28 DIAGNOSIS — M47.896 OTHER SPONDYLOSIS, LUMBAR REGION: ICD-10-CM

## 2019-10-28 DIAGNOSIS — G89.4 CHRONIC PAIN DISORDER: ICD-10-CM

## 2019-10-28 DIAGNOSIS — M51.36 DDD (DEGENERATIVE DISC DISEASE), LUMBAR: ICD-10-CM

## 2019-10-28 PROCEDURE — 99214 PR OFFICE/OUTPT VISIT, EST, LEVL IV, 30-39 MIN: ICD-10-PCS | Mod: S$GLB,,, | Performed by: ANESTHESIOLOGY

## 2019-10-28 PROCEDURE — 97140 MANUAL THERAPY 1/> REGIONS: CPT | Mod: PN

## 2019-10-28 PROCEDURE — 99214 OFFICE O/P EST MOD 30 MIN: CPT | Mod: S$GLB,,, | Performed by: ANESTHESIOLOGY

## 2019-10-28 PROCEDURE — 99999 PR PBB SHADOW E&M-EST. PATIENT-LVL III: ICD-10-PCS | Mod: PBBFAC,,, | Performed by: ANESTHESIOLOGY

## 2019-10-28 PROCEDURE — 97110 THERAPEUTIC EXERCISES: CPT | Mod: PN

## 2019-10-28 PROCEDURE — 99999 PR PBB SHADOW E&M-EST. PATIENT-LVL III: CPT | Mod: PBBFAC,,, | Performed by: ANESTHESIOLOGY

## 2019-10-28 RX ORDER — PANTOPRAZOLE SODIUM 40 MG/1
TABLET, DELAYED RELEASE ORAL
Refills: 1 | COMMUNITY
Start: 2019-10-14 | End: 2020-12-03 | Stop reason: ALTCHOICE

## 2019-10-28 NOTE — PROGRESS NOTES
Subjective:     Patient ID: Teresa Clay is a 64 y.o. female.    Chief Complaint: Pain    Consulted by: Self     Disclaimer: This note was generated using voice recognition software.  There may be typographical errors that were missed during proofreading.    HPI:    Teresa Clay is a 64 y.o. female who presents today with chronic low back pain, fibromyalgia, generalized anxiety. This pain started in 2012 as a result of a fall down the stairs.  She did not seek treatment until this year.  She was treating herself with ibuprofen for the past 7 years.  She then sought treatment this past year when it started going into her legs, R>L.  She describes a deep, aching pain that is located on either side of her low back and radiates down the posterolateral aspect of her legs to the tops of her feet.  This pain is described in detail below.    Aggravating factors:  Sitting, standing, walking, lifting, lying down, exercise, morning, getting up out of bed/chair    Mitigating factors:  Rest    Previously seeing: Dr. Trey Dueñas    Interval History (10/29/2019):  She returns today for follow up.  She reports that the L5/S1 and physical therapy have been very helpful.  She is very satisfied with her current pain control.      Physical Therapy:  Yes, she went for 4-6 weeks.  She does the HEP, stretching.      Non-pharmacologic Treatment:     · Ice/Heat: Ice is helpful  · TENS: Not tried  · Massage: Hurt but helpful  · Chiropractic care: Yes, helps a little  · Acupuncture: Not tried  · Other: Support pillow between her legs at night         Pain Medications:         · Currently taking:  Voltaren gel (help), Wellbutrin 150 mg BID, ibuprofen 800 mg BID-TID (helpful)    · Has tried in the past:    · Opioids: tramadol, Norco  · NSAIDS: Maybe  · Tylenol: Not tried  · Muscle relaxants:  Baclofen (can't remember benefit)  · TCAs: Not tried  · SNRIs: Cymbalta (prescribed but did not receive)  · Anticonvulsants:  Gabapentin  (minimal benefit), Lyrica (no help)  · topical creams: As above  · Other: Ativan for anxiety in the past    Blood thinners: None    Interventional Therapies:   · bilateral L4-5 TFESI on 2/4/19 with 50% relief  · bilateral L4-5 TF DEAN on 5/6/19 with 50% relief for a couple of weeks.  · 10/07/2019:  L5/S1 interlaminar DEAN:  80% relief ongoing    Relevant Surgeries: None    Affecting sleep? Yes    Affecting daily activities? Yes    Depressive symptoms? Yes, she has been treated in the past          · SI/HI? No    Work status: , she has to stand on her feet all day.      Prescription Monitoring Program database:  Reviewed and consistent with medication use as prescribed.    Last 3 PDI Scores 10/28/2019 9/30/2019 7/22/2019   Pain Disability Index (PDI) 14 41 44       Opioid Risk Score       Value Time User    Opioid Risk Score  1 1/15/2019  9:05 AM Henrietta Fernandez          GENERAL:  Positive for weight gain.  No weight loss, malaise or fevers.  HEENT:   No recent changes in vision or hearing  NECK:  Negative for lumps, no difficulty with swallowing.  RESPIRATORY:  Negative for cough, wheezing or shortness of breath, patient denies any recent URI.  CARDIOVASCULAR:  Negative for chest pain, leg swelling or palpitations.  GI:  Positive for stomach pain.  Negative for abdominal discomfort, blood in stools or black stools or change in bowel habits.  MUSCULOSKELETAL:  See HPI.  SKIN:  Negative for lesions, rash, and itching.  PSYCH:  Positive for anxiety, difficulty sleeping.  She has been treated depression in the past.  No other mood disorder or recent psychosocial stressors.    HEMATOLOGY/LYMPHOLOGY:  Negative for prolonged bleeding, bruising easily or swollen nodes.    ENDO: No history of diabetes or thyroid dysfunction  NEURO:   No history of headaches, syncope, paralysis, seizures or tremors.  All other reviewed and negative other than HPI.          Past Medical History:   Diagnosis Date    Chronic back pain   "   Diverticulitis     Encounter for long-term (current) use of medications     Hypertension        Past Surgical History:   Procedure Laterality Date    APPENDECTOMY      CHOLECYSTECTOMY      COLONOSCOPY      COLONOSCOPY N/A 7/3/2019    Procedure: COLONOSCOPY;  Surgeon: Jean Jamison MD;  Location: [facility] ENDO;  Service: General;  Laterality: N/A;    EPIDURAL STEROID INJECTION N/A 10/7/2019    Procedure: Injection, Steroid, Epidural - L5/S1 LUMBAR EPIDURAL STEROID INJECTION;  Surgeon: Gail Cohen MD;  Location: [facility] OR;  Service: Pain Management;  Laterality: N/A;    ESOPHAGOGASTRODUODENOSCOPY N/A 7/3/2019    Procedure: ESOPHAGOGASTRODUODENOSCOPY (EGD);  Surgeon: Jean Jamison MD;  Location: [facility] ENDO;  Service: General;  Laterality: N/A;    TRANSFORAMINAL EPIDURAL INJECTION OF STEROID Bilateral 5/6/2019    Procedure: Injection,steroid,epidural,transforaminal approach;  Surgeon: Trey Dueñas MD;  Location: [facility] OR;  Service: Pain Management;  Laterality: Bilateral;  L4-5    TUBAL LIGATION         Review of patient's allergies indicates:   Allergen Reactions    Betamethasone acet,sod phos Other (See Comments)     " Makes me feel flushed!"    Codeine Rash       Current Outpatient Medications   Medication Sig Dispense Refill    diclofenac sodium (VOLTAREN) 1 % Gel APPLY 4 GRAMS TOPICALLY   QID AS DIRECTED 2 Tube 2    docusate sodium (COLACE) 100 MG capsule Take 1 capsule (100 mg total) by mouth 2 (two) times daily. Drink a 12 oz glass of water with each dose. 60 capsule 11    ergocalciferol (ERGOCALCIFEROL) 50,000 unit Cap Take 1 capsule by mouth.      fluticasone (FLONASE) 50 mcg/actuation nasal spray fluticasone 50 mcg/actuation nasal spray,suspension      hydroCHLOROthiazide (HYDRODIURIL) 25 MG tablet TK 1 T PO QD  2    metoprolol succinate (TOPROL-XL) 25 MG 24 hr tablet TAKE ONE TABLET BY MOUTH ONCE DAILY (Patient taking differently: TAKE HALF TABLET BY MOUTH ONCE DAILY) 30 tablet 11    " pantoprazole (PROTONIX) 40 MG tablet TK 1 T PO QD  1    tiotropium (SPIRIVA WITH HANDIHALER) 18 mcg inhalation capsule Inhale 1 capsule into the lungs.      acyclovir (ZOVIRAX) 400 MG tablet Take 2 tablets (800 mg total) by mouth 4 (four) times daily. for 7 days 70 tablet 0     No current facility-administered medications for this visit.      Facility-Administered Medications Ordered in Other Visits   Medication Dose Route Frequency Provider Last Rate Last Dose    fentaNYL injection    PRN Trey Dueñas MD   50 mcg at 02/04/19 1251    midazolam (VERSED) 1 mg/mL injection    PRN Trey Dueñas MD   2 mg at 02/04/19 1251       Family History   Problem Relation Age of Onset    Diabetes Brother     Cancer Brother     Breast cancer Maternal Aunt        Social History     Socioeconomic History    Marital status:      Spouse name: Not on file    Number of children: Not on file    Years of education: Not on file    Highest education level: Not on file   Occupational History    Not on file   Social Needs    Financial resource strain: Not on file    Food insecurity:     Worry: Not on file     Inability: Not on file    Transportation needs:     Medical: Not on file     Non-medical: Not on file   Tobacco Use    Smoking status: Former Smoker     Packs/day: 0.50     Types: Cigarettes    Smokeless tobacco: Never Used    Tobacco comment: stop approx. 6 months ago   Substance and Sexual Activity    Alcohol use: No    Drug use: No    Sexual activity: Yes   Lifestyle    Physical activity:     Days per week: Not on file     Minutes per session: Not on file    Stress: Not on file   Relationships    Social connections:     Talks on phone: Not on file     Gets together: Not on file     Attends Muslim service: Not on file     Active member of club or organization: Not on file     Attends meetings of clubs or organizations: Not on file     Relationship status: Not on file   Other Topics Concern    Not on file  "  Social History Narrative    Not on file       Objective:     Vitals:    10/28/19 1557   BP: 132/83   Pulse: 64   Temp: 97.8 °F (36.6 °C)   TempSrc: Oral   SpO2: 95%   Weight: 72.1 kg (159 lb)   Height: 4' 11" (1.499 m)   PainSc:   2       GEN:  Well developed, well nourished.  No acute distress. No pain behavior.  HEENT:  No trauma.  Mucous membranes moist.  Nares patent bilaterally.  PSYCH: Normal affect. Thought content appropriate.  CHEST:  Breathing symmetric.  No audible wheezing.  ABD: Soft, non-distended.  SKIN:  Warm, pink, dry.  No rash on exposed areas.    EXT:  No cyanosis, clubbing, or edema.  No color change or changes in nail or hair growth.  NEURO/MUSCULOSKELETAL:  Fully alert, oriented, and appropriate. Speech normal tyson. No cranial nerve deficits.   Gait:  Antalgic.      Previous Physical exam:  Mildly present trendelenburg sign bilaterally.   Motor Strength: 5/5 motor strength throughout lower extremities.   Sensory:  No sensory deficit in the lower extremities.   Reflexes:  2+ and symmetric throughout.  Downgoing Babinski's bilaterally.  No clonus or spasticity.  L-Spine:  Decreased ROM with pain on flexion. Negative pain with axial/facet loading bilaterally.  Negative SLR bilaterally, though exam was limited by hamstring tightness to about 30 degrees.    SI Joint/Hip:  Negative SIN bilaterally.  Negative FADIR bilaterally.  Mild TTP over lumbar paraspinals.  TTP over bilateral GTB, R>L    Widespread pain index:  10  Symptom severity score:  5    Imaging:        The imaging studies listed below were independently reviewed by me, and I agree with the findings as documented below.     Narrative     EXAMINATION:  MRI LUMBAR SPINE WITHOUT CONTRAST    CLINICAL HISTORY:  Low back pain, <6wks, no red flags, no prior management; Low back pain    TECHNIQUE:  Multiplanar, multisequence MR images were acquired from the thoracolumbar junction to the sacrum without the administration of " contrast.    COMPARISON:  None.    FINDINGS:  Vertebral body height and alignment are anatomic.  Marrow signal is normal.  The conus terminates at T12-L1.  There is no epidural mass or collection.    Moderate disc desiccation is present.  There is mild loss of disc height throughout the lumbar spine.  Moderate facet arthropathy is present from L2 through S1.    L4-5: There is moderate broad-based posterior disc bulging, with associated annular fissure of the disc.  This along with bilateral facet arthropathy contributes to moderate spinal canal narrowing and mild bilateral neuroforaminal narrowing.    L5-S1: Minimal broad-based posterior bulging of the disc is present without spinal canal or neuroforaminal narrowing.      Impression       Degenerative findings most pronounced at the L4-5 level where there is moderate spinal canal and mild bilateral neuroforaminal narrowing.      Electronically signed by: Crispin Escoto MD  Date: 04/09/2018  Time: 09:34         Assessment:     Encounter Diagnoses   Name Primary?    Cervical paraspinal muscle spasm Yes    Myofascial pain     Chronic pain disorder     Fibromyalgia     Other spondylosis, lumbar region     DDD (degenerative disc disease), lumbar        Plan:     Teresa was seen today for follow-up.    Diagnoses and all orders for this visit:    Cervical paraspinal muscle spasm  -     Ambulatory consult to Physical Therapy    Myofascial pain  -     Ambulatory consult to Physical Therapy    Chronic pain disorder    Fibromyalgia    Other spondylosis, lumbar region    DDD (degenerative disc disease), lumbar         Her pain is consistent with the above.      Of note, she does meet the diagnostic criteria for fibromyalgia, though with this does not appear to be the predominant feature of her pain.  We will work on some basic symptom management and get her into some physical therapy for the treatment of her fibromyalgia.  We can consider targeted medications for  fibromyalgia in the future, but she does not like to take sedating medications as she has to work.  I would consider amitriptyline as a 1st step    We discussed the assessment and recommendations.  All available images were reviewed. We discussed the disease process, prognosis, treatment plan, and risks and benefits. The patient is aware of the risks and benefits of the medications being prescribed, common side effects, and proper usage. The following is the plan we agreed on:     1. Can repeat L5/S1 interlaminar DEAN as needed  2. Can use methocarbamol 500-1000 mg QHS PRN. Stay at most comfortable dose.  She is not currently taking this medication.  3. Continue ibuprofen 800 mg BID-TID PRN  4. Continue Physical therapy.    1. Previously, We had an extensive discussion regarding the expected time course for PT for chronic pain vs after a surgery.  Specifically, we discussed that PT for chronic pain almost always causes a worsening of pain before any improvement, which is generally not seen for 3-6 months, in which time she will likely already be home doing home exercises given that a formal course of PT generally lasts anywhere from 6-12 weeks.  5. I do not recommend opioids for the chronic treatment of her non-malignant pain at this time.  Opioids for chronic pain is not associated with improved outcomes and is associated with high risk.  Short courses for acute flares are appropriate in some instances.  6. RTC as needed.    Thank you for allowing me to participate in the care of this patient.   Please do not hesitate to call me at [phone] with any questions or concerns.    Gail Cohen MD  10/28/2019     The above plan and management options were discussed at length with patient. Patient is in agreement with the above and verbalized understanding. It will be communicated with the referring physician via electronic record, fax, or mail.

## 2019-10-28 NOTE — LETTER
October 29, 2019      Maite Villatoro, [address]  [place] [address]           [facility]  [address]  [place] [address]  Phone: [phone]  Fax: [phone]          Patient: Teresa Clay   MR Number: [id number]   YOB: 1955   Date of Visit: 10/28/2019       Dear Dr. Maite Villatoro:    Thank you for referring Teresa Clay to me for evaluation. Attached you will find relevant portions of my assessment and plan of care.    If you have questions, please do not hesitate to call me. I look forward to following Teresa Clay along with you.    Sincerely,    Gail Cohen MD    Enclosure  CC:  No Recipients    If you would like to receive this communication electronically, please contact [email or web address] or [phone] to request more information on [employer] Link access.    For providers and/or their staff who would like to refer a patient to [facility], please contact us through our one-stop-shop provider referral line, [facility], at [phone].    If you feel you have received this communication in error or would no longer like to receive these types of communications, please e-mail [email or web address]

## 2019-10-30 ENCOUNTER — CLINICAL SUPPORT (OUTPATIENT)
Dept: REHABILITATION | Facility: HOSPITAL | Age: 64
End: 2019-10-30
Attending: ANESTHESIOLOGY
Payer: COMMERCIAL

## 2019-10-30 DIAGNOSIS — M54.40 ACUTE BILATERAL LOW BACK PAIN WITH SCIATICA, SCIATICA LATERALITY UNSPECIFIED: ICD-10-CM

## 2019-10-30 DIAGNOSIS — M54.40 BILATERAL LOW BACK PAIN WITH SCIATICA, SCIATICA LATERALITY UNSPECIFIED, UNSPECIFIED CHRONICITY: ICD-10-CM

## 2019-10-30 PROCEDURE — 97140 MANUAL THERAPY 1/> REGIONS: CPT | Mod: PN

## 2019-10-30 PROCEDURE — 97110 THERAPEUTIC EXERCISES: CPT | Mod: PN

## 2019-10-30 NOTE — PROGRESS NOTES
Physical Therapy Daily Treatment Note   Name: Teresa Clay 1955  MRN: [id number]    Visit Date: 10/30/2019  Visit #: 6/ 18  Authorization period Expiration:    Plan of Care Expiration:1/3/20  Precautions: avoid prolonged flexion     Time In: 900  Time Out: 1000  Total 1:1 Treatment Time: 50 min    Treatment Diagnosis: DDD, fibromyalgia, lumbar radiculitis  Physician: Gail Cohen MD    Subjective   Pt reports:pain in UT/shoulder increased today  Pain Scale:  4/10 on VAS currently, 1/10 on VAS post treatment  Pain Location:entire spine/back    Objective   Fabiola received therapeutic exercises to develop strength, endurance, ROM, flexibility, posture and core stabilization for 25  minutes including:     aProne on elbows x 10   Alternating contralateral UE/LE ext In prone x5  Stretch to piriformisx 3   Standing lumbar ext 2 x 10  Prone  ext 2 x 10  N. Flossing sunitha le x 10  abd bracing x 20  abd bracing w pelvic tilt x 30      Fabiola received the following manual therapy techniques: Myofacial release, Trp release, and soft tissue massage to UT/letator so pt may use arms to assist with LE stretches x 25 min    Ice to UT x 10 min    Home Exercises and Education Providedp     Education provided re: discussed the benefits of yoga and the type of modifications we ll have to incorporate.  Pt given handout on type of breathing used with vinyasa flow yoga (Ujjayi)  - progress towards goals   - role of therapy in multi - disciplinary team, goals for therapy  Pt educated on condition, POC, and expectations in therapy.  No spiritual or educational barriers to learning provided    Home exercises:handout pg 2,3, see media section  Pt will be provided HEP during course of treatment with progressions as appropriate. Pt was advised to perform these exercises free of pain, and to stop performing them if pain occurs.   Fabiola demonstrated good  understanding of the education provided.      Assessment   Pain did inc in middle of session likely due to flexion stretches and nustep.  Was able to reduce pain with prone ext with pillow under low abd then pillow removed f/ ext. Also improve SI alighnment    AVOID FLEXION EXES/STRETCHES AND NUSTEP    Pt prognosis is Excellent. Pt will continue to benefit from skilled outpatient physical therapy to address the deficits listed in the problem list chart on initial evaluation, provide pt/family education and to maximize pt's level of independence in the home and community environment.     Medical necessity is demonstrated by the impairments and functional limitations listed on the Initial Evaluation.     Anticipated barriers to physical therapy: none  Pt's spiritual, cultural and educational needs considered and pt agreeable to plan of care and goals.    Goals   Progressing not met  In 3 weeks:  1.  Pt will decrease pain from 5/ 10 to 2/10 w reduction of radicular sx.  2. Pt will improve strength to improve tolerance and compliance with careplan.   3. Pt will be educated in pain management,  posture, safety techniques and HEP.     Long Term GOALS:6-8 weeks  1. Pt will have 0-1/10 pain allowing pt to tolerate working a full day.   2. Pt will improve Index score to 20% or less.   3.   Pt will be independent with HEP and SX management       Plan   Continue with established Plan of Care towards Physical Therapy goals. Progress with HEP net session.   Discussed Plan of Care with patient: Yes    Mary Lawson, PT  10/30/2019

## 2019-11-01 ENCOUNTER — CLINICAL SUPPORT (OUTPATIENT)
Dept: REHABILITATION | Facility: HOSPITAL | Age: 64
End: 2019-11-01
Attending: ANESTHESIOLOGY
Payer: COMMERCIAL

## 2019-11-01 DIAGNOSIS — M54.40 BILATERAL LOW BACK PAIN WITH SCIATICA, SCIATICA LATERALITY UNSPECIFIED, UNSPECIFIED CHRONICITY: ICD-10-CM

## 2019-11-01 PROCEDURE — 97110 THERAPEUTIC EXERCISES: CPT | Mod: PN

## 2019-11-01 NOTE — PROGRESS NOTES
Physical Therapy Daily Treatment Note   Name: Teresa Clay 1955  MRN: [id number]    Visit Date: 11/1/2019  Visit #: 7/ 18  Authorization period Expiration:    Plan of Care Expiration:1/3/20  Precautions: avoid prolonged flexion     Time In: 900  Time Out: 1045  Total 1:1 Treatment Time: 45 min    Treatment Diagnosis: DDD, fibromyalgia, lumbar radiculitis  Physician: Gail Cohen MD    Subjective   Pt reports:felling good in her back  Pain Scale:  0/10 on VAS currently, 0/10 on VAS post treatment., reported some mm soreness  Pain Location:entire spine/back    Objective   Fabiola received therapeutic exercises to develop strength, endurance, ROM, flexibility, posture and core stabilization for  40 minutes including:     Treadmill x 15 min  Toe raises 2 x 10  Heel cord stretch 4 x 30 sec hold  HS curl 2 x10  Minis squats on wall 2 x 10  Hip flex/ext/abd/add 2 x 10      NOT PERFORMED THIS SESSION  Prone on elbows  aProne on elbows x 10   Alternating contralateral UE/LE ext In prone x5  Stretch to piriformisx 3   Standing lumbar ext 2 x 10  Prone  ext 2 x 10  N. Flossing sunitha le x 10  abd bracing x 20  abd bracing w pelvic tilt x 30      Fabiola received the following manual therapy techniques: Myofacial release, Trp release, and soft tissue massage to UT/letator so pt may use arms to assist with LE stretches x 25 min    Ice to UT x 10 min    Home Exercises and Education Providedp     Education provided re: discussed the benefits of yoga and the type of modifications we ll have to incorporate.  Pt given handout on type of breathing used with vinyasa flow yoga (Ujjayi)  - progress towards goals   - role of therapy in multi - disciplinary team, goals for therapy  Pt educated on condition, POC, and expectations in therapy.  No spiritual or educational barriers to learning provided    Home exercises:handout pg 2,3, see media section  Pt will be provided HEP during course of treatment  with progressions as appropriate. Pt was advised to perform these exercises free of pain, and to stop performing them if pain occurs.   Fabiola demonstrated good  understanding of the education provided.     Assessment    progressed to standing exes today  AVOID FLEXION EXES/STRETCHES AND NUSTEP    Pt prognosis is Excellent. Pt will continue to benefit from skilled outpatient physical therapy to address the deficits listed in the problem list chart on initial evaluation, provide pt/family education and to maximize pt's level of independence in the home and community environment.     Medical necessity is demonstrated by the impairments and functional limitations listed on the Initial Evaluation.     Anticipated barriers to physical therapy: none  Pt's spiritual, cultural and educational needs considered and pt agreeable to plan of care and goals.    Goals   Progressing not met  In 3 weeks:  1.  Pt will decrease pain from 5/ 10 to 2/10 w reduction of radicular sx.  2. Pt will improve strength to improve tolerance and compliance with careplan.   3. Pt will be educated in pain management,  posture, safety techniques and HEP.     Long Term GOALS:6-8 weeks  1. Pt will have 0-1/10 pain allowing pt to tolerate working a full day.   2. Pt will improve Index score to 20% or less.   3.   Pt will be independent with HEP and SX management       Plan   Continue with established Plan of Care towards Physical Therapy goals.Discussed Plan of Care with patient: Yes    Mary Lawson, PT  11/1/2019

## 2019-11-05 ENCOUNTER — CLINICAL SUPPORT (OUTPATIENT)
Dept: REHABILITATION | Facility: HOSPITAL | Age: 64
End: 2019-11-05
Attending: ANESTHESIOLOGY
Payer: COMMERCIAL

## 2019-11-05 DIAGNOSIS — M54.2 CERVICALGIA: ICD-10-CM

## 2019-11-05 DIAGNOSIS — M54.40 BILATERAL LOW BACK PAIN WITH SCIATICA, SCIATICA LATERALITY UNSPECIFIED, UNSPECIFIED CHRONICITY: ICD-10-CM

## 2019-11-05 PROCEDURE — 97140 MANUAL THERAPY 1/> REGIONS: CPT | Mod: PN

## 2019-11-05 PROCEDURE — 97110 THERAPEUTIC EXERCISES: CPT | Mod: PN

## 2019-11-05 PROCEDURE — 97035 APP MDLTY 1+ULTRASOUND EA 15: CPT | Mod: PN

## 2019-11-05 NOTE — PROGRESS NOTES
Physical Therapy Daily Treatment Note   Name: Teresa Clay 1955  MRN: [id number]    Visit Duran: 11/5/2019  Visit #: 8/ 18  Authorization period Expiration:    Plan of Care Expiration:1/3/20  Precautions: avoid prolonged flexion     Time In: 900  Time Out: 955  Total 1:1 Treatment Time: 55 min    Treatment Diagnosis: DDD, fibromyalgia, lumbar radiculitis  Physician: Gail Cohen MD    Subjective   Pt reports:being symptomatic the following day after last session  Pain Scale:  5/10 on VAS currently, 1/10 on VAS post treatment., reported some mm soreness  Pain Location:entire spine/back    Objective   Fabiola received therapeutic exercises to develop strength, endurance, ROM, flexibility, posture and core stabilization for 15 min  minutes including:   Treadmill x 15 min  US using thermal component at 100% 1 HZ 1.5 w/cm 2 to paraspinal muscles, QL, gluts and piriformis  x 15 min.   Fabiola received the following manual therapy techniques: occipital release, Trp release, and scalenes  X 20 min      NOT PERFORMED THIS SESSION  Toe raises 2 x 10  Heel cord stretch 4 x 30 sec hold  HS curl 2 x10  Minis squats on wall 2 x 10  Hip flex/ext/abd/add 2 x 10      NOT PERFORMED THIS SESSION  Prone on elbows  aProne on elbows x 10   Alternating contralateral UE/LE ext In prone x5  Stretch to piriformisx 3   Standing lumbar ext 2 x 10  Prone  ext 2 x 10  N. Flossing sunitha le x 10  abd bracing x 20  abd bracing w pelvic tilt x 30    Home Exercises and Education Providedp     Education provided re: discussed the benefits of yoga and the type of modifications we ll have to incorporate.  Pt given handout on type of breathing used with vinyasa flow yoga (Ujjayi)  - progress towards goals   - role of therapy in multi - disciplinary team, goals for therapy  Pt educated on condition, POC, and expectations in therapy.  No spiritual or educational barriers to learning provided    Home exercises:handout pg  2,3, see media section  Pt will be provided HEP during course of treatment with progressions as appropriate. Pt was advised to perform these exercises free of pain, and to stop performing them if pain occurs.   Fabiola demonstrated good  understanding of the education provided.     Assessment    progressed to standing exes today  AVOID FLEXION EXES/STRETCHES AND NUSTEP    Pt prognosis is Excellent. Pt will continue to benefit from skilled outpatient physical therapy to address the deficits listed in the problem list chart on initial evaluation, provide pt/family education and to maximize pt's level of independence in the home and community environment.     Medical necessity is demonstrated by the impairments and functional limitations listed on the Initial Evaluation.     Anticipated barriers to physical therapy: motivation  Pt's spiritual, cultural and educational needs considered and pt agreeable to plan of care and goals.    Goals   Progressing not met  In 3 weeks:  1.  Pt will decrease pain from 5/ 10 to 2/10 w reduction of radicular sx.  2. Pt will improve strength to improve tolerance and compliance with careplan.   3. Pt will be educated in pain management,  posture, safety techniques and HEP.     Long Term GOALS:6-8 weeks  1. Pt will have 0-1/10 pain allowing pt to tolerate working a full day.   2. Pt will improve Index score to 20% or less.   3.   Pt will be independent with HEP and SX management       Plan   Continue with established Plan of Care towards Physical Therapy goals.   Mary Lawson, PT  11/5/2019

## 2019-11-07 ENCOUNTER — CLINICAL SUPPORT (OUTPATIENT)
Dept: REHABILITATION | Facility: HOSPITAL | Age: 64
End: 2019-11-07
Attending: ANESTHESIOLOGY
Payer: COMMERCIAL

## 2019-11-07 PROCEDURE — 97110 THERAPEUTIC EXERCISES: CPT | Mod: PN

## 2019-11-07 NOTE — PROGRESS NOTES
Physical Therapy Daily Treatment Note   Name: Teresa Clay 1955  MRN: [id number]    Visit Duran: 11/7/2019  Visit #: 8/ 18  Authorization period Expiration:    Plan of Care Expiration:1/3/20  Precautions: avoid prolonged flexion     Time In: 900  Time Out: 945  Total 1:1 Treatment Time: 55 min    Treatment Diagnosis: DDD, fibromyalgia, lumbar radiculitis  Physician: Gail Cohen MD    Subjective   Pt reports:staying at 2/10 constantly    Pain Scale:Pain Location:entire spine/back and shoulder    Objective   Fabiola received therapeutic exercises to develop strength, endurance, ROM, flexibility, posture and core stabilization for 15 min  minutes including:   Treadmill x 15 min  therapeutic chair yoga vinyasa flow x 30 min incorporating breathing exes for core stability, improving flexibility and relaxation  See media section for handout    NOT PERFORMED THIS SESSION  US using thermal component at 100% 1 HZ 1.5 w/cm 2 to paraspinal muscles, QL, gluts and piriformis  x 15 min.   Fabiola received the following manual therapy techniques: occipital release, Trp release, and scalenes  X 20 min      NOT PERFORMED THIS SESSION  Toe raises 2 x 10  Heel cord stretch 4 x 30 sec hold  HS curl 2 x10  Minis squats on wall 2 x 10  Hip flex/ext/abd/add 2 x 10      NOT PERFORMED THIS SESSION  Prone on elbows  aProne on elbows x 10   Alternating contralateral UE/LE ext In prone x5  Stretch to piriformisx 3   Standing lumbar ext 2 x 10  Prone  ext 2 x 10  N. Flossing sunitha le x 10  abd bracing x 20  abd bracing w pelvic tilt x 30    Home Exercises and Education Providedp   Handout given with yoga chair poses and proper sitting posture at compputer  Education provided re: discussed the benefits of yoga and the type of modifications we ll have to incorporate.  Pt given handout on type of breathing used with vinyasa flow yoga (Ujjayi)  - progress towards goals   - role of therapy in multi - disciplinary  team, goals for therapy  Pt educated on condition, POC, and expectations in therapy.  No spiritual or educational barriers to learning provided    Home exercises:handout pg 2,3, see media section  Pt will be provided HEP during course of treatment with progressions as appropriate. Pt was advised to perform these exercises free of pain, and to stop performing them if pain occurs.   Fabiola demonstrated good  understanding of the education provided.     Assessme nt     AVOID FLEXION EXES/STRETCHES AND NUSTEP    Pt prognosis is Excellent. Pt will continue to benefit from skilled outpatient physical therapy to address the deficits listed in the problem list chart on initial evaluation, provide pt/family education and to maximize pt's level of independence in the home and community environment.     Medical necessity is demonstrated by the impairments and functional limitations listed on the Initial Evaluation.     Anticipated barriers to physical therapy: motivation  Pt's spiritual, cultural and educational needs considered and pt agreeable to plan of care and goals.    http://blog.[employer]/wp-content/uploads/2017/06/correct-posture-p.png    Goals   Progressing not met  In 3 weeks:  1.  Pt will decrease pain from 5/ 10 to 2/10 w reduction of radicular sx.  2. Pt will improve strength to improve tolerance and compliance with careplan.   3. Pt will be educated in pain management,  posture, safety techniques and HEP.     Long Term GOALS:6-8 weeks  1. Pt will have 0-1/10 pain allowing pt to tolerate working a full day.   2. Pt will improve Index score to 20% or less.   3.   Pt will be independent with HEP and SX management       Plan   Continue with established Plan of Care towards Physical Therapy goals.   Mary Lawson, PT  11/7/2019

## 2019-11-14 ENCOUNTER — HOSPITAL ENCOUNTER (EMERGENCY)
Facility: HOSPITAL | Age: 64
Discharge: HOME | End: 2019-11-14
Attending: EMERGENCY MEDICINE
Payer: COMMERCIAL

## 2019-11-14 VITALS
HEART RATE: 74 BPM | DIASTOLIC BLOOD PRESSURE: 92 MMHG | OXYGEN SATURATION: 96 % | TEMPERATURE: 98 F | HEIGHT: 59 IN | RESPIRATION RATE: 18 BRPM | BODY MASS INDEX: 32.25 KG/M2 | WEIGHT: 160 LBS | SYSTOLIC BLOOD PRESSURE: 133 MMHG

## 2019-11-14 DIAGNOSIS — R22.0 FACIAL SWELLING: Primary | ICD-10-CM

## 2019-11-14 DIAGNOSIS — K08.409 HISTORY OF TOOTH EXTRACTION, UNSPECIFIED EDENTULISM CLASS: ICD-10-CM

## 2019-11-14 PROCEDURE — 99283 EMERGENCY DEPT VISIT LOW MDM: CPT

## 2019-11-14 RX ORDER — AMOXICILLIN AND CLAVULANATE POTASSIUM 875; 125 MG/1; MG/1
1 TABLET, FILM COATED ORAL 2 TIMES DAILY
Qty: 14 TABLET | Refills: 0 | Status: SHIPPED | OUTPATIENT
Start: 2019-11-14 | End: 2019-12-31 | Stop reason: ALTCHOICE

## 2019-11-15 NOTE — ED PROVIDER NOTES
"Encounter Date: 11/14/2019       History     Chief Complaint   Patient presents with    Facial Swelling     complains of left face swelling onset tonight-hx of having tooth pulled on Monday     63yo female with pmh chronic back pain, HTN, and dental extraction on Monday presents to ED for evaluation of subsequent left facial swelling tonight. States she had an uneventful procedure and has been recovering well but awoke tonight with slight pressure and gum swelling where previous tooth was located. Denies fever, chills, discharge, otalgia, dysphagia, trismus.         Review of patient's allergies indicates:   Allergen Reactions    Betamethasone acet,sod phos Other (See Comments)     " Makes me feel flushed!"    Codeine Rash     Past Medical History:   Diagnosis Date    Chronic back pain     Diverticulitis     Encounter for long-term (current) use of medications     Hypertension      Past Surgical History:   Procedure Laterality Date    APPENDECTOMY      CHOLECYSTECTOMY      COLONOSCOPY      COLONOSCOPY N/A 7/3/2019    Procedure: COLONOSCOPY;  Surgeon: Jean Jamison MD;  Location: [facility] ENDO;  Service: General;  Laterality: N/A;    EPIDURAL STEROID INJECTION N/A 10/7/2019    Procedure: Injection, Steroid, Epidural - L5/S1 LUMBAR EPIDURAL STEROID INJECTION;  Surgeon: Gail Cohen MD;  Location: [facility] OR;  Service: Pain Management;  Laterality: N/A;    ESOPHAGOGASTRODUODENOSCOPY N/A 7/3/2019    Procedure: ESOPHAGOGASTRODUODENOSCOPY (EGD);  Surgeon: Jean Jamison MD;  Location: [facility] ENDO;  Service: General;  Laterality: N/A;    TRANSFORAMINAL EPIDURAL INJECTION OF STEROID Bilateral 5/6/2019    Procedure: Injection,steroid,epidural,transforaminal approach;  Surgeon: Trey Dueñas MD;  Location: [facility] OR;  Service: Pain Management;  Laterality: Bilateral;  L4-5    TUBAL LIGATION       Family History   Problem Relation Age of Onset    Diabetes Brother     Cancer Brother     Breast cancer Maternal Aunt "      Social History     Tobacco Use    Smoking status: Current Some Day Smoker     Packs/day: 0.50     Types: Cigarettes    Smokeless tobacco: Never Used    Tobacco comment: stop approx. 6 months ago   Substance Use Topics    Alcohol use: No    Drug use: No     Review of Systems   Constitutional: Negative for appetite change, chills, diaphoresis, fatigue and fever.   HENT: Positive for dental problem and facial swelling. Negative for congestion, ear pain, rhinorrhea, sinus pressure, sinus pain, sore throat and tinnitus.    Eyes: Negative for photophobia and visual disturbance.   Respiratory: Negative for cough, chest tightness, shortness of breath and wheezing.    Cardiovascular: Negative for chest pain, palpitations and leg swelling.   Gastrointestinal: Negative for abdominal pain, constipation, diarrhea, nausea and vomiting.   Endocrine: Negative for cold intolerance, heat intolerance, polydipsia, polyphagia and polyuria.   Genitourinary: Negative for decreased urine volume, difficulty urinating, dysuria, flank pain, frequency, hematuria and urgency.   Musculoskeletal: Negative for arthralgias, back pain, gait problem, joint swelling, myalgias, neck pain and neck stiffness.   Skin: Negative for color change, pallor, rash and wound.   Allergic/Immunologic: Negative for immunocompromised state.   Neurological: Negative for dizziness, syncope, weakness, light-headedness, numbness and headaches.   Hematological: Negative for adenopathy. Does not bruise/bleed easily.   Psychiatric/Behavioral: Negative for decreased concentration, dysphoric mood and sleep disturbance. The patient is not nervous/anxious.    All other systems reviewed and are negative.      Physical Exam     Initial Vitals [11/14/19 0144]   BP Pulse Resp Temp SpO2   (!) 133/92 74 18 97.7 °F (36.5 °C) 96 %      MAP       --         Physical Exam    Nursing note and vitals reviewed.  Constitutional: She appears well-developed and well-nourished. She is  not diaphoretic. No distress.   HENT:   Head: Normocephalic and atraumatic.   Right Ear: External ear normal.   Left Ear: External ear normal.   Nose: Nose normal.   Mouth/Throat: Uvula is midline, oropharynx is clear and moist and mucous membranes are normal. Abnormal dentition. Dental caries present.   Mild gum swelling   Eyes: Conjunctivae are normal. Pupils are equal, round, and reactive to light. No scleral icterus.   Neck: Normal range of motion. Neck supple. No JVD present.   Cardiovascular: Normal rate, regular rhythm, normal heart sounds and intact distal pulses.   Pulmonary/Chest: Breath sounds normal. No respiratory distress. She has no wheezes. She has no rhonchi. She has no rales. She exhibits no tenderness.   Abdominal: Soft. Bowel sounds are normal. She exhibits no distension. There is no tenderness. There is no rebound and no guarding.   Musculoskeletal: Normal range of motion. She exhibits no edema or tenderness.   Lymphadenopathy:     She has no cervical adenopathy.   Neurological: She is alert and oriented to person, place, and time. GCS score is 15. GCS eye subscore is 4. GCS verbal subscore is 5. GCS motor subscore is 6.   Skin: Skin is warm and dry. Capillary refill takes less than 2 seconds. No rash noted. No erythema. No pallor.   Psychiatric: She has a normal mood and affect. Her behavior is normal. Judgment and thought content normal.         ED Course   Procedures  Labs Reviewed - No data to display       Imaging Results    None          Medical Decision Making:   Differential Diagnosis:   Dental abscess, post-surgical swelling, dental pain                                 Clinical Impression:       ICD-10-CM ICD-9-CM   1. Facial swelling R22.0 784.2   2. History of tooth extraction, unspecified edentulism class K08.409 525.50     525.10         Disposition:   Disposition: Discharged  Condition: Stable                     Yuliet Slade MD  11/15/19 0911

## 2019-11-25 ENCOUNTER — OFFICE VISIT (OUTPATIENT)
Dept: SURGERY | Facility: CLINIC | Age: 64
End: 2019-11-25
Payer: COMMERCIAL

## 2019-11-25 VITALS
SYSTOLIC BLOOD PRESSURE: 134 MMHG | HEART RATE: 87 BPM | WEIGHT: 153.19 LBS | BODY MASS INDEX: 30.88 KG/M2 | DIASTOLIC BLOOD PRESSURE: 83 MMHG | HEIGHT: 59 IN | TEMPERATURE: 97 F | RESPIRATION RATE: 12 BRPM | OXYGEN SATURATION: 97 %

## 2019-11-25 DIAGNOSIS — M54.40 BILATERAL LOW BACK PAIN WITH SCIATICA, SCIATICA LATERALITY UNSPECIFIED, UNSPECIFIED CHRONICITY: ICD-10-CM

## 2019-11-25 DIAGNOSIS — K57.30 DIVERTICULOSIS OF COLON: ICD-10-CM

## 2019-11-25 DIAGNOSIS — M51.36 DDD (DEGENERATIVE DISC DISEASE), LUMBAR: ICD-10-CM

## 2019-11-25 DIAGNOSIS — J44.9 CHRONIC OBSTRUCTIVE PULMONARY DISEASE, UNSPECIFIED COPD TYPE: ICD-10-CM

## 2019-11-25 DIAGNOSIS — M54.16 LUMBAR RADICULITIS: ICD-10-CM

## 2019-11-25 DIAGNOSIS — I10 ESSENTIAL HYPERTENSION: ICD-10-CM

## 2019-11-25 DIAGNOSIS — M79.7 FIBROMYALGIA: ICD-10-CM

## 2019-11-25 DIAGNOSIS — M25.551 RIGHT HIP PAIN: ICD-10-CM

## 2019-11-25 DIAGNOSIS — K29.50 OTHER CHRONIC GASTRITIS WITHOUT HEMORRHAGE: Primary | ICD-10-CM

## 2019-11-25 DIAGNOSIS — E66.9 OBESITY (BMI 30-39.9): ICD-10-CM

## 2019-11-25 PROCEDURE — 99213 PR OFFICE/OUTPT VISIT, EST, LEVL III, 20-29 MIN: ICD-10-PCS | Mod: S$GLB,,, | Performed by: SURGERY

## 2019-11-25 PROCEDURE — 99213 OFFICE O/P EST LOW 20 MIN: CPT | Mod: S$GLB,,, | Performed by: SURGERY

## 2019-11-25 RX ORDER — FLUCONAZOLE 150 MG/1
TABLET ORAL
Refills: 0 | COMMUNITY
Start: 2019-11-21 | End: 2019-12-31 | Stop reason: ALTCHOICE

## 2019-11-25 RX ORDER — NYSTATIN 100000 U/G
CREAM TOPICAL
Refills: 0 | COMMUNITY
Start: 2019-11-21 | End: 2020-07-22

## 2019-11-25 NOTE — PROGRESS NOTES
"Subjective:       Patient ID: Teresa Clay is a 64 y.o. female.    Chief Complaint: Follow-up (3 month f/u - Chronic Gastritis)      HPI:  Ms. Clay returns today with no new complaints.  She has diverticulosis and bile reflux gastritis.  Diverticulosis remains asymptomatic.  She was experiencing chronic constipation but this is well controlled with Colace.  Bile reflux gastritis symptoms are well controlled with Protonix.  She has stopped taking the Carafate and Reglan, she has not noted any exacerbation or recurrence of her abdominal discomfort caused by the bile reflux gastritis since discontinuing it therapy.  She is still interested in bariatric surgery. She does have chronic hip pain and low back pain likely related to her weight.  She also has hypertension and mild COPD.  These issues would likely improve with bariatric surgery and weight loss.  She recently had diarrhea associated with antibiotic therapy prescribed by her dentist.  The diarrhea has resolved.      Allergies & Meds:  Review of patient's allergies indicates:   Allergen Reactions    Betamethasone acet,sod phos Other (See Comments)     " Makes me feel flushed!"    Codeine Rash       Current Outpatient Medications   Medication Sig Dispense Refill    diclofenac sodium (VOLTAREN) 1 % Gel APPLY 4 GRAMS TOPICALLY   QID AS DIRECTED 2 Tube 2    docusate sodium (COLACE) 100 MG capsule Take 1 capsule (100 mg total) by mouth 2 (two) times daily. Drink a 12 oz glass of water with each dose. 60 capsule 11    ergocalciferol (ERGOCALCIFEROL) 50,000 unit Cap Take 1 capsule by mouth.      fluticasone (FLONASE) 50 mcg/actuation nasal spray fluticasone 50 mcg/actuation nasal spray,suspension      hydroCHLOROthiazide (HYDRODIURIL) 25 MG tablet TK 1 T PO QD  2    metoprolol succinate (TOPROL-XL) 25 MG 24 hr tablet TAKE ONE TABLET BY MOUTH ONCE DAILY (Patient taking differently: TAKE HALF TABLET BY MOUTH ONCE DAILY) 30 tablet 11    pantoprazole " (PROTONIX) 40 MG tablet TK 1 T PO QD  1    tiotropium (SPIRIVA WITH HANDIHALER) 18 mcg inhalation capsule Inhale 1 capsule into the lungs.      acyclovir (ZOVIRAX) 400 MG tablet Take 2 tablets (800 mg total) by mouth 4 (four) times daily. for 7 days 70 tablet 0    amoxicillin-clavulanate 875-125mg (AUGMENTIN) 875-125 mg per tablet Take 1 tablet by mouth 2 (two) times daily. 14 tablet 0    fluconazole (DIFLUCAN) 150 MG Tab   0    nystatin (MYCOSTATIN) cream ARNULFO EXT AA BID  0     No current facility-administered medications for this visit.      Facility-Administered Medications Ordered in Other Visits   Medication Dose Route Frequency Provider Last Rate Last Dose    fentaNYL injection    PRN Trey Dueñas MD   50 mcg at 02/04/19 1251    midazolam (VERSED) 1 mg/mL injection    PRN Trey Dueñas MD   2 mg at 02/04/19 1251       PMFSHx:    Past Medical History:   Diagnosis Date    Chronic back pain     Diverticulitis     Encounter for long-term (current) use of medications     Hypertension        Past Surgical History:   Procedure Laterality Date    APPENDECTOMY      CHOLECYSTECTOMY      COLONOSCOPY      COLONOSCOPY N/A 7/3/2019    Procedure: COLONOSCOPY;  Surgeon: Jean Jamison MD;  Location: [facility] ENDO;  Service: General;  Laterality: N/A;    EPIDURAL STEROID INJECTION N/A 10/7/2019    Procedure: Injection, Steroid, Epidural - L5/S1 LUMBAR EPIDURAL STEROID INJECTION;  Surgeon: Gail Cohen MD;  Location: [facility] OR;  Service: Pain Management;  Laterality: N/A;    ESOPHAGOGASTRODUODENOSCOPY N/A 7/3/2019    Procedure: ESOPHAGOGASTRODUODENOSCOPY (EGD);  Surgeon: Jean Jamison MD;  Location: [facility] ENDO;  Service: General;  Laterality: N/A;    TRANSFORAMINAL EPIDURAL INJECTION OF STEROID Bilateral 5/6/2019    Procedure: Injection,steroid,epidural,transforaminal approach;  Surgeon: Trey Dueñas MD;  [facility];  Service: Pain Management;  Laterality: Bilateral;  L4-5    TUBAL LIGATION         Family  History   Problem Relation Age of Onset    Diabetes Brother     Cancer Brother     Breast cancer Maternal Aunt        Social History     Tobacco Use    Smoking status: Current Some Day Smoker     Packs/day: 0.50     Types: Cigarettes    Smokeless tobacco: Never Used    Tobacco comment: stop approx. 6 months ago   Substance Use Topics    Alcohol use: No    Drug use: No         Review of Systems   Constitutional: Negative for appetite change, chills, fatigue, fever and unexpected weight change.   HENT: Negative for congestion, dental problem, ear pain, mouth sores, postnasal drip, rhinorrhea, sore throat, tinnitus, trouble swallowing and voice change.    Eyes: Negative for photophobia, pain, discharge and visual disturbance.   Respiratory: Negative for cough, chest tightness, shortness of breath and wheezing.    Cardiovascular: Negative for chest pain, palpitations and leg swelling.   Gastrointestinal: Negative for abdominal pain, blood in stool, constipation, diarrhea, nausea and vomiting.   Endocrine: Negative for cold intolerance, heat intolerance, polydipsia, polyphagia and polyuria.   Genitourinary: Negative for difficulty urinating, dysuria, flank pain, frequency, hematuria and urgency.   Musculoskeletal: Negative for arthralgias, joint swelling and myalgias.   Skin: Negative for color change and rash.   Allergic/Immunologic: Negative for immunocompromised state.   Neurological: Negative for dizziness, tremors, seizures, syncope, speech difficulty, weakness, numbness and headaches.   Hematological: Negative for adenopathy. Does not bruise/bleed easily.   Psychiatric/Behavioral: Negative for agitation, confusion, hallucinations, self-injury and suicidal ideas. The patient is not nervous/anxious.        Objective:      Physical Exam   Constitutional: She is oriented to person, place, and time. Vital signs are normal. She appears well-developed and well-nourished.  Non-toxic appearance. She does not appear  ill. No distress.   Body mass index is 30.94 kg/m².     HENT:   Head: Normocephalic and atraumatic.   Right Ear: Hearing and external ear normal.   Left Ear: Hearing and external ear normal.   Nose: Nose normal.   Eyes: Conjunctivae and lids are normal. No scleral icterus.   Neck: Trachea normal, normal range of motion and phonation normal. Neck supple. No JVD present. Carotid bruit is not present. No thyroid mass and no thyromegaly present.   Cardiovascular: Normal rate, regular rhythm and normal heart sounds. PMI is not displaced. Exam reveals no gallop.   No murmur heard.  Pulmonary/Chest: Effort normal and breath sounds normal. No accessory muscle usage. No tachypnea. No respiratory distress.   Abdominal: Soft. Normal appearance and bowel sounds are normal. There is no tenderness. No hernia.   Lymphadenopathy:     She has no cervical adenopathy.     She has no axillary adenopathy.   Neurological: She is alert and oriented to person, place, and time. She is not disoriented. GCS eye subscore is 4. GCS verbal subscore is 5. GCS motor subscore is 6.   Skin: Skin is warm, dry and intact. No rash noted. No cyanosis. Nails show no clubbing.   Psychiatric: She has a normal mood and affect. Her speech is normal and behavior is normal. Thought content normal. She is not actively hallucinating. She is attentive.           Assessment:       1. Other chronic gastritis without hemorrhage    2. Diverticulosis of colon    3. Obesity (BMI 30-39.9)    4. DDD (degenerative disc disease), lumbar    5. Lumbar radiculitis    6. Chronic obstructive pulmonary disease, unspecified COPD type    7. Essential hypertension    8. Fibromyalgia    9. Right hip pain    10. Bilateral low back pain with sciatica, sciatica laterality unspecified, unspecified chronicity        Plan:   Other chronic gastritis without hemorrhage    Diverticulosis of colon    Obesity (BMI 30-39.9)  -     Ambulatory consult to Bariatric Surgery    DDD (degenerative  disc disease), lumbar    Lumbar radiculitis    Chronic obstructive pulmonary disease, unspecified COPD type    Essential hypertension    Fibromyalgia    Right hip pain    Bilateral low back pain with sciatica, sciatica laterality unspecified, unspecified chronicity     Continue Protonix.  Continue Colace.    Follow up for any concerns or questions as needed, resume care with PCP.

## 2019-11-25 NOTE — LETTER
November 25, 2019      Maite Villatoro, [address]  [place] [address]           [facility]  [address] [place] [address]  Phone: [phone]  Fax: [phone]          Patient: Teresa Clay   MR Number: [id number]   YOB: 1955   Date of Visit: 11/25/2019       Dear Dr. Maite Villatoro:    Thank you for referring Teresa Clay to me for evaluation. Attached you will find relevant portions of my assessment and plan of care.    If you have questions, please do not hesitate to call me. I look forward to following Teresa Clay along with you.    Sincerely,    Jean Jamison MD    Enclosure  CC:  No Recipients    If you would like to receive this communication electronically, please contact [email or web address] or [phone] to request more information on [employer] access.    For providers and/or their staff who would like to refer a patient to [facility], please contact us through our one-stop-shop provider referral line, [facility], at [phone].    If you feel you have received this communication in error or would no longer like to receive these types of communications, please e-mail [email or web address]

## 2019-12-10 ENCOUNTER — TELEPHONE (OUTPATIENT)
Dept: SURGERY | Facility: CLINIC | Age: 64
End: 2019-12-10

## 2019-12-10 NOTE — TELEPHONE ENCOUNTER
Returned pt's phone call to work and cell phone numbers listed.  No answer at either number. Left voicemail for pt that today's appt has been cancelled and rescheduled to Monday, 12-16-19 at 10:15.  Left callback info for pt if she needs to cancel/reschedule.

## 2019-12-10 NOTE — TELEPHONE ENCOUNTER
----- Message from Jackson Fregoso sent at 12/10/2019  8:49 AM CST -----  Contact: pt   Calling in regards to rescheduling 12/10 appt for another day (MONDAYS WOULD BE    BETTER CAUSE THAT'S THE DAY THE PT IS OFF FROM WORK)    Call back: [phone] (WORK)

## 2019-12-16 ENCOUNTER — LAB VISIT (OUTPATIENT)
Dept: LAB | Facility: HOSPITAL | Age: 64
End: 2019-12-16
Attending: SURGERY
Payer: COMMERCIAL

## 2019-12-16 ENCOUNTER — OFFICE VISIT (OUTPATIENT)
Dept: SURGERY | Facility: CLINIC | Age: 64
End: 2019-12-16
Payer: COMMERCIAL

## 2019-12-16 VITALS
WEIGHT: 153.44 LBS | SYSTOLIC BLOOD PRESSURE: 113 MMHG | BODY MASS INDEX: 30.93 KG/M2 | OXYGEN SATURATION: 97 % | TEMPERATURE: 99 F | HEIGHT: 59 IN | DIASTOLIC BLOOD PRESSURE: 75 MMHG | RESPIRATION RATE: 12 BRPM | HEART RATE: 75 BPM

## 2019-12-16 DIAGNOSIS — R10.84 GENERALIZED ABDOMINAL PAIN: ICD-10-CM

## 2019-12-16 DIAGNOSIS — R19.7 DIARRHEA OF PRESUMED INFECTIOUS ORIGIN: Primary | ICD-10-CM

## 2019-12-16 DIAGNOSIS — K57.30 DIVERTICULOSIS OF COLON: ICD-10-CM

## 2019-12-16 DIAGNOSIS — K29.50 OTHER CHRONIC GASTRITIS WITHOUT HEMORRHAGE: ICD-10-CM

## 2019-12-16 LAB
ALBUMIN SERPL BCP-MCNC: 4.2 G/DL (ref 3.5–5.2)
ALP SERPL-CCNC: 70 U/L (ref 55–135)
ALT SERPL W/O P-5'-P-CCNC: 26 U/L (ref 10–44)
ANION GAP SERPL CALC-SCNC: 17 MMOL/L (ref 8–16)
AST SERPL-CCNC: 20 U/L (ref 10–40)
BASOPHILS # BLD AUTO: 0.07 K/UL (ref 0–0.2)
BASOPHILS NFR BLD: 0.8 % (ref 0–1.9)
BILIRUB SERPL-MCNC: 1 MG/DL (ref 0.1–1)
BUN SERPL-MCNC: 12 MG/DL (ref 8–23)
CALCIUM SERPL-MCNC: 9.4 MG/DL (ref 8.7–10.5)
CHLORIDE SERPL-SCNC: 96 MMOL/L (ref 95–110)
CO2 SERPL-SCNC: 25 MMOL/L (ref 23–29)
CREAT SERPL-MCNC: 0.9 MG/DL (ref 0.5–1.4)
DIFFERENTIAL METHOD: NORMAL
EOSINOPHIL # BLD AUTO: 0.1 K/UL (ref 0–0.5)
EOSINOPHIL NFR BLD: 1.3 % (ref 0–8)
ERYTHROCYTE [DISTWIDTH] IN BLOOD BY AUTOMATED COUNT: 13.2 % (ref 11.5–14.5)
EST. GFR  (AFRICAN AMERICAN): >60 ML/MIN/1.73 M^2
EST. GFR  (NON AFRICAN AMERICAN): >60 ML/MIN/1.73 M^2
GLUCOSE SERPL-MCNC: 95 MG/DL (ref 70–110)
HCT VFR BLD AUTO: 45.1 % (ref 37–48.5)
HGB BLD-MCNC: 14.7 G/DL (ref 12–16)
IMM GRANULOCYTES # BLD AUTO: 0.03 K/UL (ref 0–0.04)
IMM GRANULOCYTES NFR BLD AUTO: 0.3 % (ref 0–0.5)
LYMPHOCYTES # BLD AUTO: 1.7 K/UL (ref 1–4.8)
LYMPHOCYTES NFR BLD: 18.8 % (ref 18–48)
MCH RBC QN AUTO: 28.6 PG (ref 27–31)
MCHC RBC AUTO-ENTMCNC: 32.6 G/DL (ref 32–36)
MCV RBC AUTO: 88 FL (ref 82–98)
MONOCYTES # BLD AUTO: 0.7 K/UL (ref 0.3–1)
MONOCYTES NFR BLD: 7.8 % (ref 4–15)
NEUTROPHILS # BLD AUTO: 6.5 K/UL (ref 1.8–7.7)
NEUTROPHILS NFR BLD: 71 % (ref 38–73)
NRBC BLD-RTO: 0 /100 WBC
PLATELET # BLD AUTO: 328 K/UL (ref 150–350)
PMV BLD AUTO: 9.2 FL (ref 9.2–12.9)
POTASSIUM SERPL-SCNC: 3 MMOL/L (ref 3.5–5.1)
PROT SERPL-MCNC: 7.6 G/DL (ref 6–8.4)
RBC # BLD AUTO: 5.14 M/UL (ref 4–5.4)
SODIUM SERPL-SCNC: 138 MMOL/L (ref 136–145)
WBC # BLD AUTO: 9.15 K/UL (ref 3.9–12.7)

## 2019-12-16 PROCEDURE — 85025 COMPLETE CBC W/AUTO DIFF WBC: CPT

## 2019-12-16 PROCEDURE — 99214 OFFICE O/P EST MOD 30 MIN: CPT | Mod: S$GLB,,, | Performed by: SURGERY

## 2019-12-16 PROCEDURE — 36415 COLL VENOUS BLD VENIPUNCTURE: CPT

## 2019-12-16 PROCEDURE — 80053 COMPREHEN METABOLIC PANEL: CPT

## 2019-12-16 PROCEDURE — 99214 PR OFFICE/OUTPT VISIT, EST, LEVL IV, 30-39 MIN: ICD-10-PCS | Mod: S$GLB,,, | Performed by: SURGERY

## 2019-12-16 RX ORDER — METOPROLOL TARTRATE 25 MG/1
TABLET, FILM COATED ORAL
Refills: 2 | COMMUNITY
Start: 2019-12-01

## 2019-12-16 NOTE — LETTER
December 16, 2019      Maite Villatoro, NP  [address] [place] [address] Dr  [place] [address]           [facility] - General [facility]  [address] [facility] [place] [address]  Phone: [phone]  Fax: [phone]          Patient: Teresa Clay   MR Number: [id number]   YOB: 1955   Date of Visit: 12/16/2019       Dear Dr. Maite Villatoro:    Thank you for referring Teresa Clay to me for evaluation. Attached you will find relevant portions of my assessment and plan of care.    If you have questions, please do not hesitate to call me. I look forward to following Teresa Clay along with you.    Sincerely,    Jean Jamison MD    Enclosure  CC:  No Recipients    If you would like to receive this communication electronically, please contact [email or web address] or [phone] to request more information on [facility] [facility] Link access.    For providers and/or their staff who would like to refer a patient to [facility], please contact us through our one-stop-shop provider referral line, [facility], at [phone].    If you feel you have received this communication in error or would no longer like to receive these types of communications, please e-mail [email or web address]

## 2019-12-17 ENCOUNTER — TELEPHONE (OUTPATIENT)
Dept: SURGERY | Facility: CLINIC | Age: 64
End: 2019-12-17

## 2019-12-17 ENCOUNTER — LAB VISIT (OUTPATIENT)
Dept: LAB | Facility: HOSPITAL | Age: 64
End: 2019-12-17
Attending: SURGERY
Payer: COMMERCIAL

## 2019-12-17 DIAGNOSIS — R10.84 GENERALIZED ABDOMINAL CRAMPING: Primary | ICD-10-CM

## 2019-12-17 DIAGNOSIS — R19.7 DIARRHEA OF PRESUMED INFECTIOUS ORIGIN: ICD-10-CM

## 2019-12-17 DIAGNOSIS — R10.84 ABDOMINAL PAIN, GENERALIZED: ICD-10-CM

## 2019-12-17 LAB — WBC #/AREA STL HPF: ABNORMAL /[HPF]

## 2019-12-17 PROCEDURE — 87209 SMEAR COMPLEX STAIN: CPT

## 2019-12-17 PROCEDURE — 87046 STOOL CULTR AEROBIC BACT EA: CPT

## 2019-12-17 PROCEDURE — 87328 CRYPTOSPORIDIUM AG IA: CPT

## 2019-12-17 PROCEDURE — 87329 GIARDIA AG IA: CPT

## 2019-12-17 PROCEDURE — 87045 FECES CULTURE AEROBIC BACT: CPT

## 2019-12-17 PROCEDURE — 89055 LEUKOCYTE ASSESSMENT FECAL: CPT

## 2019-12-17 PROCEDURE — 87427 SHIGA-LIKE TOXIN AG IA: CPT

## 2019-12-17 NOTE — TELEPHONE ENCOUNTER
Received phone call from [facility] that the pt's stool specimen could not be collected for C-diff because it was not a liquid stool. Test is unable to be run.  Will notify MD.

## 2019-12-18 LAB
CRYPTOSP AG STL QL IA: NEGATIVE
G LAMBLIA AG STL QL IA: NEGATIVE
O+P STL TRI STN: NORMAL

## 2019-12-19 LAB
E COLI SXT1 STL QL IA: NEGATIVE
E COLI SXT2 STL QL IA: NEGATIVE

## 2019-12-21 LAB — BACTERIA STL CULT: NORMAL

## 2019-12-26 ENCOUNTER — TELEPHONE (OUTPATIENT)
Dept: PAIN MEDICINE | Facility: CLINIC | Age: 64
End: 2019-12-26

## 2019-12-26 NOTE — TELEPHONE ENCOUNTER
LVM requesting call back to scheduled follow-up.      ----- Message -----  From: Tori Treviño  Sent: 12/16/2019  10:02 AM CST  To: Vicki Reynolds Staff ([place])    Type:  Sooner Apoointment Request    Caller is requesting a sooner appointment.  Caller declined first available appointment listed below.  Caller will not accept being placed on the waitlist and is requesting a message be sent to doctor.    Name of Caller:  Patient  When is the first available appointment?  1/28/20  Symptoms:  pain in legs , back and shoulder  Best Call Back Number. [phone]

## 2019-12-30 ENCOUNTER — OFFICE VISIT (OUTPATIENT)
Dept: SURGERY | Facility: CLINIC | Age: 64
End: 2019-12-30
Payer: COMMERCIAL

## 2019-12-30 VITALS
DIASTOLIC BLOOD PRESSURE: 73 MMHG | TEMPERATURE: 98 F | BODY MASS INDEX: 31.54 KG/M2 | RESPIRATION RATE: 14 BRPM | WEIGHT: 156.44 LBS | HEIGHT: 59 IN | HEART RATE: 64 BPM | SYSTOLIC BLOOD PRESSURE: 119 MMHG | OXYGEN SATURATION: 98 %

## 2019-12-30 DIAGNOSIS — R10.84 GENERALIZED ABDOMINAL PAIN: ICD-10-CM

## 2019-12-30 DIAGNOSIS — R19.7 DIARRHEA OF PRESUMED INFECTIOUS ORIGIN: Primary | ICD-10-CM

## 2019-12-30 PROCEDURE — 99212 OFFICE O/P EST SF 10 MIN: CPT | Mod: S$GLB,,, | Performed by: SURGERY

## 2019-12-30 PROCEDURE — 99212 PR OFFICE/OUTPT VISIT, EST, LEVL II, 10-19 MIN: ICD-10-PCS | Mod: S$GLB,,, | Performed by: SURGERY

## 2019-12-30 NOTE — LETTER
December 30, 2019      Maite Villatoro, NP  [address] [place] [address] Dr  [place] [address]           [facility]  [address] [facility] [place] [address]  Phone: [phone]  Fax: [phone]          Patient: Teresa Clay   MR Number: [id number]   YOB: 1955   Date of Visit: 12/30/2019       Dear Dr. Maite Villatoro:    Thank you for referring Teresa Clay to me for evaluation. Attached you will find relevant portions of my assessment and plan of care.    If you have questions, please do not hesitate to call me. I look forward to following Teresa Clay along with you.    Sincerely,    Jean Jamison MD    Enclosure  CC:  No Recipients    If you would like to receive this communication electronically, please contact [email or web address] or [phone] to request more information on [employer] Link access.    For providers and/or their staff who would like to refer a patient to [facility], please contact us through our one-stop-shop provider referral line, [facility], at [phone].    If you feel you have received this communication in error or would no longer like to receive these types of communications, please e-mail [email or web address]

## 2019-12-30 NOTE — PROGRESS NOTES
"Subjective:       Patient ID: Teresa Clay     Chief Complaint:  Follow-up (Labs/Stool Study 12/17/19)      HPI:  Ms. Clay returns today with no complaints.  Last seen on 12/16/2019.  At that time she was experiencing abdominal pain and diarrhea.  Stool studies were ordered.  In the interval all symptoms have completely resolved.  No pain. No nausea or vomiting.  No diarrhea or constipation.  No melena, hematochezia, hematemesis.  No weight loss.  No fever or chills.  Overall she feels great.  No other associated symptoms.  No aggravating or alleviating factors.      Allergies & Meds:  Review of patient's allergies indicates:   Allergen Reactions    Betamethasone acet,sod phos Other (See Comments)     " Makes me feel flushed!"    Codeine Rash       Current Outpatient Medications   Medication Sig Dispense Refill    amoxicillin-clavulanate 875-125mg (AUGMENTIN) 875-125 mg per tablet Take 1 tablet by mouth 2 (two) times daily. 14 tablet 0    diclofenac sodium (VOLTAREN) 1 % Gel APPLY 4 GRAMS TOPICALLY   QID AS DIRECTED 2 Tube 2    docusate sodium (COLACE) 100 MG capsule Take 1 capsule (100 mg total) by mouth 2 (two) times daily. Drink a 12 oz glass of water with each dose. 60 capsule 11    ergocalciferol (ERGOCALCIFEROL) 50,000 unit Cap Take 1 capsule by mouth.      fluconazole (DIFLUCAN) 150 MG Tab   0    fluticasone (FLONASE) 50 mcg/actuation nasal spray fluticasone 50 mcg/actuation nasal spray,suspension      hydroCHLOROthiazide (HYDRODIURIL) 25 MG tablet TK 1 T PO QD  2    metoprolol succinate (TOPROL-XL) 25 MG 24 hr tablet TAKE ONE TABLET BY MOUTH ONCE DAILY (Patient taking differently: TAKE HALF TABLET BY MOUTH ONCE DAILY) 30 tablet 11    metoprolol tartrate (LOPRESSOR) 25 MG tablet   2    nystatin (MYCOSTATIN) cream ARNULFO EXT AA BID  0    pantoprazole (PROTONIX) 40 MG tablet TK 1 T PO QD  1    tiotropium (SPIRIVA WITH HANDIHALER) 18 mcg inhalation capsule Inhale 1 capsule into the lungs.   "    acyclovir (ZOVIRAX) 400 MG tablet Take 2 tablets (800 mg total) by mouth 4 (four) times daily. for 7 days 70 tablet 0     No current facility-administered medications for this visit.      Facility-Administered Medications Ordered in Other Visits   Medication Dose Route Frequency Provider Last Rate Last Dose    fentaNYL injection    PRN Trey Dueñas MD   50 mcg at 02/04/19 1251    midazolam (VERSED) 1 mg/mL injection    PRN Trey Dueñas MD   2 mg at 02/04/19 1251       PMFSHx:  Past Medical History:   Diagnosis Date    Chronic back pain     Diverticulitis     Encounter for long-term (current) use of medications     Hypertension        Past Surgical History:   Procedure Laterality Date    APPENDECTOMY      CHOLECYSTECTOMY      COLONOSCOPY      COLONOSCOPY N/A 7/3/2019    Procedure: COLONOSCOPY;  Surgeon: Jean Jamison MD;  Location: [facility] ENDO;  Service: General;  Laterality: N/A;    EPIDURAL STEROID INJECTION N/A 10/7/2019    Procedure: Injection, Steroid, Epidural - L5/S1 LUMBAR EPIDURAL STEROID INJECTION;  Surgeon: Gail Cohen MD;  Location: [facility] OR;  Service: Pain Management;  Laterality: N/A;    ESOPHAGOGASTRODUODENOSCOPY N/A 7/3/2019    Procedure: ESOPHAGOGASTRODUODENOSCOPY (EGD);  Surgeon: Jean Jamison MD;  Location: [facility] ENDO;  Service: General;  Laterality: N/A;    TRANSFORAMINAL EPIDURAL INJECTION OF STEROID Bilateral 5/6/2019    Procedure: Injection,steroid,epidural,transforaminal approach;  Surgeon: Trey Dueñas MD;  Location[facility];  Service: Pain Management;  Laterality: Bilateral;  L4-5    TUBAL LIGATION         Family History   Problem Relation Age of Onset    Diabetes Brother     Cancer Brother     Breast cancer Maternal Aunt        Social History     Tobacco Use    Smoking status: Current Some Day Smoker     Packs/day: 0.50     Types: Cigarettes    Smokeless tobacco: Never Used    Tobacco comment: stop approx. 6 months ago   Substance Use Topics    Alcohol use: No     Drug use: No       Review of Systems   Constitutional: Negative for appetite change, chills, fatigue, fever and unexpected weight change.   HENT: Negative for congestion, dental problem, ear pain, mouth sores, postnasal drip, rhinorrhea, sore throat, tinnitus, trouble swallowing and voice change.    Eyes: Negative for photophobia, pain, discharge and visual disturbance.   Respiratory: Negative for cough, chest tightness, shortness of breath and wheezing.    Cardiovascular: Negative for chest pain, palpitations and leg swelling.   Gastrointestinal: Negative for abdominal pain, blood in stool, constipation, diarrhea, nausea and vomiting.   Endocrine: Negative for cold intolerance, heat intolerance, polydipsia, polyphagia and polyuria.   Genitourinary: Negative for difficulty urinating, dysuria, flank pain, frequency, hematuria and urgency.   Musculoskeletal: Negative for arthralgias, joint swelling and myalgias.   Skin: Negative for color change and rash.   Allergic/Immunologic: Negative for immunocompromised state.   Neurological: Negative for dizziness, tremors, seizures, syncope, speech difficulty, weakness, numbness and headaches.   Hematological: Negative for adenopathy. Does not bruise/bleed easily.   Psychiatric/Behavioral: Negative for agitation, confusion, hallucinations, self-injury and suicidal ideas. The patient is not nervous/anxious.             Physical Exam   Constitutional: She appears well-developed and well-nourished.  Non-toxic appearance. She does not appear ill. No distress.   Cardiovascular: Normal rate, regular rhythm and normal heart sounds. PMI is not displaced. Exam reveals no gallop.   No murmur heard.  Pulmonary/Chest: Effort normal and breath sounds normal. No accessory muscle usage. No tachypnea. No respiratory distress.   Abdominal: Soft. Normal appearance and bowel sounds are normal. There is no tenderness. No hernia.   Skin: Skin is warm, dry and intact. No rash noted.          Test Results:  Stool studies reviewed from 12/17/2019.  Occasional neutrophils seen within the specimen.  Negative Giardia, Cryptosporidium, OCPs.  Stool culture showed no evidence of enteric pathogens.  E coli 0157 negative.  C difficile not completed as stool was not watery.      Assessment:       Resolved diarrhea likely of infectious origin following antibiotic therapy.  Probable C difficile colitis spontaneously resolved.  Generalized abdominal pain resolved.    1. Diarrhea of presumed infectious origin    2. Generalized abdominal pain          Plan:   Diarrhea of presumed infectious origin    Generalized abdominal pain        Follow up for any concerns or questions as needed, resume care with PCP.

## 2019-12-31 ENCOUNTER — OFFICE VISIT (OUTPATIENT)
Dept: PAIN MEDICINE | Facility: CLINIC | Age: 64
End: 2019-12-31
Payer: COMMERCIAL

## 2019-12-31 VITALS
BODY MASS INDEX: 31.45 KG/M2 | HEART RATE: 67 BPM | DIASTOLIC BLOOD PRESSURE: 75 MMHG | RESPIRATION RATE: 16 BRPM | HEIGHT: 59 IN | SYSTOLIC BLOOD PRESSURE: 121 MMHG | WEIGHT: 156 LBS | TEMPERATURE: 98 F

## 2019-12-31 DIAGNOSIS — M62.838 CERVICAL PARASPINAL MUSCLE SPASM: ICD-10-CM

## 2019-12-31 DIAGNOSIS — M51.36 DDD (DEGENERATIVE DISC DISEASE), LUMBAR: ICD-10-CM

## 2019-12-31 DIAGNOSIS — M47.896 OTHER SPONDYLOSIS, LUMBAR REGION: ICD-10-CM

## 2019-12-31 DIAGNOSIS — M54.16 LUMBAR RADICULOPATHY: ICD-10-CM

## 2019-12-31 DIAGNOSIS — G89.4 CHRONIC PAIN DISORDER: Primary | ICD-10-CM

## 2019-12-31 DIAGNOSIS — M79.18 MYOFASCIAL PAIN: ICD-10-CM

## 2019-12-31 PROCEDURE — 99999 PR PBB SHADOW E&M-EST. PATIENT-LVL III: CPT | Mod: PBBFAC,,, | Performed by: ANESTHESIOLOGY

## 2019-12-31 PROCEDURE — 99214 PR OFFICE/OUTPT VISIT, EST, LEVL IV, 30-39 MIN: ICD-10-PCS | Mod: S$GLB,,, | Performed by: ANESTHESIOLOGY

## 2019-12-31 PROCEDURE — 99999 PR PBB SHADOW E&M-EST. PATIENT-LVL III: ICD-10-PCS | Mod: PBBFAC,,, | Performed by: ANESTHESIOLOGY

## 2019-12-31 PROCEDURE — 99214 OFFICE O/P EST MOD 30 MIN: CPT | Mod: S$GLB,,, | Performed by: ANESTHESIOLOGY

## 2019-12-31 NOTE — H&P (VIEW-ONLY)
Subjective:     Patient ID: Teresa Clay is a 64 y.o. female.    Chief Complaint: Pain    Consulted by: Self     Disclaimer: This note was generated using voice recognition software.  There may be typographical errors that were missed during proofreading.    HPI:    Teresa Clay is a 64 y.o. female who presents today with chronic low back pain, fibromyalgia, generalized anxiety. This pain started in 2012 as a result of a fall down the stairs.  She did not seek treatment until this year.  She was treating herself with ibuprofen for the past 7 years.  She then sought treatment this past year when it started going into her legs, R>L.  She describes a deep, aching pain that is located on either side of her low back and radiates down the posterolateral aspect of her legs to the tops of her feet.  This pain is described in detail below.    Aggravating factors:  Sitting, standing, walking, lifting, lying down, exercise, morning, getting up out of bed/chair    Mitigating factors:  Rest    Previously seeing: Dr. Trey Dueñas    Interval History (10/29/2019):  She returns today for follow up.  She reports that the L5/S1 and physical therapy have been very helpful.  She is very satisfied with her current pain control.      Interval History (12/31/2019):  She returns today for follow up.  She reports that she is doing the home exercise program, which she finds helpful.  She is planning to go to a gym after the 1st of the year.  She has noted that, in the past few weeks, her low back pain has returned, though it is not severe as it was before.  She fears that this may interfere with her ability to continue doing her home exercises.  Also, she reports bilateral shoulder burning/tightness that she attributes to stress.  This was relieved with a methocarbamol that she took last night.    Physical Therapy:  Yes, she went for 4-6 weeks.  She does the HEP, stretching.      Non-pharmacologic Treatment:     · Ice/Heat: Ice is  helpful  · TENS: Not tried  · Massage: Hurt but helpful  · Chiropractic care: Yes, helps a little  · Acupuncture: Not tried  · Other: Support pillow between her legs at night         Pain Medications:         · Currently taking:  Methocarbamol (helpful, she takes sparingly), Voltaren gel (helpful), Wellbutrin 150 mg BID, ibuprofen 800 mg BID-TID (helpful)    · Has tried in the past:    · Opioids: tramadol, Norco  · NSAIDS: Maybe  · Tylenol: Not tried  · Muscle relaxants:  Baclofen (can't remember benefit)  · TCAs: Not tried  · SNRIs: Cymbalta (prescribed but did not receive)  · Anticonvulsants:  Gabapentin (minimal benefit), Lyrica (no help)  · topical creams: As above  · Other: Ativan for anxiety in the past    Blood thinners: None    Interventional Therapies:   · bilateral L4-5 TFESI on 2/4/19 with 50% relief  · bilateral L4-5 TF DEAN on 5/6/19 with 50% relief for a couple of weeks.  · 10/07/2019:  L5/S1 interlaminar DEAN:  80% relief ongoing    Relevant Surgeries: None    Affecting sleep? Yes    Affecting daily activities? Yes    Depressive symptoms? Yes, she has been treated in the past          · SI/HI? No    Work status: , she has to stand on her feet all day.      Prescription Monitoring Program database:  Reviewed and consistent with medication use as prescribed.    Last 3 PDI Scores 12/31/2019 10/28/2019 9/30/2019   Pain Disability Index (PDI) 4 14 41       Opioid Risk Score       Value Time User    Opioid Risk Score  1 1/15/2019  9:05 AM Henrietta Fernandez          GENERAL:  Positive for weight gain.  No weight loss, malaise or fevers.  HEENT:   No recent changes in vision or hearing  NECK:  Negative for lumps, no difficulty with swallowing.  RESPIRATORY:  Negative for cough, wheezing or shortness of breath, patient denies any recent URI.  CARDIOVASCULAR:  Negative for chest pain, leg swelling or palpitations.  GI:  Positive for stomach pain.  Negative for abdominal discomfort, blood in stools or  "black stools or change in bowel habits.  MUSCULOSKELETAL:  See HPI.  SKIN:  Negative for lesions, rash, and itching.  PSYCH:  Positive for anxiety, difficulty sleeping.  She has been treated depression in the past.  No other mood disorder or recent psychosocial stressors.    HEMATOLOGY/LYMPHOLOGY:  Negative for prolonged bleeding, bruising easily or swollen nodes.    ENDO: No history of diabetes or thyroid dysfunction  NEURO:   No history of headaches, syncope, paralysis, seizures or tremors.  All other reviewed and negative other than HPI.          Past Medical History:   Diagnosis Date    Chronic back pain     Diverticulitis     Encounter for long-term (current) use of medications     Hypertension        Past Surgical History:   Procedure Laterality Date    APPENDECTOMY      CHOLECYSTECTOMY      COLONOSCOPY      COLONOSCOPY N/A 7/3/2019    Procedure: COLONOSCOPY;  Surgeon: Jean Jamison MD;  Location: [facility] ENDO;  Service: General;  Laterality: N/A;    EPIDURAL STEROID INJECTION N/A 10/7/2019    Procedure: Injection, Steroid, Epidural - L5/S1 LUMBAR EPIDURAL STEROID INJECTION;  Surgeon: Gail Cohen MD;  Location: [facility] OR;  Service: Pain Management;  Laterality: N/A;    ESOPHAGOGASTRODUODENOSCOPY N/A 7/3/2019    Procedure: ESOPHAGOGASTRODUODENOSCOPY (EGD);  Surgeon: Jean Jamison MD;  Location: [facility] ENDO;  Service: General;  Laterality: N/A;    TRANSFORAMINAL EPIDURAL INJECTION OF STEROID Bilateral 5/6/2019    Procedure: Injection,steroid,epidural,transforaminal approach;  Surgeon: Trey Dueñas MD;  Location: [facility] OR;  Service: Pain Management;  Laterality: Bilateral;  L4-5    TUBAL LIGATION         Review of patient's allergies indicates:   Allergen Reactions    Betamethasone acet,sod phos Other (See Comments)     " Makes me feel flushed!"    Codeine Rash       Current Outpatient Medications   Medication Sig Dispense Refill    diclofenac sodium (VOLTAREN) 1 % Gel APPLY 4 GRAMS TOPICALLY   " QID AS DIRECTED 2 Tube 2    docusate sodium (COLACE) 100 MG capsule Take 1 capsule (100 mg total) by mouth 2 (two) times daily. Drink a 12 oz glass of water with each dose. 60 capsule 11    ergocalciferol (ERGOCALCIFEROL) 50,000 unit Cap Take 1 capsule by mouth.      fluticasone (FLONASE) 50 mcg/actuation nasal spray fluticasone 50 mcg/actuation nasal spray,suspension      hydroCHLOROthiazide (HYDRODIURIL) 25 MG tablet TK 1 T PO QD  2    metoprolol succinate (TOPROL-XL) 25 MG 24 hr tablet TAKE ONE TABLET BY MOUTH ONCE DAILY (Patient taking differently: TAKE HALF TABLET BY MOUTH ONCE DAILY) 30 tablet 11    metoprolol tartrate (LOPRESSOR) 25 MG tablet   2    nystatin (MYCOSTATIN) cream ARNULFO EXT AA BID  0    pantoprazole (PROTONIX) 40 MG tablet TK 1 T PO QD  1    tiotropium (SPIRIVA WITH HANDIHALER) 18 mcg inhalation capsule Inhale 1 capsule into the lungs.       No current facility-administered medications for this visit.      Facility-Administered Medications Ordered in Other Visits   Medication Dose Route Frequency Provider Last Rate Last Dose    fentaNYL injection    PRN Tery Dueñas MD   50 mcg at 02/04/19 1251    midazolam (VERSED) 1 mg/mL injection    PRN Trey Dueñas MD   2 mg at 02/04/19 1251       Family History   Problem Relation Age of Onset    Diabetes Brother     Cancer Brother     Breast cancer Maternal Aunt        Social History     Socioeconomic History    Marital status:      Spouse name: Not on file    Number of children: Not on file    Years of education: Not on file    Highest education level: Not on file   Occupational History    Not on file   Social Needs    Financial resource strain: Not on file    Food insecurity:     Worry: Not on file     Inability: Not on file    Transportation needs:     Medical: Not on file     Non-medical: Not on file   Tobacco Use    Smoking status: Current Some Day Smoker     Packs/day: 0.50     Types: Cigarettes    Smokeless tobacco: Never  "Used    Tobacco comment: stop approx. 6 months ago   Substance and Sexual Activity    Alcohol use: No    Drug use: No    Sexual activity: Yes   Lifestyle    Physical activity:     Days per week: Not on file     Minutes per session: Not on file    Stress: Not on file   Relationships    Social connections:     Talks on phone: Not on file     Gets together: Not on file     Attends Christian service: Not on file     Active member of club or organization: Not on file     Attends meetings of clubs or organizations: Not on file     Relationship status: Not on file   Other Topics Concern    Not on file   Social History Narrative    Not on file       Objective:     Vitals:    12/31/19 1402   BP: 121/75   Pulse: 67   Resp: 16   Temp: 98 °F (36.7 °C)   TempSrc: Oral   Weight: 70.8 kg (156 lb)   Height: 4' 11" (1.499 m)   PainSc:   4       GEN:  Well developed, well nourished.  No acute distress. No pain behavior.  HEENT:  No trauma.  Mucous membranes moist.  Nares patent bilaterally.  PSYCH: Normal affect. Thought content appropriate.  CHEST:  Breathing symmetric.  No audible wheezing.  ABD: Soft, non-distended.  SKIN:  Warm, pink, dry.  No rash on exposed areas.    EXT:  No cyanosis, clubbing, or edema.  No color change or changes in nail or hair growth.  NEURO/MUSCULOSKELETAL:  Fully alert, oriented, and appropriate. Speech normal tyson. No cranial nerve deficits.   Gait:  Antalgic.    L-Spine:  Decreased ROM with pain on flexion. Negative pain with axial/facet loading bilaterally.  Positive SLR bilaterally at 45° for S1.  This is a marked improvement in hamstring flexibility from her previous exam.  SI Joint/Hip:  Negative SIN bilaterally.  Negative FADIR bilaterally.    Previous Physical exam:  Mildly present trendelenburg sign bilaterally.   Motor Strength: 5/5 motor strength throughout lower extremities.   Sensory:  No sensory deficit in the lower extremities.   Reflexes:  2+ and symmetric throughout.  " Downgoing Babinski's bilaterally.  No clonus or spasticity.  Mild TTP over lumbar paraspinals.  TTP over bilateral GTB, R>L    Widespread pain index:  10  Symptom severity score:  5    Imaging:        The imaging studies listed below were independently reviewed by me, and I agree with the findings as documented below.     Narrative     EXAMINATION:  MRI LUMBAR SPINE WITHOUT CONTRAST    CLINICAL HISTORY:  Low back pain, <6wks, no red flags, no prior management; Low back pain    TECHNIQUE:  Multiplanar, multisequence MR images were acquired from the thoracolumbar junction to the sacrum without the administration of contrast.    COMPARISON:  None.    FINDINGS:  Vertebral body height and alignment are anatomic.  Marrow signal is normal.  The conus terminates at T12-L1.  There is no epidural mass or collection.    Moderate disc desiccation is present.  There is mild loss of disc height throughout the lumbar spine.  Moderate facet arthropathy is present from L2 through S1.    L4-5: There is moderate broad-based posterior disc bulging, with associated annular fissure of the disc.  This along with bilateral facet arthropathy contributes to moderate spinal canal narrowing and mild bilateral neuroforaminal narrowing.    L5-S1: Minimal broad-based posterior bulging of the disc is present without spinal canal or neuroforaminal narrowing.      Impression       Degenerative findings most pronounced at the L4-5 level where there is moderate spinal canal and mild bilateral neuroforaminal narrowing.      Electronically signed by: Crispin Escoto MD  Date: 04/09/2018  Time: 09:34         Assessment:     Encounter Diagnoses   Name Primary?    Chronic pain disorder Yes    Other spondylosis, lumbar region     Lumbar radiculopathy     DDD (degenerative disc disease), lumbar     Myofascial pain     Cervical paraspinal muscle spasm        Plan:     Teresa was seen today for follow-up.    Diagnoses and all orders for this  visit:    Chronic pain disorder    Other spondylosis, lumbar region    Lumbar radiculopathy    DDD (degenerative disc disease), lumbar    Myofascial pain    Cervical paraspinal muscle spasm         Her pain is consistent with the above.      Of note, she does meet the diagnostic criteria for fibromyalgia, though with this does not appear to be the predominant feature of her pain.  We will work on some basic symptom management and get her into some physical therapy for the treatment of her fibromyalgia.  We can consider targeted medications for fibromyalgia in the future, but she does not like to take sedating medications as she has to work.  I would consider amitriptyline as a 1st step    We discussed the assessment and recommendations.  All available images were reviewed. We discussed the disease process, prognosis, treatment plan, and risks and benefits. The patient is aware of the risks and benefits of the medications being prescribed, common side effects, and proper usage. The following is the plan we agreed on:     1. Schedule for repeat L5/S1 ainterlaminr DEAN. The procedure was explained in detail, including risks, benefits, and alternatives.  All questions answered.  Consent obtained today.  1. The purposes of this injections to allow her to remain functioning to continue with her home exercise program, which has been so helpful for her.  2. Can use methocarbamol 500-1000 mg QHS PRN. Stay at most comfortable dose.  She is not currently taking this medication.  3. Continue ibuprofen 800 mg BID-TID PRN  4. Continue Physical therapy HEP.    1. Previously, We had an extensive discussion regarding the expected time course for PT for chronic pain vs after a surgery.  Specifically, we discussed that PT for chronic pain almost always causes a worsening of pain before any improvement, which is generally not seen for 3-6 months, in which time she will likely already be home doing home exercises given that a formal  course of PT generally lasts anywhere from 6-12 weeks.  5. I do not recommend opioids for the chronic treatment of her non-malignant pain at this time.  Opioids for chronic pain is not associated with improved outcomes and is associated with high risk.  Short courses for acute flares are appropriate in some instances.  6. RTC 6 weeks after the procedure or sooner if needed.    Thank you for allowing me to participate in the care of this patient.   Please do not hesitate to call me at [phone] with any questions or concerns.    Gail Cohen MD  12/31/2019     The above plan and management options were discussed at length with patient. Patient is in agreement with the above and verbalized understanding. It will be communicated with the referring physician via electronic record, fax, or mail.

## 2019-12-31 NOTE — PROGRESS NOTES
Subjective:     Patient ID: Teresa Clay is a 64 y.o. female.    Chief Complaint: Pain    Consulted by: Self     Disclaimer: This note was generated using voice recognition software.  There may be typographical errors that were missed during proofreading.    HPI:    Teresa Clay is a 64 y.o. female who presents today with chronic low back pain, fibromyalgia, generalized anxiety. This pain started in 2012 as a result of a fall down the stairs.  She did not seek treatment until this year.  She was treating herself with ibuprofen for the past 7 years.  She then sought treatment this past year when it started going into her legs, R>L.  She describes a deep, aching pain that is located on either side of her low back and radiates down the posterolateral aspect of her legs to the tops of her feet.  This pain is described in detail below.    Aggravating factors:  Sitting, standing, walking, lifting, lying down, exercise, morning, getting up out of bed/chair    Mitigating factors:  Rest    Previously seeing: Dr. Trey Dueñas    Interval History (10/29/2019):  She returns today for follow up.  She reports that the L5/S1 and physical therapy have been very helpful.  She is very satisfied with her current pain control.      Interval History (12/31/2019):  She returns today for follow up.  She reports that she is doing the home exercise program, which she finds helpful.  She is planning to go to a gym after the 1st of the year.  She has noted that, in the past few weeks, her low back pain has returned, though it is not severe as it was before.  She fears that this may interfere with her ability to continue doing her home exercises.  Also, she reports bilateral shoulder burning/tightness that she attributes to stress.  This was relieved with a methocarbamol that she took last night.    Physical Therapy:  Yes, she went for 4-6 weeks.  She does the HEP, stretching.      Non-pharmacologic Treatment:     · Ice/Heat: Ice is  helpful  · TENS: Not tried  · Massage: Hurt but helpful  · Chiropractic care: Yes, helps a little  · Acupuncture: Not tried  · Other: Support pillow between her legs at night         Pain Medications:         · Currently taking:  Methocarbamol (helpful, she takes sparingly), Voltaren gel (helpful), Wellbutrin 150 mg BID, ibuprofen 800 mg BID-TID (helpful)    · Has tried in the past:    · Opioids: tramadol, Norco  · NSAIDS: Maybe  · Tylenol: Not tried  · Muscle relaxants:  Baclofen (can't remember benefit)  · TCAs: Not tried  · SNRIs: Cymbalta (prescribed but did not receive)  · Anticonvulsants:  Gabapentin (minimal benefit), Lyrica (no help)  · topical creams: As above  · Other: Ativan for anxiety in the past    Blood thinners: None    Interventional Therapies:   · bilateral L4-5 TFESI on 2/4/19 with 50% relief  · bilateral L4-5 TF DEAN on 5/6/19 with 50% relief for a couple of weeks.  · 10/07/2019:  L5/S1 interlaminar DEAN:  80% relief ongoing    Relevant Surgeries: None    Affecting sleep? Yes    Affecting daily activities? Yes    Depressive symptoms? Yes, she has been treated in the past          · SI/HI? No    Work status: , she has to stand on her feet all day.      Prescription Monitoring Program database:  Reviewed and consistent with medication use as prescribed.    Last 3 PDI Scores 12/31/2019 10/28/2019 9/30/2019   Pain Disability Index (PDI) 4 14 41       Opioid Risk Score       Value Time User    Opioid Risk Score  1 1/15/2019  9:05 AM Henrietta Fernandez          GENERAL:  Positive for weight gain.  No weight loss, malaise or fevers.  HEENT:   No recent changes in vision or hearing  NECK:  Negative for lumps, no difficulty with swallowing.  RESPIRATORY:  Negative for cough, wheezing or shortness of breath, patient denies any recent URI.  CARDIOVASCULAR:  Negative for chest pain, leg swelling or palpitations.  GI:  Positive for stomach pain.  Negative for abdominal discomfort, blood in stools or  "black stools or change in bowel habits.  MUSCULOSKELETAL:  See HPI.  SKIN:  Negative for lesions, rash, and itching.  PSYCH:  Positive for anxiety, difficulty sleeping.  She has been treated depression in the past.  No other mood disorder or recent psychosocial stressors.    HEMATOLOGY/LYMPHOLOGY:  Negative for prolonged bleeding, bruising easily or swollen nodes.    ENDO: No history of diabetes or thyroid dysfunction  NEURO:   No history of headaches, syncope, paralysis, seizures or tremors.  All other reviewed and negative other than HPI.          Past Medical History:   Diagnosis Date    Chronic back pain     Diverticulitis     Encounter for long-term (current) use of medications     Hypertension        Past Surgical History:   Procedure Laterality Date    APPENDECTOMY      CHOLECYSTECTOMY      COLONOSCOPY      COLONOSCOPY N/A 7/3/2019    Procedure: COLONOSCOPY;  Surgeon: Jean Jamison MD;  Location: [facility] ENDO;  Service: General;  Laterality: N/A;    EPIDURAL STEROID INJECTION N/A 10/7/2019    Procedure: Injection, Steroid, Epidural - L5/S1 LUMBAR EPIDURAL STEROID INJECTION;  Surgeon: Gail Cohen MD;  Location: [facility] OR;  Service: Pain Management;  Laterality: N/A;    ESOPHAGOGASTRODUODENOSCOPY N/A 7/3/2019    Procedure: ESOPHAGOGASTRODUODENOSCOPY (EGD);  Surgeon: Jean Jamison MD;  Location: [facility] ENDO;  Service: General;  Laterality: N/A;    TRANSFORAMINAL EPIDURAL INJECTION OF STEROID Bilateral 5/6/2019    Procedure: Injection,steroid,epidural,transforaminal approach;  Surgeon: Trey Dueñas MD;  Location: [facility];  Service: Pain Management;  Laterality: Bilateral;  L4-5    TUBAL LIGATION         Review of patient's allergies indicates:   Allergen Reactions    Betamethasone acet,sod phos Other (See Comments)     " Makes me feel flushed!"    Codeine Rash       Current Outpatient Medications   Medication Sig Dispense Refill    diclofenac sodium (VOLTAREN) 1 % Gel APPLY 4 GRAMS TOPICALLY   " QID AS DIRECTED 2 Tube 2    docusate sodium (COLACE) 100 MG capsule Take 1 capsule (100 mg total) by mouth 2 (two) times daily. Drink a 12 oz glass of water with each dose. 60 capsule 11    ergocalciferol (ERGOCALCIFEROL) 50,000 unit Cap Take 1 capsule by mouth.      fluticasone (FLONASE) 50 mcg/actuation nasal spray fluticasone 50 mcg/actuation nasal spray,suspension      hydroCHLOROthiazide (HYDRODIURIL) 25 MG tablet TK 1 T PO QD  2    metoprolol succinate (TOPROL-XL) 25 MG 24 hr tablet TAKE ONE TABLET BY MOUTH ONCE DAILY (Patient taking differently: TAKE HALF TABLET BY MOUTH ONCE DAILY) 30 tablet 11    metoprolol tartrate (LOPRESSOR) 25 MG tablet   2    nystatin (MYCOSTATIN) cream ARNULFO EXT AA BID  0    pantoprazole (PROTONIX) 40 MG tablet TK 1 T PO QD  1    tiotropium (SPIRIVA WITH HANDIHALER) 18 mcg inhalation capsule Inhale 1 capsule into the lungs.       No current facility-administered medications for this visit.      Facility-Administered Medications Ordered in Other Visits   Medication Dose Route Frequency Provider Last Rate Last Dose    fentaNYL injection    PRN Trey Dueñas MD   50 mcg at 02/04/19 1251    midazolam (VERSED) 1 mg/mL injection    PRN Trey Dueñas MD   2 mg at 02/04/19 1251       Family History   Problem Relation Age of Onset    Diabetes Brother     Cancer Brother     Breast cancer Maternal Aunt        Social History     Socioeconomic History    Marital status:      Spouse name: Not on file    Number of children: Not on file    Years of education: Not on file    Highest education level: Not on file   Occupational History    Not on file   Social Needs    Financial resource strain: Not on file    Food insecurity:     Worry: Not on file     Inability: Not on file    Transportation needs:     Medical: Not on file     Non-medical: Not on file   Tobacco Use    Smoking status: Current Some Day Smoker     Packs/day: 0.50     Types: Cigarettes    Smokeless tobacco: Never  "Used    Tobacco comment: stop approx. 6 months ago   Substance and Sexual Activity    Alcohol use: No    Drug use: No    Sexual activity: Yes   Lifestyle    Physical activity:     Days per week: Not on file     Minutes per session: Not on file    Stress: Not on file   Relationships    Social connections:     Talks on phone: Not on file     Gets together: Not on file     Attends Holiness service: Not on file     Active member of club or organization: Not on file     Attends meetings of clubs or organizations: Not on file     Relationship status: Not on file   Other Topics Concern    Not on file   Social History Narrative    Not on file       Objective:     Vitals:    12/31/19 1402   BP: 121/75   Pulse: 67   Resp: 16   Temp: 98 °F (36.7 °C)   TempSrc: Oral   Weight: 70.8 kg (156 lb)   Height: 4' 11" (1.499 m)   PainSc:   4       GEN:  Well developed, well nourished.  No acute distress. No pain behavior.  HEENT:  No trauma.  Mucous membranes moist.  Nares patent bilaterally.  PSYCH: Normal affect. Thought content appropriate.  CHEST:  Breathing symmetric.  No audible wheezing.  ABD: Soft, non-distended.  SKIN:  Warm, pink, dry.  No rash on exposed areas.    EXT:  No cyanosis, clubbing, or edema.  No color change or changes in nail or hair growth.  NEURO/MUSCULOSKELETAL:  Fully alert, oriented, and appropriate. Speech normal tyson. No cranial nerve deficits.   Gait:  Antalgic.    L-Spine:  Decreased ROM with pain on flexion. Negative pain with axial/facet loading bilaterally.  Positive SLR bilaterally at 45° for S1.  This is a marked improvement in hamstring flexibility from her previous exam.  SI Joint/Hip:  Negative SIN bilaterally.  Negative FADIR bilaterally.    Previous Physical exam:  Mildly present trendelenburg sign bilaterally.   Motor Strength: 5/5 motor strength throughout lower extremities.   Sensory:  No sensory deficit in the lower extremities.   Reflexes:  2+ and symmetric throughout.  " Downgoing Babinski's bilaterally.  No clonus or spasticity.  Mild TTP over lumbar paraspinals.  TTP over bilateral GTB, R>L    Widespread pain index:  10  Symptom severity score:  5    Imaging:        The imaging studies listed below were independently reviewed by me, and I agree with the findings as documented below.     Narrative     EXAMINATION:  MRI LUMBAR SPINE WITHOUT CONTRAST    CLINICAL HISTORY:  Low back pain, <6wks, no red flags, no prior management; Low back pain    TECHNIQUE:  Multiplanar, multisequence MR images were acquired from the thoracolumbar junction to the sacrum without the administration of contrast.    COMPARISON:  None.    FINDINGS:  Vertebral body height and alignment are anatomic.  Marrow signal is normal.  The conus terminates at T12-L1.  There is no epidural mass or collection.    Moderate disc desiccation is present.  There is mild loss of disc height throughout the lumbar spine.  Moderate facet arthropathy is present from L2 through S1.    L4-5: There is moderate broad-based posterior disc bulging, with associated annular fissure of the disc.  This along with bilateral facet arthropathy contributes to moderate spinal canal narrowing and mild bilateral neuroforaminal narrowing.    L5-S1: Minimal broad-based posterior bulging of the disc is present without spinal canal or neuroforaminal narrowing.      Impression       Degenerative findings most pronounced at the L4-5 level where there is moderate spinal canal and mild bilateral neuroforaminal narrowing.      Electronically signed by: Crispin Escoto MD  Date: 04/09/2018  Time: 09:34         Assessment:     Encounter Diagnoses   Name Primary?    Chronic pain disorder Yes    Other spondylosis, lumbar region     Lumbar radiculopathy     DDD (degenerative disc disease), lumbar     Myofascial pain     Cervical paraspinal muscle spasm        Plan:     Teresa was seen today for follow-up.    Diagnoses and all orders for this  visit:    Chronic pain disorder    Other spondylosis, lumbar region    Lumbar radiculopathy    DDD (degenerative disc disease), lumbar    Myofascial pain    Cervical paraspinal muscle spasm         Her pain is consistent with the above.      Of note, she does meet the diagnostic criteria for fibromyalgia, though with this does not appear to be the predominant feature of her pain.  We will work on some basic symptom management and get her into some physical therapy for the treatment of her fibromyalgia.  We can consider targeted medications for fibromyalgia in the future, but she does not like to take sedating medications as she has to work.  I would consider amitriptyline as a 1st step    We discussed the assessment and recommendations.  All available images were reviewed. We discussed the disease process, prognosis, treatment plan, and risks and benefits. The patient is aware of the risks and benefits of the medications being prescribed, common side effects, and proper usage. The following is the plan we agreed on:     1. Schedule for repeat L5/S1 ainterlaminr DEAN. The procedure was explained in detail, including risks, benefits, and alternatives.  All questions answered.  Consent obtained today.  1. The purposes of this injections to allow her to remain functioning to continue with her home exercise program, which has been so helpful for her.  2. Can use methocarbamol 500-1000 mg QHS PRN. Stay at most comfortable dose.  She is not currently taking this medication.  3. Continue ibuprofen 800 mg BID-TID PRN  4. Continue Physical therapy HEP.    1. Previously, We had an extensive discussion regarding the expected time course for PT for chronic pain vs after a surgery.  Specifically, we discussed that PT for chronic pain almost always causes a worsening of pain before any improvement, which is generally not seen for 3-6 months, in which time she will likely already be home doing home exercises given that a formal  course of PT generally lasts anywhere from 6-12 weeks.  5. I do not recommend opioids for the chronic treatment of her non-malignant pain at this time.  Opioids for chronic pain is not associated with improved outcomes and is associated with high risk.  Short courses for acute flares are appropriate in some instances.  6. RTC 6 weeks after the procedure or sooner if needed.    Thank you for allowing me to participate in the care of this patient.   Please do not hesitate to call me at [phone] with any questions or concerns.    Gail Cohen MD  12/31/2019     The above plan and management options were discussed at length with patient. Patient is in agreement with the above and verbalized understanding. It will be communicated with the referring physician via electronic record, fax, or mail.

## 2019-12-31 NOTE — LETTER
December 31, 2019      Maite Villatoro, [address]  [address] [place] [address]           [facility]  [address]  [place] [address]  Phone: [phone]  Fax: [phone]          Patient: Teresa Clay   MR Number: [id number]   YOB: 1955   Date of Visit: 12/31/2019       Dear Dr. Maite Villatoro:    Thank you for referring Teresa Clay to me for evaluation. Attached you will find relevant portions of my assessment and plan of care.    If you have questions, please do not hesitate to call me. I look forward to following Teresa Clay along with you.    Sincerely,    Gail Cohen MD    Enclosure  CC:  No Recipients    If you would like to receive this communication electronically, please contact [email or web address] or [phone] to request more information on [employer] Link access.    For providers and/or their staff who would like to refer a patient to [facility], please contact us through our one-stop-shop provider referral line, [facility], at [phone].    If you feel you have received this communication in error or would no longer like to receive these types of communications, please e-mail [email or web address]

## 2020-01-02 ENCOUNTER — TELEPHONE (OUTPATIENT)
Dept: PAIN MEDICINE | Facility: CLINIC | Age: 65
End: 2020-01-02

## 2020-01-02 NOTE — TELEPHONE ENCOUNTER
----- Message from Eva Lawson sent at 1/1/2020 11:08 AM CST -----  Due to the Holiday the insurance provider is closed on today.       Surgery is not cleared for ___1/6______.  In the event the procedure is not been cleared, is the surgery deemed Medically Urgent (the MD will carry on with the surgery without the authorization)?        If the surgery is deemed Non Medically Urgent will the MD cancel or reschedule the surgery until authorization has been obtained?

## 2020-01-10 ENCOUNTER — PATIENT MESSAGE (OUTPATIENT)
Dept: SURGERY | Facility: HOSPITAL | Age: 65
End: 2020-01-10

## 2020-01-10 ENCOUNTER — TELEPHONE (OUTPATIENT)
Dept: SPINE | Facility: CLINIC | Age: 65
End: 2020-01-10

## 2020-01-10 NOTE — TELEPHONE ENCOUNTER
----- Message from Kj Ambrose sent at 1/10/2020  3:03 PM CST -----  Contact: LIA TIERNEY [[id number]]  Name of Who is Calling: LIA TIERNEY [[id number]]      What is the request in detail: Pt is requesting a call back from clinical team in regard to medication for nerve pain Please contact to further discuss and advise.          Can the clinic reply by [facility]:       What Number to Call Back if not in [facility]: [phone]

## 2020-01-10 NOTE — TELEPHONE ENCOUNTER
"Staff returned the patient's call regarding her concerns about nerve pain medication gabapentin.     Patient  answered stating, "She no longer see the doctor whom gave her gabapentin so she was wondering if Dr. Cohen could prescribe it to her."    Staff informed the  that the the patient's request would be presented to you for review.    Patient's  stated,"She said it helps for her especially since there is not much you can get for pain these days until her procedure on 1/20/20.    Please review and advise.       "

## 2020-01-14 ENCOUNTER — TELEPHONE (OUTPATIENT)
Dept: BARIATRICS | Facility: CLINIC | Age: 65
End: 2020-01-14

## 2020-01-14 ENCOUNTER — TELEPHONE (OUTPATIENT)
Dept: SURGERY | Facility: CLINIC | Age: 65
End: 2020-01-14

## 2020-01-14 NOTE — TELEPHONE ENCOUNTER
Ok, I am happy to send this in for her.  I have a dose of 300 mg twice daily.  Can you please verify that this is correct?    Thanks!  LW

## 2020-01-14 NOTE — TELEPHONE ENCOUNTER
----- Message from Iris Aldridge sent at 1/14/2020  8:57 AM CST -----  Contact: Patient  Type: Needs Medical Advice    Who Called:  Patient  Best Call Back Number:   Additional Information: Per patient requesting a call back regarding referral-please advise-thank you

## 2020-01-14 NOTE — TELEPHONE ENCOUNTER
Returned pt's phone call. Advised pt that I will contact Dr. Chung's office regarding her referral, and either their office or me will be getting back in touch with her regarding an appointment. Pt verbalizes understanding.

## 2020-01-14 NOTE — TELEPHONE ENCOUNTER
----- Message from Miranda Woodson RN sent at 1/14/2020  9:15 AM CST -----  Good morning,    Please see the referral for the above patient and schedule.    Thank you,  LISA Jean  Ext. [phone]

## 2020-01-20 ENCOUNTER — TELEPHONE (OUTPATIENT)
Dept: PAIN MEDICINE | Facility: CLINIC | Age: 65
End: 2020-01-20

## 2020-01-20 DIAGNOSIS — M54.16 LUMBAR RADICULOPATHY: Primary | ICD-10-CM

## 2020-01-20 RX ORDER — GABAPENTIN 300 MG/1
300 CAPSULE ORAL 2 TIMES DAILY
Qty: 60 CAPSULE | Refills: 11 | Status: SHIPPED | OUTPATIENT
Start: 2020-01-20 | End: 2020-07-22

## 2020-01-20 NOTE — TELEPHONE ENCOUNTER
Please contact to schedule L5/S1 interlaminar DEAN for lumbar radiculopathy for next week.  Procedure had to be rescheduled today due antibiotic use.

## 2020-01-20 NOTE — TELEPHONE ENCOUNTER
Spoke with patient, states she is currently take [facility] [place].      ----- Message from Yanci Serrano sent at 1/20/2020  8:55 AM CST -----  Contact: pt  Pt calling wanting to speak to nurse Lloyd concerning medication,talked to the Dr this morning who told her to call and ask the nurse about gabapentin being ordered....[phone]             .  [id number] [place] [address] AT [facility] [address]  [address] [place] [address] [place] [address]  Phone: [phone] Fax: [phone]

## 2020-01-27 ENCOUNTER — HOSPITAL ENCOUNTER (OUTPATIENT)
Facility: HOSPITAL | Age: 65
Discharge: HOME | End: 2020-01-27
Attending: ANESTHESIOLOGY | Admitting: ANESTHESIOLOGY
Payer: COMMERCIAL

## 2020-01-27 VITALS
SYSTOLIC BLOOD PRESSURE: 131 MMHG | TEMPERATURE: 98 F | BODY MASS INDEX: 31.25 KG/M2 | HEART RATE: 58 BPM | RESPIRATION RATE: 15 BRPM | HEIGHT: 59 IN | WEIGHT: 155 LBS | DIASTOLIC BLOOD PRESSURE: 81 MMHG | OXYGEN SATURATION: 95 %

## 2020-01-27 DIAGNOSIS — M54.16 LUMBAR RADICULOPATHY: Primary | ICD-10-CM

## 2020-01-27 PROCEDURE — 63600175 PHARM REV CODE 636 W HCPCS: Performed by: ANESTHESIOLOGY

## 2020-01-27 PROCEDURE — 25500020 PHARM REV CODE 255: Performed by: ANESTHESIOLOGY

## 2020-01-27 PROCEDURE — 62323 PR INJ LUMBAR/SACRAL, W/IMAGING GUIDANCE: ICD-10-PCS | Mod: ,,, | Performed by: ANESTHESIOLOGY

## 2020-01-27 PROCEDURE — 62323 NJX INTERLAMINAR LMBR/SAC: CPT | Performed by: ANESTHESIOLOGY

## 2020-01-27 PROCEDURE — 25000003 PHARM REV CODE 250: Performed by: ANESTHESIOLOGY

## 2020-01-27 PROCEDURE — 62323 NJX INTERLAMINAR LMBR/SAC: CPT | Mod: ,,, | Performed by: ANESTHESIOLOGY

## 2020-01-27 RX ORDER — METHYLPREDNISOLONE ACETATE 80 MG/ML
INJECTION, SUSPENSION INTRA-ARTICULAR; INTRALESIONAL; INTRAMUSCULAR; SOFT TISSUE
Status: DISCONTINUED | OUTPATIENT
Start: 2020-01-27 | End: 2020-01-27 | Stop reason: HOSPADM

## 2020-01-27 RX ORDER — LIDOCAINE HYDROCHLORIDE 20 MG/ML
INJECTION, SOLUTION INFILTRATION; PERINEURAL
Status: DISCONTINUED | OUTPATIENT
Start: 2020-01-27 | End: 2020-01-27 | Stop reason: HOSPADM

## 2020-01-27 RX ORDER — BUPIVACAINE HYDROCHLORIDE 2.5 MG/ML
INJECTION, SOLUTION EPIDURAL; INFILTRATION; INTRACAUDAL
Status: DISCONTINUED | OUTPATIENT
Start: 2020-01-27 | End: 2020-01-27 | Stop reason: HOSPADM

## 2020-01-27 NOTE — OP NOTE
Date of Procedure: 01/27/2020    Procedure: L5/S1 Interlaminar Epidural Steroid Injection    Referring Provider: None    Pre-op diagnosis: Lumbar radiculopathy [M54.16]    Post-op diagnosis: Lumbar radiculopathy [M54.16]    Physician: Dr. Gail Cohen     Assistant: None    Anesthestia: local    EBL: None    Specimens: None    All medications, allergies, and relevant histories were reviewed. No recent antibiotics or infections.  A time-out was taken to verify the correct patient, procedure, laterality, and appropriate medications/allergies.    Fluoroscopically-Guided, Contrast-Controlled Lumbar Interlaminar Epidural Steroid Injection:     Following denial of allergy and review of potential side effects and complications including but not necessarily limited to infection, allergic reaction, local tissue breakdown, nerve injury, paresis, paralysis, spinal cord injury, and seizure, the patient indicated they understood and agreed to proceed.     Patient was placed in prone position. Lumbar area was prepped and draped in sterile manner. Local Xylocaine 1% was given subcutaneously at the level L5/S1. An 18-gauge Tuohy needle was introduced atraumatically in the interspinous space and advanced up to the epidural space using fluoroscopic guidance in the AP position. Loss of resistance to air was used to identify the epidural space using fluoroscopic guidance in the lateral position. Following negative aspiration for blood and CSF and confirming the absence of paresthesias, injection of approximately 1 cc of Omnipaque 300 demonstrated excellent epidural spread without vascular or intrathecal uptake. At this point, 2cc of 0.25% marcaine with 80 mg of Depo-Medrol was injected without complication. The needle was flushed with 1% lidocaine and withdrawn.     Patient tolerated the procedure well without any side effects. No noted complications.     The patient was followed post procedure and discharged under their own power in  excellent condition in the company of a responsible adult.     Future Management:   If helpful, can repeat as needed.    Follow up with my clinic in 3 weeks or sooner if needed

## 2020-01-27 NOTE — INTERVAL H&P NOTE
The patient has been examined and the H&P has been reviewed:    I concur with the findings and no changes have occurred since H&P was written.     No change in the location or quality of the pain since the most recent clinic visit.  No new symptoms.  She wishes to proceed with the procedure today.    PE, unchanged from previous:  CV:  RRR  Resp: unlabored, no wheezing.    NPO since MN.      Anesthesia/Surgery risks, benefits and alternative options discussed and understood by patient/family.          Active Hospital Problems    Diagnosis  POA    Lumbar radiculopathy [M54.16]  Yes      Resolved Hospital Problems   No resolved problems to display.

## 2020-01-27 NOTE — DISCHARGE SUMMARY
Discharge Note  Short Stay      SUMMARY     Admit Date: 1/27/2020    Attending Physician: Gail Cohen    Procedure: Procedure(s) (LRB):  Injection, Steroid, Epidural - L5/S1 INTERLAMINAR EPIDURAL STEROID INJECTION (N/A)     Discharge Physician: Gail Cohen    Discharge Date: 1/27/2020 8:12 AM    Final Diagnosis: Lumbar radiculopathy [M54.16]    Disposition: Home or self care    Patient Instructions:   Current Discharge Medication List      CONTINUE these medications which have NOT CHANGED    Details   ergocalciferol (ERGOCALCIFEROL) 50,000 unit Cap Take 1 capsule by mouth.      gabapentin (NEURONTIN) 300 MG capsule Take 1 capsule (300 mg total) by mouth 2 (two) times daily.  Qty: 60 capsule, Refills: 11      hydroCHLOROthiazide (HYDRODIURIL) 25 MG tablet TK 1 T PO QD  Refills: 2      metoprolol succinate (TOPROL-XL) 25 MG 24 hr tablet TAKE ONE TABLET BY MOUTH ONCE DAILY  Qty: 30 tablet, Refills: 11    Associated Diagnoses: Encounter for long-term (current) use of medications      metoprolol tartrate (LOPRESSOR) 25 MG tablet Refills: 2      nystatin (MYCOSTATIN) cream ARNULFO EXT AA BID  Refills: 0      pantoprazole (PROTONIX) 40 MG tablet TK 1 T PO QD  Refills: 1      diclofenac sodium (VOLTAREN) 1 % Gel APPLY 4 GRAMS TOPICALLY   QID AS DIRECTED  Qty: 2 Tube, Refills: 2      docusate sodium (COLACE) 100 MG capsule Take 1 capsule (100 mg total) by mouth 2 (two) times daily. Drink a 12 oz glass of water with each dose.  Qty: 60 capsule, Refills: 11      fluticasone (FLONASE) 50 mcg/actuation nasal spray fluticasone 50 mcg/actuation nasal spray,suspension      tiotropium (SPIRIVA WITH HANDIHALER) 18 mcg inhalation capsule Inhale 1 capsule into the lungs.             Resume home diet and activity

## 2020-01-27 NOTE — PLAN OF CARE
Patient awake and alert. Local Procedure/No Sedation. VSS. No bleeding noted from injection sites. Discharge instructions complete. All questions answered.

## 2020-02-18 ENCOUNTER — OFFICE VISIT (OUTPATIENT)
Dept: PAIN MEDICINE | Facility: CLINIC | Age: 65
End: 2020-02-18
Payer: COMMERCIAL

## 2020-02-18 VITALS
WEIGHT: 154.19 LBS | OXYGEN SATURATION: 95 % | HEART RATE: 64 BPM | SYSTOLIC BLOOD PRESSURE: 114 MMHG | BODY MASS INDEX: 31.08 KG/M2 | RESPIRATION RATE: 16 BRPM | DIASTOLIC BLOOD PRESSURE: 77 MMHG | HEIGHT: 59 IN

## 2020-02-18 DIAGNOSIS — M54.16 LUMBAR RADICULOPATHY: ICD-10-CM

## 2020-02-18 DIAGNOSIS — R53.81 PHYSICAL DECONDITIONING: ICD-10-CM

## 2020-02-18 DIAGNOSIS — M79.18 MYOFASCIAL PAIN: ICD-10-CM

## 2020-02-18 DIAGNOSIS — M79.7 FIBROMYALGIA: ICD-10-CM

## 2020-02-18 DIAGNOSIS — M53.3 SACROILIAC JOINT PAIN: ICD-10-CM

## 2020-02-18 DIAGNOSIS — M47.896 OTHER SPONDYLOSIS, LUMBAR REGION: ICD-10-CM

## 2020-02-18 DIAGNOSIS — G89.4 CHRONIC PAIN DISORDER: Primary | ICD-10-CM

## 2020-02-18 PROCEDURE — 20552 NJX 1/MLT TRIGGER POINT 1/2: CPT | Mod: S$GLB,,, | Performed by: ANESTHESIOLOGY

## 2020-02-18 PROCEDURE — 99999 PR PBB SHADOW E&M-EST. PATIENT-LVL III: ICD-10-PCS | Mod: PBBFAC,,, | Performed by: ANESTHESIOLOGY

## 2020-02-18 PROCEDURE — 99999 PR PBB SHADOW E&M-EST. PATIENT-LVL III: CPT | Mod: PBBFAC,,, | Performed by: ANESTHESIOLOGY

## 2020-02-18 PROCEDURE — 99214 PR OFFICE/OUTPT VISIT, EST, LEVL IV, 30-39 MIN: ICD-10-PCS | Mod: 25,S$GLB,, | Performed by: ANESTHESIOLOGY

## 2020-02-18 PROCEDURE — 20552 PR INJECT TRIGGER POINT, 1 OR 2: ICD-10-PCS | Mod: S$GLB,,, | Performed by: ANESTHESIOLOGY

## 2020-02-18 PROCEDURE — 99214 OFFICE O/P EST MOD 30 MIN: CPT | Mod: 25,S$GLB,, | Performed by: ANESTHESIOLOGY

## 2020-02-18 RX ORDER — BUPIVACAINE HYDROCHLORIDE 5 MG/ML
4.5 INJECTION, SOLUTION EPIDURAL; INTRACAUDAL
Status: COMPLETED | OUTPATIENT
Start: 2020-02-18 | End: 2020-02-18

## 2020-02-18 RX ORDER — METHYLPREDNISOLONE ACETATE 40 MG/ML
20 INJECTION, SUSPENSION INTRA-ARTICULAR; INTRALESIONAL; INTRAMUSCULAR; SOFT TISSUE
Status: COMPLETED | OUTPATIENT
Start: 2020-02-18 | End: 2020-02-18

## 2020-02-18 RX ADMIN — BUPIVACAINE HYDROCHLORIDE 22.5 MG: 5 INJECTION, SOLUTION EPIDURAL; INTRACAUDAL at 02:02

## 2020-02-18 RX ADMIN — METHYLPREDNISOLONE ACETATE 20 MG: 40 INJECTION, SUSPENSION INTRA-ARTICULAR; INTRALESIONAL; INTRAMUSCULAR; SOFT TISSUE at 02:02

## 2020-02-18 NOTE — PATIENT INSTRUCTIONS
Recommend taking methocarbamol 1-2 tablets at bedtime    Recommend talking to Maite Villatoro NP regarding your fluid pill.  There may be other options    The recommended bilateral water daily is your weight in pounds divided by 2.  For you, this would be 150/2 = 75.  This means your recommended amount of water is 75 oz daily.

## 2020-02-18 NOTE — PROGRESS NOTES
Subjective:     Patient ID: Teresa Clay is a 64 y.o. female.    Chief Complaint: Pain    Consulted by: Self     Disclaimer: This note was generated using voice recognition software.  There may be typographical errors that were missed during proofreading.    HPI:    Teresa Clay is a 64 y.o. female who presents today with chronic low back pain, fibromyalgia, generalized anxiety. This pain started in 2012 as a result of a fall down the stairs.  She did not seek treatment until this year.  She was treating herself with ibuprofen for the past 7 years.  She then sought treatment this past year when it started going into her legs, R>L.  She describes a deep, aching pain that is located on either side of her low back and radiates down the posterolateral aspect of her legs to the tops of her feet.  This pain is described in detail below.    Aggravating factors:  Sitting, standing, walking, lifting, lying down, exercise, morning, getting up out of bed/chair    Mitigating factors:  Rest    Previously seeing: Dr. Trey Dueñas    Interval History (10/29/2019):  She returns today for follow up.  She reports that the L5/S1 and physical therapy have been very helpful.  She is very satisfied with her current pain control.      Interval History (12/31/2019):  She returns today for follow up.  She reports that she is doing the home exercise program, which she finds helpful.  She is planning to go to a gym after the 1st of the year.  She has noted that, in the past few weeks, her low back pain has returned, though it is not severe as it was before.  She fears that this may interfere with her ability to continue doing her home exercises.  Also, she reports bilateral shoulder burning/tightness that she attributes to stress.  This was relieved with a methocarbamol that she took last night.    Interval History (2/18/2020):  She returns today for follow up.  She reports that the L5/S1 interlaminar DEAN has been helpful for the  pain in her low back.  Today, her primary pain complaint is her left neck/shoulder.  She reports that this pain started yesterday with no inciting events or trauma, but she feels that it is a muscle spasm.  She wonders if she slept wrong.  She reports she is under lot of stress today is her son, who lives in [place], is leaving.  She definitely thinks this is contributing to her pain.    Physical Therapy:  Yes, she went for 4-6 weeks.  She does the HEP, stretching.      Non-pharmacologic Treatment:     · Ice/Heat: Ice is helpful  · TENS: Not tried  · Massage: Hurt but helpful  · Chiropractic care: Yes, helps a little  · Acupuncture: Not tried  · Other: Support pillow between her legs at night         Pain Medications:         · Currently taking:  Gabapentin (takes sparingly), ibuprofen 800 mg BID-TID (helpful), Methocarbamol (helpful, she takes sparingly), Voltaren gel (helpful), Wellbutrin 150 mg BID     · Has tried in the past:    · Opioids: tramadol, Norco  · NSAIDS: Maybe  · Tylenol: Not tried  · Muscle relaxants:  Baclofen (can't remember benefit)  · TCAs: Not tried  · SNRIs: Cymbalta (prescribed but did not receive)  · Anticonvulsants:  Gabapentin (minimal benefit), Lyrica (no help)  · topical creams: As above  · Other: Ativan for anxiety in the past    Blood thinners: None    Interventional Therapies:   · bilateral L4-5 TFESI on 2/4/19 with 50% relief  · bilateral L4-5 TF DEAN on 5/6/19 with 50% relief for a couple of weeks.  · 10/07/2019:  L5/S1 interlaminar DEAN:  80% relief for 3 months  · 01/27/2020:  L5/S1 interlaminar DEAN:  80% relief ongoing    Relevant Surgeries: None    Affecting sleep? Yes    Affecting daily activities? Yes    Depressive symptoms? Yes, she has been treated in the past          · SI/HI? No    Work status: , she has to stand on her feet all day.      Prescription Monitoring Program database:  Reviewed and consistent with medication use as prescribed.    Last 3 PDI Scores  2/18/2020 12/31/2019 10/28/2019   Pain Disability Index (PDI) 14 4 14       Opioid Risk Score       Value Time User    Opioid Risk Score  1 1/15/2019  9:05 AM Henrietta Fernandez          GENERAL:  Positive for weight gain.  No weight loss, malaise or fevers.  HEENT:   No recent changes in vision or hearing  NECK:  Negative for lumps, no difficulty with swallowing.  RESPIRATORY:  Negative for cough, wheezing or shortness of breath, patient denies any recent URI.  CARDIOVASCULAR:  Negative for chest pain, leg swelling or palpitations.  GI:  Positive for stomach pain.  Negative for abdominal discomfort, blood in stools or black stools or change in bowel habits.  MUSCULOSKELETAL:  See HPI.  SKIN:  Negative for lesions, rash, and itching.  PSYCH:  Positive for anxiety, difficulty sleeping.  She has been treated depression in the past.  No other mood disorder or recent psychosocial stressors.    HEMATOLOGY/LYMPHOLOGY:  Negative for prolonged bleeding, bruising easily or swollen nodes.    ENDO: No history of diabetes or thyroid dysfunction  NEURO:   No history of headaches, syncope, paralysis, seizures or tremors.  All other reviewed and negative other than HPI.          Past Medical History:   Diagnosis Date    Chronic back pain     Diverticulitis     Encounter for long-term (current) use of medications     Hypertension        Past Surgical History:   Procedure Laterality Date    APPENDECTOMY      CHOLECYSTECTOMY      COLONOSCOPY      COLONOSCOPY N/A 7/3/2019    Procedure: COLONOSCOPY;  Surgeon: Jean Jamison MD;  Location: [facility] ENDO;  Service: General;  Laterality: N/A;    EPIDURAL STEROID INJECTION N/A 10/7/2019    Procedure: Injection, Steroid, Epidural - L5/S1 LUMBAR EPIDURAL STEROID INJECTION;  Surgeon: Gail Cohen MD;  Location: [facility] OR;  Service: Pain Management;  Laterality: N/A;    EPIDURAL STEROID INJECTION N/A 1/27/2020    Procedure: Injection, Steroid, Epidural - L5/S1 INTERLAMINAR EPIDURAL  "STEROID INJECTION;  Surgeon: Gail Cohen MD;  Location: [facility] OR;  Service: Pain Management;  Laterality: N/A;    ESOPHAGOGASTRODUODENOSCOPY N/A 7/3/2019    Procedure: ESOPHAGOGASTRODUODENOSCOPY (EGD);  Surgeon: Jean Jamison MD;  Location: [facility] ENDO;  Service: General;  Laterality: N/A;    TRANSFORAMINAL EPIDURAL INJECTION OF STEROID Bilateral 5/6/2019    Procedure: Injection,steroid,epidural,transforaminal approach;  Surgeon: Trey Dueñas MD;  Location: [facility] OR;  Service: Pain Management;  Laterality: Bilateral;  L4-5    TUBAL LIGATION         Review of patient's allergies indicates:   Allergen Reactions    Betamethasone acet,sod phos Other (See Comments)     " Makes me feel flushed!"    Codeine Rash       Current Outpatient Medications   Medication Sig Dispense Refill    diclofenac sodium (VOLTAREN) 1 % Gel APPLY 4 GRAMS TOPICALLY   QID AS DIRECTED 2 Tube 2    docusate sodium (COLACE) 100 MG capsule Take 1 capsule (100 mg total) by mouth 2 (two) times daily. Drink a 12 oz glass of water with each dose. 60 capsule 11    ergocalciferol (ERGOCALCIFEROL) 50,000 unit Cap Take 1 capsule by mouth.      fluticasone (FLONASE) 50 mcg/actuation nasal spray fluticasone 50 mcg/actuation nasal spray,suspension      gabapentin (NEURONTIN) 300 MG capsule Take 1 capsule (300 mg total) by mouth 2 (two) times daily. 60 capsule 11    hydroCHLOROthiazide (HYDRODIURIL) 25 MG tablet TK 1 T PO QD  2    metoprolol succinate (TOPROL-XL) 25 MG 24 hr tablet TAKE ONE TABLET BY MOUTH ONCE DAILY (Patient taking differently: TAKE HALF TABLET BY MOUTH ONCE DAILY) 30 tablet 11    metoprolol tartrate (LOPRESSOR) 25 MG tablet   2    nystatin (MYCOSTATIN) cream ARNULFO EXT AA BID  0    pantoprazole (PROTONIX) 40 MG tablet TK 1 T PO QD  1    tiotropium (SPIRIVA WITH HANDIHALER) 18 mcg inhalation capsule Inhale 1 capsule into the lungs.       No current facility-administered medications for this visit.      Facility-Administered " "Medications Ordered in Other Visits   Medication Dose Route Frequency Provider Last Rate Last Dose    fentaNYL injection    PRN Trey Dueñas MD   50 mcg at 02/04/19 1251    midazolam (VERSED) 1 mg/mL injection    PRN Trey Dueñas MD   2 mg at 02/04/19 1251       Family History   Problem Relation Age of Onset    Diabetes Brother     Cancer Brother     Breast cancer Maternal Aunt        Social History     Socioeconomic History    Marital status:      Spouse name: Not on file    Number of children: Not on file    Years of education: Not on file    Highest education level: Not on file   Occupational History    Not on file   Social Needs    Financial resource strain: Not on file    Food insecurity:     Worry: Not on file     Inability: Not on file    Transportation needs:     Medical: Not on file     Non-medical: Not on file   Tobacco Use    Smoking status: Current Some Day Smoker     Packs/day: 0.50     Types: Cigarettes    Smokeless tobacco: Never Used    Tobacco comment: stop approx. 6 months ago   Substance and Sexual Activity    Alcohol use: No    Drug use: No    Sexual activity: Yes   Lifestyle    Physical activity:     Days per week: Not on file     Minutes per session: Not on file    Stress: Not on file   Relationships    Social connections:     Talks on phone: Not on file     Gets together: Not on file     Attends Yarsani service: Not on file     Active member of club or organization: Not on file     Attends meetings of clubs or organizations: Not on file     Relationship status: Not on file   Other Topics Concern    Not on file   Social History Narrative    Not on file       Objective:     Vitals:    02/18/20 0822   BP: 114/77   Pulse: 64   Resp: 16   SpO2: 95%   Weight: 70 kg (154 lb 3.4 oz)   Height: 4' 11" (1.499 m)   PainSc:   4       GEN:  Well developed, well nourished.  No acute distress. No pain behavior.  HEENT:  No trauma.  Mucous membranes moist.  Nares patent " bilaterally.  PSYCH: Normal affect. Thought content appropriate.  CHEST:  Breathing symmetric.  No audible wheezing.  ABD: Soft, non-distended.  SKIN:  Warm, pink, dry.  No rash on exposed areas.    EXT:  No cyanosis, clubbing, or edema.  No color change or changes in nail or hair growth.  NEURO/MUSCULOSKELETAL:  Fully alert, oriented, and appropriate. Speech normal tyson. No cranial nerve deficits.   Gait:  Antalgic.    L-Spine:  Decreased ROM with pain on flexion. Negative pain with axial/facet loading bilaterally.  Positive SLR bilaterally at 45° for S1.  This is a marked improvement in hamstring flexibility from her previous exam.  SI Joint/Hip:  Negative SIN bilaterally.  Negative FADIR bilaterally.    Previous Physical exam:  Mildly present trendelenburg sign bilaterally.   Motor Strength: 5/5 motor strength throughout lower extremities.   Sensory:  No sensory deficit in the lower extremities.   Reflexes:  2+ and symmetric throughout.  Downgoing Babinski's bilaterally.  No clonus or spasticity.  Mild TTP over lumbar paraspinals.  TTP over bilateral GTB, R>L    Widespread pain index:  10  Symptom severity score:  5    Imaging:        The imaging studies listed below were independently reviewed by me, and I agree with the findings as documented below.     Narrative     EXAMINATION:  MRI LUMBAR SPINE WITHOUT CONTRAST    CLINICAL HISTORY:  Low back pain, <6wks, no red flags, no prior management; Low back pain    TECHNIQUE:  Multiplanar, multisequence MR images were acquired from the thoracolumbar junction to the sacrum without the administration of contrast.    COMPARISON:  None.    FINDINGS:  Vertebral body height and alignment are anatomic.  Marrow signal is normal.  The conus terminates at T12-L1.  There is no epidural mass or collection.    Moderate disc desiccation is present.  There is mild loss of disc height throughout the lumbar spine.  Moderate facet arthropathy is present from L2 through  S1.    L4-5: There is moderate broad-based posterior disc bulging, with associated annular fissure of the disc.  This along with bilateral facet arthropathy contributes to moderate spinal canal narrowing and mild bilateral neuroforaminal narrowing.    L5-S1: Minimal broad-based posterior bulging of the disc is present without spinal canal or neuroforaminal narrowing.      Impression       Degenerative findings most pronounced at the L4-5 level where there is moderate spinal canal and mild bilateral neuroforaminal narrowing.      Electronically signed by: Crispin Escoto MD  Date: 04/09/2018  Time: 09:34         Assessment:     Encounter Diagnoses   Name Primary?    Chronic pain disorder Yes    Lumbar radiculopathy     Other spondylosis, lumbar region     Sacroiliac joint pain     Fibromyalgia     Physical deconditioning     Myofascial pain        Plan:     Teresa was seen today for chronic pain syndrome.    Diagnoses and all orders for this visit:    Chronic pain disorder    Lumbar radiculopathy    Other spondylosis, lumbar region    Sacroiliac joint pain    Fibromyalgia    Physical deconditioning    Myofascial pain  -     bupivacaine (PF) 0.5% (5 mg/mL) injection 22.5 mg  -     methylPREDNISolone acetate injection 20 mg         Her pain is consistent with the above.      Of note, she does meet the diagnostic criteria for fibromyalgia, though with this does not appear to be the predominant feature of her pain.  We will work on some basic symptom management and get her into some physical therapy for the treatment of her fibromyalgia.  We can consider targeted medications for fibromyalgia in the future, but she does not like to take sedating medications as she has to work.  I would consider amitriptyline as a 1st step    We discussed the assessment and recommendations.  All available images were reviewed. We discussed the disease process, prognosis, treatment plan, and risks and benefits. The patient is  aware of the risks and benefits of the medications being prescribed, common side effects, and proper usage. The following is the plan we agreed on:     1. Trigger point injections today as below. The procedure was explained in detail, including risks, benefits, and alternatives.  All questions answered.  Consent obtained today.  2. Can repeat L5/S1 interlaminar DEAN as needed  1. The purposes of this injection is to allow her to remain functioning to continue with her home exercise program, which has been so helpful for her.  3. Can use methocarbamol 500-1000 mg QHS PRN. Stay at most comfortable dose.  She is not currently taking this medication.  4. Continue ibuprofen 800 mg BID-TID PRN  5. We discussed proper water intake.  I recommended that she speak to her primary care provider regarding her fluid pill.  There may be other options.  6. Continue Physical therapy HEP.    1. Previously, We had an extensive discussion regarding the expected time course for PT for chronic pain vs after a surgery.  Specifically, we discussed that PT for chronic pain almost always causes a worsening of pain before any improvement, which is generally not seen for 3-6 months, in which time she will likely already be home doing home exercises given that a formal course of PT generally lasts anywhere from 6-12 weeks.  7. I do not recommend opioids for the chronic treatment of her non-malignant pain at this time.  Opioids for chronic pain is not associated with improved outcomes and is associated with high risk.  Short courses for acute flares are appropriate in some instances.  8. RTC 6 weeks after the procedure or sooner if needed.    Trigger Point Injection:   The procedure was discussed with the patient including complications of damage, bleeding, infection, and failure of pain relief.     All medications, allergies, and relevant histories were reviewed. No recent antibiotics or infections.  A time-out was taken to verify the correct  patient, procedure, laterality, and appropriate medications/allergies.    Trigger points were identified by palpation and marked. CHG prep of sites done. A 27-gauge needle was advanced to the point of maximal tenderness, and 3 mL of a mixture of the above mixture was injected after negative aspiration in a fanlike distribution. All sites done in the same manner. Patient tolerated the procedure well and without complications. Sites injected included:  Left upper trap     Total amount injected:  3 cc   Total amount wasted:  2 cc    The patient tolerated the procedure well and was discharged in excellent condition.        Gail Cohen MD  02/18/2020     The above plan and management options were discussed at length with patient. Patient is in agreement with the above and verbalized understanding. It will be communicated with the referring physician via electronic record, fax, or mail.

## 2020-02-18 NOTE — LETTER
February 18, 2020      Maite Villatoro NP  [address] [place] [address] [address] [place] [address]           [facility]  [address] [facility]  [place] [address]  Phone: [phone]  Fax: [phone]          Patient: Teresa Clay   MR Number: [id number]   YOB: 1955   Date of Visit: 2/18/2020       Dear Dr. Maite Villatoro:    Thank you for referring Teresa Clay to me for evaluation. Attached you will find relevant portions of my assessment and plan of care.    If you have questions, please do not hesitate to call me. I look forward to following Teresa Clay along with you.    Sincerely,    Gail Cohen MD    Enclosure  CC:  No Recipients    If you would like to receive this communication electronically, please contact [email or web address] or [phone] to request more information on [employer] Link access.    For providers and/or their staff who would like to refer a patient to [facility], please contact us through our one-stop-shop provider referral line, [facility], at [phone].    If you feel you have received this communication in error or would no longer like to receive these types of communications, please e-mail [email or web address]

## 2020-03-17 RX ORDER — DICLOFENAC SODIUM 10 MG/G
GEL TOPICAL
Qty: 200 G | Refills: 2 | Status: SHIPPED | OUTPATIENT
Start: 2020-03-17 | End: 2020-10-22 | Stop reason: SDUPTHER

## 2020-03-25 RX ORDER — ERGOCALCIFEROL 1.25 MG/1
CAPSULE ORAL
Qty: 12 CAPSULE | OUTPATIENT
Start: 2020-03-25

## 2020-03-31 ENCOUNTER — TELEPHONE (OUTPATIENT)
Dept: PAIN MEDICINE | Facility: CLINIC | Age: 65
End: 2020-03-31

## 2020-03-31 NOTE — TELEPHONE ENCOUNTER
Telephone encounter due to Covid 19 concerns    Spoke to her on the phone.  She is doing well overall.  She does report some increased pain at night that is keeping her from sleeping well she is currently taking methocarbamol 1 or 2 tablets at bedtime.  She is not taking gabapentin.    The following is a plan we agreed upon:    1.  Restart gabapentin 300-600 mg at bedtime.  We discussed that this medication is more helpful in taking regularly.  2.  Continue home exercises  3.  Return to clinic in 5 weeks with plans to schedule a repeat epidural steroid injection that time

## 2020-06-04 ENCOUNTER — TELEPHONE (OUTPATIENT)
Dept: PAIN MEDICINE | Facility: CLINIC | Age: 65
End: 2020-06-04

## 2020-06-04 NOTE — TELEPHONE ENCOUNTER
Spoke with pt and scheduled appointment with Dr. Blackwell to schedule her procedure. Pt verbalized understanding of appt date time and location.

## 2020-06-04 NOTE — TELEPHONE ENCOUNTER
----- Message from Vanessa Mosquera sent at 6/4/2020  8:13 AM CDT -----  Type: Needs Medical Advice  Who Called:  Patient  Best Call Back Number: [phone] (home) [phone] (work)    Additional Information: States she needs to schedule a Epidural Steroid Injection. Dr. Cohen used to give this to her. Please call to schedule procedure.

## 2020-06-10 ENCOUNTER — OFFICE VISIT (OUTPATIENT)
Dept: PAIN MEDICINE | Facility: CLINIC | Age: 65
End: 2020-06-10
Payer: COMMERCIAL

## 2020-06-10 VITALS
DIASTOLIC BLOOD PRESSURE: 74 MMHG | SYSTOLIC BLOOD PRESSURE: 125 MMHG | RESPIRATION RATE: 18 BRPM | TEMPERATURE: 98 F | BODY MASS INDEX: 31.43 KG/M2 | WEIGHT: 155.88 LBS | OXYGEN SATURATION: 98 % | HEIGHT: 59 IN | HEART RATE: 63 BPM

## 2020-06-10 DIAGNOSIS — M47.818 SI JOINT ARTHRITIS: ICD-10-CM

## 2020-06-10 DIAGNOSIS — M51.36 DDD (DEGENERATIVE DISC DISEASE), LUMBAR: Primary | ICD-10-CM

## 2020-06-10 DIAGNOSIS — Z01.818 PRE-OP TESTING: ICD-10-CM

## 2020-06-10 PROCEDURE — 99214 PR OFFICE/OUTPT VISIT, EST, LEVL IV, 30-39 MIN: ICD-10-PCS | Mod: S$GLB,,, | Performed by: ANESTHESIOLOGY

## 2020-06-10 PROCEDURE — 99214 OFFICE O/P EST MOD 30 MIN: CPT | Mod: S$GLB,,, | Performed by: ANESTHESIOLOGY

## 2020-06-10 PROCEDURE — 99999 PR PBB SHADOW E&M-EST. PATIENT-LVL III: ICD-10-PCS | Mod: PBBFAC,,, | Performed by: ANESTHESIOLOGY

## 2020-06-10 PROCEDURE — 99999 PR PBB SHADOW E&M-EST. PATIENT-LVL III: CPT | Mod: PBBFAC,,, | Performed by: ANESTHESIOLOGY

## 2020-06-10 NOTE — LETTER
Eva 10, 2020      Maite Villatoro, NP  [address] [place] [address]   [place] [address]           [facility]  [address] [facility]  [place] [address]  Phone: [phone]  Fax: [phone]          Patient: Teresa Clay   MR Number: [id number]   YOB: 1955   Date of Visit: 6/10/2020       Dear Dr. Maite Villatoro:    Thank you for referring Teresa Clay to me for evaluation. Attached you will find relevant portions of my assessment and plan of care.    If you have questions, please do not hesitate to call me. I look forward to following Teresa Clay along with you.    Sincerely,    Seb Blackwell MD    Enclosure  CC:  No Recipients    If you would like to receive this communication electronically, please contact [email or web address] or [phone] to request more information on [employer] Link access.    For providers and/or their staff who would like to refer a patient to [facility], please contact us through our one-stop-shop provider referral line, [facility], at [phone].    If you feel you have received this communication in error or would no longer like to receive these types of communications, please e-mail [email or web address]

## 2020-06-10 NOTE — H&P (VIEW-ONLY)
FOLLOW UP NOTE:     CHIEF COMPLAINT: back pain and shoulder pain    INITIAL HISTORY OF PRESENT ILLNESS (via Dr. Cohen): Teresa Clay is a 64 y.o. female who presents today with chronic low back pain, fibromyalgia, generalized anxiety. This pain started in 2012 as a result of a fall down the stairs.  She did not seek treatment until this year.  She was treating herself with ibuprofen for the past 7 years.  She then sought treatment this past year when it started going into her legs, R>L.  She describes a deep, aching pain that is located on either side of her low back and radiates down the posterolateral aspect of her legs to the tops of her feet.  This pain is described in detail below.     Aggravating factors:  Sitting, standing, walking, lifting, lying down, exercise, morning, getting up out of bed/chair     Mitigating factors:  Rest     Previously seeing: Dr. Trey Dueñas    INTERVAL HISTORY OF PRESENT ILLNESS: Teresa Clay is a 64 y.o. female with PMH significant for HTN presents as an established patient for Dr. Cohen (new to me) for the continued management of back and shoulder pain.    The patient reports that her pain began in 2012 when she slipped and fell at home. The patient reports of low back pain (R > L) with radiation down the postero-lateral aspect of her LE's down her feet. The patient does report of intermittent numbness in her LE's. The patient describes her back pain as a constant aching type of pain. The patient reports that her current pain is a 7/10. Patient denies of any urinary/fecal incontinence, saddle anesthesia, or weakness. Of note, the patient does mention that her right hip bothers her a fair bit as well. Patient is unsure as to whether that is related to her back.     The patient currently works as a .     The patient reports that her shoulder pain has been present for a few years. The patient localizes her pain to her bilateral trapezius area. The patient  "reports of intermittent radiation to her left thumb. The patient describes her pain as a "stiff" type of pain. The patient reports that her current pain is a 4/10. The patient denies of hand weakness.     INTERVENTIONAL PAIN HISTORY: she reports of benefit with injections by Dr. Dueñas and Dr. Cohen  2/4/2019: Bilateral L4-5 transforaminal epidural steroid injection under fluoroscopy via Dr. Dueñas  5/6/2019: Bilateral L4-5 transforaminal epidural steroid injection under fluoroscopy via Dr. Dueñas  10/7/2019: L5/S1 Interlaminar Epidural Steroid Injection via Dr. Cohen  1/27/2020: L5/S1 Interlaminar Epidural Steroid Injection via Dr. Cohen - reports of at least one month of good relief    CURRENT PAIN MEDICATIONS:     · Currently taking: ibuprofen 800 mg BID, Voltaren gel (helpful)     · Has tried in the past:    ? Opioids: tramadol, Norco  ? NSAIDS: Maybe  ? Tylenol: Not tried  ? Muscle relaxants:  Baclofen (can't remember benefit); stopped robaxin  ? TCAs: Not tried  ? SNRIs: Cymbalta (prescribed but did not receive)  ? Anticonvulsants:  Gabapentin (minimal benefit), Lyrica (no help); stopped gabapentin  ? topical creams: As above  ? Other: Ativan for anxiety in the past        ROS:  Review of Systems   Constitutional: Negative for chills and fever.   HENT: Negative for sore throat.    Eyes: Negative for visual disturbance.   Respiratory: Negative for shortness of breath.    Cardiovascular: Negative for chest pain.   Gastrointestinal: Negative for nausea and vomiting.   Genitourinary: Negative for difficulty urinating.   Musculoskeletal: Positive for back pain, myalgias and neck pain.   Skin: Negative for rash.   Allergic/Immunologic: Negative for immunocompromised state.   Neurological: Negative for syncope.   Hematological: Does not bruise/bleed easily.   Psychiatric/Behavioral: Negative for suicidal ideas.        MEDICAL, SURGICAL, FAMILY, SOCIAL HX: reviewed    MEDICATIONS/ALLERGIES: reviewed    PHYSICAL EXAM:    VITALS: " "Vitals reviewed.   Vitals:    06/10/20 1134   BP: 125/74   Pulse: 63   Resp: 18   Temp: 98.3 °F (36.8 °C)   TempSrc: Oral   SpO2: 98%   Weight: 70.7 kg (155 lb 13.8 oz)   Height: 4' 11" (1.499 m)   PainSc:   7       Physical Exam   Constitutional: She is oriented to person, place, and time and well-developed, well-nourished, and in no distress.   HENT:   Head: Normocephalic and atraumatic.   Eyes: Conjunctivae and EOM are normal. Right eye exhibits no discharge. Left eye exhibits no discharge.   Cardiovascular: Normal rate.   Pulmonary/Chest: Effort normal and breath sounds normal. No respiratory distress.   Abdominal: Soft.   Neurological: She is alert and oriented to person, place, and time.   Skin: Skin is warm and dry. No rash noted. She is not diaphoretic.   Psychiatric: Mood, memory, affect and judgment normal.   Nursing note and vitals reviewed.       UPPER EXTREMITIES: Normal alignment, normal range of motion, no atrophy, no skin changes,  hair growth and nail growth normal and equal bilaterally. No swelling, no tenderness.    Shoulder exam: FROM. No pain with active abduction at 90 degrees. No TTP at the site of the AC joint. No joint line tenderness.     LOWER EXTREMITIES:  Normal alignment, normal range of motion, no atrophy, no skin changes,  hair growth and nail growth normal and equal bilaterally. No swelling, no tenderness.    CERVICAL SPINE:  Cervical spine: ROM is full in flexion, extension and lateral rotation without increased pain.  ((--)) Spurling's maneuver   Myofascial exam: Tenderness to palpation across cervical paraspinous region and trapezius muscles bilaterally.    LUMBAR SPINE  Lumbar spine: ROM is full with flexion extension and oblique extension with increased pain with flexion.     ((--)) Supine straight leg raise    ((+)) Facet loading   Internal and external rotation of the hip causes no increased pain on either side. TTP at the site of the right greater trochanter.   Myofascial " exam: No tenderness to palpation across lumbar paraspinous muscles.    ((--)) TTP at the SI joint  ((+)) SIN's test on the right  ((--)) One leg stand    ((--)) Distraction test    CRANIAL NERVES:  II:  PERRL bilaterally,   III,IV,VI: EOMI.    V:  Facial sensation equal bilaterally  VII:  Facial motor function normal.  VIII:  Hearing equal to finger rub bilaterally  IX/X: Gag normal, palate symmetric  XI:  Shoulder shrug equal, head turn equal  XII:  Tongue midline without fasciculations      MOTOR: Tone and bulk: normal bilateral upper and lower Strength: normal   Delt Bi Tri WE WF     R 5 5 5 5 5 5   L 5 5 5 5 5 5     IP ADD ABD Quad TA Gas HAM  R 5 5 5 5 5 5 5  L 5 5 5 5 5 5 5    SENSATION: Light touch and pinprick intact bilaterally  REFLEXES: normal, symmetric, nonbrisk.  Toes down, no clonus. Negative muir's sign bilaterally.  GAIT: normal rise, base, steps, and arm swing.        IMAGING:   MRI Lumbar spine without contrast (4/9/2018):  Vertebral body height and alignment are anatomic.  Marrow signal is normal.  The conus terminates at T12-L1.  There is no epidural mass or collection.    Moderate disc desiccation is present.  There is mild loss of disc height throughout the lumbar spine.  Moderate facet arthropathy is present from L2 through S1.    L4-5: There is moderate broad-based posterior disc bulging, with associated annular fissure of the disc.  This along with bilateral facet arthropathy contributes to moderate spinal canal narrowing and mild bilateral neuroforaminal narrowing.    L5-S1: Minimal broad-based posterior bulging of the disc is present without spinal canal or neuroforaminal narrowing.    X-ray Cervical Spine (7/26/2018):  There is straightening of the normal cervical lordosis.  Cervical vertebral bodies otherwise normal in height and alignment.  No acute fracture or subluxation.  No significant prevertebral soft tissue swelling.    There is mild multilevel degenerative disc  "disease.    Bilateral oblique views demonstrate mild to moderate bilateral neural foraminal narrowing extending from C3 through C6.    ASSESSMENT: Teresa Clay is a 64 y.o. female with PMH significant for HTN presents as an established patient for Dr. Cohen (new to me) for the continued management of back and shoulder pain. Given her benign shoulder exam and reproducible TTP at the of the trapezius bilaterally, I suspect her "shoulder" pain is secondary to myofascial pain. In regards to her low back and hip pain, the patient has responded well in the past to epidural steroid injections performed by Dr. Dueñas and Dr. Cohen (patient unable to recall which injection was better). Physical exam was significant for TTP at the site of the right greater trochanter and SI provocation, which I suspect is contributing to her hip pain. Treatment plan outlined below.    PLAN:  1. Schedule repeat L5-S1 lumbar interlaminar epidural steroid injection. Plan to re-evaluate residual pain once we improve her chronic back pain with an epidural steroid injection. Would consider SI joint injection or right greater trochanteric bursa injection under fluoroscopy pending physical examination at that time.   2. Continue ibuprofen 800 mg BID, Voltaren gel (helpful) as it provides her with benefit.   3. Consider formal PT referral once back pain improves  4. RTC for the procedure as outlined above      Seb Blackwell MD  Pain Management            "

## 2020-06-19 DIAGNOSIS — Z12.31 SCREENING MAMMOGRAM, ENCOUNTER FOR: Primary | ICD-10-CM

## 2020-06-22 DIAGNOSIS — Z12.31 SCREENING MAMMOGRAM, ENCOUNTER FOR: Primary | ICD-10-CM

## 2020-06-29 ENCOUNTER — LAB VISIT (OUTPATIENT)
Dept: FAMILY MEDICINE | Facility: CLINIC | Age: 65
End: 2020-06-29
Payer: COMMERCIAL

## 2020-06-29 DIAGNOSIS — Z01.818 PRE-OP TESTING: ICD-10-CM

## 2020-06-29 PROCEDURE — U0003 INFECTIOUS AGENT DETECTION BY NUCLEIC ACID (DNA OR RNA); SEVERE ACUTE RESPIRATORY SYNDROME CORONAVIRUS 2 (SARS-COV-2) (CORONAVIRUS DISEASE [COVID-19]), AMPLIFIED PROBE TECHNIQUE, MAKING USE OF HIGH THROUGHPUT TECHNOLOGIES AS DESCRIBED BY CMS-2020-01-R: HCPCS

## 2020-06-30 LAB — SARS-COV-2 RNA RESP QL NAA+PROBE: NOT DETECTED

## 2020-07-02 ENCOUNTER — HOSPITAL ENCOUNTER (OUTPATIENT)
Facility: HOSPITAL | Age: 65
Discharge: HOME | End: 2020-07-02
Attending: ANESTHESIOLOGY | Admitting: ANESTHESIOLOGY
Payer: COMMERCIAL

## 2020-07-02 VITALS
TEMPERATURE: 98 F | HEART RATE: 66 BPM | RESPIRATION RATE: 19 BRPM | SYSTOLIC BLOOD PRESSURE: 124 MMHG | OXYGEN SATURATION: 96 % | BODY MASS INDEX: 31.25 KG/M2 | WEIGHT: 155 LBS | HEIGHT: 59 IN | DIASTOLIC BLOOD PRESSURE: 61 MMHG

## 2020-07-02 DIAGNOSIS — M51.36 DEGENERATIVE DISC DISEASE, LUMBAR: ICD-10-CM

## 2020-07-02 DIAGNOSIS — M51.36 DDD (DEGENERATIVE DISC DISEASE), LUMBAR: Primary | ICD-10-CM

## 2020-07-02 PROBLEM — M51.369 DEGENERATIVE DISC DISEASE, LUMBAR: Status: ACTIVE | Noted: 2020-07-02

## 2020-07-02 PROCEDURE — 25000003 PHARM REV CODE 250: Performed by: ANESTHESIOLOGY

## 2020-07-02 PROCEDURE — 63600175 PHARM REV CODE 636 W HCPCS: Performed by: ANESTHESIOLOGY

## 2020-07-02 PROCEDURE — 62323 NJX INTERLAMINAR LMBR/SAC: CPT | Performed by: ANESTHESIOLOGY

## 2020-07-02 PROCEDURE — 62323 NJX INTERLAMINAR LMBR/SAC: CPT | Mod: ,,, | Performed by: ANESTHESIOLOGY

## 2020-07-02 PROCEDURE — 62323 PR INJ LUMBAR/SACRAL, W/IMAGING GUIDANCE: ICD-10-PCS | Mod: ,,, | Performed by: ANESTHESIOLOGY

## 2020-07-02 PROCEDURE — 25500020 PHARM REV CODE 255: Performed by: ANESTHESIOLOGY

## 2020-07-02 RX ORDER — SODIUM CHLORIDE, SODIUM LACTATE, POTASSIUM CHLORIDE, CALCIUM CHLORIDE 600; 310; 30; 20 MG/100ML; MG/100ML; MG/100ML; MG/100ML
INJECTION, SOLUTION INTRAVENOUS ONCE AS NEEDED
Status: DISCONTINUED | OUTPATIENT
Start: 2020-07-02 | End: 2020-07-02 | Stop reason: HOSPADM

## 2020-07-02 RX ORDER — BUPIVACAINE HYDROCHLORIDE 2.5 MG/ML
INJECTION, SOLUTION EPIDURAL; INFILTRATION; INTRACAUDAL
Status: DISCONTINUED | OUTPATIENT
Start: 2020-07-02 | End: 2020-07-02 | Stop reason: HOSPADM

## 2020-07-02 RX ORDER — LIDOCAINE HYDROCHLORIDE 10 MG/ML
INJECTION INFILTRATION; PERINEURAL
Status: DISPENSED
Start: 2020-07-02 | End: 2020-07-03

## 2020-07-02 RX ORDER — METHYLPREDNISOLONE ACETATE 80 MG/ML
INJECTION, SUSPENSION INTRA-ARTICULAR; INTRALESIONAL; INTRAMUSCULAR; SOFT TISSUE
Status: DISCONTINUED | OUTPATIENT
Start: 2020-07-02 | End: 2020-07-02 | Stop reason: HOSPADM

## 2020-07-02 RX ORDER — LIDOCAINE HYDROCHLORIDE 10 MG/ML
INJECTION INFILTRATION; PERINEURAL
Status: DISCONTINUED | OUTPATIENT
Start: 2020-07-02 | End: 2020-07-02 | Stop reason: HOSPADM

## 2020-07-02 RX ORDER — METHYLPREDNISOLONE ACETATE 80 MG/ML
INJECTION, SUSPENSION INTRA-ARTICULAR; INTRALESIONAL; INTRAMUSCULAR; SOFT TISSUE
Status: DISPENSED
Start: 2020-07-02 | End: 2020-07-03

## 2020-07-02 NOTE — DISCHARGE INSTRUCTIONS
[facility]   [phone]  [facility]      [phone]    Managing nausea    Some people have an upset stomach after surgery. This is often because of anesthesia, pain, or pain medicine, or the stress of surgery. These tips will help you handle nausea and eat healthy foods as you get better. If you were on a special food plan before surgery, ask your healthcare provider if you should follow it while you get better. These tips may help:  · Do not push yourself to eat. Your body will tell you when to eat and how much.  · Start off with clear liquids and soup. They are easier to digest.  · Next try semi-solid foods, such as mashed potatoes, applesauce, and gelatin, as you feel ready.  · Slowly move to solid foods. Dont eat fatty, rich, or spicy foods at first.  · Do not force yourself to have 3 large meals a day. Instead eat smaller amounts more often.  · Take pain medicines with a small amount of solid food, such as crackers or toast, to avoid nausea.

## 2020-07-02 NOTE — DISCHARGE SUMMARY
[facility]  Discharge Note  Short Stay    Admit Date: 7/2/2020    Discharge Date and Time: 7/2/2020    Attending Physician: Seb Blackwell MD     Discharge Provider: Seb Blackwell    Diagnoses:  Active Hospital Problems    Diagnosis  POA    *Degenerative disc disease, lumbar [M51.36]  Yes      Resolved Hospital Problems   No resolved problems to display.       Hospital Course: Lumbar interlaminar epidural steroid injection     Discharged Condition: Good    Final Diagnoses:   Active Hospital Problems    Diagnosis  POA    *Degenerative disc disease, lumbar [M51.36]  Yes      Resolved Hospital Problems   No resolved problems to display.       Disposition: Home or Self Care    Follow up/Patient Instructions:    Medications:  Reconciled Home Medications:      Medication List      CHANGE how you take these medications    metoprolol succinate 25 MG 24 hr tablet  Commonly known as: TOPROL-XL  TAKE ONE TABLET BY MOUTH ONCE DAILY  What changed:   · how much to take  · how to take this  · when to take this        CONTINUE taking these medications    diclofenac sodium 1 % Gel  Commonly known as: VOLTAREN  APPLY 4 GRAMS TOPICALLY TO THE AFFECTED AREA FOUR TIMES DAILY AS DIRECTED     docusate sodium 100 MG capsule  Commonly known as: COLACE  Take 1 capsule (100 mg total) by mouth 2 (two) times daily. Drink a 12 oz glass of water with each dose.     ergocalciferol 50,000 unit Cap  Commonly known as: ERGOCALCIFEROL  Take 1 capsule (50,000 Units total) by mouth every 7 days.     fluticasone propionate 50 mcg/actuation nasal spray  Commonly known as: FLONASE  fluticasone 50 mcg/actuation nasal spray,suspension     gabapentin 300 MG capsule  Commonly known as: NEURONTIN  Take 1 capsule (300 mg total) by mouth 2 (two) times daily.     hydroCHLOROthiazide 25 MG tablet  Commonly known as: HYDRODIURIL  TK 1 T PO QD     metoprolol tartrate 25 MG tablet  Commonly known as: LOPRESSOR     nystatin cream  Commonly known as:  MYCOSTATIN  ARNULFO EXT AA BID     pantoprazole 40 MG tablet  Commonly known as: PROTONIX  TK 1 T PO QD     SPIRIVA WITH HANDIHALER 18 mcg inhalation capsule  Generic drug: tiotropium  Inhale 1 capsule into the lungs.          Discharge Procedure Orders   Diet general     Call MD for:  temperature >100.4     Call MD for:  persistent nausea and vomiting     Call MD for:  severe uncontrolled pain     Call MD for:  difficulty breathing, headache or visual disturbances     Call MD for:  redness, tenderness, or signs of infection (pain, swelling, redness, odor or green/yellow discharge around incision site)     Call MD for:  hives     Call MD for:  persistent dizziness or light-headedness     Call MD for:  extreme fatigue        Follow up with MD in 2-3 weeks    Discharge Procedure Orders (must include Diet, Follow-up, Activity):   Discharge Procedure Orders (must include Diet, Follow-up, Activity)   Diet general     Call MD for:  temperature >100.4     Call MD for:  persistent nausea and vomiting     Call MD for:  severe uncontrolled pain     Call MD for:  difficulty breathing, headache or visual disturbances     Call MD for:  redness, tenderness, or signs of infection (pain, swelling, redness, odor or green/yellow discharge around incision site)     Call MD for:  hives     Call MD for:  persistent dizziness or light-headedness     Call MD for:  extreme fatigue

## 2020-07-02 NOTE — OP NOTE
PROCEDURE DATE: 7/2/2020    PROCEDURE:  Lumbar interlaminar epidural steroid injection at L5-S1 under fluoroscopic guidance.    DIAGNOSIS: LUMBAR DISC DISPLACEMENT WITHOUT MYELOPATHY  POSTOP DIAGNOSIS: SAME    PHYSICIAN: Seb Blackwell M.D.    MEDICATIONS INJECTED: 80 mg depo-medrol with 4 ml of preservative free NaCl    LOCAL ANESTHETIC INJECTED:    Lidocaine 1% 3 ml total    SEDATION MEDICATIONS: N/A; local anesthesia only    ESTIMATED BLOOD LOSS:  none    COMPLICATIONS:  none    TECHNIQUE:  Time-out taken to identify patient and procedure prior to starting the procedure.  With the patient laying in a prone position, the area was prepped and draped in the usual sterile fashion using ChloraPrep and a fenestrated drape.  After determining the target level with an AP fluoroscopic view, local anesthetic was given using a 25-gauge 1.5 inch needle by raising a wheal and then infiltrating toward the interlaminar entry space.  A 3.5 inch 20-gauge Touhy needle was introduced under AP fluoroscopic guidance to the interlaminar space of L5-S1. Once the trajectory was established, the needle was visualized in the lateral view and advanced using loss of resistance technique. Once in the desired position, 2 mL contrast was injected to confirm placement and there was no vascular uptake nor intrathecal spread.  The medication was then injected slowly. The patient tolerated the procedure well.      The patient was monitored after the procedure.   They were given post-procedure and discharge instructions to follow at home.  The patient was discharged in a stable condition.

## 2020-07-17 ENCOUNTER — TELEPHONE (OUTPATIENT)
Dept: PAIN MEDICINE | Facility: CLINIC | Age: 65
End: 2020-07-17

## 2020-07-22 ENCOUNTER — OFFICE VISIT (OUTPATIENT)
Dept: PAIN MEDICINE | Facility: CLINIC | Age: 65
End: 2020-07-22
Payer: COMMERCIAL

## 2020-07-22 VITALS
HEART RATE: 63 BPM | BODY MASS INDEX: 31.07 KG/M2 | HEIGHT: 59 IN | DIASTOLIC BLOOD PRESSURE: 81 MMHG | RESPIRATION RATE: 18 BRPM | SYSTOLIC BLOOD PRESSURE: 137 MMHG | WEIGHT: 154.13 LBS | TEMPERATURE: 98 F | OXYGEN SATURATION: 95 %

## 2020-07-22 DIAGNOSIS — M51.36 DDD (DEGENERATIVE DISC DISEASE), LUMBAR: ICD-10-CM

## 2020-07-22 DIAGNOSIS — M62.838 CERVICAL PARASPINAL MUSCLE SPASM: Primary | ICD-10-CM

## 2020-07-22 DIAGNOSIS — M47.896 OTHER SPONDYLOSIS, LUMBAR REGION: ICD-10-CM

## 2020-07-22 DIAGNOSIS — M50.30 DDD (DEGENERATIVE DISC DISEASE), CERVICAL: ICD-10-CM

## 2020-07-22 PROCEDURE — 99214 PR OFFICE/OUTPT VISIT, EST, LEVL IV, 30-39 MIN: ICD-10-PCS | Mod: 25,S$GLB,, | Performed by: NURSE PRACTITIONER

## 2020-07-22 PROCEDURE — 20553 PR INJECT TRIGGER POINTS, > 3: ICD-10-PCS | Mod: S$GLB,,, | Performed by: NURSE PRACTITIONER

## 2020-07-22 PROCEDURE — 20553 NJX 1/MLT TRIGGER POINTS 3/>: CPT | Mod: S$GLB,,, | Performed by: NURSE PRACTITIONER

## 2020-07-22 PROCEDURE — 99999 PR PBB SHADOW E&M-EST. PATIENT-LVL IV: CPT | Mod: PBBFAC,,, | Performed by: NURSE PRACTITIONER

## 2020-07-22 PROCEDURE — 99999 PR PBB SHADOW E&M-EST. PATIENT-LVL IV: ICD-10-PCS | Mod: PBBFAC,,, | Performed by: NURSE PRACTITIONER

## 2020-07-22 PROCEDURE — 99214 OFFICE O/P EST MOD 30 MIN: CPT | Mod: 25,S$GLB,, | Performed by: NURSE PRACTITIONER

## 2020-07-22 RX ORDER — NYSTATIN 100000 [USP'U]/G
POWDER TOPICAL
COMMUNITY
Start: 2020-07-14 | End: 2020-12-03 | Stop reason: ALTCHOICE

## 2020-07-22 RX ORDER — METHYLPREDNISOLONE ACETATE 80 MG/ML
80 INJECTION, SUSPENSION INTRA-ARTICULAR; INTRALESIONAL; INTRAMUSCULAR; SOFT TISSUE
Status: COMPLETED | OUTPATIENT
Start: 2020-07-22 | End: 2020-07-22

## 2020-07-22 RX ORDER — ZOSTER VACCINE RECOMBINANT, ADJUVANTED 50 MCG/0.5
0.5 KIT INTRAMUSCULAR
COMMUNITY
Start: 2019-09-30 | End: 2021-06-25 | Stop reason: ALTCHOICE

## 2020-07-22 RX ADMIN — METHYLPREDNISOLONE ACETATE 80 MG: 80 INJECTION, SUSPENSION INTRA-ARTICULAR; INTRALESIONAL; INTRAMUSCULAR; SOFT TISSUE at 11:07

## 2020-07-22 NOTE — PROGRESS NOTES
FOLLOW UP NOTE:     CHIEF COMPLAINT: DEAN follow up    INITIAL HISTORY OF PRESENT ILLNESS: (via Dr. Cohen): Teresa Clay is a 64 y.o. female who presents today with chronic low back pain, fibromyalgia, generalized anxiety. This pain started in 2012 as a result of a fall down the stairs.  She did not seek treatment until this year.  She was treating herself with ibuprofen for the past 7 years.  She then sought treatment this past year when it started going into her legs, R>L.  She describes a deep, aching pain that is located on either side of her low back and radiates down the posterolateral aspect of her legs to the tops of her feet.  This pain is described in detail below.     Aggravating factors:  Sitting, standing, walking, lifting, lying down, exercise, morning, getting up out of bed/chair     Mitigating factors:  Rest     Previously seeing: Dr. Trey Dueñas     Initial Visit With Dr. Blackwell: 6/10/2020:   Teresa Clay is a 64 y.o. female with PMH significant for HTN presents as an established patient for Dr. Cohen (new to me) for the continued management of back and shoulder pain.     The patient reports that her pain began in 2012 when she slipped and fell at home. The patient reports of low back pain (R > L) with radiation down the postero-lateral aspect of her LE's down her feet. The patient does report of intermittent numbness in her LE's. The patient describes her back pain as a constant aching type of pain. The patient reports that her current pain is a 7/10. Patient denies of any urinary/fecal incontinence, saddle anesthesia, or weakness. Of note, the patient does mention that her right hip bothers her a fair bit as well. Patient is unsure as to whether that is related to her back.      The patient currently works as a .      The patient reports that her shoulder pain has been present for a few years. The patient localizes her pain to her bilateral trapezius area. The patient reports of  "intermittent radiation to her left thumb. The patient describes her pain as a "stiff" type of pain. The patient reports that her current pain is a 4/10. The patient denies of hand weakness.     INTERVAL HISTORY OF PRESENT ILLNESS: Teresa Clay is a 64 y.o. female with PMH significant for HTN presents for a L5 - S1 DEAN follow up and the continued management of back and shoulder pain. Pt reports that she has gotten 100% relief from the DEAN. She has no pain down her leg or to her lower back. She does report some tingling to the top of her right foot and back of her right calf that is "almost unnoticeable". She is c/o some discomfort to the cervical paraspinous muscles that is more on the right than the left. Pt is a  and uses her hands constantly. She reports that by the end of the day she feels that her neck/shoulders are "bound" and describes the pain as a deep "grabbing, aching" pain. She does not report any radiation of pain or numbness/tingling into her arms, but says that they feel "heavy" occasionally. She cannot tolerate muscle relaxants and does not use ice or heat for the pain. She denies arm weakness or dropping objects. Patient denies of any urinary/fecal incontinence, saddle anesthesia, or weakness.     INTERVENTIONAL PAIN HISTORY:  7/02/2020: L5 - S1 Intralaminar Epidural Steroid Injection (Dr. Blackwell 100% benefit)    she reports of benefit with injections by Dr. Dueñas and Dr. Cohen    1/27/2020: L5/S1 Interlaminar Epidural Steroid Injection via Dr. Cohen - reports of at least one month of good relief  10/7/2019: L5/S1 Interlaminar Epidural Steroid Injection via Dr. Cohen2/4/2019: Bilateral L4-5 transforaminal epidural steroid injection under fluoroscopy via Dr. Dueñas  5/6/2019: Bilateral L4-5 transforaminal epidural steroid injection under fluoroscopy via Dr. Dueñas    Anticoagulation: no    CURRENT PAIN MEDICATIONS:     :  9/13/2020: Norco 5-325 mg #12 tablets  6/28/2019: Tramadol 50 mg #90 " "tablets  5/27/2019: Tramadol 50 mg #90 tablets      IMAGING:  No new imaging to review.    ROS:  Review of Systems   Constitutional: Negative for chills and fever.   HENT: Negative for sore throat.    Eyes: Negative for visual disturbance.   Respiratory: Negative for shortness of breath.    Cardiovascular: Negative for chest pain.   Gastrointestinal: Negative for nausea and vomiting.   Genitourinary: Negative for difficulty urinating.   Musculoskeletal: Positive for back pain, myalgias and neck pain.   Skin: Negative for rash.   Allergic/Immunologic: Negative for immunocompromised state.   Neurological: Negative for syncope.   Hematological: Does not bruise/bleed easily.   Psychiatric/Behavioral: Negative for suicidal ideas.        MEDICAL, SURGICAL, FAMILY, SOCIAL HX: reviewed    MEDICATIONS/ALLERGIES: reviewed    PHYSICAL EXAM:    VITALS: Vitals reviewed.   Vitals:    07/22/20 1045   BP: 137/81   Pulse: 63   Resp: 18   Temp: 97.9 °F (36.6 °C)   TempSrc: Tympanic   SpO2: 95%   Weight: 69.9 kg (154 lb 1.6 oz)   Height: 4' 11" (1.499 m)   PainSc:   1       Physical Exam   Constitutional: She is oriented to person, place, and time and well-developed, well-nourished, and in no distress.   HENT:   Head: Normocephalic and atraumatic.   Eyes: Conjunctivae and EOM are normal. Right eye exhibits no discharge. Left eye exhibits no discharge.   Cardiovascular: Normal rate.   Pulmonary/Chest: Effort normal and breath sounds normal. No respiratory distress.   Abdominal: Soft.   Musculoskeletal:         General: Tenderness present. No edema.   Neurological: She is alert and oriented to person, place, and time.   Skin: Skin is warm and dry. No rash noted. She is not diaphoretic.   Psychiatric: Mood, memory, affect and judgment normal.   Nursing note and vitals reviewed.       UPPER EXTREMITIES: Normal alignment, normal range of motion, no atrophy, no skin changes,  hair growth and nail growth normal and equal bilaterally. No " swelling, no tenderness.    LOWER EXTREMITIES:  Normal alignment, normal range of motion, no atrophy, no skin changes,  hair growth and nail growth normal and equal bilaterally. No swelling, no tenderness.    CERVICAL SPINE:  Cervical spine: ROM is full in flexion, extension and lateral rotation without increased pain.  ((--)) Facet loading bilaterally  ((--)) Bales's bilaterally  ((--)) Spurling's maneuver   Myofascial exam:  Tenderness to palpation across cervical paraspinous region and trapezius bilaterally with R>L    CRANIAL NERVES:  II:  PERRL bilaterally,   III,IV,VI: EOMI.    V:  Facial sensation equal bilaterally  VII:  Facial motor function normal.  VIII:  Hearing equal to finger rub bilaterally  IX/X: Gag normal, palate symmetric  XI:  Shoulder shrug equal, head turn equal  XII:  Tongue midline without fasciculations      MOTOR: Tone and bulk: normal bilateral upper and lower Strength: normal   Delt Bi Tri WE WF     R 5 5 5 5 5 5   L 5 5 5 5 5 5     IP ADD ABD Quad TA Gas HAM  R 5 5 5 5 5 5 5  L 5 5 5 5 5 5 5      NEUROLOGICAL:    Gen: No clonus or spasticity.   Gait: Normal without antalgic lean. Normal rise, base, steps, and arm swing.    BABINSKI: Absent bilaterally  Sensory: Intact to light touch and proprioception BLE  REFLEXES: normal, symmetric, nonbrisk.  Toes down, no clonus. Negative bales's sign bilaterally.      ASSESSMENT: Teresa Clay is a 64 y.o. female with PMH significant for HTN presents  for the continued management of back and shoulder pain and follow up from [facility]. She has gotten 100% pain relief to her lower back and hip/leg. She does have significant tension and tenderness to her cervical paraspinous muscles and trapezius with the right side being more tender. Her cervical exam was otherwise normal. We discussed her imaging and possible interventions and elected to proceed with TPI to the cervical muscles. We discussed symptomatic treatment and ways to reduce muscular  pain. Plan below:     1. Cervical paraspinal muscle spasm  methylPREDNISolone acetate injection 80 mg   2. DDD (degenerative disc disease), lumbar     3. Other spondylosis, lumbar region     4. DDD (degenerative disc disease), cervical         PLAN:  1. Trigger point injections in the office today  2. Continue Ibuprofen 800 mg BID as needed and Voltaren gel as needed.   3. If no improvement with home exercise plan, will get formal PT evaluation and treatment.  4. Can repeat DEAN L5 - S1 in the future if needed.  5. RTC in 2 months for follow up or sooner if needed.     Trigger point injection   Pre-procedure diagnosis: Myofascial trigger points in cervical region  Post-procedure diagnosis: Same    Timeout performed.  Upon examination, 8 trigger points were noted in the above noted regions.   After the procedure was described and informed consent obtained, the skin over the trigger points was cleaned with isopropyl alcohol. Using a 27-gauge needle, injection of 1ml of 0.25%bupvicaine and 10mg of methylprednisone was injected into each trigger point. The patient noted concordant radiating pain upon injection of her trigger points. The skin was then cleaned and bandages were applied to sites as necessary. Blood loss was <2ml. We observed the patient for 10 minutes after the procedure for any complications, and as there were none noted, the patient was then discharged home with instructions. We advised the patient that during the duration of the local anesthetic effect, this would be the optimal time to perform stretching exercises for the muscles which contain the trigger points. We also advised the patient to continue these exercises daily as this would likely give the best chance of resolution of the trigger points.    Paris Zamora, CNP  Pain Management

## 2020-07-22 NOTE — LETTER
July 23, 2020      Maite Villatoro NP  [address] [place] [address]   [place] [address]           [facility] - [facility]  [address] [facility]  [place] [address]  Phone: [phone]  Fax: [phone]          Patient: Teresa Clay   MR Number: [id number]   YOB: 1955   Date of Visit: 7/22/2020       Dear Dr. Maite Villatoro:    Thank you for referring Teresa Clay to me for evaluation. Attached you will find relevant portions of my assessment and plan of care.    If you have questions, please do not hesitate to call me. I look forward to following Teresa Clay along with you.    Sincerely,    Paris Zamora, JOSE    Enclosure  CC:  No Recipients    If you would like to receive this communication electronically, please contact [email or web address] or [phone] to request more information on [employer] Link access.    For providers and/or their staff who would like to refer a patient to [facility], please contact us through our one-stop-shop provider referral line, [facility], at [phone].    If you feel you have received this communication in error or would no longer like to receive these types of communications, please e-mail [email or web address]

## 2020-08-04 ENCOUNTER — HOSPITAL ENCOUNTER (OUTPATIENT)
Dept: RADIOLOGY | Facility: HOSPITAL | Age: 65
Discharge: HOME | End: 2020-08-04
Attending: NURSE PRACTITIONER
Payer: COMMERCIAL

## 2020-08-04 VITALS — BODY MASS INDEX: 31.07 KG/M2 | WEIGHT: 154.13 LBS | HEIGHT: 59 IN

## 2020-08-04 DIAGNOSIS — Z12.31 SCREENING MAMMOGRAM, ENCOUNTER FOR: ICD-10-CM

## 2020-08-04 PROCEDURE — 77063 MAMMO DIGITAL SCREENING BILAT WITH TOMOSYNTHESIS_CAD: ICD-10-PCS | Mod: 26,,, | Performed by: RADIOLOGY

## 2020-08-04 PROCEDURE — 77063 BREAST TOMOSYNTHESIS BI: CPT | Mod: 26,,, | Performed by: RADIOLOGY

## 2020-08-04 PROCEDURE — 77067 SCR MAMMO BI INCL CAD: CPT | Mod: TC

## 2020-08-04 PROCEDURE — 77067 SCR MAMMO BI INCL CAD: CPT | Mod: 26,,, | Performed by: RADIOLOGY

## 2020-08-04 PROCEDURE — 77067 MAMMO DIGITAL SCREENING BILAT WITH TOMOSYNTHESIS_CAD: ICD-10-PCS | Mod: 26,,, | Performed by: RADIOLOGY

## 2020-08-20 ENCOUNTER — HOSPITAL ENCOUNTER (EMERGENCY)
Facility: HOSPITAL | Age: 65
Discharge: HOME | End: 2020-08-20
Attending: EMERGENCY MEDICINE
Payer: COMMERCIAL

## 2020-08-20 ENCOUNTER — OFFICE VISIT (OUTPATIENT)
Dept: PAIN MEDICINE | Facility: CLINIC | Age: 65
End: 2020-08-20
Payer: COMMERCIAL

## 2020-08-20 VITALS
BODY MASS INDEX: 31.04 KG/M2 | OXYGEN SATURATION: 97 % | SYSTOLIC BLOOD PRESSURE: 123 MMHG | TEMPERATURE: 98 F | HEIGHT: 59 IN | DIASTOLIC BLOOD PRESSURE: 87 MMHG | HEART RATE: 67 BPM | RESPIRATION RATE: 18 BRPM | WEIGHT: 154 LBS

## 2020-08-20 DIAGNOSIS — M54.2 CERVICALGIA: ICD-10-CM

## 2020-08-20 DIAGNOSIS — M79.18 MYOFASCIAL PAIN: Primary | ICD-10-CM

## 2020-08-20 DIAGNOSIS — R26.9 GAIT DISTURBANCE: ICD-10-CM

## 2020-08-20 DIAGNOSIS — M54.12 CERVICAL RADICULOPATHY: ICD-10-CM

## 2020-08-20 DIAGNOSIS — R42 DIZZINESS AND GIDDINESS: ICD-10-CM

## 2020-08-20 DIAGNOSIS — E87.6 HYPOKALEMIA: ICD-10-CM

## 2020-08-20 DIAGNOSIS — S16.1XXA STRAIN OF NECK MUSCLE, INITIAL ENCOUNTER: Primary | ICD-10-CM

## 2020-08-20 LAB
ALBUMIN SERPL BCP-MCNC: 4.7 G/DL (ref 3.5–5.2)
ALP SERPL-CCNC: 67 U/L (ref 55–135)
ALT SERPL W/O P-5'-P-CCNC: 35 U/L (ref 10–44)
ANION GAP SERPL CALC-SCNC: 11 MMOL/L (ref 8–16)
AST SERPL-CCNC: 26 U/L (ref 10–40)
BASOPHILS # BLD AUTO: 0.05 K/UL (ref 0–0.2)
BASOPHILS NFR BLD: 0.6 % (ref 0–1.9)
BILIRUB SERPL-MCNC: 1.1 MG/DL (ref 0.1–1)
BUN SERPL-MCNC: 15 MG/DL (ref 8–23)
CALCIUM SERPL-MCNC: 9.2 MG/DL (ref 8.7–10.5)
CHLORIDE SERPL-SCNC: 97 MMOL/L (ref 95–110)
CO2 SERPL-SCNC: 29 MMOL/L (ref 23–29)
CREAT SERPL-MCNC: 0.7 MG/DL (ref 0.5–1.4)
DIFFERENTIAL METHOD: ABNORMAL
EOSINOPHIL # BLD AUTO: 0.2 K/UL (ref 0–0.5)
EOSINOPHIL NFR BLD: 1.9 % (ref 0–8)
ERYTHROCYTE [DISTWIDTH] IN BLOOD BY AUTOMATED COUNT: 13.8 % (ref 11.5–14.5)
EST. GFR  (AFRICAN AMERICAN): >60 ML/MIN/1.73 M^2
EST. GFR  (NON AFRICAN AMERICAN): >60 ML/MIN/1.73 M^2
GLUCOSE SERPL-MCNC: 101 MG/DL (ref 70–110)
HCT VFR BLD AUTO: 44.9 % (ref 37–48.5)
HGB BLD-MCNC: 14.8 G/DL (ref 12–16)
IMM GRANULOCYTES # BLD AUTO: 0.03 K/UL (ref 0–0.04)
IMM GRANULOCYTES NFR BLD AUTO: 0.4 % (ref 0–0.5)
LYMPHOCYTES # BLD AUTO: 1.9 K/UL (ref 1–4.8)
LYMPHOCYTES NFR BLD: 22.1 % (ref 18–48)
MAGNESIUM SERPL-MCNC: 2.1 MG/DL (ref 1.6–2.6)
MCH RBC QN AUTO: 28.8 PG (ref 27–31)
MCHC RBC AUTO-ENTMCNC: 33 G/DL (ref 32–36)
MCV RBC AUTO: 88 FL (ref 82–98)
MONOCYTES # BLD AUTO: 0.7 K/UL (ref 0.3–1)
MONOCYTES NFR BLD: 8.3 % (ref 4–15)
NEUTROPHILS # BLD AUTO: 5.7 K/UL (ref 1.8–7.7)
NEUTROPHILS NFR BLD: 66.7 % (ref 38–73)
NRBC BLD-RTO: 0 /100 WBC
PLATELET # BLD AUTO: 269 K/UL (ref 150–350)
PMV BLD AUTO: 9 FL (ref 9.2–12.9)
POTASSIUM SERPL-SCNC: 2.5 MMOL/L (ref 3.5–5.1)
PROT SERPL-MCNC: 8 G/DL (ref 6–8.4)
RBC # BLD AUTO: 5.13 M/UL (ref 4–5.4)
SODIUM SERPL-SCNC: 137 MMOL/L (ref 136–145)
WBC # BLD AUTO: 8.55 K/UL (ref 3.9–12.7)

## 2020-08-20 PROCEDURE — 80053 COMPREHEN METABOLIC PANEL: CPT

## 2020-08-20 PROCEDURE — 99284 EMERGENCY DEPT VISIT MOD MDM: CPT

## 2020-08-20 PROCEDURE — 99999 PR PBB SHADOW E&M-EST. PATIENT-LVL III: ICD-10-PCS | Mod: PBBFAC,,, | Performed by: NURSE PRACTITIONER

## 2020-08-20 PROCEDURE — 99214 OFFICE O/P EST MOD 30 MIN: CPT | Mod: S$GLB,,, | Performed by: NURSE PRACTITIONER

## 2020-08-20 PROCEDURE — 85025 COMPLETE CBC W/AUTO DIFF WBC: CPT

## 2020-08-20 PROCEDURE — 25000003 PHARM REV CODE 250: Performed by: EMERGENCY MEDICINE

## 2020-08-20 PROCEDURE — 83735 ASSAY OF MAGNESIUM: CPT

## 2020-08-20 PROCEDURE — 99999 PR PBB SHADOW E&M-EST. PATIENT-LVL III: CPT | Mod: PBBFAC,,, | Performed by: NURSE PRACTITIONER

## 2020-08-20 PROCEDURE — 99214 PR OFFICE/OUTPT VISIT, EST, LEVL IV, 30-39 MIN: ICD-10-PCS | Mod: S$GLB,,, | Performed by: NURSE PRACTITIONER

## 2020-08-20 PROCEDURE — 25000003 PHARM REV CODE 250

## 2020-08-20 RX ORDER — LANOLIN ALCOHOL/MO/W.PET/CERES
400 CREAM (GRAM) TOPICAL DAILY
Qty: 7 TABLET | Refills: 0 | Status: SHIPPED | OUTPATIENT
Start: 2020-08-20 | End: 2020-08-27

## 2020-08-20 RX ORDER — TIZANIDINE 4 MG/1
4 TABLET ORAL EVERY 8 HOURS
Qty: 90 TABLET | Refills: 0 | Status: SHIPPED | OUTPATIENT
Start: 2020-08-20 | End: 2020-08-30

## 2020-08-20 RX ORDER — LANOLIN ALCOHOL/MO/W.PET/CERES
CREAM (GRAM) TOPICAL
Status: COMPLETED
Start: 2020-08-20 | End: 2020-08-20

## 2020-08-20 RX ORDER — POTASSIUM CHLORIDE 20 MEQ/1
60 TABLET, EXTENDED RELEASE ORAL
Status: COMPLETED | OUTPATIENT
Start: 2020-08-20 | End: 2020-08-20

## 2020-08-20 RX ORDER — POTASSIUM CHLORIDE 20 MEQ/1
20 TABLET, EXTENDED RELEASE ORAL 2 TIMES DAILY
Qty: 14 TABLET | Refills: 0 | Status: SHIPPED | OUTPATIENT
Start: 2020-08-20 | End: 2020-08-27

## 2020-08-20 RX ORDER — LANOLIN ALCOHOL/MO/W.PET/CERES
400 CREAM (GRAM) TOPICAL ONCE
Status: DISCONTINUED | OUTPATIENT
Start: 2020-08-20 | End: 2020-08-20 | Stop reason: HOSPADM

## 2020-08-20 RX ADMIN — Medication 400 MG: at 08:08

## 2020-08-20 RX ADMIN — POTASSIUM CHLORIDE 60 MEQ: 1500 TABLET, EXTENDED RELEASE ORAL at 08:08

## 2020-08-20 NOTE — DISCHARGE INSTRUCTIONS
Continue taking muscle like previously prescribed.  Take OTC Motrin/Tylenol as needed for headache/pain.  Take all medications as prescribed.      Download the [employer] hay to access your health records & test results.  Please remember that you had a visit to the emergency room today and this does not substitute as primary care services for ongoing management because emergency services is a snap shot in time.  Should you have any worsening condition that requires emergency services do not hesitate to return to the ER.    COVID-19 TESTING  Hot Line [phone]  [address] [place] [address], [place] [address]  [facility]  Hours: 8am-5pm Monday - Friday   8am-noon Saturday - Sunday

## 2020-08-20 NOTE — ED TRIAGE NOTES
Patient complaining of neck pain and vertigo, saw her MD today and was started on muscle relaxer, wants 2nd opinion.

## 2020-08-20 NOTE — PROGRESS NOTES
"FOLLOW UP NOTE:     CHIEF COMPLAINT: arm pain    INITIAL HISTORY OF PRESENT ILLNESS: (via Dr. Cohen): Teresa Clay is a 64 y.o. female who presents today with chronic low back pain, fibromyalgia, generalized anxiety. This pain started in 2012 as a result of a fall down the stairs.  She did not seek treatment until this year.  She was treating herself with ibuprofen for the past 7 years.  She then sought treatment this past year when it started going into her legs, R>L.  She describes a deep, aching pain that is located on either side of her low back and radiates down the posterolateral aspect of her legs to the tops of her feet.  This pain is described in detail below.     Aggravating factors:  Sitting, standing, walking, lifting, lying down, exercise, morning, getting up out of bed/chair     Mitigating factors:  Rest     Previously seeing: Dr. Trey Dueñas     INTERVAL HISTORY OF PRESENT ILLNESS: Teresa Clay is a 64 y.o. female  with PMH significant for HTN presents as a walk-in for the c/o worsening right arm pain today. She has had worsening pain to the cervical myofascial areas bilaterally and into her upper thoracic muscular areas. She received TPI's about 4 weeks ago and they helped with her pain for about a week. She is concerned today due to numbness and tingling in her right arm and reports of it "drawing up" at times throughout the day today. She expresses that "I just don't feel right in my head". She reports dizziness intermittently and that her "head has been bobbing when I talk". (Reported to her by her ). She denies chest pain or SOB. She does feel "slower than normal in my thoughts". She reports that she feels her gait has been "wobbly" at times throughout the last several days. She has no significant history. Her BP and HR are stable. She denies trauma or fall. She expresses concern that she could be having stroke-like symptoms. She is requesting a CT scan to be done this evening. " Explained to her that I am pain management and that if she has these concerns she should seek ER treatment. She is not displaying any abnormal speech, coordination, or ambulation at this time. She drove herself to the office today without difficulty. I do not feel that she would need ambulance transfer due to no display of treatment. I explained to her that I can not do anything that she is requesting in the office today as a STAT procedure.      Of note, she denies visual changes, dizziness, chest pain, SOB, or gait difficulty at this time.     Patient denies of any urinary/fecal incontinence, saddle anesthesia, or weakness.       INTERVENTIONAL PAIN HISTORY:  7/22/2020: Cervical TPI's - good benefit x 1 week  7/02/2020: L5 - S1 Intralaminar Epidural Steroid Injection (Dr. Blackwell 100% benefit)     she reports of benefit with injections by Dr. Dueñas and Dr. Cohen     1/27/2020: L5/S1 Interlaminar Epidural Steroid Injection via Dr. Cohen - reports of at least one month of good relief  10/7/2019: L5/S1 Interlaminar Epidural Steroid Injection via Dr. Cohen2/4/2019: Bilateral L4-5 transforaminal epidural steroid injection under fluoroscopy via Dr. Dueñas  5/6/2019: Bilateral L4-5 transforaminal epidural steroid injection under fluoroscopy via Dr. Dueñas    CURRENT PAIN MEDICATIONS:   Ibuprofen 800 mg PO BID  Voltaren gel QID as needed    :  9/13/2020: Norco 5-325 mg #12 tablets  6/28/2019: Tramadol 50 mg #90 tablets  5/27/2019: Tramadol 50 mg #90 tablets    IMAGING:    No new imaging to review.    ROS:  Review of Systems   Constitutional: Positive for fatigue. Negative for chills and fever.   HENT: Positive for congestion and postnasal drip. Negative for ear discharge, ear pain, hearing loss, sinus pressure, sinus pain, sneezing and sore throat.    Eyes: Negative for visual disturbance.   Respiratory: Negative for cough, shortness of breath and wheezing.    Cardiovascular: Negative for chest pain and palpitations.  "  Gastrointestinal: Negative for abdominal pain, nausea and vomiting.   Genitourinary: Negative for difficulty urinating.   Musculoskeletal: Positive for back pain, gait problem ("wobbly"), myalgias and neck pain.   Skin: Negative for color change and rash.   Allergic/Immunologic: Negative for immunocompromised state.   Neurological: Positive for dizziness, weakness, light-headedness and headaches. Negative for syncope.   Hematological: Does not bruise/bleed easily.   Psychiatric/Behavioral: Negative for behavioral problems, dysphoric mood and suicidal ideas. The patient is nervous/anxious.         MEDICAL, SURGICAL, FAMILY, SOCIAL HX: reviewed    MEDICATIONS/ALLERGIES: reviewed     PHYSICAL EXAM:    VITALS: Vitals reviewed. There were no vitals filed for this visit.    Physical Exam   Constitutional: She is oriented to person, place, and time and well-developed, well-nourished, and in no distress. No distress.   HENT:   Head: Normocephalic.   Eyes: Pupils are equal, round, and reactive to light. Conjunctivae and EOM are normal.   Neck: Muscular tenderness present. No spinous process tenderness present. No neck rigidity. Decreased range of motion (turns about 60 degrees both lateral, up, down. discomfort tolerable until this degree of movement) present. No edema and no erythema present.       Cardiovascular: Normal rate, regular rhythm and normal heart sounds.   Pulmonary/Chest: Effort normal and breath sounds normal. No respiratory distress. She has no wheezes.   Abdominal: Soft. Bowel sounds are normal. She exhibits no distension. There is no abdominal tenderness.   Musculoskeletal:         General: Tenderness (myofascial) present. No deformity or edema.      Comments: See hpi   Neurological: She is alert and oriented to person, place, and time. She displays normal reflexes. No cranial nerve deficit. She exhibits normal muscle tone. Gait normal. Coordination normal. GCS score is 15.   Skin: Skin is warm and dry. " She is not diaphoretic. No erythema.   Psychiatric: Memory, affect and judgment normal. Her mood appears anxious.        UPPER EXTREMITIES: Normal alignment, normal range of motion, no atrophy, no skin changes,  hair growth and nail growth normal and equal bilaterally. No swelling, no tenderness.    LOWER EXTREMITIES:  Normal alignment, normal range of motion, no atrophy, no skin changes,  hair growth and nail growth normal and equal bilaterally. No swelling, no tenderness.    CERVICAL SPINE:  Cervical spine: ROM is full in flexion, extension and lateral rotation without increased pain.  ((+)) Facet loading bilaterally  ((--)) Bales's bilaterally  ((--)) Spurling's maneuver   Myofascial exam: Tenderness to palpation across cervical paraspinous region bilaterally.    CRANIAL NERVES:  II:  PERRL bilaterally,   III,IV,VI: EOMI.    V:  Facial sensation equal bilaterally  VII:  Facial motor function normal.  VIII:  Hearing equal to finger rub bilaterally  IX/X: Gag normal, palate symmetric  XI:  Shoulder shrug equal, head turn equal  XII:  Tongue midline without fasciculations      MOTOR: Tone and bulk: normal bilateral upper and lower Strength: normal   Delt Bi Tri WE WF     R 5 5 5 5 5 5   L 5 5 5 5 5 5     IP ADD ABD Quad TA Gas HAM  R 5 5 5 5 5 5 5  L 5 5 5 5 5 5 5      NEUROLOGICAL:    Gen: No clonus or spasticity.   Gait: Normal without antalgic lean. Normal rise, base, steps, and arm swing.    BABINSKI: Absent bilaterally  Sensory: Intact to light touch and proprioception BLE  REFLEXES: normal, symmetric, nonbrisk.  Toes down, no clonus. Negative bales's sign bilaterally.      ASSESSMENT: Teresa Clay is a 64 y.o. female that presents as a walk in for increased arm pain, cervical paraspinous musclular pain, and not feeling herself for several days. Pt expresses concern over stroke-like symptoms and requests an immediate CT scan and labs. See HPI for description. I have examined the patient and found  no neurological red flags, but her complaints are concerning for some electrolyte imbalances or dehydration possibilities. She is having increased muscle tension in her neck especially while working as a . She is not currently on any muscle relaxant medication, so I will prescribe this and recommend that she seek ER if she is immediately concerned about her symptoms, or see her PCP in the AM. We discussed that I am unable to do emergent labs or imaging from the Pain Management office. I feel that the patient is safe to leave on her own accord. She is not displaying any concerning s/s at this time. Her speech is normal, gait is normal, thought processes are clear, no weakness to arms/hands or legs/feet. Plan below:     1. Myofascial pain  tiZANidine (ZANAFLEX) 4 MG tablet   2. Cervicalgia  tiZANidine (ZANAFLEX) 4 MG tablet   3. Dizziness and giddiness  CANCELED: CT Head Without Contrast   4. Gait disturbance  CANCELED: CT Head Without Contrast       PLAN:  1. Prescribe tizanidine 4 mg to take PO every 8 hours as needed for myofascial pain  2. Recommend close follow up with either ER or PCP today or tomorrow. We discussed warning s/s or if any of her symptoms return she needs to seek immediate care.   3. Can repeat TPI's in about a week if needed. Will discuss possible Botox injections for cervical dystonia at that time.   4. RTC in 1 - 2 weeks for follow up and TPI's      Paris Zamora CNP  Pain Management

## 2020-08-20 NOTE — ED PROVIDER NOTES
"Encounter Date: 8/20/2020       History     Chief Complaint   Patient presents with    Dizziness     Patient complaining of neck pain and vertigo, saw her MD today and was started on muscle relaxer, rudy 2nd opinion.    Neck Pain     64-year-old female presents to ER for complaints of neck pain and dizziness, she is requesting 2nd opinion s/p seeing her PCP (Noah GARCIA) today being diagnosed with myofascial pain, cervicalgia, dizziness and giddiness & gait disturbance, patient was prescribed muscle relaxers; patient further explains that she has had symptoms for several days and is concerned she may be having stroke-like symptoms, PCP has pending lab work to be drawn tomorrow -- denies dizziness at present time    Noah GARCIA note reviewed:  "She then sought treatment this past year when it started going into her legs, R>L.  She describes a deep, aching pain that is located on either side of her low back and radiates down the posterolateral aspect of her legs to the tops of her feet.  This pain is described in detail below.  Aggravating factors:  Sitting, standing, walking, lifting, lying down, exercise, morning, getting up out of bed/chair  Mitigating factors:  Rest"    Denies fever, headache, syncope, vision changes, painful/difficult swallowing, chest pain, shortness breath, cough, abdominal pain, nausea/vomiting/diarrhea, hematuria/dysuria, pelvic pain/numbness/tingling, difficulty ambulating    Past medical/surgical history, allergies & current medications reviewed with patient -- chronic low back pain, fibromyalgia, anxiety    Known SARS-CoV2 exposure:  No      The history is provided by the patient. No  was used.     Review of patient's allergies indicates:   Allergen Reactions    Betamethasone acet,sod phos Other (See Comments)     " Makes me feel flushed!"    Codeine Rash     Past Medical History:   Diagnosis Date    Chronic back pain     Diverticulitis     Encounter for long-term " (current) use of medications     Hypertension      Past Surgical History:   Procedure Laterality Date    APPENDECTOMY      CHOLECYSTECTOMY      COLONOSCOPY      COLONOSCOPY N/A 7/3/2019    Procedure: COLONOSCOPY;  Surgeon: Jean Jamison MD;  Location: [facility] ENDO;  Service: General;  Laterality: N/A;    EPIDURAL STEROID INJECTION N/A 10/7/2019    Procedure: Injection, Steroid, Epidural - L5/S1 LUMBAR EPIDURAL STEROID INJECTION;  Surgeon: Gail Cohen MD;  Location: [facility] OR;  Service: Pain Management;  Laterality: N/A;    EPIDURAL STEROID INJECTION N/A 1/27/2020    Procedure: Injection, Steroid, Epidural - L5/S1 INTERLAMINAR EPIDURAL STEROID INJECTION;  Surgeon: Gail Cohen MD;  Location: [facility] OR;  Service: Pain Management;  Laterality: N/A;    EPIDURAL STEROID INJECTION N/A 7/2/2020    Procedure: DEAN Lumbar L5-S1;  Surgeon: Seb Blackwell MD;  Location: [facility] OR;  Service: Pain Management;  Laterality: N/A;    ESOPHAGOGASTRODUODENOSCOPY N/A 7/3/2019    Procedure: ESOPHAGOGASTRODUODENOSCOPY (EGD);  Surgeon: Jean Jamison MD;  Location: [facility] ENDO;  Service: General;  Laterality: N/A;    TRANSFORAMINAL EPIDURAL INJECTION OF STEROID Bilateral 5/6/2019    Procedure: Injection,steroid,epidural,transforaminal approach;  Surgeon: Trey Dueñas MD;  Location: [facility] OR;  Service: Pain Management;  Laterality: Bilateral;  L4-5    TUBAL LIGATION       Family History   Problem Relation Age of Onset    Diabetes Brother     Cancer Brother     Breast cancer Maternal Aunt      Social History     Tobacco Use    Smoking status: Current Some Day Smoker     Packs/day: 0.50     Types: Cigarettes    Smokeless tobacco: Never Used    Tobacco comment: stop approx. 6 months ago   Substance Use Topics    Alcohol use: No     Frequency: Never     Binge frequency: Never    Drug use: No     Review of Systems   Constitutional: Negative for fever.   HENT: Negative for sore throat.    Respiratory: Negative for shortness  of breath.    Cardiovascular: Negative for chest pain.   Gastrointestinal: Negative for abdominal pain and nausea.   Genitourinary: Negative for dysuria.   Musculoskeletal: Positive for back pain, myalgias and neck pain. Negative for gait problem (patient observed ambulating to the ER without difficulty on her own accord).   Skin: Negative for rash.   Neurological: Positive for dizziness (denies at present time). Negative for facial asymmetry, speech difficulty, weakness, numbness and headaches.   Hematological: Does not bruise/bleed easily.   All other systems reviewed and are negative.      Physical Exam     Initial Vitals [08/20/20 1746]   BP Pulse Resp Temp SpO2   (!) 147/75 81 20 98.3 °F (36.8 °C) 96 %      MAP       --         Physical Exam    Nursing note and vitals reviewed.  Constitutional: She appears well-developed. She does not appear ill. No distress.   Afebrile, vital signs stable   HENT:   Head: Normocephalic and atraumatic.   Right Ear: Tympanic membrane, external ear and ear canal normal.   Left Ear: Tympanic membrane, external ear and ear canal normal.   Nose: Nose normal.   Mouth/Throat: Uvula is midline, oropharynx is clear and moist and mucous membranes are normal.   Eyes: Lids are normal.   Neck: Neck supple. No thyromegaly present. Muscular tenderness (mild to bilateral paraspinal muscles) present. Carotid bruit is not present. No JVD present.   Patient is able to turn her head left and right 45° without pain, denies pain with palpation C-spine, no crepitus or step-offs appreciated   Cardiovascular: Normal rate.   Pulmonary/Chest: Effort normal and breath sounds normal. No respiratory distress.   Abdominal: Soft. Bowel sounds are normal. She exhibits no distension.   Musculoskeletal:      Cervical back: Normal.      Thoracic back: Normal.      Lumbar back: She exhibits tenderness (mild & reproducible to bilateral paraspinal muscles). She exhibits normal range of motion, no bony tenderness (no  crepitus or step-offs appreciated) and no deformity.   Lymphadenopathy:     She has no cervical adenopathy.   Neurological: She is alert and oriented to person, place, and time. GCS eye subscore is 4. GCS verbal subscore is 5. GCS motor subscore is 6.   Face and eyebrows are symmetrical with midline tongue, strength 5/5 intact all extremities without unilateral weakness or drift, cranial nerves 2-12 grossly intact, we do not appreciate sensory deficits, patient does not demonstrate abnormal coordination or abnormal gait   Skin: Skin is warm. Capillary refill takes less than 2 seconds. No rash noted.   Psychiatric: She has a normal mood and affect.         ED Course   Procedures  Labs Reviewed   CBC W/ AUTO DIFFERENTIAL - Abnormal; Notable for the following components:       Result Value    MPV 9.0 (*)     All other components within normal limits   COMPREHENSIVE METABOLIC PANEL - Abnormal; Notable for the following components:    Potassium 2.5 (*)     Total Bilirubin 1.1 (*)     All other components within normal limits    Narrative:       K critical result(s) called and verbal readback obtained from   LISA Lane by ALBARO 08/20/2020 20:35   MAGNESIUM          Imaging Results    None          Medical Decision Making:   ED Management:  Exam is unimpressive    Lab results reviewed:  K+ 2.5    Findings, diagnosis & plan of care discussed with patient:  Neck strain, hypokalemia; patient repleted with potassium, patient discharged home with potassium and magnesium, instructed to continue taking the muscle muscle relaxer as previously prescribed, care instructions given -- further instructions given to follow-up with PCP early next week to recheck chemistry    All questions answered, strict return precautions given, patient verbalized understanding to all instructions, pleasant visit -- vital signs are stable & patient is in no distress at discharge    Disclaimer:  This note was prepared with MModal Naturally Speaking voice  recognition transcription software. Garbled syntax, mangled pronouns, and other bizarre constructions may be attributed to that software system.  Should there be any questions do not hesitate to contact me for clarification.    Other:   I have discussed this case with another health care provider.       <> Summary of the Discussion: Dr. Holden                   ED Course as of Aug 21 1007   Thu Aug 20, 2020   1902 Spoke with daughter Simran on the phone, she expressed concerns with the understanding that patient's PCP sent patient to the ER for a brain MRI and lab work, we explained to the patient's daughter that the PCPs note does not reveal patient being sent to the ER nor was brain MRI indicated with labs to be drawn outpatient, we agreed that we will draw a basic set of lab panels today in the ER., pleasant conversation with daughter expressing gratitude for care given.    [DH]   1951 Handoff given to Dr. Holden, lab results pending    [DH]      ED Course User Index  [DH] Cirilo Carcamo NP                Clinical Impression:       ICD-10-CM ICD-9-CM   1. Strain of neck muscle, initial encounter  S16.1XXA 847.0   2. Hypokalemia  E87.6 276.8   3. Cervical radiculopathy  M54.12 723.4             ED Disposition Condition    Discharge Stable        ED Prescriptions     Medication Sig Dispense Start Date End Date Auth. Provider    potassium chloride SA (KLOR-CON M20) 20 MEQ tablet Take 1 tablet (20 mEq total) by mouth 2 (two) times daily. for 7 days 14 tablet 8/20/2020 8/27/2020 Mikey Holden, DO    magnesium oxide (MAG-OX) 400 mg (241.3 mg magnesium) tablet Take 1 tablet (400 mg total) by mouth once daily. for 7 days 7 tablet 8/20/2020 8/27/2020 Mikey Holden, DO        Follow-up Information     Follow up With Specialties Details Why Contact Info    Maite Villatoro NP Family Medicine Call  In the morning to schedule follow-up early next week for reevaluation and recheck of your potassium level [address]  [facility]   [facility] [address] [phone]      [facility] - ED Emergency Medicine  As needed, If symptoms worsen [address]  [facility] [address] [phone]                                     Cirilo Carcamo, NP  08/20/20 1807       Cirilo Carcamo, NP  08/20/20 1809       Cirilo Carcamo, NP  08/20/20 1902       Cirilo Carcamo, NP  08/20/20 1927       Cirilo Carcamo, NP  08/20/20 1935       Cirilo Carcamo, NP  08/20/20 1940       Cirilo Carcamo, NP  08/20/20 1953       Cirilo Carcamo, NP  08/21/20 1007

## 2020-08-21 ENCOUNTER — TELEPHONE (OUTPATIENT)
Dept: EMERGENCY MEDICINE | Facility: HOSPITAL | Age: 65
End: 2020-08-21

## 2020-08-21 VITALS
HEART RATE: 71 BPM | OXYGEN SATURATION: 98 % | TEMPERATURE: 98 F | RESPIRATION RATE: 18 BRPM | SYSTOLIC BLOOD PRESSURE: 133 MMHG | DIASTOLIC BLOOD PRESSURE: 77 MMHG

## 2020-08-21 NOTE — TELEPHONE ENCOUNTER
Gustavo Clay, spouse of patient, arrived to facility with concern that his wife's prescriptions did not get sent in to the pharmacy and they were not provided with a copy of the Rx last night upon discharge. Consulted with Dr. Acuna about concern. Verbal order to call in Rx per patient's spouse request.    Magnesium Oxide 400 mg- take one tablet daily for 7 days. Quantity #7. No refills.     Potassium Chloride SA 20 mEq- take one tablet twice daily for 7 days. Quantity # 14. No refills.     Prescriptions called into [facility], spoke with Ariana, Pharmacist.     Spouse updated and informed to  prescriptions at pharmacy.

## 2020-09-21 ENCOUNTER — OFFICE VISIT (OUTPATIENT)
Dept: PAIN MEDICINE | Facility: CLINIC | Age: 65
End: 2020-09-21
Payer: COMMERCIAL

## 2020-09-21 VITALS
HEART RATE: 63 BPM | WEIGHT: 155.31 LBS | DIASTOLIC BLOOD PRESSURE: 77 MMHG | RESPIRATION RATE: 18 BRPM | HEIGHT: 59 IN | SYSTOLIC BLOOD PRESSURE: 133 MMHG | BODY MASS INDEX: 31.31 KG/M2 | TEMPERATURE: 99 F

## 2020-09-21 DIAGNOSIS — M54.16 LUMBAR RADICULOPATHY: ICD-10-CM

## 2020-09-21 DIAGNOSIS — M51.36 DDD (DEGENERATIVE DISC DISEASE), LUMBAR: Primary | ICD-10-CM

## 2020-09-21 DIAGNOSIS — Z01.818 PRE-OP TESTING: ICD-10-CM

## 2020-09-21 DIAGNOSIS — M50.30 DDD (DEGENERATIVE DISC DISEASE), CERVICAL: ICD-10-CM

## 2020-09-21 DIAGNOSIS — M62.838 CERVICAL PARASPINAL MUSCLE SPASM: ICD-10-CM

## 2020-09-21 PROCEDURE — 99999 PR PBB SHADOW E&M-EST. PATIENT-LVL III: ICD-10-PCS | Mod: PBBFAC,,, | Performed by: NURSE PRACTITIONER

## 2020-09-21 PROCEDURE — 99214 OFFICE O/P EST MOD 30 MIN: CPT | Mod: S$GLB,,, | Performed by: NURSE PRACTITIONER

## 2020-09-21 PROCEDURE — 99214 PR OFFICE/OUTPT VISIT, EST, LEVL IV, 30-39 MIN: ICD-10-PCS | Mod: S$GLB,,, | Performed by: NURSE PRACTITIONER

## 2020-09-21 PROCEDURE — 99999 PR PBB SHADOW E&M-EST. PATIENT-LVL III: CPT | Mod: PBBFAC,,, | Performed by: NURSE PRACTITIONER

## 2020-09-21 RX ORDER — MELOXICAM 15 MG/1
15 TABLET ORAL DAILY
Qty: 30 TABLET | Refills: 3 | Status: SHIPPED | OUTPATIENT
Start: 2020-09-21 | End: 2020-11-03

## 2020-09-21 NOTE — PROGRESS NOTES
"FOLLOW UP NOTE:     CHIEF COMPLAINT: hip and lower back pain    INITIAL HISTORY OF PRESENT ILLNESS:   Teresa Clay is a 64 y.o. female with PMH significant for HTN presents as an established patient for Dr. Cohen (new to me) for the continued management of back and shoulder pain.     The patient reports that her pain began in 2012 when she slipped and fell at home. The patient reports of low back pain (R > L) with radiation down the postero-lateral aspect of her LE's down her feet. The patient does report of intermittent numbness in her LE's. The patient describes her back pain as a constant aching type of pain. The patient reports that her current pain is a 7/10. Patient denies of any urinary/fecal incontinence, saddle anesthesia, or weakness. Of note, the patient does mention that her right hip bothers her a fair bit as well. Patient is unsure as to whether that is related to her back.      The patient currently works as a .      The patient reports that her shoulder pain has been present for a few years. The patient localizes her pain to her bilateral trapezius area. The patient reports of intermittent radiation to her left thumb. The patient describes her pain as a "stiff" type of pain. The patient reports that her current pain is a 4/10. The patient denies of hand weakness.      INTERVAL HISTORY OF PRESENT ILLNESS: Teresa Clay is a 64 y.o. female  with PMH significant for HTN presents as an established patient that presents for a f/u and complaints of lower back pain and right hip and leg pain. She says that this is the same pain that she had prior to her last L5 - S1 DEAN. She localizes her pain to the lower back with R > L that radiates down the postero-lateral aspect of her leg to her feet. She does not have any radiation into the left side at this time. She describes the pain as a constant ache 7/10. States, "I could tolerate it through the day when I can stay busy, but at night it " "gets worse".   Patient denies of any urinary/fecal incontinence, saddle anesthesia, or weakness.     INTERVENTIONAL PAIN HISTORY:  7/02/2020: L5 - S1 Intralaminar Epidural Steroid Injection (Dr. Blackwell 100% benefit)     she reports of benefit with injections by Dr. Dueñas and Dr. Cohen     1/27/2020: L5/S1 Interlaminar Epidural Steroid Injection via Dr. Cohen - reports of at least one month of good relief  10/7/2019: L5/S1 Interlaminar Epidural Steroid Injection via Dr. Cohen2/4/2019: Bilateral L4-5 transforaminal epidural steroid injection under fluoroscopy via Dr. Dueñas  5/6/2019: Bilateral L4-5 transforaminal epidural steroid injection under fluoroscopy via Dr. Dueñas    CURRENT PAIN MEDICATIONS:   Voltaren gel   Advil OTC as needed    :  9/13/2020: Norco 5-325 mg #12 tablets  6/28/2019: Tramadol 50 mg #90 tablets  5/27/2019: Tramadol 50 mg #90 tablets    IMAGING:  No new imaging to review    ROS:  Review of Systems   Constitutional: Negative for chills and fever.   HENT: Negative for sore throat.    Eyes: Negative for visual disturbance.   Respiratory: Negative for shortness of breath.    Cardiovascular: Negative for chest pain.   Gastrointestinal: Negative for nausea and vomiting.   Genitourinary: Negative for difficulty urinating.   Musculoskeletal: Positive for back pain, myalgias and neck pain.   Skin: Negative for rash.   Allergic/Immunologic: Negative for immunocompromised state.   Neurological: Negative for syncope.   Hematological: Does not bruise/bleed easily.   Psychiatric/Behavioral: Negative for suicidal ideas.        MEDICAL, SURGICAL, FAMILY, SOCIAL HX: reviewed    MEDICATIONS/ALLERGIES: reviewed    PHYSICAL EXAM:    VITALS: Vitals reviewed.   Vitals:    09/21/20 1509   BP: 133/77   Pulse: 63   Resp: 18   Temp: 98.5 °F (36.9 °C)   TempSrc: Temporal   Weight: 70.5 kg (155 lb 5 oz)   Height: 4' 11" (1.499 m)   PainSc:   7   PainLoc: Hip       Physical Exam   Constitutional: She is oriented to person, place, " and time and well-developed, well-nourished, and in no distress.   HENT:   Head: Normocephalic and atraumatic.   Eyes: Conjunctivae and EOM are normal. Right eye exhibits no discharge. Left eye exhibits no discharge.   Cardiovascular: Normal rate.   Pulmonary/Chest: Effort normal and breath sounds normal. No respiratory distress.   Abdominal: Soft.   Musculoskeletal:         General: Tenderness present. No edema.   Neurological: She is alert and oriented to person, place, and time.   Skin: Skin is warm and dry. No rash noted. She is not diaphoretic.   Psychiatric: Mood, memory, affect and judgment normal.   Nursing note and vitals reviewed.         ASSESSMENT: Teresa Clay is a 64 y.o. female with c/o recurrent pain to her lower back with right sided radiculopathy. She has had previous L5 - S1 DEAN that has provided up to 100% relief for about 10 weeks. We discussed interventional options and previous imaging. Plan below:    1. DDD (degenerative disc disease), lumbar  meloxicam (MOBIC) 15 MG tablet   2. Lumbar radiculopathy  meloxicam (MOBIC) 15 MG tablet   3. DDD (degenerative disc disease), cervical  meloxicam (MOBIC) 15 MG tablet   4. Cervical paraspinal muscle spasm  meloxicam (MOBIC) 15 MG tablet       PLAN:  1. Schedule Interlaminar Epidural Steroid Injection L5 - S1   2. Prescribe meloxicam 15 mg PO to take daily. Do not take any other NSAIDs or containing medications with this.   3. Continue stretches and strengthening for neck and lower back.   4. Continue Voltaren gel 4 times daily as needed.   5. I discussed with the patient potential secondary side effects of medication prescribed and urged the patient to read all FDA approved materials that the pharmacy provides with the prescription.  6. Discussed safety in and around the home to prevent falls and injury. Discussed any environmental changes that can be made to help with safety.   7. I have stressed the importance of physical activity and a home  exercise plan to help with chronic pain and improve health.        Paris Zamora, AJ  Pain Management

## 2020-09-21 NOTE — LETTER
September 21, 2020      Maite Villatoro NP  [address] [place] [address]   [facility] [place] [address]           [facility] - Pain Management  [address] [facility]  [place] [address]  Phone: [phone]  Fax: [phone]          Patient: Teresa Clay   MR Number: [id number]   YOB: 1955   Date of Visit: 9/21/2020       Dear Dr. Maite Villatoro:    Thank you for referring Teresa Clay to me for evaluation. Attached you will find relevant portions of my assessment and plan of care.    If you have questions, please do not hesitate to call me. I look forward to following Teresa Clay along with you.    Sincerely,    Paris Zamora, JOSE    Enclosure  CC:  No Recipients    If you would like to receive this communication electronically, please contact [email or web address] or [phone] to request more information on [facility] access.    For providers and/or their staff who would like to refer a patient to [facility], please contact us through our one-stop-shop provider referral line, [facility], at [phone].    If you feel you have received this communication in error or would no longer like to receive these types of communications, please e-mail [email or web address]         
Out of season

## 2020-10-12 ENCOUNTER — LAB VISIT (OUTPATIENT)
Dept: FAMILY MEDICINE | Facility: CLINIC | Age: 65
End: 2020-10-12
Payer: COMMERCIAL

## 2020-10-12 DIAGNOSIS — Z01.818 PRE-OP TESTING: ICD-10-CM

## 2020-10-12 PROCEDURE — U0003 INFECTIOUS AGENT DETECTION BY NUCLEIC ACID (DNA OR RNA); SEVERE ACUTE RESPIRATORY SYNDROME CORONAVIRUS 2 (SARS-COV-2) (CORONAVIRUS DISEASE [COVID-19]), AMPLIFIED PROBE TECHNIQUE, MAKING USE OF HIGH THROUGHPUT TECHNOLOGIES AS DESCRIBED BY CMS-2020-01-R: HCPCS

## 2020-10-13 LAB — SARS-COV-2 RNA RESP QL NAA+PROBE: NOT DETECTED

## 2020-10-15 ENCOUNTER — HOSPITAL ENCOUNTER (OUTPATIENT)
Facility: HOSPITAL | Age: 65
Discharge: HOME | End: 2020-10-15
Attending: ANESTHESIOLOGY | Admitting: ANESTHESIOLOGY
Payer: COMMERCIAL

## 2020-10-15 VITALS
BODY MASS INDEX: 31.25 KG/M2 | HEIGHT: 59 IN | TEMPERATURE: 98 F | RESPIRATION RATE: 20 BRPM | WEIGHT: 155 LBS | SYSTOLIC BLOOD PRESSURE: 137 MMHG | OXYGEN SATURATION: 98 % | DIASTOLIC BLOOD PRESSURE: 57 MMHG | HEART RATE: 63 BPM

## 2020-10-15 DIAGNOSIS — M51.36 DEGENERATIVE DISC DISEASE, LUMBAR: Primary | ICD-10-CM

## 2020-10-15 PROCEDURE — 62323 NJX INTERLAMINAR LMBR/SAC: CPT | Mod: ,,, | Performed by: ANESTHESIOLOGY

## 2020-10-15 PROCEDURE — 63600175 PHARM REV CODE 636 W HCPCS: Performed by: ANESTHESIOLOGY

## 2020-10-15 PROCEDURE — 62323 NJX INTERLAMINAR LMBR/SAC: CPT | Performed by: ANESTHESIOLOGY

## 2020-10-15 PROCEDURE — 25000003 PHARM REV CODE 250: Performed by: ANESTHESIOLOGY

## 2020-10-15 PROCEDURE — 62323 PR INJ LUMBAR/SACRAL, W/IMAGING GUIDANCE: ICD-10-PCS | Mod: ,,, | Performed by: ANESTHESIOLOGY

## 2020-10-15 PROCEDURE — 25500020 PHARM REV CODE 255: Performed by: ANESTHESIOLOGY

## 2020-10-15 RX ORDER — LIDOCAINE HYDROCHLORIDE 10 MG/ML
INJECTION INFILTRATION; PERINEURAL
Status: DISCONTINUED | OUTPATIENT
Start: 2020-10-15 | End: 2020-10-15 | Stop reason: HOSPADM

## 2020-10-15 RX ORDER — LIDOCAINE HYDROCHLORIDE 10 MG/ML
INJECTION INFILTRATION; PERINEURAL
Status: DISCONTINUED
Start: 2020-10-15 | End: 2020-10-15 | Stop reason: HOSPADM

## 2020-10-15 RX ORDER — SODIUM CHLORIDE, SODIUM LACTATE, POTASSIUM CHLORIDE, CALCIUM CHLORIDE 600; 310; 30; 20 MG/100ML; MG/100ML; MG/100ML; MG/100ML
INJECTION, SOLUTION INTRAVENOUS ONCE AS NEEDED
Status: DISCONTINUED | OUTPATIENT
Start: 2020-10-15 | End: 2020-10-15 | Stop reason: HOSPADM

## 2020-10-15 RX ORDER — ALPRAZOLAM 0.5 MG/1
0.5 TABLET, ORALLY DISINTEGRATING ORAL
Status: DISCONTINUED | OUTPATIENT
Start: 2020-10-15 | End: 2020-10-15 | Stop reason: HOSPADM

## 2020-10-15 RX ORDER — METHYLPREDNISOLONE ACETATE 80 MG/ML
INJECTION, SUSPENSION INTRA-ARTICULAR; INTRALESIONAL; INTRAMUSCULAR; SOFT TISSUE
Status: DISCONTINUED | OUTPATIENT
Start: 2020-10-15 | End: 2020-10-15 | Stop reason: HOSPADM

## 2020-10-15 RX ORDER — METHYLPREDNISOLONE ACETATE 80 MG/ML
INJECTION, SUSPENSION INTRA-ARTICULAR; INTRALESIONAL; INTRAMUSCULAR; SOFT TISSUE
Status: DISCONTINUED
Start: 2020-10-15 | End: 2020-10-15 | Stop reason: HOSPADM

## 2020-10-15 NOTE — DISCHARGE SUMMARY
[facility]  Discharge Note  Short Stay    Procedure(s) (LRB):  DEAN Lumbar L5-S1 (N/A)    OUTCOME: Patient tolerated treatment/procedure well without complication and is now ready for discharge.    DISPOSITION: Home or Self Care    FINAL DIAGNOSIS:  Degenerative disc disease, lumbar    FOLLOWUP: In clinic    DISCHARGE INSTRUCTIONS:    Discharge Procedure Orders   Diet general     Call MD for:  temperature >100.4     Call MD for:  persistent nausea and vomiting     Call MD for:  severe uncontrolled pain     Call MD for:  difficulty breathing, headache or visual disturbances     Call MD for:  redness, tenderness, or signs of infection (pain, swelling, redness, odor or green/yellow discharge around incision site)     Call MD for:  hives     Call MD for:  persistent dizziness or light-headedness     Call MD for:  extreme fatigue        Clinical Reference Documents Added to Patient Instructions       Document    INJECTION, CERVICAL EPIDURAL: YOUR EXPERIENCE  (ENGLISH)

## 2020-10-15 NOTE — PLAN OF CARE
To PACU via stretcher s/p lumbar DEAN. Connected to monitors-alarms on.   AAO x3. Resp even and unlabored room air. No distress observed. No IV access. Band-aids to puncture sites-clean, dry and intact. VSS. Will monitor closely.

## 2020-10-15 NOTE — OP NOTE
PROCEDURE DATE: 10/15/2020    PROCEDURE: Lumbar interlaminar epidural steroid injection at L5-S1 under fluoroscopic guidance (right paramedian approach).    DIAGNOSIS: LUMBAR DISC DISPLACEMENT WITHOUT MYELOPATHY  POSTOP DIAGNOSIS: SAME    PHYSICIAN: Seb Blackwell M.D.    MEDICATIONS INJECTED: 80 mg depo-medrol with 4 ml of preservative free NaCl    LOCAL ANESTHETIC INJECTED:    Lidocaine 1% 3 ml total    SEDATION MEDICATIONS: N/A    ESTIMATED BLOOD LOSS:  none    COMPLICATIONS:  none    TECHNIQUE:  Time-out taken to identify patient and procedure prior to starting the procedure.  With the patient laying in a prone position, the area was prepped and draped in the usual sterile fashion using ChloraPrep and a fenestrated drape.  After determining the target level with an AP fluoroscopic view, local anesthetic was given using a 25-gauge 1.5 inch needle by raising a wheal and then infiltrating toward the interlaminar entry space.  A 3.5 inch 20-gauge Touhy needle was introduced under AP fluoroscopic guidance to the interlaminar space of L5-S1. Once the trajectory was established, the needle was visualized in the lateral view and advanced using loss of resistance technique. Once in the desired position, 2 mL contrast was injected to confirm placement and there was no vascular uptake nor intrathecal spread.  The medication was then injected slowly. The patient tolerated the procedure well.      The patient was monitored after the procedure.   They were given post-procedure and discharge instructions to follow at home.  The patient was discharged in a stable condition.

## 2020-10-15 NOTE — PLAN OF CARE
Has met unit/department guidelines for discharge from each phase of the post procedure continuum. Leaving floor per w/c with RN. JENNIE x3. Resp even and unlabored room air. No distress noted. All personal belongings returned to pt.

## 2020-10-15 NOTE — INTERVAL H&P NOTE

## 2020-10-22 ENCOUNTER — TELEPHONE (OUTPATIENT)
Dept: PAIN MEDICINE | Facility: CLINIC | Age: 65
End: 2020-10-22

## 2020-10-22 PROBLEM — Z20.822 CLOSE EXPOSURE TO COVID-19 VIRUS: Status: ACTIVE | Noted: 2020-10-22

## 2020-10-22 PROBLEM — Z71.89 COUNSELED ABOUT COVID-19 VIRUS INFECTION: Status: ACTIVE | Noted: 2020-10-22

## 2020-10-22 RX ORDER — DICLOFENAC SODIUM 10 MG/G
GEL TOPICAL
Qty: 200 G | Refills: 2 | Status: SHIPPED | OUTPATIENT
Start: 2020-10-22 | End: 2021-01-19 | Stop reason: SDUPTHER

## 2020-10-26 ENCOUNTER — HOSPITAL ENCOUNTER (EMERGENCY)
Facility: HOSPITAL | Age: 65
Discharge: HOME | End: 2020-10-26
Attending: EMERGENCY MEDICINE
Payer: COMMERCIAL

## 2020-10-26 VITALS
DIASTOLIC BLOOD PRESSURE: 79 MMHG | TEMPERATURE: 98 F | BODY MASS INDEX: 31.25 KG/M2 | HEIGHT: 59 IN | HEART RATE: 60 BPM | SYSTOLIC BLOOD PRESSURE: 153 MMHG | OXYGEN SATURATION: 94 % | WEIGHT: 155 LBS | RESPIRATION RATE: 18 BRPM

## 2020-10-26 DIAGNOSIS — M62.838 MUSCLE SPASM: Primary | ICD-10-CM

## 2020-10-26 PROCEDURE — 99281 EMR DPT VST MAYX REQ PHY/QHP: CPT

## 2020-10-26 NOTE — Clinical Note
"Teresa Smithdy" Obed was seen and treated in our emergency department on 10/26/2020.  She may return to work on 10/28/2020.       If you have any questions or concerns, please don't hesitate to call.      Yuliet Slade MD"

## 2020-10-31 NOTE — ED PROVIDER NOTES
"Encounter Date: 10/26/2020       History     Chief Complaint   Patient presents with    Spasms     65-year-old female with past medical history significant for chronic back and neck pain - follows with Dr. Blackwell, diverticulitis, hypertension presents to the ED for evaluation of bilateral neck spasm.  States she has been in contact with her pain management doctor's office but has not heard back.  Denies any known trauma.        Review of patient's allergies indicates:   Allergen Reactions    Betamethasone acet,sod phos Other (See Comments)     " Makes me feel flushed!"    Codeine Rash     Past Medical History:   Diagnosis Date    Chronic back pain     Diverticulitis     Encounter for long-term (current) use of medications     Hypertension      Past Surgical History:   Procedure Laterality Date    APPENDECTOMY      CHOLECYSTECTOMY      COLONOSCOPY      COLONOSCOPY N/A 7/3/2019    Procedure: COLONOSCOPY;  Surgeon: Jean Jamison MD;  Location: [facility] ENDO;  Service: General;  Laterality: N/A;    EPIDURAL STEROID INJECTION N/A 10/7/2019    Procedure: Injection, Steroid, Epidural - L5/S1 LUMBAR EPIDURAL STEROID INJECTION;  Surgeon: Gail Cohen MD;  Location: [facility] OR;  Service: Pain Management;  Laterality: N/A;    EPIDURAL STEROID INJECTION N/A 1/27/2020    Procedure: Injection, Steroid, Epidural - L5/S1 INTERLAMINAR EPIDURAL STEROID INJECTION;  Surgeon: Gail Cohen MD;  Location: [facility] OR;  Service: Pain Management;  Laterality: N/A;    EPIDURAL STEROID INJECTION N/A 7/2/2020    Procedure: DEAN Lumbar L5-S1;  Surgeon: Seb Blackwell MD;  Location: [facility] OR;  Service: Pain Management;  Laterality: N/A;    EPIDURAL STEROID INJECTION N/A 10/15/2020    Procedure: DEAN Lumbar L5-S1;  Surgeon: Seb Blackwell MD;  Location: [facility] OR;  Service: Pain Management;  Laterality: N/A;    ESOPHAGOGASTRODUODENOSCOPY N/A 7/3/2019    Procedure: ESOPHAGOGASTRODUODENOSCOPY (EGD);  Surgeon: Jean Jamison MD;  " Location: [facility] ENDO;  Service: General;  Laterality: N/A;    TRANSFORAMINAL EPIDURAL INJECTION OF STEROID Bilateral 5/6/2019    Procedure: Injection,steroid,epidural,transforaminal approach;  Surgeon: Trey Dueñas MD;  Location: [facility] OR;  Service: Pain Management;  Laterality: Bilateral;  L4-5    TUBAL LIGATION       Family History   Problem Relation Age of Onset    Diabetes Brother     Cancer Brother     Breast cancer Maternal Aunt      Social History     Tobacco Use    Smoking status: Current Some Day Smoker     Packs/day: 0.50     Types: Cigarettes    Smokeless tobacco: Never Used    Tobacco comment: stop approx. 6 months ago   Substance Use Topics    Alcohol use: No     Frequency: Never     Binge frequency: Never    Drug use: No     Review of Systems   Constitutional: Negative for appetite change, chills, diaphoresis, fatigue and fever.   HENT: Negative for congestion, ear pain, rhinorrhea, sinus pressure, sinus pain, sore throat and tinnitus.    Eyes: Negative for photophobia and visual disturbance.   Respiratory: Negative for cough, chest tightness, shortness of breath and wheezing.    Cardiovascular: Negative for chest pain, palpitations and leg swelling.   Gastrointestinal: Negative for abdominal pain, constipation, diarrhea, nausea and vomiting.   Endocrine: Negative for cold intolerance, heat intolerance, polydipsia, polyphagia and polyuria.   Genitourinary: Negative for decreased urine volume, difficulty urinating, dysuria, flank pain, frequency, hematuria and urgency.   Musculoskeletal: Positive for neck stiffness. Negative for arthralgias, back pain, gait problem, joint swelling, myalgias and neck pain.   Skin: Negative for color change, pallor, rash and wound.   Allergic/Immunologic: Negative for immunocompromised state.   Neurological: Negative for dizziness, syncope, weakness, light-headedness, numbness and headaches.   Hematological: Negative for adenopathy. Does not bruise/bleed easily.    Psychiatric/Behavioral: Negative for decreased concentration, dysphoric mood and sleep disturbance. The patient is not nervous/anxious.    All other systems reviewed and are negative.      Physical Exam     Initial Vitals [10/26/20 0905]   BP Pulse Resp Temp SpO2   (!) 153/79 60 18 97.9 °F (36.6 °C) (!) 94 %      MAP       --         Physical Exam    Nursing note and vitals reviewed.  Constitutional: She appears well-developed and well-nourished. She is not diaphoretic. No distress.   HENT:   Head: Normocephalic and atraumatic.   Right Ear: External ear normal.   Left Ear: External ear normal.   Nose: Nose normal.   Mouth/Throat: Oropharynx is clear and moist.   Eyes: Conjunctivae are normal. Pupils are equal, round, and reactive to light. No scleral icterus.   Neck: Trachea normal and normal range of motion. Neck supple. Muscular tenderness present. No spinous process tenderness present. No neck rigidity. No JVD present.       Cardiovascular: Normal rate, regular rhythm, normal heart sounds and intact distal pulses.   Pulmonary/Chest: Breath sounds normal. No respiratory distress. She has no wheezes. She has no rhonchi. She has no rales. She exhibits no tenderness.   Abdominal: Soft. Bowel sounds are normal. She exhibits no distension. There is no abdominal tenderness. There is no rebound and no guarding.   Musculoskeletal: Normal range of motion. No tenderness or edema.   Lymphadenopathy:     She has no cervical adenopathy.   Neurological: She is alert and oriented to person, place, and time. GCS score is 15. GCS eye subscore is 4. GCS verbal subscore is 5. GCS motor subscore is 6.   Skin: Skin is warm and dry. Capillary refill takes less than 2 seconds. No rash noted. No erythema. No pallor.   Psychiatric: She has a normal mood and affect. Her behavior is normal. Judgment and thought content normal.         ED Course   Procedures  Labs Reviewed - No data to display       Imaging Results    None          Medical  Decision Making:   Differential Diagnosis:   Encounter for medication refill, muscle spasm                             Clinical Impression:       ICD-10-CM ICD-9-CM   1. Muscle spasm  M62.838 728.85                      Disposition:   Disposition: Discharged  Condition: Stable     ED Disposition Condition    Discharge Stable        ED Prescriptions     None        Follow-up Information     Follow up With Specialties Details Why Contact Info    Maite Villatoro NP Family Medicine Schedule an appointment as soon as possible for a visit   [address] [place] [address] Dr  [place] [address] [phone]                                         Yuliet Slade MD  10/30/20 2272

## 2020-11-03 ENCOUNTER — OFFICE VISIT (OUTPATIENT)
Dept: PAIN MEDICINE | Facility: CLINIC | Age: 65
End: 2020-11-03
Payer: COMMERCIAL

## 2020-11-03 VITALS
HEIGHT: 59 IN | SYSTOLIC BLOOD PRESSURE: 152 MMHG | BODY MASS INDEX: 31.83 KG/M2 | DIASTOLIC BLOOD PRESSURE: 81 MMHG | HEART RATE: 66 BPM | TEMPERATURE: 98 F | WEIGHT: 157.88 LBS

## 2020-11-03 DIAGNOSIS — M54.2 CERVICALGIA: ICD-10-CM

## 2020-11-03 DIAGNOSIS — M51.36 DDD (DEGENERATIVE DISC DISEASE), LUMBAR: ICD-10-CM

## 2020-11-03 DIAGNOSIS — M54.16 LUMBAR RADICULOPATHY: ICD-10-CM

## 2020-11-03 DIAGNOSIS — M79.7 FIBROMYALGIA: ICD-10-CM

## 2020-11-03 DIAGNOSIS — M79.18 MYOFASCIAL PAIN: Primary | ICD-10-CM

## 2020-11-03 DIAGNOSIS — M62.838 CERVICAL PARASPINAL MUSCLE SPASM: ICD-10-CM

## 2020-11-03 DIAGNOSIS — M50.30 DDD (DEGENERATIVE DISC DISEASE), CERVICAL: ICD-10-CM

## 2020-11-03 PROBLEM — J32.9 SINUSITIS: Status: ACTIVE | Noted: 2020-11-03

## 2020-11-03 PROCEDURE — 99214 OFFICE O/P EST MOD 30 MIN: CPT | Mod: S$GLB,,, | Performed by: NURSE PRACTITIONER

## 2020-11-03 PROCEDURE — 99214 PR OFFICE/OUTPT VISIT, EST, LEVL IV, 30-39 MIN: ICD-10-PCS | Mod: S$GLB,,, | Performed by: NURSE PRACTITIONER

## 2020-11-03 PROCEDURE — 99999 PR PBB SHADOW E&M-EST. PATIENT-LVL IV: CPT | Mod: PBBFAC,,, | Performed by: NURSE PRACTITIONER

## 2020-11-03 PROCEDURE — 99999 PR PBB SHADOW E&M-EST. PATIENT-LVL IV: ICD-10-PCS | Mod: PBBFAC,,, | Performed by: NURSE PRACTITIONER

## 2020-11-03 RX ORDER — METHOCARBAMOL 500 MG/1
500 TABLET, FILM COATED ORAL 4 TIMES DAILY
Qty: 90 TABLET | Refills: 0 | Status: SHIPPED | OUTPATIENT
Start: 2020-11-03 | End: 2020-12-03 | Stop reason: ALTCHOICE

## 2020-11-03 RX ORDER — HYDROCODONE BITARTRATE AND ACETAMINOPHEN 5; 325 MG/1; MG/1
1 TABLET ORAL EVERY 6 HOURS PRN
Qty: 30 TABLET | Refills: 0 | Status: SHIPPED | OUTPATIENT
Start: 2020-11-03 | End: 2020-12-03 | Stop reason: ALTCHOICE

## 2020-11-03 RX ORDER — MELOXICAM 15 MG/1
15 TABLET ORAL DAILY
Qty: 90 TABLET | Refills: 2 | Status: SHIPPED | OUTPATIENT
Start: 2020-11-03 | End: 2020-12-03 | Stop reason: ALTCHOICE

## 2020-11-03 NOTE — PROGRESS NOTES
"FOLLOW UP NOTE:     CHIEF COMPLAINT:  DEAN follow-up    INITIAL HISTORY OF PRESENT ILLNESS:   Teresa Clay is a 64 y.o. female with PMH significant for HTN presents as an established patient for Dr. Cohen (new to me) for the continued management of back and shoulder pain.     The patient reports that her pain began in 2012 when she slipped and fell at home. The patient reports of low back pain (R > L) with radiation down the postero-lateral aspect of her LE's down her feet. The patient does report of intermittent numbness in her LE's. The patient describes her back pain as a constant aching type of pain. The patient reports that her current pain is a 7/10. Patient denies of any urinary/fecal incontinence, saddle anesthesia, or weakness. Of note, the patient does mention that her right hip bothers her a fair bit as well. Patient is unsure as to whether that is related to her back.      The patient currently works as a .      The patient reports that her shoulder pain has been present for a few years. The patient localizes her pain to her bilateral trapezius area. The patient reports of intermittent radiation to her left thumb. The patient describes her pain as a "stiff" type of pain. The patient reports that her current pain is a 4/10. The patient denies of hand weakness.     INTERVAL HISTORY OF PRESENT ILLNESS: Teresa Clay is a 65 y.o. female  with PMH significant for HTN presents as an established patient that presents for a f/u and complaints of lower back pain and right hip and leg pain and follow-up for an DEAN.  The patient had an DEAN  L5-S1 on 10/15/2020.  She reports that she got about 50% benefit for several weeks, which is unusual she feels that she usually gets more benefit than this from this same injection.  She still complain of some localized lumbar pain but no radiation down her legs or into her hips.  Her main complaint today is discomfort in her cervical muscle area, which " is ongoing and persistent especially with activity involving use of her arms.  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  She has been prescribed muscle relaxants and meloxicam previously, but she does not take as directed.  She rates her pain as 7/10 today.  Of note, she is seeking assistance with finding a plastic surgeon to do a breast reduction and feels that this will help her neck pain is significantly.  However, she is having a hard time finding someone to take her insurance.  Patient denies of any urinary/fecal incontinence, saddle anesthesia, or weakness.       INTERVENTIONAL PAIN HISTORY:  10/15/2020:  Lumbar DEAN (Dr. Blackwell) - 50% benefit for about 4 weeks  7/22/2020: Cervical TPI's - good benefit x 1 week  7/02/2020: L5 - S1 Intralaminar Epidural Steroid Injection (Dr. Blackwell 100% benefit)     she reports of benefit with injections by Dr. Dueñas and Dr. Cohen     1/27/2020: L5/S1 Interlaminar Epidural Steroid Injection via Dr. Cohen - reports of at least one month of good relief  10/7/2019: L5/S1 Interlaminar Epidural Steroid Injection via Dr. Cohen2/4/2019: Bilateral L4-5 transforaminal epidural steroid injection under fluoroscopy via Dr. Dueñas  5/6/2019: Bilateral L4-5 transforaminal epidural steroid injection under fluoroscopy via Dr. Dueñas      CURRENT PAIN MEDICATIONS:   Voltaren gel as needed (she rarely uses)  Over-the-counter ibuprofen as needed  ** the patient is not using the prescribed meloxicam or tizanidine**    :  9/13/2020: Norco 5-325 mg #12 tablets  6/28/2019: Tramadol 50 mg #90 tablets  5/27/2019: Tramadol 50 mg #90 tablets    IMAGING:  No new imaging to review at this time    ROS:  Review of Systems   Constitutional: Negative for chills and fever.   HENT: Negative for sore throat.    Eyes: Negative for visual disturbance.   Respiratory: Negative for shortness of breath.    Cardiovascular: Negative for chest pain.   Gastrointestinal: Negative for nausea  "and vomiting.   Genitourinary: Negative for difficulty urinating.   Musculoskeletal: Positive for back pain, myalgias and neck pain.   Skin: Negative for rash.   Allergic/Immunologic: Negative for immunocompromised state.   Neurological: Negative for syncope.   Hematological: Does not bruise/bleed easily.   Psychiatric/Behavioral: Negative for suicidal ideas.        MEDICAL, SURGICAL, FAMILY, SOCIAL HX: reviewed    MEDICATIONS/ALLERGIES: reviewed    PHYSICAL EXAM:    VITALS: Vitals reviewed.   Vitals:    11/03/20 1334   BP: (!) 152/81   Pulse: 66   Temp: 98 °F (36.7 °C)   TempSrc: Oral   Weight: 71.6 kg (157 lb 13.6 oz)   Height: 4' 11" (1.499 m)   PainSc:   7   PainLoc: Shoulder       Physical Exam   Constitutional: She is oriented to person, place, and time and well-developed, well-nourished, and in no distress.   HENT:   Head: Normocephalic and atraumatic.   Eyes: Conjunctivae and EOM are normal. Right eye exhibits no discharge. Left eye exhibits no discharge.   Cardiovascular: Normal rate.   Pulmonary/Chest: Effort normal and breath sounds normal. No respiratory distress.   Abdominal: Soft.   Musculoskeletal:         General: Tenderness present. No edema.   Neurological: She is alert and oriented to person, place, and time.   Skin: Skin is warm and dry. No rash noted. She is not diaphoretic.   Psychiatric: Mood, memory, affect and judgment normal.   Nursing note and vitals reviewed.       ASSESSMENT: Teresa Clay is a 65 y.o. female with PMH significant for HTN presents as an established patient that presents for a f/u and complaints of lower back pain and right hip and leg pain and follow-up for an DEAN.  She does not feel that this most recent injection gave her relief that she normally gets.  Today, she reports that her worst pain is in her cervical paraspinous muscles.  However, she is not taking medication as prescribed for inflammation, pain, or muscle spasms.  We discussed general debilitation and " the need for exercise and strengthening of the muscles to provide her with more comfort when she is doing general activities and her work as a .  We also discussed the fact that a breast reduction could be beneficial in regards to her neck pain and he denied degeneration of spine.  We discussed the assessment and recommendations.  All available images were reviewed. We discussed the disease process, prognosis, treatment plan, and risks and benefits. The patient is aware of the risks and benefits of the medications being prescribed, common side effects, and proper usage. The following is the plan we agreed on:         1. Myofascial pain  methocarbamoL (ROBAXIN) 500 MG Tab    HYDROcodone-acetaminophen (NORCO) 5-325 mg per tablet    meloxicam (MOBIC) 15 MG tablet   2. DDD (degenerative disc disease), lumbar  HYDROcodone-acetaminophen (NORCO) 5-325 mg per tablet    meloxicam (MOBIC) 15 MG tablet   3. Lumbar radiculopathy  HYDROcodone-acetaminophen (NORCO) 5-325 mg per tablet    meloxicam (MOBIC) 15 MG tablet   4. DDD (degenerative disc disease), cervical  HYDROcodone-acetaminophen (NORCO) 5-325 mg per tablet    meloxicam (MOBIC) 15 MG tablet   5. Cervical paraspinal muscle spasm  HYDROcodone-acetaminophen (NORCO) 5-325 mg per tablet    meloxicam (MOBIC) 15 MG tablet   6. Fibromyalgia  HYDROcodone-acetaminophen (NORCO) 5-325 mg per tablet    meloxicam (MOBIC) 15 MG tablet       PLAN:  1.  Referral to plastic surgery for evaluation of breast reduction  2.  Prescribed meloxicam 15 mg to take daily.  Instructed her not to take any other NSAIDs or containing medicines with this.  We discussed the importance of taking this daily for the inflammation and swelling and to reduce her overall pain and discomfort  3.  Stop tizanidine and start Robaxin 500 mg.  She can take up to 4 times a day as needed for muscle spasms and tightness.  4.  Continue the Voltaren gel.  Use consistently 4 times a day for 1 week.  5.   Prescribed Norco 5-325 mg to take nightly as needed for severe pain  6.  Discussed stretching and strengthening of neck and lower back.  Printed exercises given to the patient.  7.I have stressed the importance of physical activity and a home exercise plan to help with chronic pain and improve health.  8. I discussed with the patient potential secondary side effects of medication prescribed and urged the patient to read all FDA approved materials that the pharmacy provides with the prescription.      Paris Zamora, AJ  Pain Management

## 2020-11-10 PROBLEM — J30.9 ALLERGIC SINUSITIS: Status: ACTIVE | Noted: 2020-11-03

## 2020-12-03 ENCOUNTER — HOSPITAL ENCOUNTER (EMERGENCY)
Facility: HOSPITAL | Age: 65
Discharge: HOME | End: 2020-12-04
Attending: EMERGENCY MEDICINE
Payer: COMMERCIAL

## 2020-12-03 VITALS
DIASTOLIC BLOOD PRESSURE: 73 MMHG | TEMPERATURE: 98 F | RESPIRATION RATE: 20 BRPM | BODY MASS INDEX: 30.24 KG/M2 | OXYGEN SATURATION: 93 % | HEART RATE: 70 BPM | SYSTOLIC BLOOD PRESSURE: 140 MMHG | HEIGHT: 59 IN | WEIGHT: 150 LBS

## 2020-12-03 DIAGNOSIS — J32.9 SINUSITIS, UNSPECIFIED CHRONICITY, UNSPECIFIED LOCATION: Primary | ICD-10-CM

## 2020-12-03 DIAGNOSIS — J01.00 ACUTE MAXILLARY SINUSITIS, RECURRENCE NOT SPECIFIED: ICD-10-CM

## 2020-12-03 PROCEDURE — 99283 EMERGENCY DEPT VISIT LOW MDM: CPT | Mod: 25

## 2020-12-04 LAB — SARS-COV-2 RDRP RESP QL NAA+PROBE: NEGATIVE

## 2020-12-04 PROCEDURE — U0002 COVID-19 LAB TEST NON-CDC: HCPCS

## 2020-12-04 RX ORDER — AMOXICILLIN AND CLAVULANATE POTASSIUM 875; 125 MG/1; MG/1
1 TABLET, FILM COATED ORAL 2 TIMES DAILY
Qty: 20 TABLET | Refills: 0 | Status: SHIPPED | OUTPATIENT
Start: 2020-12-04 | End: 2021-01-19

## 2020-12-04 RX ORDER — CETIRIZINE HYDROCHLORIDE 10 MG/1
10 TABLET ORAL DAILY
Qty: 20 TABLET | Refills: 0 | Status: SHIPPED | OUTPATIENT
Start: 2020-12-04 | End: 2021-01-19

## 2020-12-04 NOTE — ED TRIAGE NOTES
Pt presents to ED POV with c/o headache x3 days. She reports that the pain comes and goes. She also reports wet cough. She is AAOx4. Skin warm, dry to touch. Respirations even, nonlabored. Ambulatory unassisted. Denies loss of taste/smell, fever.

## 2020-12-04 NOTE — ED NOTES
"Registration staff notified that the patients  can now come back to the room. They report "I think he left but if he comes back we will tell him."   "

## 2020-12-04 NOTE — ED PROVIDER NOTES
CHIEF COMPLAINT    Chief Complaint   Patient presents with    Headache       HPI    Teresa Clay is a 65 y.o. female who presents complaining of a headache for the past 3 days.  Pain is more of a pressure sensation mainly in the frontal and maxillary sinus area.  She has had some sinus drainage and nasal congestion.  Also has headache in the back of the head also.  She took Motrin earlier tonight without much relief.  She has had some low-grade fevers and a mild cough.  Denies any chest pain, shortness of breath, abdominal pain, nausea, vomiting, diarrhea, dysuria, numbness or weakness in extremities.  The severity of the symptoms is moderate.    CURRENT MEDICATIONS    Prior to Admission medications    Medication Sig Start Date End Date Taking? Authorizing Provider   diclofenac sodium (VOLTAREN) 1 % Gel APPLY 4 GRAMS TOPICALLY TO THE AFFECTED AREA FOUR TIMES DAILY AS DIRECTED 10/22/20  Yes Trey Dueñas MD   ergocalciferol (ERGOCALCIFEROL) 50,000 unit Cap Take 1 capsule (50,000 Units total) by mouth every 7 days. 3/25/20 2/13/21 Yes Mateo Herbert MD   hydroCHLOROthiazide (HYDRODIURIL) 25 MG tablet TK 1 T PO QD 3/18/19  Yes Historical Provider   metoprolol succinate (TOPROL-XL) 25 MG 24 hr tablet TAKE ONE TABLET BY MOUTH ONCE DAILY  Patient taking differently: TAKE HALF TABLET BY MOUTH ONCE DAILY 8/8/17  Yes Willie Jerome III, MD   tiotropium (SPIRIVA WITH HANDIHALER) 18 mcg inhalation capsule Inhale 1 capsule into the lungs. 2/15/19  Yes Historical Provider   fluticasone (FLONASE) 50 mcg/actuation nasal spray fluticasone 50 mcg/actuation nasal spray,suspension    Historical Provider   metoprolol tartrate (LOPRESSOR) 25 MG tablet  12/1/19   Historical Provider   varicella-zoster [id number], PF, (SHINGRIX, PF,) 50 mcg/0.5 mL injection Inject 0.5 mLs into the muscle. 9/30/19   Historical Provider   acyclovir (ZOVIRAX) 400 MG tablet Take 1 tablet (400 mg total) by mouth 3 (three) times daily. for 10 days  "11/3/20 12/3/20  Quentin Mendez MD   HYDROcodone-acetaminophen (NORCO) 5-325 mg per tablet Take 1 tablet by mouth every 6 (six) hours as needed for Pain. 11/3/20 12/3/20  Paris Zamora NP   hydrOXYchloroQUINE (PLAQUENIL) 200 mg tablet Take 1 tablet (200 mg total) by mouth 2 (two) times daily. 10/22/20 12/3/20  Quentin Mendez MD   meloxicam (MOBIC) 15 MG tablet Take 1 tablet (15 mg total) by mouth once daily. 11/3/20 12/3/20  Paris Zamora NP   methocarbamoL (ROBAXIN) 500 MG Tab Take 1 tablet (500 mg total) by mouth 4 (four) times daily. 11/3/20 12/3/20  Paris Zamora NP   NYSTOP powder ARNULFO AA BID 7/14/20 12/3/20  Historical Provider   pantoprazole (PROTONIX) 40 MG tablet TK 1 T PO QD 10/14/19 12/3/20  Historical Provider       ALLERGIES    Review of patient's allergies indicates:   Allergen Reactions    Betamethasone acet,sod phos Other (See Comments)     " Makes me feel flushed!"    Codeine Rash       PAST MEDICAL HISTORY  Past Medical History:   Diagnosis Date    Chronic back pain     Diverticulitis     Encounter for long-term (current) use of medications     Hypertension        SURGICAL HISTORY    Past Surgical History:   Procedure Laterality Date    APPENDECTOMY      CHOLECYSTECTOMY      COLONOSCOPY      COLONOSCOPY N/A 7/3/2019    Procedure: COLONOSCOPY;  Surgeon: Jean Jamison MD;  Location: [facility] ENDO;  Service: General;  Laterality: N/A;    EPIDURAL STEROID INJECTION N/A 10/7/2019    Procedure: Injection, Steroid, Epidural - L5/S1 LUMBAR EPIDURAL STEROID INJECTION;  Surgeon: Gail Cohen MD;  Location: [facility] OR;  Service: Pain Management;  Laterality: N/A;    EPIDURAL STEROID INJECTION N/A 1/27/2020    Procedure: Injection, Steroid, Epidural - L5/S1 INTERLAMINAR EPIDURAL STEROID INJECTION;  Surgeon: Gail Cohen MD;  Location: [facility] OR;  Service: Pain Management;  Laterality: N/A;    EPIDURAL STEROID INJECTION N/A 7/2/2020    Procedure: DEAN Lumbar L5-S1;  Surgeon: Seb" AGAPITO Blackwell MD;  Location: [facility] OR;  Service: Pain Management;  Laterality: N/A;    EPIDURAL STEROID INJECTION N/A 10/15/2020    Procedure: DEAN Lumbar L5-S1;  Surgeon: Seb Blackwell MD;  Location: [facility] OR;  Service: Pain Management;  Laterality: N/A;    ESOPHAGOGASTRODUODENOSCOPY N/A 7/3/2019    Procedure: ESOPHAGOGASTRODUODENOSCOPY (EGD);  Surgeon: Jean Jamison MD;  Location: [facility] ENDO;  Service: General;  Laterality: N/A;    TRANSFORAMINAL EPIDURAL INJECTION OF STEROID Bilateral 5/6/2019    Procedure: Injection,steroid,epidural,transforaminal approach;  Surgeon: Trey Dueñas MD;  Location: [facility] OR;  Service: Pain Management;  Laterality: Bilateral;  L4-5    TUBAL LIGATION         SOCIAL HISTORY    Social History     Socioeconomic History    Marital status:      Spouse name: Not on file    Number of children: Not on file    Years of education: Not on file    Highest education level: Not on file   Occupational History    Not on file   Social Needs    Financial resource strain: Not on file    Food insecurity     Worry: Not on file     Inability: Not on file    Transportation needs     Medical: No     Non-medical: No   Tobacco Use    Smoking status: Current Some Day Smoker     Packs/day: 0.50     Types: Cigarettes    Smokeless tobacco: Never Used    Tobacco comment: stop approx. 6 months ago   Substance and Sexual Activity    Alcohol use: No     Frequency: Never     Binge frequency: Never    Drug use: No    Sexual activity: Yes   Lifestyle    Physical activity     Days per week: 7 days     Minutes per session: 10 min    Stress: Only a little   Relationships    Social connections     Talks on phone: More than three times a week     Gets together: More than three times a week     Attends Protestant service: Not on file     Active member of club or organization: No     Attends meetings of clubs or organizations: Never     Relationship status:    Other Topics Concern    Not on file  "  Social History Narrative    Not on file       FAMILY HISTORY    Family History   Problem Relation Age of Onset    Diabetes Brother     Cancer Brother     Breast cancer Maternal Aunt        REVIEW OF SYSTEMS    Constitutional:  No reported chills, weight loss or weakness.   Eyes:  No reported photophobia or discharge. No visual changes  HENT:  No reported sore throat or ear pain.   Respiratory:  No reported  shortness of breath.   Cardiovascular:  No reported chest pain, palpitations or swelling.   GI:  No reported abdominal pain, nausea, vomiting, or diarrhea.   Musculoskeletal:  No reported back pain.   Skin:  No reported rash.   Neurologic:  No reported focal weakness or sensory changes.   Endocrine:  No reported polyuria or polydypsia.   Lymphatic:  No reported swollen glands.   Psychiatric:  No reported depression, suicidal ideation or homicidal ideation.   All Systems otherwise negative except as noted in the Review of Systems and History of Present Illness.    PHYSICAL EXAM    VITAL SIGNS: BP (!) 140/73 (BP Location: Left arm, Patient Position: Sitting)   Pulse 70   Temp 97.9 °F (36.6 °C) (Oral)   Resp 20   Ht 4' 11" (1.499 m)   Wt 68 kg (150 lb)   SpO2 (!) 93%   Breastfeeding No   BMI 30.30 kg/m²    Constitutional:  Well developed, Well nourished who appears stated age, No acute distress, Non-toxic appearance.   HENT:  Normocephalic, Atraumatic, Bilateral external ears normal, tympanic membranes appear normal without erythema, bulging or effusion. Moist mucus membranes, No oral exudates or ulcerations, Nares patent,  Normal appearing turbinates.  Mild bilateral maxillary sinus tenderness to palpation.  Neck- Normal range of motion, No tenderness, Supple, No stridor. No meningismus  Eyes:  PERRL, EOMI, Conjunctiva normal, Anicteric sclera  Respiratory: Breath sounds clear to auscultation bilaterally, No respiratory distress, No wheezing, No chest tenderness.   Cardiovascular:  Normal heart rate, " Normal rhythm, No murmurs, No rubs, No gallops.   Musculoskeletal:  Intact distal pulses, No edema, No tenderness, No cyanosis, No clubbing. Good range of motion in all major joints. No tenderness to palpation or major deformities noted.   Integument:  Warm, Dry, No erythema, No rash.   Lymphatic:  No lymphadenopathy noted.   Neurologic:  Alert & oriented x 3, Motor and sensory function grossly intact, CN 2-12 grossly intact. No focal deficits noted.   Psychiatric:  Affect normal, Judgment normal, Mood normal.     LABS  Pertinent labs reviewed. (See chart for details)   Labs Reviewed   SARS-COV-2 RNA AMPLIFICATION, QUAL     RADIOLOGY    Imaging Results          X-Ray Chest AP Portable (In process)               Portable chest x-ray interpreted by myself shows no acute cardiopulmonary process.    ED COURSE & MEDICAL DECISION MAKING    Medications - No data to display    The patient presents with the history as noted above. The differential diagnosis would include but is not limited to:  Sinusitis, URI, dental infection, otitis media, COVID-19, subarachnoid hemorrhage, intracranial mass.     Patient presents with acute sinusitis symptoms.  COVID negative.  Chest x-ray unremarkable.  No focal neurologic deficits on exam.  Will treat with Augmentin and Zyrtec.  Patient follow-up with primary care next week.  ED return precautions given the patient.    FINAL IMPRESSION    1. Sinusitis, unspecified chronicity, unspecified location    2. Acute maxillary sinusitis, recurrence not specified          Juan Carlos Edwards    The patient was discharged to home with the following prescriptions:   New Prescriptions    AMOXICILLIN-CLAVULANATE 875-125MG (AUGMENTIN) 875-125 MG PER TABLET    Take 1 tablet by mouth 2 (two) times daily.    CETIRIZINE (ZYRTEC) 10 MG TABLET    Take 1 tablet (10 mg total) by mouth once daily.       I stressed the importance of close follow-up with:  Maite Villatoro NP  [address] [place] [address] [address]  [place]  [place] [address] [phone]    In 3 days      [facility] - ED  [address] [facility] [address] [phone]    As needed, If symptoms worsen      I discussed the differential diagnosis, treatment plan, and strict return precautions for any worsening symptoms or new concerning symptoms, and they stated understanding.        **Parts of this note were created using [employer] Voice Recognition software program. While efforts were made to correct any mistakes made by this voice recognition software program, nonsensical phrases may remain in this note.       Juan Carlos Edwards, DO  12/04/20 0109

## 2021-01-19 ENCOUNTER — OFFICE VISIT (OUTPATIENT)
Dept: PAIN MEDICINE | Facility: CLINIC | Age: 66
End: 2021-01-19
Payer: COMMERCIAL

## 2021-01-19 VITALS
HEIGHT: 59 IN | BODY MASS INDEX: 31.74 KG/M2 | DIASTOLIC BLOOD PRESSURE: 75 MMHG | OXYGEN SATURATION: 98 % | RESPIRATION RATE: 16 BRPM | TEMPERATURE: 98 F | SYSTOLIC BLOOD PRESSURE: 140 MMHG | WEIGHT: 157.44 LBS | HEART RATE: 65 BPM

## 2021-01-19 DIAGNOSIS — M79.18 MYOFASCIAL PAIN: Primary | ICD-10-CM

## 2021-01-19 DIAGNOSIS — M51.36 DDD (DEGENERATIVE DISC DISEASE), LUMBAR: ICD-10-CM

## 2021-01-19 DIAGNOSIS — M50.30 DDD (DEGENERATIVE DISC DISEASE), CERVICAL: ICD-10-CM

## 2021-01-19 DIAGNOSIS — M53.3 SACROILIAC JOINT PAIN: ICD-10-CM

## 2021-01-19 DIAGNOSIS — M48.07 SPINAL STENOSIS, LUMBOSACRAL REGION: ICD-10-CM

## 2021-01-19 DIAGNOSIS — M47.896 OTHER SPONDYLOSIS, LUMBAR REGION: ICD-10-CM

## 2021-01-19 PROCEDURE — 99999 PR PBB SHADOW E&M-EST. PATIENT-LVL IV: ICD-10-PCS | Mod: PBBFAC,,, | Performed by: NURSE PRACTITIONER

## 2021-01-19 PROCEDURE — 99214 PR OFFICE/OUTPT VISIT, EST, LEVL IV, 30-39 MIN: ICD-10-PCS | Mod: S$GLB,,, | Performed by: NURSE PRACTITIONER

## 2021-01-19 PROCEDURE — 99214 OFFICE O/P EST MOD 30 MIN: CPT | Mod: S$GLB,,, | Performed by: NURSE PRACTITIONER

## 2021-01-19 PROCEDURE — 99999 PR PBB SHADOW E&M-EST. PATIENT-LVL IV: CPT | Mod: PBBFAC,,, | Performed by: NURSE PRACTITIONER

## 2021-01-19 RX ORDER — METHOCARBAMOL 500 MG/1
TABLET, FILM COATED ORAL
COMMUNITY
End: 2021-01-19

## 2021-01-19 RX ORDER — PROMETHAZINE HYDROCHLORIDE 25 MG/1
25 TABLET ORAL EVERY 6 HOURS PRN
COMMUNITY
Start: 2020-12-11 | End: 2021-06-25 | Stop reason: ALTCHOICE

## 2021-01-19 RX ORDER — POTASSIUM CHLORIDE 750 MG/1
TABLET, EXTENDED RELEASE ORAL
COMMUNITY
Start: 2020-11-22

## 2021-01-19 RX ORDER — HYDROCODONE BITARTRATE AND ACETAMINOPHEN 5; 325 MG/1; MG/1
1 TABLET ORAL DAILY
Qty: 30 TABLET | Refills: 0 | Status: SHIPPED | OUTPATIENT
Start: 2021-01-19 | End: 2021-05-07

## 2021-01-19 RX ORDER — HYDROCHLOROTHIAZIDE 25 MG/1
TABLET ORAL
COMMUNITY
Start: 2020-09-18 | End: 2021-01-19

## 2021-01-19 RX ORDER — SCOLOPAMINE TRANSDERMAL SYSTEM 1 MG/1
PATCH, EXTENDED RELEASE TRANSDERMAL
COMMUNITY
Start: 2020-12-11 | End: 2021-06-25 | Stop reason: ALTCHOICE

## 2021-01-19 RX ORDER — HYDROCODONE BITARTRATE AND ACETAMINOPHEN 5; 325 MG/1; MG/1
TABLET ORAL
COMMUNITY
End: 2021-01-19 | Stop reason: SDUPTHER

## 2021-01-19 RX ORDER — DICLOFENAC SODIUM 10 MG/G
GEL TOPICAL
Qty: 200 G | Refills: 2 | Status: SHIPPED | OUTPATIENT
Start: 2021-01-19 | End: 2021-11-01

## 2021-01-19 RX ORDER — PROMETHAZINE HYDROCHLORIDE AND DEXTROMETHORPHAN HYDROBROMIDE 6.25; 15 MG/5ML; MG/5ML
SYRUP ORAL
COMMUNITY
Start: 2020-12-04 | End: 2021-01-19

## 2021-01-19 RX ORDER — MELATONIN 5 MG
CAPSULE ORAL
COMMUNITY
Start: 2020-09-18 | End: 2021-01-19

## 2021-04-24 ENCOUNTER — HOSPITAL ENCOUNTER (EMERGENCY)
Facility: HOSPITAL | Age: 66
Discharge: HOME | End: 2021-04-24
Attending: EMERGENCY MEDICINE
Payer: COMMERCIAL

## 2021-04-24 VITALS
SYSTOLIC BLOOD PRESSURE: 176 MMHG | HEART RATE: 69 BPM | WEIGHT: 155 LBS | OXYGEN SATURATION: 96 % | RESPIRATION RATE: 18 BRPM | HEIGHT: 59 IN | BODY MASS INDEX: 31.25 KG/M2 | TEMPERATURE: 98 F | DIASTOLIC BLOOD PRESSURE: 89 MMHG

## 2021-04-24 DIAGNOSIS — I10 EPISODE OF HYPERTENSION: ICD-10-CM

## 2021-04-24 DIAGNOSIS — T63.481A INSECT STINGS, ACCIDENTAL OR UNINTENTIONAL, INITIAL ENCOUNTER: Primary | ICD-10-CM

## 2021-04-24 PROCEDURE — 99283 EMERGENCY DEPT VISIT LOW MDM: CPT

## 2021-04-24 PROCEDURE — 25000003 PHARM REV CODE 250: Performed by: EMERGENCY MEDICINE

## 2021-04-24 RX ORDER — MUPIROCIN 20 MG/G
OINTMENT TOPICAL 3 TIMES DAILY
Qty: 15 G | Refills: 0 | Status: SHIPPED | OUTPATIENT
Start: 2021-04-24 | End: 2021-04-29

## 2021-04-24 RX ORDER — BACITRACIN 500 [USP'U]/G
OINTMENT TOPICAL
Status: DISCONTINUED | OUTPATIENT
Start: 2021-04-24 | End: 2021-04-24

## 2021-04-24 RX ADMIN — BACITRACIN ZINC NEOMYCIN SULFATE POLYMYXIN B SULFATE: 400; 3.5; 5 OINTMENT TOPICAL at 11:04

## 2021-05-07 ENCOUNTER — OFFICE VISIT (OUTPATIENT)
Dept: PAIN MEDICINE | Facility: CLINIC | Age: 66
End: 2021-05-07
Payer: COMMERCIAL

## 2021-05-07 VITALS
HEART RATE: 61 BPM | WEIGHT: 155 LBS | DIASTOLIC BLOOD PRESSURE: 82 MMHG | BODY MASS INDEX: 31.25 KG/M2 | HEIGHT: 59 IN | SYSTOLIC BLOOD PRESSURE: 172 MMHG | TEMPERATURE: 99 F | OXYGEN SATURATION: 95 %

## 2021-05-07 DIAGNOSIS — M51.36 DDD (DEGENERATIVE DISC DISEASE), LUMBAR: ICD-10-CM

## 2021-05-07 DIAGNOSIS — M51.36 DDD (DEGENERATIVE DISC DISEASE), LUMBAR: Primary | ICD-10-CM

## 2021-05-07 DIAGNOSIS — M54.16 LUMBAR RADICULOPATHY: ICD-10-CM

## 2021-05-07 DIAGNOSIS — M48.07 SPINAL STENOSIS, LUMBOSACRAL REGION: Primary | ICD-10-CM

## 2021-05-07 PROCEDURE — 99214 PR OFFICE/OUTPT VISIT, EST, LEVL IV, 30-39 MIN: ICD-10-PCS | Mod: ,,, | Performed by: NURSE PRACTITIONER

## 2021-05-07 PROCEDURE — 99999 PR PBB SHADOW E&M-EST. PATIENT-LVL III: CPT | Mod: PBBFAC,,, | Performed by: NURSE PRACTITIONER

## 2021-05-07 PROCEDURE — 99999 PR PBB SHADOW E&M-EST. PATIENT-LVL III: ICD-10-PCS | Mod: PBBFAC,,, | Performed by: NURSE PRACTITIONER

## 2021-05-07 PROCEDURE — 99214 OFFICE O/P EST MOD 30 MIN: CPT | Mod: ,,, | Performed by: NURSE PRACTITIONER

## 2021-05-07 RX ORDER — HYDROCODONE BITARTRATE AND ACETAMINOPHEN 5; 325 MG/1; MG/1
1 TABLET ORAL EVERY 8 HOURS PRN
Qty: 60 TABLET | Refills: 0 | Status: SHIPPED | OUTPATIENT
Start: 2021-05-07 | End: 2021-06-25 | Stop reason: ALTCHOICE

## 2021-05-07 RX ORDER — GABAPENTIN 100 MG/1
100 CAPSULE ORAL 2 TIMES DAILY
Qty: 60 CAPSULE | Refills: 0 | Status: SHIPPED | OUTPATIENT
Start: 2021-05-07 | End: 2021-06-10

## 2021-05-18 NOTE — PROGRESS NOTES
"Subjective:       Patient ID: Teresa Clay     Chief Complaint:  Follow-up (Acute Abd Pain)      HPI:  Ms. Clay presents today with complaints of vague diffuse abdominal pain and diarrhea.  Symptoms currently appear to be resolving.  She was seen in the emergency room on 11/14/2019 for an infected tooth.  She was prescribed Augmentin.  Her symptoms began approximately 2 or 3 days after starting the Augmentin therapy.  Diarrhea initially was 8 or 10 watery stools a day.  Yesterday she had 1 soft semi-formed brown stool.  Pain is described as mild, diffuse, burning.  No fever or chills.  Nausea only after eating.  No vomiting.  No jaundice, biliuria, acholia, melena, hematochezia, hematemesis, etc.  No specific food intolerance.  Eating precipitates rectal urgency and bowel movements.  No other associated symptoms.  No other aggravating factors.  No alleviating factors.  Colonoscopy on 7/3/2019 showed moderate diverticulosis with a sigmoid colon narrowing due to the diverticular disease.  No other abnormalities were noted. EGD on 7/3/2019 revealed evidence of bile reflux gastritis.  Biopsies confirmed chronic gastritis, Helicobacter negative.      Allergies & Meds:  Review of patient's allergies indicates:   Allergen Reactions    Betamethasone acet,sod phos Other (See Comments)     " Makes me feel flushed!"    Codeine Rash       Current Outpatient Medications   Medication Sig Dispense Refill    hydroCHLOROthiazide (HYDRODIURIL) 25 MG tablet TK 1 T PO QD  2    metoprolol succinate (TOPROL-XL) 25 MG 24 hr tablet TAKE ONE TABLET BY MOUTH ONCE DAILY (Patient taking differently: TAKE HALF TABLET BY MOUTH ONCE DAILY) 30 tablet 11    pantoprazole (PROTONIX) 40 MG tablet TK 1 T PO QD  1    tiotropium (SPIRIVA WITH HANDIHALER) 18 mcg inhalation capsule Inhale 1 capsule into the lungs.      acyclovir (ZOVIRAX) 400 MG tablet Take 2 tablets (800 mg total) by mouth 4 (four) times daily. for 7 days 70 tablet 0    " amoxicillin-clavulanate 875-125mg (AUGMENTIN) 875-125 mg per tablet Take 1 tablet by mouth 2 (two) times daily. 14 tablet 0    diclofenac sodium (VOLTAREN) 1 % Gel APPLY 4 GRAMS TOPICALLY   QID AS DIRECTED 2 Tube 2    docusate sodium (COLACE) 100 MG capsule Take 1 capsule (100 mg total) by mouth 2 (two) times daily. Drink a 12 oz glass of water with each dose. 60 capsule 11    ergocalciferol (ERGOCALCIFEROL) 50,000 unit Cap Take 1 capsule by mouth.      fluconazole (DIFLUCAN) 150 MG Tab   0    fluticasone (FLONASE) 50 mcg/actuation nasal spray fluticasone 50 mcg/actuation nasal spray,suspension      metoprolol tartrate (LOPRESSOR) 25 MG tablet   2    nystatin (MYCOSTATIN) cream ARNULFO EXT AA BID  0     No current facility-administered medications for this visit.      Facility-Administered Medications Ordered in Other Visits   Medication Dose Route Frequency Provider Last Rate Last Dose    fentaNYL injection    PRN Trey Dueñas MD   50 mcg at 02/04/19 1251    midazolam (VERSED) 1 mg/mL injection    PRN Trey Dueñas MD   2 mg at 02/04/19 1251       PMFSHx:  Past Medical History:   Diagnosis Date    Chronic back pain     Diverticulitis     Encounter for long-term (current) use of medications     Hypertension        Past Surgical History:   Procedure Laterality Date    APPENDECTOMY      CHOLECYSTECTOMY      COLONOSCOPY      COLONOSCOPY N/A 7/3/2019    Procedure: COLONOSCOPY;  Surgeon: Jean Jamison MD;  Location: [facility] ENDO;  Service: General;  Laterality: N/A;    EPIDURAL STEROID INJECTION N/A 10/7/2019    Procedure: Injection, Steroid, Epidural - L5/S1 LUMBAR EPIDURAL STEROID INJECTION;  Surgeon: Gail Cohen MD;  Location: [facility] OR;  Service: Pain Management;  Laterality: N/A;    ESOPHAGOGASTRODUODENOSCOPY N/A 7/3/2019    Procedure: ESOPHAGOGASTRODUODENOSCOPY (EGD);  Surgeon: Jean Jamison MD;  Location: [facility] ENDO;  Service: General;  Laterality: N/A;    TRANSFORAMINAL EPIDURAL INJECTION OF  STEROID Bilateral 5/6/2019    Procedure: Injection,steroid,epidural,transforaminal approach;  Surgeon: Trey Dueñas MD;  Location: [facility] OR;  Service: Pain Management;  Laterality: Bilateral;  L4-5    TUBAL LIGATION         Family History   Problem Relation Age of Onset    Diabetes Brother     Cancer Brother     Breast cancer Maternal Aunt        Social History     Tobacco Use    Smoking status: Current Some Day Smoker     Packs/day: 0.50     Types: Cigarettes    Smokeless tobacco: Never Used    Tobacco comment: stop approx. 6 months ago   Substance Use Topics    Alcohol use: No    Drug use: No       Review of Systems   Constitutional: Negative for appetite change, chills, fatigue, fever and unexpected weight change.   HENT: Negative for congestion, dental problem, ear pain, mouth sores, postnasal drip, rhinorrhea, sore throat, tinnitus, trouble swallowing and voice change.    Eyes: Negative for photophobia, pain, discharge and visual disturbance.   Respiratory: Negative for cough, chest tightness, shortness of breath and wheezing.    Cardiovascular: Negative for chest pain, palpitations and leg swelling.   Gastrointestinal: Negative for blood in stool, constipation, nausea and vomiting.   Endocrine: Negative for cold intolerance, heat intolerance, polydipsia, polyphagia and polyuria.   Genitourinary: Negative for difficulty urinating, dysuria, flank pain, frequency, hematuria and urgency.   Musculoskeletal: Negative for arthralgias, joint swelling and myalgias.   Skin: Negative for color change and rash.   Allergic/Immunologic: Negative for immunocompromised state.   Neurological: Negative for dizziness, tremors, seizures, syncope, speech difficulty, weakness, numbness and headaches.   Hematological: Negative for adenopathy. Does not bruise/bleed easily.   Psychiatric/Behavioral: Negative for agitation, confusion, hallucinations, self-injury and suicidal ideas. The patient is not nervous/anxious.              Physical Exam   Constitutional: She is oriented to person, place, and time. Vital signs are normal. She appears well-developed and well-nourished.  Non-toxic appearance. She does not appear ill. No distress.   HENT:   Head: Normocephalic and atraumatic.   Right Ear: Hearing and external ear normal.   Left Ear: Hearing and external ear normal.   Nose: Nose normal.   Eyes: Conjunctivae and lids are normal. No scleral icterus.   Neck: Trachea normal and phonation normal. Neck supple. No JVD present. Carotid bruit is not present. No thyroid mass and no thyromegaly present.   Cardiovascular: Normal rate, regular rhythm and normal heart sounds. PMI is not displaced. Exam reveals no gallop.   No murmur heard.  Pulmonary/Chest: Effort normal and breath sounds normal. No accessory muscle usage. No tachypnea. No respiratory distress.   Abdominal: Soft. Normal appearance and bowel sounds are normal. There is no tenderness. No hernia.   Lymphadenopathy:     She has no cervical adenopathy.     She has no axillary adenopathy.        Right: No inguinal adenopathy present.        Left: No inguinal adenopathy present.   Neurological: She is alert and oriented to person, place, and time. She is not disoriented. GCS eye subscore is 4. GCS verbal subscore is 5. GCS motor subscore is 6.   Skin: Skin is warm, dry and intact. No rash noted. No cyanosis. Nails show no clubbing.   Psychiatric: She has a normal mood and affect. Her speech is normal and behavior is normal. She is not actively hallucinating. She is attentive.           Assessment:       Antibiotic induced abdominal pain and diarrhea.  Currently appears to be improving.  Known diverticulosis.  Known chronic bile reflux gastritis.  Differential diagnosis includes was not limited to infectious colitis, C difficile, diverticulitis, diverticulosis, gastritis, etc.  Stool studies warranted.  If symptoms persist and studies normal further evaluation with CT scan, etc will be  necessary including possible endoscopy.  If symptoms resolved further evaluation will be unnecessary.  If stool studies are abnormal therapy will be instituted    1. Generalized abdominal pain    2. Diarrhea of presumed infectious origin          Plan:   Generalized abdominal pain  -     CBC auto differential; Future; Expected date: 12/16/2019  -     Comprehensive metabolic panel; Future; Expected date: 12/16/2019  -     WBC, Stool; Future; Expected date: 12/16/2019  -     Stool culture; Future; Expected date: 12/16/2019  -     Giardia / Cryptosporidum, EIA; Future; Expected date: 12/16/2019  -     Stool Exam-Ova,Cysts,Parasites; Future; Expected date: 12/16/2019  -     Clostridium difficile EIA; Future; Expected date: 12/16/2019    Diarrhea of presumed infectious origin        Follow up in about 2 weeks (around 12/30/2019) for results.                Surgeon/Pathologist Verbiage (Will Incorporate Name Of Surgeon From Intro If Not Blank): operated in two distinct and integrated capacities as the surgeon and pathologist.

## 2021-05-26 DIAGNOSIS — M51.36 DDD (DEGENERATIVE DISC DISEASE), LUMBAR: Primary | ICD-10-CM

## 2021-05-27 ENCOUNTER — HOSPITAL ENCOUNTER (OUTPATIENT)
Facility: HOSPITAL | Age: 66
Discharge: HOME | End: 2021-05-27
Attending: ANESTHESIOLOGY | Admitting: ANESTHESIOLOGY
Payer: COMMERCIAL

## 2021-05-27 VITALS
TEMPERATURE: 98 F | SYSTOLIC BLOOD PRESSURE: 133 MMHG | HEIGHT: 59 IN | BODY MASS INDEX: 31.25 KG/M2 | WEIGHT: 155 LBS | RESPIRATION RATE: 16 BRPM | HEART RATE: 60 BPM | OXYGEN SATURATION: 98 % | DIASTOLIC BLOOD PRESSURE: 70 MMHG

## 2021-05-27 DIAGNOSIS — M51.36 DEGENERATIVE DISC DISEASE, LUMBAR: Primary | ICD-10-CM

## 2021-05-27 PROCEDURE — 62323 NJX INTERLAMINAR LMBR/SAC: CPT | Mod: ,,, | Performed by: ANESTHESIOLOGY

## 2021-05-27 PROCEDURE — 62323 NJX INTERLAMINAR LMBR/SAC: CPT | Performed by: ANESTHESIOLOGY

## 2021-05-27 PROCEDURE — 62323 PR INJ LUMBAR/SACRAL, W/IMAGING GUIDANCE: ICD-10-PCS | Mod: ,,, | Performed by: ANESTHESIOLOGY

## 2021-05-27 PROCEDURE — 63600175 PHARM REV CODE 636 W HCPCS: Performed by: ANESTHESIOLOGY

## 2021-05-27 PROCEDURE — 25500020 PHARM REV CODE 255: Performed by: ANESTHESIOLOGY

## 2021-05-27 PROCEDURE — 25000003 PHARM REV CODE 250: Performed by: ANESTHESIOLOGY

## 2021-05-27 RX ORDER — FENTANYL CITRATE 50 UG/ML
INJECTION, SOLUTION INTRAMUSCULAR; INTRAVENOUS
Status: DISCONTINUED | OUTPATIENT
Start: 2021-05-27 | End: 2021-05-27 | Stop reason: HOSPADM

## 2021-05-27 RX ORDER — SODIUM CHLORIDE, SODIUM LACTATE, POTASSIUM CHLORIDE, CALCIUM CHLORIDE 600; 310; 30; 20 MG/100ML; MG/100ML; MG/100ML; MG/100ML
INJECTION, SOLUTION INTRAVENOUS CONTINUOUS
Status: DISCONTINUED | OUTPATIENT
Start: 2021-05-27 | End: 2021-05-27 | Stop reason: HOSPADM

## 2021-05-27 RX ORDER — LIDOCAINE HYDROCHLORIDE 10 MG/ML
INJECTION INFILTRATION; PERINEURAL
Status: DISCONTINUED | OUTPATIENT
Start: 2021-05-27 | End: 2021-05-27 | Stop reason: HOSPADM

## 2021-05-27 RX ORDER — MIDAZOLAM HYDROCHLORIDE 5 MG/ML
INJECTION INTRAMUSCULAR; INTRAVENOUS
Status: DISCONTINUED | OUTPATIENT
Start: 2021-05-27 | End: 2021-05-27 | Stop reason: HOSPADM

## 2021-05-27 RX ORDER — METHYLPREDNISOLONE ACETATE 80 MG/ML
INJECTION, SUSPENSION INTRA-ARTICULAR; INTRALESIONAL; INTRAMUSCULAR; SOFT TISSUE
Status: DISCONTINUED | OUTPATIENT
Start: 2021-05-27 | End: 2021-05-27 | Stop reason: HOSPADM

## 2021-05-27 RX ADMIN — SODIUM CHLORIDE, POTASSIUM CHLORIDE, SODIUM LACTATE AND CALCIUM CHLORIDE: 600; 310; 30; 20 INJECTION, SOLUTION INTRAVENOUS at 02:05

## 2021-06-04 ENCOUNTER — TELEPHONE (OUTPATIENT)
Dept: PAIN MEDICINE | Facility: CLINIC | Age: 66
End: 2021-06-04

## 2021-06-25 ENCOUNTER — HOSPITAL ENCOUNTER (EMERGENCY)
Facility: HOSPITAL | Age: 66
Discharge: HOME | End: 2021-06-25
Attending: EMERGENCY MEDICINE
Payer: COMMERCIAL

## 2021-06-25 VITALS
BODY MASS INDEX: 33.26 KG/M2 | OXYGEN SATURATION: 94 % | RESPIRATION RATE: 18 BRPM | WEIGHT: 165 LBS | TEMPERATURE: 98 F | HEIGHT: 59 IN | HEART RATE: 61 BPM | DIASTOLIC BLOOD PRESSURE: 85 MMHG | SYSTOLIC BLOOD PRESSURE: 171 MMHG

## 2021-06-25 DIAGNOSIS — R10.13 EPIGASTRIC PAIN: Primary | ICD-10-CM

## 2021-06-25 DIAGNOSIS — R42 DIZZINESS: ICD-10-CM

## 2021-06-25 DIAGNOSIS — R51.9 SINUS HEADACHE: ICD-10-CM

## 2021-06-25 LAB
ALBUMIN SERPL BCP-MCNC: 3.5 G/DL (ref 3.5–5.2)
ALP SERPL-CCNC: 90 U/L (ref 55–135)
ALT SERPL W/O P-5'-P-CCNC: 20 U/L (ref 10–44)
ANION GAP SERPL CALC-SCNC: 8 MMOL/L (ref 8–16)
AST SERPL-CCNC: 14 U/L (ref 10–40)
BASOPHILS # BLD AUTO: 0.06 K/UL (ref 0–0.2)
BASOPHILS NFR BLD: 0.8 % (ref 0–1.9)
BILIRUB SERPL-MCNC: 0.6 MG/DL (ref 0.1–1)
BILIRUB UR QL STRIP: NEGATIVE
BUN SERPL-MCNC: 10 MG/DL (ref 8–23)
CALCIUM SERPL-MCNC: 8.9 MG/DL (ref 8.7–10.5)
CHLORIDE SERPL-SCNC: 108 MMOL/L (ref 95–110)
CLARITY UR: CLEAR
CO2 SERPL-SCNC: 24 MMOL/L (ref 23–29)
COLOR UR: YELLOW
CREAT SERPL-MCNC: 0.8 MG/DL (ref 0.5–1.4)
DIFFERENTIAL METHOD: NORMAL
EOSINOPHIL # BLD AUTO: 0.1 K/UL (ref 0–0.5)
EOSINOPHIL NFR BLD: 1.8 % (ref 0–8)
ERYTHROCYTE [DISTWIDTH] IN BLOOD BY AUTOMATED COUNT: 14.1 % (ref 11.5–14.5)
EST. GFR  (AFRICAN AMERICAN): >60 ML/MIN/1.73 M^2
EST. GFR  (NON AFRICAN AMERICAN): >60 ML/MIN/1.73 M^2
GLUCOSE SERPL-MCNC: 115 MG/DL (ref 70–110)
GLUCOSE UR QL STRIP: NEGATIVE
HCT VFR BLD AUTO: 44 % (ref 37–48.5)
HGB BLD-MCNC: 14.6 G/DL (ref 12–16)
HGB UR QL STRIP: ABNORMAL
IMM GRANULOCYTES # BLD AUTO: 0.01 K/UL (ref 0–0.04)
IMM GRANULOCYTES NFR BLD AUTO: 0.1 % (ref 0–0.5)
KETONES UR QL STRIP: NEGATIVE
LEUKOCYTE ESTERASE UR QL STRIP: NEGATIVE
LYMPHOCYTES # BLD AUTO: 2.8 K/UL (ref 1–4.8)
LYMPHOCYTES NFR BLD: 37.3 % (ref 18–48)
MCH RBC QN AUTO: 29.4 PG (ref 27–31)
MCHC RBC AUTO-ENTMCNC: 33.2 G/DL (ref 32–36)
MCV RBC AUTO: 89 FL (ref 82–98)
MONOCYTES # BLD AUTO: 0.7 K/UL (ref 0.3–1)
MONOCYTES NFR BLD: 8.9 % (ref 4–15)
NEUTROPHILS # BLD AUTO: 3.9 K/UL (ref 1.8–7.7)
NEUTROPHILS NFR BLD: 51.1 % (ref 38–73)
NITRITE UR QL STRIP: NEGATIVE
NRBC BLD-RTO: 0 /100 WBC
PH UR STRIP: 7 [PH] (ref 5–8)
PLATELET # BLD AUTO: 234 K/UL (ref 150–450)
PMV BLD AUTO: 10.5 FL (ref 9.2–12.9)
POTASSIUM SERPL-SCNC: 3.4 MMOL/L (ref 3.5–5.1)
PROT SERPL-MCNC: 6.4 G/DL (ref 6–8.4)
PROT UR QL STRIP: NEGATIVE
RBC # BLD AUTO: 4.97 M/UL (ref 4–5.4)
SODIUM SERPL-SCNC: 140 MMOL/L (ref 136–145)
SP GR UR STRIP: 1.01 (ref 1–1.03)
URN SPEC COLLECT METH UR: ABNORMAL
UROBILINOGEN UR STRIP-ACNC: NEGATIVE EU/DL
WBC # BLD AUTO: 7.61 K/UL (ref 3.9–12.7)

## 2021-06-25 PROCEDURE — 81003 URINALYSIS AUTO W/O SCOPE: CPT | Performed by: EMERGENCY MEDICINE

## 2021-06-25 PROCEDURE — 99284 EMERGENCY DEPT VISIT MOD MDM: CPT | Mod: 25

## 2021-06-25 PROCEDURE — 93005 ELECTROCARDIOGRAM TRACING: CPT

## 2021-06-25 PROCEDURE — 80053 COMPREHEN METABOLIC PANEL: CPT | Performed by: EMERGENCY MEDICINE

## 2021-06-25 PROCEDURE — 25000003 PHARM REV CODE 250: Performed by: EMERGENCY MEDICINE

## 2021-06-25 PROCEDURE — 96361 HYDRATE IV INFUSION ADD-ON: CPT

## 2021-06-25 PROCEDURE — 96374 THER/PROPH/DIAG INJ IV PUSH: CPT

## 2021-06-25 PROCEDURE — 85025 COMPLETE CBC W/AUTO DIFF WBC: CPT | Performed by: EMERGENCY MEDICINE

## 2021-06-25 PROCEDURE — 63600175 PHARM REV CODE 636 W HCPCS: Performed by: EMERGENCY MEDICINE

## 2021-06-25 RX ORDER — MECLIZINE HYDROCHLORIDE 25 MG/1
25 TABLET ORAL 3 TIMES DAILY PRN
Qty: 20 TABLET | Refills: 0 | Status: SHIPPED | OUTPATIENT
Start: 2021-06-25 | End: 2021-09-09

## 2021-06-25 RX ORDER — PANTOPRAZOLE SODIUM 20 MG/1
20 TABLET, DELAYED RELEASE ORAL DAILY
Qty: 30 TABLET | Refills: 0 | Status: SHIPPED | OUTPATIENT
Start: 2021-06-25 | End: 2021-06-25 | Stop reason: SDUPTHER

## 2021-06-25 RX ORDER — ONDANSETRON 2 MG/ML
4 INJECTION INTRAMUSCULAR; INTRAVENOUS
Status: COMPLETED | OUTPATIENT
Start: 2021-06-25 | End: 2021-06-25

## 2021-06-25 RX ORDER — MECLIZINE HYDROCHLORIDE 25 MG/1
25 TABLET ORAL 3 TIMES DAILY PRN
Qty: 20 TABLET | Refills: 0 | Status: SHIPPED | OUTPATIENT
Start: 2021-06-25 | End: 2021-06-25 | Stop reason: SDUPTHER

## 2021-06-25 RX ORDER — PANTOPRAZOLE SODIUM 20 MG/1
20 TABLET, DELAYED RELEASE ORAL DAILY
Qty: 30 TABLET | Refills: 0 | Status: SHIPPED | OUTPATIENT
Start: 2021-06-25 | End: 2021-09-09

## 2021-06-25 RX ORDER — MECLIZINE HCL 12.5 MG 12.5 MG/1
25 TABLET ORAL
Status: COMPLETED | OUTPATIENT
Start: 2021-06-25 | End: 2021-06-25

## 2021-06-25 RX ADMIN — SODIUM CHLORIDE 500 ML: 0.9 INJECTION, SOLUTION INTRAVENOUS at 01:06

## 2021-06-25 RX ADMIN — LIDOCAINE HYDROCHLORIDE: 20 SOLUTION ORAL; TOPICAL at 01:06

## 2021-06-25 RX ADMIN — MECLIZINE 25 MG: 12.5 TABLET ORAL at 01:06

## 2021-06-25 RX ADMIN — ONDANSETRON HYDROCHLORIDE 4 MG: 2 SOLUTION INTRAMUSCULAR; INTRAVENOUS at 01:06

## 2021-07-14 ENCOUNTER — OFFICE VISIT (OUTPATIENT)
Dept: PAIN MEDICINE | Facility: CLINIC | Age: 66
End: 2021-07-14
Payer: COMMERCIAL

## 2021-07-14 ENCOUNTER — TELEPHONE (OUTPATIENT)
Dept: PAIN MEDICINE | Facility: CLINIC | Age: 66
End: 2021-07-14

## 2021-07-14 VITALS
SYSTOLIC BLOOD PRESSURE: 180 MMHG | TEMPERATURE: 99 F | WEIGHT: 165 LBS | HEART RATE: 71 BPM | BODY MASS INDEX: 33.26 KG/M2 | OXYGEN SATURATION: 94 % | HEIGHT: 59 IN | DIASTOLIC BLOOD PRESSURE: 88 MMHG

## 2021-07-14 DIAGNOSIS — M79.7 FIBROMYALGIA: ICD-10-CM

## 2021-07-14 DIAGNOSIS — M51.36 DDD (DEGENERATIVE DISC DISEASE), LUMBAR: Primary | ICD-10-CM

## 2021-07-14 DIAGNOSIS — F11.90 CHRONIC, CONTINUOUS USE OF OPIOIDS: ICD-10-CM

## 2021-07-14 DIAGNOSIS — M48.07 SPINAL STENOSIS, LUMBOSACRAL REGION: ICD-10-CM

## 2021-07-14 DIAGNOSIS — Z01.818 PRE-OP TESTING: ICD-10-CM

## 2021-07-14 DIAGNOSIS — M54.16 LUMBAR RADICULOPATHY: ICD-10-CM

## 2021-07-14 DIAGNOSIS — M47.818 SI JOINT ARTHRITIS: ICD-10-CM

## 2021-07-14 PROCEDURE — 99214 OFFICE O/P EST MOD 30 MIN: CPT | Mod: CS,S$GLB,, | Performed by: NURSE PRACTITIONER

## 2021-07-14 PROCEDURE — 80307 DRUG TEST PRSMV CHEM ANLYZR: CPT | Performed by: NURSE PRACTITIONER

## 2021-07-14 PROCEDURE — 99214 PR OFFICE/OUTPT VISIT, EST, LEVL IV, 30-39 MIN: ICD-10-PCS | Mod: CS,S$GLB,, | Performed by: NURSE PRACTITIONER

## 2021-07-14 PROCEDURE — 99213 OFFICE O/P EST LOW 20 MIN: CPT | Mod: PBBFAC,PO | Performed by: NURSE PRACTITIONER

## 2021-07-14 PROCEDURE — 99999 PR PBB SHADOW E&M-EST. PATIENT-LVL III: CPT | Mod: PBBFAC,,, | Performed by: NURSE PRACTITIONER

## 2021-07-14 PROCEDURE — 99999 PR PBB SHADOW E&M-EST. PATIENT-LVL III: ICD-10-PCS | Mod: PBBFAC,,, | Performed by: NURSE PRACTITIONER

## 2021-07-14 RX ORDER — CLARITHROMYCIN 500 MG/1
TABLET, FILM COATED ORAL
COMMUNITY
Start: 2021-06-28 | End: 2021-09-09

## 2021-07-14 RX ORDER — HYDROCODONE BITARTRATE AND ACETAMINOPHEN 5; 325 MG/1; MG/1
1 TABLET ORAL EVERY 6 HOURS PRN
Qty: 28 TABLET | Refills: 0 | Status: SHIPPED | OUTPATIENT
Start: 2021-07-14 | End: 2021-09-09 | Stop reason: SDUPTHER

## 2021-07-14 RX ORDER — AMOXICILLIN 500 MG/1
CAPSULE ORAL
COMMUNITY
Start: 2021-06-28 | End: 2021-09-09

## 2021-07-19 LAB

## 2021-08-09 ENCOUNTER — LAB VISIT (OUTPATIENT)
Dept: FAMILY MEDICINE | Facility: CLINIC | Age: 66
End: 2021-08-09

## 2021-08-09 DIAGNOSIS — Z01.818 PRE-OP TESTING: ICD-10-CM

## 2021-08-09 PROCEDURE — U0003 INFECTIOUS AGENT DETECTION BY NUCLEIC ACID (DNA OR RNA); SEVERE ACUTE RESPIRATORY SYNDROME CORONAVIRUS 2 (SARS-COV-2) (CORONAVIRUS DISEASE [COVID-19]), AMPLIFIED PROBE TECHNIQUE, MAKING USE OF HIGH THROUGHPUT TECHNOLOGIES AS DESCRIBED BY CMS-2020-01-R: HCPCS | Performed by: NURSE PRACTITIONER

## 2021-08-09 PROCEDURE — U0005 INFEC AGEN DETEC AMPLI PROBE: HCPCS | Performed by: NURSE PRACTITIONER

## 2021-08-10 LAB — SARS-COV-2 RNA RESP QL NAA+PROBE: NOT DETECTED

## 2021-08-12 ENCOUNTER — HOSPITAL ENCOUNTER (OUTPATIENT)
Facility: HOSPITAL | Age: 66
Discharge: HOME | End: 2021-08-12
Attending: ANESTHESIOLOGY | Admitting: ANESTHESIOLOGY
Payer: COMMERCIAL

## 2021-08-12 VITALS
TEMPERATURE: 98 F | RESPIRATION RATE: 20 BRPM | BODY MASS INDEX: 33.26 KG/M2 | OXYGEN SATURATION: 95 % | HEART RATE: 64 BPM | HEIGHT: 59 IN | DIASTOLIC BLOOD PRESSURE: 72 MMHG | SYSTOLIC BLOOD PRESSURE: 149 MMHG | WEIGHT: 165 LBS

## 2021-08-12 DIAGNOSIS — M51.36 DEGENERATIVE DISC DISEASE, LUMBAR: Primary | ICD-10-CM

## 2021-08-12 PROCEDURE — 25000003 PHARM REV CODE 250: Performed by: ANESTHESIOLOGY

## 2021-08-12 PROCEDURE — 62323 PR INJ LUMBAR/SACRAL, W/IMAGING GUIDANCE: ICD-10-PCS | Mod: ,,, | Performed by: ANESTHESIOLOGY

## 2021-08-12 PROCEDURE — 63600175 PHARM REV CODE 636 W HCPCS: Performed by: ANESTHESIOLOGY

## 2021-08-12 PROCEDURE — 62323 NJX INTERLAMINAR LMBR/SAC: CPT | Performed by: ANESTHESIOLOGY

## 2021-08-12 PROCEDURE — 62323 NJX INTERLAMINAR LMBR/SAC: CPT | Mod: ,,, | Performed by: ANESTHESIOLOGY

## 2021-08-12 PROCEDURE — 25500020 PHARM REV CODE 255: Performed by: ANESTHESIOLOGY

## 2021-08-12 RX ORDER — METHYLPREDNISOLONE ACETATE 80 MG/ML
INJECTION, SUSPENSION INTRA-ARTICULAR; INTRALESIONAL; INTRAMUSCULAR; SOFT TISSUE
Status: DISCONTINUED
Start: 2021-08-12 | End: 2021-08-12 | Stop reason: HOSPADM

## 2021-08-12 RX ORDER — LIDOCAINE HYDROCHLORIDE 10 MG/ML
INJECTION INFILTRATION; PERINEURAL
Status: DISCONTINUED
Start: 2021-08-12 | End: 2021-08-12 | Stop reason: HOSPADM

## 2021-08-12 RX ORDER — METHYLPREDNISOLONE ACETATE 80 MG/ML
INJECTION, SUSPENSION INTRA-ARTICULAR; INTRALESIONAL; INTRAMUSCULAR; SOFT TISSUE
Status: DISCONTINUED | OUTPATIENT
Start: 2021-08-12 | End: 2021-08-12 | Stop reason: HOSPADM

## 2021-08-12 RX ORDER — FENTANYL CITRATE 50 UG/ML
INJECTION, SOLUTION INTRAMUSCULAR; INTRAVENOUS
Status: DISCONTINUED
Start: 2021-08-12 | End: 2021-08-12 | Stop reason: WASHOUT

## 2021-08-12 RX ORDER — MIDAZOLAM HYDROCHLORIDE 1 MG/ML
INJECTION INTRAMUSCULAR; INTRAVENOUS
Status: DISCONTINUED
Start: 2021-08-12 | End: 2021-08-12 | Stop reason: WASHOUT

## 2021-08-12 RX ORDER — LIDOCAINE HYDROCHLORIDE 10 MG/ML
INJECTION INFILTRATION; PERINEURAL
Status: DISCONTINUED | OUTPATIENT
Start: 2021-08-12 | End: 2021-08-12 | Stop reason: HOSPADM

## 2021-08-12 RX ORDER — SODIUM CHLORIDE, SODIUM LACTATE, POTASSIUM CHLORIDE, CALCIUM CHLORIDE 600; 310; 30; 20 MG/100ML; MG/100ML; MG/100ML; MG/100ML
INJECTION, SOLUTION INTRAVENOUS ONCE AS NEEDED
Status: DISCONTINUED | OUTPATIENT
Start: 2021-08-12 | End: 2021-08-12 | Stop reason: HOSPADM

## 2021-08-19 ENCOUNTER — HOSPITAL ENCOUNTER (OUTPATIENT)
Dept: RADIOLOGY | Facility: HOSPITAL | Age: 66
Discharge: HOME | End: 2021-08-19
Attending: NURSE PRACTITIONER
Payer: COMMERCIAL

## 2021-08-19 DIAGNOSIS — Z78.0 MENOPAUSE PRESENT: ICD-10-CM

## 2021-08-19 PROCEDURE — 77080 DXA BONE DENSITY AXIAL: CPT | Mod: 26,,, | Performed by: RADIOLOGY

## 2021-08-19 PROCEDURE — 77080 DEXA BONE DENSITY SPINE HIP: ICD-10-PCS | Mod: 26,,, | Performed by: RADIOLOGY

## 2021-08-19 PROCEDURE — 77080 DXA BONE DENSITY AXIAL: CPT | Mod: TC

## 2021-09-09 ENCOUNTER — OFFICE VISIT (OUTPATIENT)
Dept: PAIN MEDICINE | Facility: CLINIC | Age: 66
End: 2021-09-09

## 2021-09-09 VITALS
DIASTOLIC BLOOD PRESSURE: 86 MMHG | BODY MASS INDEX: 33.26 KG/M2 | HEART RATE: 66 BPM | SYSTOLIC BLOOD PRESSURE: 166 MMHG | WEIGHT: 165 LBS | TEMPERATURE: 98 F | OXYGEN SATURATION: 95 % | HEIGHT: 59 IN

## 2021-09-09 DIAGNOSIS — M47.817 SPONDYLOSIS WITHOUT MYELOPATHY OR RADICULOPATHY, LUMBOSACRAL REGION: ICD-10-CM

## 2021-09-09 DIAGNOSIS — M47.818 SI JOINT ARTHRITIS: ICD-10-CM

## 2021-09-09 DIAGNOSIS — M48.02 SPINAL STENOSIS, CERVICAL REGION: ICD-10-CM

## 2021-09-09 DIAGNOSIS — M54.16 LUMBAR RADICULOPATHY: ICD-10-CM

## 2021-09-09 DIAGNOSIS — M48.07 SPINAL STENOSIS, LUMBOSACRAL REGION: ICD-10-CM

## 2021-09-09 DIAGNOSIS — M51.36 DDD (DEGENERATIVE DISC DISEASE), LUMBAR: ICD-10-CM

## 2021-09-09 DIAGNOSIS — M79.7 FIBROMYALGIA: ICD-10-CM

## 2021-09-09 PROCEDURE — 99999 PR PBB SHADOW E&M-EST. PATIENT-LVL IV: CPT | Mod: PBBFAC,,, | Performed by: NURSE PRACTITIONER

## 2021-09-09 PROCEDURE — 99999 PR PBB SHADOW E&M-EST. PATIENT-LVL IV: ICD-10-PCS | Mod: PBBFAC,,, | Performed by: NURSE PRACTITIONER

## 2021-09-09 PROCEDURE — 99214 OFFICE O/P EST MOD 30 MIN: CPT | Mod: S$PBB,,, | Performed by: NURSE PRACTITIONER

## 2021-09-09 PROCEDURE — 99214 OFFICE O/P EST MOD 30 MIN: CPT | Mod: PBBFAC,PO | Performed by: NURSE PRACTITIONER

## 2021-09-09 PROCEDURE — 99214 PR OFFICE/OUTPT VISIT, EST, LEVL IV, 30-39 MIN: ICD-10-PCS | Mod: S$PBB,,, | Performed by: NURSE PRACTITIONER

## 2021-09-09 RX ORDER — HYDROCODONE BITARTRATE AND ACETAMINOPHEN 5; 325 MG/1; MG/1
1 TABLET ORAL EVERY 8 HOURS PRN
Qty: 90 TABLET | Refills: 0 | Status: SHIPPED | OUTPATIENT
Start: 2021-09-09 | End: 2021-11-01

## 2021-09-13 ENCOUNTER — HOSPITAL ENCOUNTER (OUTPATIENT)
Dept: RADIOLOGY | Facility: HOSPITAL | Age: 66
Discharge: HOME | End: 2021-09-13
Attending: NURSE PRACTITIONER

## 2021-09-13 ENCOUNTER — HOSPITAL ENCOUNTER (OUTPATIENT)
Dept: RADIOLOGY | Facility: HOSPITAL | Age: 66
Discharge: HOME | End: 2021-09-13
Attending: NURSE PRACTITIONER
Payer: COMMERCIAL

## 2021-09-13 DIAGNOSIS — M48.07 SPINAL STENOSIS, LUMBOSACRAL REGION: ICD-10-CM

## 2021-09-13 DIAGNOSIS — M48.02 SPINAL STENOSIS, CERVICAL REGION: ICD-10-CM

## 2021-09-13 DIAGNOSIS — M47.817 SPONDYLOSIS WITHOUT MYELOPATHY OR RADICULOPATHY, LUMBOSACRAL REGION: ICD-10-CM

## 2021-09-13 PROCEDURE — 72148 MRI LUMBAR SPINE W/O DYE: CPT | Mod: 26,,, | Performed by: RADIOLOGY

## 2021-09-13 PROCEDURE — 72141 MRI NECK SPINE W/O DYE: CPT | Mod: TC

## 2021-09-13 PROCEDURE — 72141 MRI NECK SPINE W/O DYE: CPT | Mod: 26,,, | Performed by: RADIOLOGY

## 2021-09-13 PROCEDURE — 72148 MRI LUMBAR SPINE WITHOUT CONTRAST: ICD-10-PCS | Mod: 26,,, | Performed by: RADIOLOGY

## 2021-09-13 PROCEDURE — 72148 MRI LUMBAR SPINE W/O DYE: CPT | Mod: TC

## 2021-09-13 PROCEDURE — 72141 MRI CERVICAL SPINE WITHOUT CONTRAST: ICD-10-PCS | Mod: 26,,, | Performed by: RADIOLOGY

## 2021-09-16 ENCOUNTER — TELEPHONE (OUTPATIENT)
Dept: PAIN MEDICINE | Facility: CLINIC | Age: 66
End: 2021-09-16

## 2021-09-16 DIAGNOSIS — M54.16 LUMBAR RADICULOPATHY: ICD-10-CM

## 2021-09-16 DIAGNOSIS — M51.36 DDD (DEGENERATIVE DISC DISEASE), LUMBAR: ICD-10-CM

## 2021-09-16 DIAGNOSIS — M48.07 SPINAL STENOSIS, LUMBOSACRAL REGION: Primary | ICD-10-CM

## 2021-09-16 DIAGNOSIS — M54.16 LUMBAR RADICULOPATHY: Primary | ICD-10-CM

## 2021-09-24 ENCOUNTER — LAB VISIT (OUTPATIENT)
Dept: FAMILY MEDICINE | Facility: CLINIC | Age: 66
End: 2021-09-24
Payer: MEDICARE

## 2021-09-24 DIAGNOSIS — M54.16 LUMBAR RADICULOPATHY: ICD-10-CM

## 2021-09-24 DIAGNOSIS — M51.36 DDD (DEGENERATIVE DISC DISEASE), LUMBAR: ICD-10-CM

## 2021-09-24 DIAGNOSIS — M48.07 SPINAL STENOSIS, LUMBOSACRAL REGION: ICD-10-CM

## 2021-09-24 PROCEDURE — U0003 INFECTIOUS AGENT DETECTION BY NUCLEIC ACID (DNA OR RNA); SEVERE ACUTE RESPIRATORY SYNDROME CORONAVIRUS 2 (SARS-COV-2) (CORONAVIRUS DISEASE [COVID-19]), AMPLIFIED PROBE TECHNIQUE, MAKING USE OF HIGH THROUGHPUT TECHNOLOGIES AS DESCRIBED BY CMS-2020-01-R: HCPCS | Performed by: NURSE PRACTITIONER

## 2021-09-24 PROCEDURE — U0005 INFEC AGEN DETEC AMPLI PROBE: HCPCS | Performed by: NURSE PRACTITIONER

## 2021-09-25 LAB
SARS-COV-2 RNA RESP QL NAA+PROBE: NOT DETECTED
SARS-COV-2- CYCLE NUMBER: NORMAL

## 2021-09-27 ENCOUNTER — HOSPITAL ENCOUNTER (OUTPATIENT)
Facility: AMBULARY SURGERY CENTER | Age: 66
Discharge: HOME | End: 2021-09-27
Attending: ANESTHESIOLOGY | Admitting: ANESTHESIOLOGY
Payer: COMMERCIAL

## 2021-09-27 DIAGNOSIS — M51.36 DEGENERATIVE DISC DISEASE, LUMBAR: Primary | ICD-10-CM

## 2021-09-27 PROCEDURE — 64483 PR EPIDURAL INJ, ANES/STEROID, TRANSFORAMINAL, LUMB/SACR, SNGL LEVL: ICD-10-PCS | Mod: 50,,, | Performed by: ANESTHESIOLOGY

## 2021-09-27 PROCEDURE — 64483 NJX AA&/STRD TFRM EPI L/S 1: CPT | Mod: LT | Performed by: ANESTHESIOLOGY

## 2021-09-27 PROCEDURE — 64483 NJX AA&/STRD TFRM EPI L/S 1: CPT | Mod: 50,,, | Performed by: ANESTHESIOLOGY

## 2021-09-27 RX ORDER — BUPIVACAINE HYDROCHLORIDE 2.5 MG/ML
INJECTION, SOLUTION EPIDURAL; INFILTRATION; INTRACAUDAL
Status: DISCONTINUED | OUTPATIENT
Start: 2021-09-27 | End: 2021-09-27 | Stop reason: HOSPADM

## 2021-09-27 RX ORDER — SODIUM CHLORIDE, SODIUM LACTATE, POTASSIUM CHLORIDE, CALCIUM CHLORIDE 600; 310; 30; 20 MG/100ML; MG/100ML; MG/100ML; MG/100ML
INJECTION, SOLUTION INTRAVENOUS ONCE AS NEEDED
Status: DISCONTINUED | OUTPATIENT
Start: 2021-09-27 | End: 2021-09-27 | Stop reason: HOSPADM

## 2021-09-27 RX ORDER — ALPRAZOLAM 1 MG/1
TABLET, ORALLY DISINTEGRATING ORAL
Status: COMPLETED
Start: 2021-09-27 | End: 2021-09-27

## 2021-09-27 RX ORDER — DEXAMETHASONE SODIUM PHOSPHATE 10 MG/ML
INJECTION INTRAMUSCULAR; INTRAVENOUS
Status: DISCONTINUED | OUTPATIENT
Start: 2021-09-27 | End: 2021-09-27 | Stop reason: HOSPADM

## 2021-09-27 RX ORDER — ALPRAZOLAM 1 MG/1
1 TABLET, ORALLY DISINTEGRATING ORAL ONCE
Status: COMPLETED | OUTPATIENT
Start: 2021-09-27 | End: 2021-09-27

## 2021-09-27 RX ORDER — LIDOCAINE HYDROCHLORIDE 10 MG/ML
INJECTION, SOLUTION EPIDURAL; INFILTRATION; INTRACAUDAL; PERINEURAL
Status: DISCONTINUED | OUTPATIENT
Start: 2021-09-27 | End: 2021-09-27 | Stop reason: HOSPADM

## 2021-09-27 RX ADMIN — ALPRAZOLAM 1 MG: 1 TABLET, ORALLY DISINTEGRATING ORAL at 02:09

## 2021-09-28 VITALS
BODY MASS INDEX: 33.31 KG/M2 | DIASTOLIC BLOOD PRESSURE: 63 MMHG | HEART RATE: 71 BPM | WEIGHT: 164.88 LBS | SYSTOLIC BLOOD PRESSURE: 133 MMHG | TEMPERATURE: 98 F | RESPIRATION RATE: 18 BRPM | OXYGEN SATURATION: 94 %

## 2021-09-29 DIAGNOSIS — M47.896 OTHER SPONDYLOSIS, LUMBAR REGION: Primary | ICD-10-CM

## 2021-10-05 ENCOUNTER — TELEPHONE (OUTPATIENT)
Dept: PAIN MEDICINE | Facility: CLINIC | Age: 66
End: 2021-10-05

## 2021-10-08 ENCOUNTER — TELEPHONE (OUTPATIENT)
Dept: PAIN MEDICINE | Facility: CLINIC | Age: 66
End: 2021-10-08

## 2021-10-08 DIAGNOSIS — M51.36 DDD (DEGENERATIVE DISC DISEASE), LUMBAR: ICD-10-CM

## 2021-10-08 DIAGNOSIS — M54.16 LUMBAR RADICULOPATHY: ICD-10-CM

## 2021-10-08 DIAGNOSIS — M48.07 SPINAL STENOSIS, LUMBOSACRAL REGION: ICD-10-CM

## 2021-10-08 DIAGNOSIS — M47.896 OTHER SPONDYLOSIS, LUMBAR REGION: Primary | ICD-10-CM

## 2021-10-08 RX ORDER — HYDROCODONE BITARTRATE AND ACETAMINOPHEN 5; 325 MG/1; MG/1
1 TABLET ORAL EVERY 8 HOURS PRN
Qty: 90 TABLET | Refills: 0 | Status: SHIPPED | OUTPATIENT
Start: 2021-10-08 | End: 2021-12-14 | Stop reason: SDUPTHER

## 2021-10-18 ENCOUNTER — HOSPITAL ENCOUNTER (OUTPATIENT)
Dept: RADIOLOGY | Facility: HOSPITAL | Age: 66
Discharge: HOME | End: 2021-10-18
Attending: NURSE PRACTITIONER
Payer: MEDICARE

## 2021-10-18 DIAGNOSIS — Z01.810 PRE-OPERATIVE CARDIOVASCULAR EXAMINATION: ICD-10-CM

## 2021-10-18 PROCEDURE — 71046 XR CHEST PA AND LATERAL: ICD-10-PCS | Mod: 26,,, | Performed by: RADIOLOGY

## 2021-10-18 PROCEDURE — 71046 X-RAY EXAM CHEST 2 VIEWS: CPT | Mod: TC,FY

## 2021-10-18 PROCEDURE — 71046 X-RAY EXAM CHEST 2 VIEWS: CPT | Mod: 26,,, | Performed by: RADIOLOGY

## 2021-10-24 ENCOUNTER — LAB VISIT (OUTPATIENT)
Dept: FAMILY MEDICINE | Facility: CLINIC | Age: 66
End: 2021-10-24
Payer: MEDICARE

## 2021-10-24 DIAGNOSIS — M47.896 OTHER SPONDYLOSIS, LUMBAR REGION: ICD-10-CM

## 2021-10-24 PROCEDURE — U0005 INFEC AGEN DETEC AMPLI PROBE: HCPCS | Performed by: NURSE PRACTITIONER

## 2021-10-24 PROCEDURE — U0003 INFECTIOUS AGENT DETECTION BY NUCLEIC ACID (DNA OR RNA); SEVERE ACUTE RESPIRATORY SYNDROME CORONAVIRUS 2 (SARS-COV-2) (CORONAVIRUS DISEASE [COVID-19]), AMPLIFIED PROBE TECHNIQUE, MAKING USE OF HIGH THROUGHPUT TECHNOLOGIES AS DESCRIBED BY CMS-2020-01-R: HCPCS | Performed by: NURSE PRACTITIONER

## 2021-10-25 LAB
SARS-COV-2 RNA RESP QL NAA+PROBE: NOT DETECTED
SARS-COV-2- CYCLE NUMBER: NORMAL

## 2021-10-27 ENCOUNTER — HOSPITAL ENCOUNTER (OUTPATIENT)
Facility: HOSPITAL | Age: 66
Discharge: HOME | End: 2021-10-27
Attending: ANESTHESIOLOGY | Admitting: ANESTHESIOLOGY
Payer: MEDICARE

## 2021-10-27 VITALS
HEART RATE: 72 BPM | HEIGHT: 59 IN | BODY MASS INDEX: 33.87 KG/M2 | OXYGEN SATURATION: 95 % | DIASTOLIC BLOOD PRESSURE: 82 MMHG | TEMPERATURE: 98 F | WEIGHT: 168 LBS | RESPIRATION RATE: 15 BRPM | SYSTOLIC BLOOD PRESSURE: 138 MMHG

## 2021-10-27 DIAGNOSIS — M51.36 DDD (DEGENERATIVE DISC DISEASE), LUMBAR: Primary | ICD-10-CM

## 2021-10-27 DIAGNOSIS — M47.896 OTHER SPONDYLOSIS, LUMBAR REGION: ICD-10-CM

## 2021-10-27 PROCEDURE — 64493 INJ PARAVERT F JNT L/S 1 LEV: CPT | Mod: 50 | Performed by: ANESTHESIOLOGY

## 2021-10-27 PROCEDURE — 63600175 PHARM REV CODE 636 W HCPCS: Performed by: ANESTHESIOLOGY

## 2021-10-27 PROCEDURE — 64493 PR INJ DX/THER AGNT PARAVERT FACET JOINT,IMG GUIDE,LUMBAR/SAC,1ST LVL: ICD-10-PCS | Mod: 50,,, | Performed by: ANESTHESIOLOGY

## 2021-10-27 PROCEDURE — 64495 INJ PARAVERT F JNT L/S 3 LEV: CPT | Mod: 50,,, | Performed by: ANESTHESIOLOGY

## 2021-10-27 PROCEDURE — 25000003 PHARM REV CODE 250: Performed by: ANESTHESIOLOGY

## 2021-10-27 PROCEDURE — 64495 PR INJ DX/THER AGNT PARAVERT FACET JOINT,IMG GUIDE,LUMBAR/SAC, ADD LEVEL: ICD-10-PCS | Mod: 50,,, | Performed by: ANESTHESIOLOGY

## 2021-10-27 PROCEDURE — 64494 INJ PARAVERT F JNT L/S 2 LEV: CPT | Mod: 50,,, | Performed by: ANESTHESIOLOGY

## 2021-10-27 PROCEDURE — 64494 PR INJ DX/THER AGNT PARAVERT FACET JOINT,IMG GUIDE,LUMBAR/SAC, 2ND LEVEL: ICD-10-PCS | Mod: 50,,, | Performed by: ANESTHESIOLOGY

## 2021-10-27 PROCEDURE — 64493 INJ PARAVERT F JNT L/S 1 LEV: CPT | Mod: 50,,, | Performed by: ANESTHESIOLOGY

## 2021-10-27 PROCEDURE — 64494 INJ PARAVERT F JNT L/S 2 LEV: CPT | Mod: 50 | Performed by: ANESTHESIOLOGY

## 2021-10-27 PROCEDURE — 64495 INJ PARAVERT F JNT L/S 3 LEV: CPT | Mod: 50 | Performed by: ANESTHESIOLOGY

## 2021-10-27 RX ORDER — BUPIVACAINE HYDROCHLORIDE 5 MG/ML
INJECTION, SOLUTION EPIDURAL; INTRACAUDAL
Status: DISCONTINUED | OUTPATIENT
Start: 2021-10-27 | End: 2021-10-27 | Stop reason: HOSPADM

## 2021-10-27 RX ORDER — MIDAZOLAM HYDROCHLORIDE 1 MG/ML
INJECTION INTRAMUSCULAR; INTRAVENOUS
Status: DISCONTINUED
Start: 2021-10-27 | End: 2021-10-27 | Stop reason: HOSPADM

## 2021-10-27 RX ORDER — LIDOCAINE HYDROCHLORIDE 10 MG/ML
INJECTION INFILTRATION; PERINEURAL
Status: DISCONTINUED
Start: 2021-10-27 | End: 2021-10-27 | Stop reason: HOSPADM

## 2021-10-27 RX ORDER — MIDAZOLAM HYDROCHLORIDE 1 MG/ML
INJECTION INTRAMUSCULAR; INTRAVENOUS
Status: DISCONTINUED | OUTPATIENT
Start: 2021-10-27 | End: 2021-10-27 | Stop reason: HOSPADM

## 2021-10-27 RX ORDER — BUPIVACAINE HYDROCHLORIDE 2.5 MG/ML
INJECTION, SOLUTION EPIDURAL; INFILTRATION; INTRACAUDAL
Status: DISCONTINUED | OUTPATIENT
Start: 2021-10-27 | End: 2021-10-27 | Stop reason: HOSPADM

## 2021-10-27 RX ORDER — LIDOCAINE HYDROCHLORIDE 10 MG/ML
INJECTION INFILTRATION; PERINEURAL
Status: DISCONTINUED | OUTPATIENT
Start: 2021-10-27 | End: 2021-10-27 | Stop reason: HOSPADM

## 2021-10-27 RX ORDER — SODIUM CHLORIDE, SODIUM LACTATE, POTASSIUM CHLORIDE, CALCIUM CHLORIDE 600; 310; 30; 20 MG/100ML; MG/100ML; MG/100ML; MG/100ML
INJECTION, SOLUTION INTRAVENOUS ONCE AS NEEDED
Status: ACTIVE | OUTPATIENT
Start: 2021-10-27 | End: 2033-03-24

## 2021-10-27 RX ORDER — BUPIVACAINE HYDROCHLORIDE 5 MG/ML
INJECTION, SOLUTION EPIDURAL; INTRACAUDAL
Status: DISCONTINUED
Start: 2021-10-27 | End: 2021-10-27 | Stop reason: HOSPADM

## 2021-10-28 ENCOUNTER — TELEPHONE (OUTPATIENT)
Dept: PAIN MEDICINE | Facility: CLINIC | Age: 66
End: 2021-10-28
Payer: MEDICARE

## 2021-10-29 ENCOUNTER — TELEPHONE (OUTPATIENT)
Dept: PAIN MEDICINE | Facility: CLINIC | Age: 66
End: 2021-10-29
Payer: MEDICARE

## 2021-11-01 ENCOUNTER — TELEPHONE (OUTPATIENT)
Dept: PAIN MEDICINE | Facility: CLINIC | Age: 66
End: 2021-11-01
Payer: MEDICARE

## 2021-11-01 ENCOUNTER — OFFICE VISIT (OUTPATIENT)
Dept: PAIN MEDICINE | Facility: CLINIC | Age: 66
End: 2021-11-01
Payer: MEDICARE

## 2021-11-01 DIAGNOSIS — M51.36 DDD (DEGENERATIVE DISC DISEASE), LUMBAR: ICD-10-CM

## 2021-11-01 DIAGNOSIS — M48.07 SPINAL STENOSIS, LUMBOSACRAL REGION: ICD-10-CM

## 2021-11-01 DIAGNOSIS — M54.16 LUMBAR RADICULOPATHY: ICD-10-CM

## 2021-11-01 PROCEDURE — 99213 PR OFFICE/OUTPT VISIT, EST, LEVL III, 20-29 MIN: ICD-10-PCS | Mod: 95,,, | Performed by: PHYSICIAN ASSISTANT

## 2021-11-01 PROCEDURE — 99213 OFFICE O/P EST LOW 20 MIN: CPT | Mod: 95,,, | Performed by: PHYSICIAN ASSISTANT

## 2021-11-01 RX ORDER — GABAPENTIN 100 MG/1
100 CAPSULE ORAL 3 TIMES DAILY
Qty: 90 CAPSULE | Refills: 2 | Status: SHIPPED | OUTPATIENT
Start: 2021-11-01 | End: 2022-03-10 | Stop reason: SDUPTHER

## 2021-11-18 ENCOUNTER — HOSPITAL ENCOUNTER (OUTPATIENT)
Dept: RADIOLOGY | Facility: HOSPITAL | Age: 66
Discharge: HOME | End: 2021-11-18
Attending: SURGERY
Payer: MEDICARE

## 2021-11-18 DIAGNOSIS — K76.0 FATTY LIVER: ICD-10-CM

## 2021-11-18 PROCEDURE — 76705 ECHO EXAM OF ABDOMEN: CPT | Mod: 26,XS,, | Performed by: RADIOLOGY

## 2021-11-18 PROCEDURE — 93976 VASCULAR STUDY: CPT | Mod: 26,,, | Performed by: RADIOLOGY

## 2021-11-18 PROCEDURE — 93976 VASCULAR STUDY: CPT | Mod: TC

## 2021-11-18 PROCEDURE — 76705 ECHO EXAM OF ABDOMEN: CPT | Mod: TC

## 2021-11-18 PROCEDURE — 93976 US LIVER WITH DOPPLER: ICD-10-PCS | Mod: 26,,, | Performed by: RADIOLOGY

## 2021-11-18 PROCEDURE — 76705 US LIVER WITH DOPPLER: ICD-10-PCS | Mod: 26,XS,, | Performed by: RADIOLOGY

## 2021-12-14 ENCOUNTER — TELEPHONE (OUTPATIENT)
Dept: PAIN MEDICINE | Facility: CLINIC | Age: 66
End: 2021-12-14
Payer: MEDICARE

## 2021-12-14 DIAGNOSIS — M51.36 DDD (DEGENERATIVE DISC DISEASE), LUMBAR: ICD-10-CM

## 2021-12-14 DIAGNOSIS — M54.16 LUMBAR RADICULOPATHY: ICD-10-CM

## 2021-12-14 DIAGNOSIS — M47.896 OTHER SPONDYLOSIS, LUMBAR REGION: ICD-10-CM

## 2021-12-14 DIAGNOSIS — M48.07 SPINAL STENOSIS, LUMBOSACRAL REGION: Primary | ICD-10-CM

## 2021-12-14 RX ORDER — HYDROCODONE BITARTRATE AND ACETAMINOPHEN 5; 325 MG/1; MG/1
1 TABLET ORAL EVERY 8 HOURS PRN
Qty: 90 TABLET | Refills: 0 | Status: SHIPPED | OUTPATIENT
Start: 2021-12-14 | End: 2022-01-11 | Stop reason: SDUPTHER

## 2022-01-11 DIAGNOSIS — M54.16 LUMBAR RADICULOPATHY: ICD-10-CM

## 2022-01-11 DIAGNOSIS — M48.07 SPINAL STENOSIS, LUMBOSACRAL REGION: ICD-10-CM

## 2022-01-11 DIAGNOSIS — M51.36 DDD (DEGENERATIVE DISC DISEASE), LUMBAR: ICD-10-CM

## 2022-01-11 DIAGNOSIS — M47.896 OTHER SPONDYLOSIS, LUMBAR REGION: ICD-10-CM

## 2022-01-11 RX ORDER — HYDROCODONE BITARTRATE AND ACETAMINOPHEN 5; 325 MG/1; MG/1
1 TABLET ORAL EVERY 8 HOURS PRN
Qty: 90 TABLET | Refills: 0 | Status: SHIPPED | OUTPATIENT
Start: 2022-01-11 | End: 2022-02-09 | Stop reason: SDUPTHER

## 2022-01-11 NOTE — TELEPHONE ENCOUNTER
----- Message from Viviana Marie sent at 1/11/2022  1:59 PM CST -----  Contact: pt  Type: Needs Medical Advice    Who Called: pt    Best Call Back Number: [phone]    Inquiry/Question: pt is requesting to speak to nurse regarding rx's     Thank you~

## 2022-01-11 NOTE — TELEPHONE ENCOUNTER
Spoke with pt she states that she would like a nerve burn last MBB 10/27 and 80% relief for a few days per pt. Ok to schedule?  She also wants a refill of norco pended please sign if appropriate. Thank you

## 2022-01-12 ENCOUNTER — TELEPHONE (OUTPATIENT)
Dept: PAIN MEDICINE | Facility: CLINIC | Age: 67
End: 2022-01-12
Payer: MEDICARE

## 2022-01-12 DIAGNOSIS — M47.896 OTHER SPONDYLOSIS, LUMBAR REGION: ICD-10-CM

## 2022-01-12 DIAGNOSIS — Z01.818 PRE-OP TESTING: Primary | ICD-10-CM

## 2022-01-12 NOTE — TELEPHONE ENCOUNTER
----- Message from Seb Blackwell MD sent at 1/11/2022  2:26 PM CST -----  Okay to schedule for bilateral L2, L3, L4, and L5 medial branch nerves radiofrequency ablation.

## 2022-01-23 ENCOUNTER — LAB VISIT (OUTPATIENT)
Dept: FAMILY MEDICINE | Facility: CLINIC | Age: 67
End: 2022-01-23
Payer: MEDICARE

## 2022-01-23 DIAGNOSIS — Z01.818 PRE-OP TESTING: ICD-10-CM

## 2022-01-23 PROCEDURE — U0005 INFEC AGEN DETEC AMPLI PROBE: HCPCS | Performed by: ANESTHESIOLOGY

## 2022-01-23 PROCEDURE — U0003 INFECTIOUS AGENT DETECTION BY NUCLEIC ACID (DNA OR RNA); SEVERE ACUTE RESPIRATORY SYNDROME CORONAVIRUS 2 (SARS-COV-2) (CORONAVIRUS DISEASE [COVID-19]), AMPLIFIED PROBE TECHNIQUE, MAKING USE OF HIGH THROUGHPUT TECHNOLOGIES AS DESCRIBED BY CMS-2020-01-R: HCPCS | Performed by: ANESTHESIOLOGY

## 2022-01-23 NOTE — PROGRESS NOTES
Teresa Clay presented to clinic for COVID-19 swab.   Teresa Clay verified x2, name and .   Teresa Clay instructed on what will be completed, asked if ever had COVID-19 swab.   Explained procedure to Teresa Clay.   Specimen obtained.   No questions or concerns voiced further at this time.   Teresa Clay left in satisfactory condition.

## 2022-01-24 LAB
SARS-COV-2 RNA RESP QL NAA+PROBE: NOT DETECTED
SARS-COV-2- CYCLE NUMBER: NORMAL

## 2022-01-26 ENCOUNTER — HOSPITAL ENCOUNTER (OUTPATIENT)
Facility: HOSPITAL | Age: 67
Discharge: HOME | End: 2022-01-26
Attending: ANESTHESIOLOGY | Admitting: ANESTHESIOLOGY
Payer: MEDICARE

## 2022-01-26 VITALS
BODY MASS INDEX: 30.24 KG/M2 | OXYGEN SATURATION: 96 % | TEMPERATURE: 98 F | HEIGHT: 59 IN | RESPIRATION RATE: 16 BRPM | DIASTOLIC BLOOD PRESSURE: 72 MMHG | WEIGHT: 150 LBS | SYSTOLIC BLOOD PRESSURE: 151 MMHG | HEART RATE: 53 BPM

## 2022-01-26 DIAGNOSIS — M47.896 OTHER SPONDYLOSIS, LUMBAR REGION: Primary | ICD-10-CM

## 2022-01-26 PROCEDURE — 64635 DESTROY LUMB/SAC FACET JNT: CPT | Mod: 50 | Performed by: ANESTHESIOLOGY

## 2022-01-26 PROCEDURE — 25000003 PHARM REV CODE 250: Performed by: ANESTHESIOLOGY

## 2022-01-26 PROCEDURE — 99152 PR MOD CONSCIOUS SEDATION, SAME PHYS, 5+ YRS, FIRST 15 MIN: ICD-10-PCS | Mod: ,,, | Performed by: ANESTHESIOLOGY

## 2022-01-26 PROCEDURE — 64635 DESTROY LUMB/SAC FACET JNT: CPT | Mod: 50,,, | Performed by: ANESTHESIOLOGY

## 2022-01-26 PROCEDURE — 64636 DESTROY L/S FACET JNT ADDL: CPT | Mod: 50,,, | Performed by: ANESTHESIOLOGY

## 2022-01-26 PROCEDURE — 99152 MOD SED SAME PHYS/QHP 5/>YRS: CPT | Performed by: ANESTHESIOLOGY

## 2022-01-26 PROCEDURE — 99153 MOD SED SAME PHYS/QHP EA: CPT | Performed by: ANESTHESIOLOGY

## 2022-01-26 PROCEDURE — 64635 PR DESTROY LUMB/SAC FACET JNT: ICD-10-PCS | Mod: 50,,, | Performed by: ANESTHESIOLOGY

## 2022-01-26 PROCEDURE — 64636 DESTROY L/S FACET JNT ADDL: CPT | Mod: 50 | Performed by: ANESTHESIOLOGY

## 2022-01-26 PROCEDURE — 64636 PR DESTROY L/S FACET JNT ADDL: ICD-10-PCS | Mod: 50,,, | Performed by: ANESTHESIOLOGY

## 2022-01-26 PROCEDURE — 99152 MOD SED SAME PHYS/QHP 5/>YRS: CPT | Mod: ,,, | Performed by: ANESTHESIOLOGY

## 2022-01-26 PROCEDURE — 63600175 PHARM REV CODE 636 W HCPCS: Performed by: ANESTHESIOLOGY

## 2022-01-26 RX ORDER — MIDAZOLAM HYDROCHLORIDE 5 MG/ML
INJECTION INTRAMUSCULAR; INTRAVENOUS
Status: DISCONTINUED | OUTPATIENT
Start: 2022-01-26 | End: 2022-01-26 | Stop reason: HOSPADM

## 2022-01-26 RX ORDER — DEXAMETHASONE SODIUM PHOSPHATE 4 MG/ML
INJECTION, SOLUTION INTRA-ARTICULAR; INTRALESIONAL; INTRAMUSCULAR; INTRAVENOUS; SOFT TISSUE
Status: DISCONTINUED | OUTPATIENT
Start: 2022-01-26 | End: 2022-01-26 | Stop reason: HOSPADM

## 2022-01-26 RX ORDER — FENTANYL CITRATE 50 UG/ML
INJECTION, SOLUTION INTRAMUSCULAR; INTRAVENOUS
Status: DISCONTINUED | OUTPATIENT
Start: 2022-01-26 | End: 2022-01-26 | Stop reason: HOSPADM

## 2022-01-26 RX ORDER — LIDOCAINE HYDROCHLORIDE 20 MG/ML
INJECTION, SOLUTION INFILTRATION; PERINEURAL
Status: DISCONTINUED | OUTPATIENT
Start: 2022-01-26 | End: 2022-01-26 | Stop reason: HOSPADM

## 2022-01-26 RX ORDER — BUPIVACAINE HYDROCHLORIDE 5 MG/ML
INJECTION, SOLUTION EPIDURAL; INTRACAUDAL
Status: DISCONTINUED | OUTPATIENT
Start: 2022-01-26 | End: 2022-01-26 | Stop reason: HOSPADM

## 2022-01-26 RX ORDER — SODIUM CHLORIDE, SODIUM LACTATE, POTASSIUM CHLORIDE, CALCIUM CHLORIDE 600; 310; 30; 20 MG/100ML; MG/100ML; MG/100ML; MG/100ML
INJECTION, SOLUTION INTRAVENOUS ONCE AS NEEDED
Status: COMPLETED | OUTPATIENT
Start: 2022-01-26 | End: 2022-01-26

## 2022-01-26 RX ADMIN — SODIUM CHLORIDE, SODIUM LACTATE, POTASSIUM CHLORIDE, AND CALCIUM CHLORIDE: .6; .31; .03; .02 INJECTION, SOLUTION INTRAVENOUS at 10:01

## 2022-01-26 NOTE — DISCHARGE SUMMARY
[facility]  Discharge Note  Short Stay    Procedure(s) (LRB):  Radiofrequency Ablation (Bilateral)    OUTCOME: Patient tolerated treatment/procedure well without complication and is now ready for discharge.    DISPOSITION: Home or Self Care    FINAL DIAGNOSIS:  Other spondylosis, lumbar    FOLLOWUP: In clinic    DISCHARGE INSTRUCTIONS:    Discharge Procedure Orders   Diet general     Call MD for:  temperature >100.4     Call MD for:  persistent nausea and vomiting     Call MD for:  severe uncontrolled pain     Call MD for:  difficulty breathing, headache or visual disturbances     Call MD for:  redness, tenderness, or signs of infection (pain, swelling, redness, odor or green/yellow discharge around incision site)     Call MD for:  hives     Call MD for:  persistent dizziness or light-headedness     Call MD for:  extreme fatigue        TIME SPENT ON DISCHARGE: 30 minutes

## 2022-01-26 NOTE — DISCHARGE INSTRUCTIONS
[phone]  [facility]   [phone]  [facility]      [phone]    Managing nausea    Some people have an upset stomach after surgery. This is often because of anesthesia, pain, or pain medicine, or the stress of surgery. These tips will help you handle nausea and eat healthy foods as you get better. If you were on a special food plan before surgery, ask your healthcare provider if you should follow it while you get better. These tips may help:  · Do not push yourself to eat. Your body will tell you when to eat and how much.  · Start off with clear liquids and soup. They are easier to digest.  · Next try semi-solid foods, such as mashed potatoes, applesauce, and gelatin, as you feel ready.  · Slowly move to solid foods. Dont eat fatty, rich, or spicy foods at first.  · Do not force yourself to have 3 large meals a day. Instead eat smaller amounts more often.  · Take pain medicines with a small amount of solid food, such as crackers or toast, to avoid nausea.

## 2022-01-26 NOTE — OP NOTE
PROCEDURE DATE: 1/26/2022    PROCEDURE:  Radiofrequency ablation of the L2, L3, L4, and L5 medial branch nerves on the bilateral-side utilizing fluoroscopy    DIAGNOSIS:  Other lumbar spondylosis  Post op Diagnosis: Same    PHYSICIAN: Seb Blackwell MD    MEDICATIONS INJECTED:  From a mixture of 7 ml of 0.25% bupivicaine and 10 mg of dexamethasone,  1ml of this solution was injected at each level.    LOCAL ANESTHETIC USED: Lidocaine 1%, 2 ml given at each site.    SEDATION MEDICATIONS: Under my direction supervision, intravenous moderate sedation was administered during the course of this procedure, with continuous monitoring of hemodynamic parameters. Total time of sedation was 30 minutes.     ESTIMATED BLOOD LOSS:  none    COMPLICATIONS:  none    TECHNIQUE:  A time out was taken to identify patient and procedure side prior to starting the procedure. Laying in a prone position, the patient was prepped and draped in the usual sterile fashion using ChloraPrep and sterile towels.  The levels were determined under fluoroscopic guidance and then marked.  Local anesthetic was given by raising a wheal at the skin over each site and then infiltrated approximately 2cm deeper.  A 20-gauge  100 mm RF needle was introduced to the anatomic location of the bilateral L2, L3, L4, and L5 medial branch nerves. Motor stimulation up to 2 Volts at each level confirmed no motor nerve involvement.  Impedance was less than 800 ohms at each level.  1ml of 2% lidocaine was instilled prior to lesioning.  Ablation was performed per level utilizing radiofrequency generator 80°C for 60 seconds.  Needles were then rotated 90° and a second lesion was performed.  The above noted medication was then injected slowly. The patient tolerated the procedure well.     The patient was monitored after the procedure.  Patient was given post procedure and discharge instructions to follow at home.  The patient was discharged in a stable condition

## 2022-01-26 NOTE — H&P
"FOLLOW UP NOTE:     CHIEF COMPLAINT: back pain    INTERVAL HISTORY OF PRESENT ILLNESS: Teresa Clay is a 66 y.o. female who presents for bilateral L2, L3, L4, and L5 medial branch nerves radiofrequency ablation. The patient denies of any significant changes in her health since her last appointment. The patient also denies of any changes in the character of her pain since her last appointment.     ROS:  Review of Systems   Constitutional: Negative for chills and fever.   HENT: Negative for sore throat.    Eyes: Negative for visual disturbance.   Respiratory: Negative for shortness of breath.    Cardiovascular: Negative for chest pain.   Gastrointestinal: Negative for nausea and vomiting.   Genitourinary: Negative for difficulty urinating.   Musculoskeletal: Positive for back pain.   Skin: Negative for rash.   Allergic/Immunologic: Negative for immunocompromised state.   Neurological: Negative for syncope.   Hematological: Does not bruise/bleed easily.   Psychiatric/Behavioral: Negative for suicidal ideas.        MEDICAL, SURGICAL, FAMILY, SOCIAL HX: reviewed    MEDICATIONS/ALLERGIES: reviewed    PHYSICAL EXAM:    VITALS: Vitals reviewed.   Vitals:    01/26/22 0952 01/26/22 0953   BP: 134/66 134/66   Pulse: (!) 50    Resp: 16    Temp: 97.8 °F (36.6 °C)    TempSrc: Oral    SpO2: 97%    Weight: 68 kg (150 lb)    Height: 4' 11" (1.499 m)        Physical Exam  Vitals and nursing note reviewed.   Constitutional:       Appearance: She is not diaphoretic.   HENT:      Head: Normocephalic and atraumatic.   Eyes:      General:         Right eye: No discharge.         Left eye: No discharge.      Extraocular Movements: EOM normal.      Conjunctiva/sclera: Conjunctivae normal.   Cardiovascular:      Rate and Rhythm: Normal rate.   Pulmonary:      Effort: Pulmonary effort is normal. No respiratory distress.      Breath sounds: Normal breath sounds.   Abdominal:      Palpations: Abdomen is soft.   Skin:     General: Skin is " warm and dry.      Findings: No rash.   Neurological:      Mental Status: She is alert and oriented to person, place, and time.   Psychiatric:         Mood and Affect: Mood and affect normal.         Cognition and Memory: Memory normal.         Judgment: Judgment normal.          EXTREMITIES:    Gen: No cyanosis, edema, varicosities, or tenderness to palpation BLE   Skin: Warm, pink, dry, no rashes, no lesions BLE   Strength: 5/5 motor strength BLE   ROM: hips, knees and ankles without pain or instability.     NEUROLOGICAL:    Gen: No clonus or spasticity.   Gait: Normal without antalgic lean   DTR's: 2+ in bilateral patellar, and ankle   BABINSKI: Absent bilaterally  Sensory: Intact to light touch and proprioception BLE    ASSESSMENT: Teresa Clay is a 66 y.o. female who presents for bilateral L2, L3, L4, and L5 medial branch nerves radiofrequency ablation.     PLAN:  1. Proceed with bilateral L2, L3, L4, and L5 medial branch nerves radiofrequency ablation as previously discussed.    This patient has been cleared for surgery in an ambulatory surgical facility    ASA 3,  Mallampatti Score 3  No history of anesthetic complications  Plan for RN IV sedation    Seb Blackwell MD  Pain Management

## 2022-01-26 NOTE — PLAN OF CARE
Pt transported to vehicle via w/c. No distress. Pt has discharge paperwork. Reviewed discharge instructions with .

## 2022-02-09 DIAGNOSIS — M47.896 OTHER SPONDYLOSIS, LUMBAR REGION: ICD-10-CM

## 2022-02-09 DIAGNOSIS — M48.07 SPINAL STENOSIS, LUMBOSACRAL REGION: ICD-10-CM

## 2022-02-09 DIAGNOSIS — M54.16 LUMBAR RADICULOPATHY: ICD-10-CM

## 2022-02-09 DIAGNOSIS — M51.36 DDD (DEGENERATIVE DISC DISEASE), LUMBAR: ICD-10-CM

## 2022-02-09 RX ORDER — HYDROCODONE BITARTRATE AND ACETAMINOPHEN 5; 325 MG/1; MG/1
1 TABLET ORAL EVERY 8 HOURS PRN
Qty: 90 TABLET | Refills: 0 | Status: SHIPPED | OUTPATIENT
Start: 2022-02-09 | End: 2022-02-23 | Stop reason: SDUPTHER

## 2022-02-09 NOTE — TELEPHONE ENCOUNTER
----- Message from Patricia Rosario sent at 2/9/2022  8:21 AM CST -----  Contact: patient  Type:  RX Refill Request    Who Called:  patient   Refill or New Rx:  refill  RX Name and Strength:  states they will know what it is  How is the patient currently taking it? (ex. 1XDay):    Is this a 30 day or 90 day RX:    Preferred Pharmacy with phone number:    [id number] [place] [id number] [facility] [address]  [address] [place] [id number]  Phone: [phone] Fax: [phone]      Local or Mail Order:  local  Ordering Provider:  Rosalva Kidd Call Back Number:  [phone] (home)     Additional Information:

## 2022-02-10 ENCOUNTER — TELEPHONE (OUTPATIENT)
Dept: PAIN MEDICINE | Facility: CLINIC | Age: 67
End: 2022-02-10
Payer: MEDICARE

## 2022-02-10 NOTE — TELEPHONE ENCOUNTER
I see no message except this one to call patient calling pt now she is requesting refill explained to pt it was ordered yesterday. She verbalizes understanding.

## 2022-02-10 NOTE — TELEPHONE ENCOUNTER
----- Message from Dain Donato sent at 2/10/2022 10:46 AM CST -----  Regarding: Call Back  Who Called: pt         What is the reason for the call: states been waiting since yesterday for a call back. Please contact         Can patient be contacted on [facility]: No          Call back number: [phone]

## 2022-02-23 ENCOUNTER — OFFICE VISIT (OUTPATIENT)
Dept: PAIN MEDICINE | Facility: CLINIC | Age: 67
End: 2022-02-23
Payer: MEDICARE

## 2022-02-23 VITALS
BODY MASS INDEX: 29.18 KG/M2 | WEIGHT: 144.75 LBS | DIASTOLIC BLOOD PRESSURE: 83 MMHG | OXYGEN SATURATION: 98 % | SYSTOLIC BLOOD PRESSURE: 157 MMHG | HEART RATE: 44 BPM | HEIGHT: 59 IN | RESPIRATION RATE: 18 BRPM

## 2022-02-23 DIAGNOSIS — M54.16 LUMBAR RADICULOPATHY: ICD-10-CM

## 2022-02-23 DIAGNOSIS — M48.07 SPINAL STENOSIS, LUMBOSACRAL REGION: ICD-10-CM

## 2022-02-23 DIAGNOSIS — G89.4 CHRONIC PAIN DISORDER: Primary | ICD-10-CM

## 2022-02-23 DIAGNOSIS — M48.061 SPINAL STENOSIS OF LUMBAR REGION, UNSPECIFIED WHETHER NEUROGENIC CLAUDICATION PRESENT: Primary | ICD-10-CM

## 2022-02-23 DIAGNOSIS — M47.896 OTHER SPONDYLOSIS, LUMBAR REGION: ICD-10-CM

## 2022-02-23 DIAGNOSIS — M51.36 DDD (DEGENERATIVE DISC DISEASE), LUMBAR: ICD-10-CM

## 2022-02-23 DIAGNOSIS — M43.06 SPONDYLOLYSIS OF LUMBAR REGION: ICD-10-CM

## 2022-02-23 DIAGNOSIS — Z01.818 PRE-OP TESTING: ICD-10-CM

## 2022-02-23 DIAGNOSIS — G89.4 CHRONIC PAIN DISORDER: ICD-10-CM

## 2022-02-23 PROCEDURE — 80307 DRUG TEST PRSMV CHEM ANLYZR: CPT

## 2022-02-23 PROCEDURE — 3077F SYST BP >= 140 MM HG: CPT | Mod: CPTII,S$GLB,,

## 2022-02-23 PROCEDURE — 3079F PR MOST RECENT DIASTOLIC BLOOD PRESSURE 80-89 MM HG: ICD-10-PCS | Mod: CPTII,S$GLB,,

## 2022-02-23 PROCEDURE — 1101F PR PT FALLS ASSESS DOC 0-1 FALLS W/OUT INJ PAST YR: ICD-10-PCS | Mod: CPTII,S$GLB,,

## 2022-02-23 PROCEDURE — 3077F PR MOST RECENT SYSTOLIC BLOOD PRESSURE >= 140 MM HG: ICD-10-PCS | Mod: CPTII,S$GLB,,

## 2022-02-23 PROCEDURE — 1159F PR MEDICATION LIST DOCUMENTED IN MEDICAL RECORD: ICD-10-PCS | Mod: CPTII,S$GLB,,

## 2022-02-23 PROCEDURE — 3288F FALL RISK ASSESSMENT DOCD: CPT | Mod: CPTII,S$GLB,,

## 2022-02-23 PROCEDURE — 3008F PR BODY MASS INDEX (BMI) DOCUMENTED: ICD-10-PCS | Mod: CPTII,S$GLB,,

## 2022-02-23 PROCEDURE — 3288F PR FALLS RISK ASSESSMENT DOCUMENTED: ICD-10-PCS | Mod: CPTII,S$GLB,,

## 2022-02-23 PROCEDURE — 1160F RVW MEDS BY RX/DR IN RCRD: CPT | Mod: CPTII,S$GLB,,

## 2022-02-23 PROCEDURE — 1159F MED LIST DOCD IN RCRD: CPT | Mod: CPTII,S$GLB,,

## 2022-02-23 PROCEDURE — 3008F BODY MASS INDEX DOCD: CPT | Mod: CPTII,S$GLB,,

## 2022-02-23 PROCEDURE — 99999 PR PBB SHADOW E&M-EST. PATIENT-LVL V: CPT | Mod: PBBFAC,,,

## 2022-02-23 PROCEDURE — 99999 PR PBB SHADOW E&M-EST. PATIENT-LVL V: ICD-10-PCS | Mod: PBBFAC,,,

## 2022-02-23 PROCEDURE — 1125F AMNT PAIN NOTED PAIN PRSNT: CPT | Mod: CPTII,S$GLB,,

## 2022-02-23 PROCEDURE — 99214 PR OFFICE/OUTPT VISIT, EST, LEVL IV, 30-39 MIN: ICD-10-PCS | Mod: CS,S$GLB,,

## 2022-02-23 PROCEDURE — 1101F PT FALLS ASSESS-DOCD LE1/YR: CPT | Mod: CPTII,S$GLB,,

## 2022-02-23 PROCEDURE — 1125F PR PAIN SEVERITY QUANTIFIED, PAIN PRESENT: ICD-10-PCS | Mod: CPTII,S$GLB,,

## 2022-02-23 PROCEDURE — 1160F PR REVIEW ALL MEDS BY PRESCRIBER/CLIN PHARMACIST DOCUMENTED: ICD-10-PCS | Mod: CPTII,S$GLB,,

## 2022-02-23 PROCEDURE — 99214 OFFICE O/P EST MOD 30 MIN: CPT | Mod: CS,S$GLB,,

## 2022-02-23 PROCEDURE — 3079F DIAST BP 80-89 MM HG: CPT | Mod: CPTII,S$GLB,,

## 2022-02-23 RX ORDER — HYDROCODONE BITARTRATE AND ACETAMINOPHEN 5; 325 MG/1; MG/1
1 TABLET ORAL EVERY 8 HOURS PRN
Qty: 90 TABLET | Refills: 0 | Status: SHIPPED | OUTPATIENT
Start: 2022-05-05 | End: 2022-04-07 | Stop reason: SDUPTHER

## 2022-02-23 RX ORDER — HYDROCODONE BITARTRATE AND ACETAMINOPHEN 5; 325 MG/1; MG/1
1 TABLET ORAL EVERY 8 HOURS PRN
Qty: 90 TABLET | Refills: 0 | Status: SHIPPED | OUTPATIENT
Start: 2022-03-10 | End: 2022-04-07 | Stop reason: SDUPTHER

## 2022-02-23 RX ORDER — HYDROCODONE BITARTRATE AND ACETAMINOPHEN 5; 325 MG/1; MG/1
1 TABLET ORAL EVERY 8 HOURS PRN
Qty: 90 TABLET | Refills: 0 | Status: SHIPPED | OUTPATIENT
Start: 2022-04-07 | End: 2022-04-07 | Stop reason: SDUPTHER

## 2022-02-23 NOTE — PROGRESS NOTES
"FOLLOW UP NOTE:     CHIEF COMPLAINT: neck and back pain    INITIAL HISTORY OF PRESENT ILLNESS: Teresa Clay is a 64 y.o. female with PMH significant for HTN presents as an established patient for Dr. Cohen (new to me) for the continued management of back and shoulder pain.     The patient reports that her pain began in 2012 when she slipped and fell at home. The patient reports of low back pain (R > L) with radiation down the postero-lateral aspect of her LE's down her feet. The patient does report of intermittent numbness in her LE's. The patient describes her back pain as a constant aching type of pain. The patient reports that her current pain is a 7/10. Patient denies of any urinary/fecal incontinence, saddle anesthesia, or weakness. Of note, the patient does mention that her right hip bothers her a fair bit as well. Patient is unsure as to whether that is related to her back.      The patient currently works as a .      The patient reports that her shoulder pain has been present for a few years. The patient localizes her pain to her bilateral trapezius area. The patient reports of intermittent radiation to her left thumb. The patient describes her pain as a "stiff" type of pain. The patient reports that her current pain is a 4/10. The patient denies of hand weakness.        INTERVAL HISTORY OF PRESENT ILLNESS: Teresa Clay is a 66 y.o. female with PMH significant for HTN presents as an established patient, new to me, that presents continued management of back and neck pain.  She is s/p Radiofrequency ablation of the L2, L3, L4, and L5 medial branch nerves on the bilateral reports 80% benefit. Reports pain has returned but describes the pain at the same location but different in character. She localizes the pain to her lower back with radiation into both buttocks and hips down the posterior and lateral aspects of legs down to feet. R>L.  She describes the pain has "aching" with " ""numbness" into her feet. She reports the pain is similar in character to the pain she had in May of 2021 and would like to repeat that procedure since she thinks it had the most benefit with it. She has increased pain at night but reports benefit with current pain medication regimen. Currently taking Gabapentin 100 mg QHS, reports it helps her sleep and relieves the pain in legs and feet but does not feel benefit from medication into the next day.  Discussed increasing Gabapentin, pt wishes to defer increasing medication at this time.  Currently taking Norco 5 mg approximately 3-4 x a day due to increasing in pain and stressors in life from unexpectedly losing a grandchild. She reports neck/shoulder pain is present today but attributes the pain to stress. She reports having bariatric weight loss surgery with Dr. Concepcion on 12/13/21 and reports significant weight loss with surgery.     She denies trauma or injury. Patient denies of any urinary/fecal incontinence, saddle anesthesia, or weakness.           INTERVENTIONAL PAIN HISTORY:  1/26/22: Radiofrequency ablation of the L2, L3, L4, and L5 medial branch nerves on the bilateral- 80% for about a week.    10/27/21: Bilateral L2, L3, L4, and L5 medial branch nerve blocks- minimal improvement   9/27/21: Bilateral L4-L5 transforaminal epidural steroid injection   8/12/2021: L5 - S1 with right paramedian approach (Dr. Blackwell) - 30% benefit  5/27/2021: L5 - S1 DEAN (Dr. Blackwell) - 90% benefit until about a week ago  10/15/2020:  L5 - S1 DEAN (Dr. Blackwell) - 50% benefit for about 4 weeks  7/22/2020: Cervical TPI's - good benefit x 1 week  7/02/2020: L5 - S1 Intralaminar Epidural Steroid Injection (Dr. Blackwell 100% benefit)     she reports of benefit with injections by Dr. Dueñas and Dr. Cohen     1/27/2020: L5/S1 Interlaminar Epidural Steroid Injection via Dr. Cohen - reports of at least one month of good relief  10/7/2019: L5/S1 Interlaminar Epidural Steroid Injection via Dr." "Vicki  2/4/2019: Bilateral L4-5 transforaminal epidural steroid injection under fluoroscopy via Dr. Dueñas  5/6/2019: Bilateral L4-5 transforaminal epidural steroid injection under fluoroscopy via Dr. Dueñas    CURRENT PAIN MEDICATIONS:   Voltaren gel as needed   Norco 5-325 mg PO QHS as needed- currently taking q6hrs due to increase pain.    Gabapentin 100 mg PO QHS    :      IMAGING: No new imaging to review    ROS:  Review of Systems   Constitutional: Negative for chills and fever.   HENT: Negative for sore throat.    Eyes: Negative for visual disturbance.   Respiratory: Negative for shortness of breath.    Cardiovascular: Negative for chest pain.   Gastrointestinal: Negative for nausea and vomiting.   Genitourinary: Negative for difficulty urinating.   Musculoskeletal: Positive for back pain, myalgias and neck stiffness. Negative for neck pain.   Skin: Negative for rash.   Allergic/Immunologic: Negative for immunocompromised state.   Neurological: Positive for numbness (in feet ). Negative for syncope.   Hematological: Does not bruise/bleed easily.   Psychiatric/Behavioral: Negative for suicidal ideas.   All other systems reviewed and are negative.       MEDICAL, SURGICAL, FAMILY, SOCIAL HX: reviewed    MEDICATIONS/ALLERGIES: reviewed    PHYSICAL EXAM:    VITALS:   Vitals:    02/23/22 0854   BP: (!) 157/83   Pulse: (!) 44   Resp: 18   SpO2: 98%   Weight: 65.6 kg (144 lb 11.7 oz)   Height: 4' 11" (1.499 m)   PainSc:   9       Physical Exam  Vitals and nursing note reviewed.   Constitutional:       Appearance: She is not diaphoretic.   HENT:      Head: Normocephalic and atraumatic.   Eyes:      General:         Right eye: No discharge.         Left eye: No discharge.      Conjunctiva/sclera: Conjunctivae normal.   Cardiovascular:      Rate and Rhythm: Normal rate.   Pulmonary:      Effort: Pulmonary effort is normal. No respiratory distress.      Breath sounds: Normal breath sounds.   Abdominal:      Palpations: " Abdomen is soft.   Musculoskeletal:      Lumbar back: Tenderness present.        Back:    Skin:     General: Skin is warm and dry.      Findings: No rash.   Neurological:      Mental Status: She is alert and oriented to person, place, and time.   Psychiatric:         Mood and Affect: Mood and affect normal.         Cognition and Memory: Memory normal.         Judgment: Judgment normal.        UPPER EXTREMITIES: Normal alignment, normal range of motion, no atrophy, no skin changes,  hair growth and nail growth normal and equal bilaterally. No swelling, no tenderness.    LOWER EXTREMITIES:  Normal alignment, normal range of motion, no atrophy, no skin changes,  hair growth and nail growth normal and equal bilaterally. No swelling, no tenderness.    LUMBAR SPINE:    Lumbar spine: ROM is full with flexion extension and oblique extension with increased pain with flexion and extension.    ((--)) Supine straight leg raise mild bilaterally  ((+)) Facet loading bilaterally  Internal and external rotation of the hip causes no increased pain on either side.  Myofascial exam:  Tenderness to palpation across lumbar paraspinous muscles.      ((+)) TTP at the SI joint on the right side > left side  ((+)) SIN's test on the Right side   ((--)) One leg stand to the right side > left side  ((--)) Distraction test    CRANIAL NERVES:  II:  PERRL bilaterally,   III,IV,VI: EOMI.    V:  Facial sensation equal bilaterally  VII:  Facial motor function normal.  VIII:  Hearing equal to finger rub bilaterally  IX/X: Gag normal, palate symmetric  XI:  Shoulder shrug equal, head turn equal  XII:  Tongue midline without fasciculations      MOTOR: Tone and bulk: normal bilateral upper and lower Strength: normal   Delt Bi Tri WE WF     R 5 5 5 5 5 5   L 5 5 5 5 5 5     IP ADD ABD Quad TA Gas HAM  R 5 5 5 5 5 5 5  L 5 5 5 5 5 5 5      NEUROLOGICAL:    Gen: No clonus or spasticity.   Gait: Normal without antalgic lean. Normal rise, base, steps,  "and arm swing.    BABINSKI: Absent bilaterally  Sensory: Intact to light touch and proprioception BLE  REFLEXES: normal, symmetric, nonbrisk.  Toes down, no clonus. Negative muir's sign bilaterally.      ASSESSMENT: Teresa Clay is a 66 y.o. female with PMH significant for HTN presents as an established patient, new to me, that presents continued management of back and neck pain.  She is s/p radiofrequency ablation of the L2, L3, L4, and L5 medial branch nerves on the bilateral on 1/26/22 reports approximately 80% benefit.  She reports pain has returned to same location but is different in character.  She localizes the pain to her lower back with radiation into both buttocks and hips down the posterior and lateral aspects of legs down to feet. R>L.  She describes the pain has "aching" with "numbness" into her feet. Describes pain as similar to the pain she reported back in May of 2021 and would like to repeat that procedure has she had excellent benefit with it.     Plan below:      1. Spinal stenosis of lumbar region, unspecified whether neurogenic claudication present     2. Chronic pain disorder  Pain Clinic Drug Screen   3. Spondylolysis of lumbar region     4. DDD (degenerative disc disease), lumbar     5. Pre-op testing  COVID-19 Routine Screening       PLAN:    1. Refill Norco 5-325 mg PO to take TID as needed, discussed taking medication as prescribed for breakthrough pain.    2. UDS collected today-last took Norco this morning.   3. Schedule for DEAN Lumbar interlaminar epidural steroid injection at L5-S1 Right Paramedian approach   4. Continue current medication regimen   5. Discussed increasing Gabapentin in the future  6. I have stressed the importance of physical activity and a home exercise plan to help with chronic pain and improve health.  7. I discussed with the patient potential secondary side effects of medication prescribed and urged the patient to read all FDA approved materials that " the pharmacy provides with the prescription.  8. Discussed safety in and around the home to prevent falls and injury. Discussed any environmental changes that can be made to help with safety.   9. RTC for procedure as outlined above or sooner if needed  Alayna Bronson NP

## 2022-03-01 LAB
6MAM UR QL: NOT DETECTED
7AMINOCLONAZEPAM UR QL: NOT DETECTED
A-OH ALPRAZ UR QL: NOT DETECTED
ALPHA-OH-MIDAZOLAM: NOT DETECTED
ALPRAZ UR QL: NOT DETECTED
AMPHET UR QL SCN: NOT DETECTED
ANNOTATION COMMENT IMP: ABNORMAL
ANNOTATION COMMENT IMP: ABNORMAL
BARBITURATES UR QL: NOT DETECTED
BUPRENORPHINE UR QL: NOT DETECTED
BZE UR QL: NOT DETECTED
CARBOXYTHC UR QL: NOT DETECTED
CARISOPRODOL UR QL: NOT DETECTED
CLONAZEPAM UR QL: NOT DETECTED
CODEINE UR QL: NOT DETECTED
CREAT UR-MCNC: >400 MG/DL (ref 20–400)
DIAZEPAM UR QL: NOT DETECTED
ETHYL GLUCURONIDE UR QL: NOT DETECTED
FENTANYL UR QL: NOT DETECTED
GABAPENTIN: PRESENT
HYDROCODONE UR QL: PRESENT
HYDROMORPHONE UR QL: PRESENT
LORAZEPAM UR QL: NOT DETECTED
MDA UR QL: NOT DETECTED
MDEA UR QL: NOT DETECTED
MDMA UR QL: NOT DETECTED
ME-PHENIDATE UR QL: NOT DETECTED
METHADONE UR QL: NOT DETECTED
METHAMPHET UR QL: NOT DETECTED
MIDAZOLAM UR QL SCN: NOT DETECTED
MORPHINE UR QL: NOT DETECTED
NALOXONE: NOT DETECTED
NORBUPRENORPHINE UR QL CFM: NOT DETECTED
NORDIAZEPAM UR QL: NOT DETECTED
NORFENTANYL UR QL: NOT DETECTED
NORHYDROCODONE UR QL CFM: PRESENT
NORMEPERIDINE UR QL CFM: NOT DETECTED
NOROXYCODONE UR QL CFM: NOT DETECTED
NOROXYMORPHONE UR QL SCN: NOT DETECTED
OXAZEPAM UR QL: NOT DETECTED
OXYCODONE UR QL: NOT DETECTED
OXYMORPHONE UR QL: NOT DETECTED
PATHOLOGY STUDY: ABNORMAL
PCP UR QL: NOT DETECTED
PHENTERMINE UR QL: NOT DETECTED
PREGABALIN: NOT DETECTED
SERVICE CMNT-IMP: ABNORMAL
TAPENTADOL UR QL SCN: NOT DETECTED
TAPENTADOL UR QL SCN: NOT DETECTED
TEMAZEPAM UR QL: NOT DETECTED
TRAMADOL UR QL: NOT DETECTED
ZOLPIDEM METABOLITE: NOT DETECTED
ZOLPIDEM UR QL: NOT DETECTED

## 2022-03-06 ENCOUNTER — LAB VISIT (OUTPATIENT)
Dept: FAMILY MEDICINE | Facility: CLINIC | Age: 67
End: 2022-03-06
Payer: MEDICARE

## 2022-03-06 DIAGNOSIS — Z01.818 PRE-OP TESTING: ICD-10-CM

## 2022-03-06 PROCEDURE — U0003 INFECTIOUS AGENT DETECTION BY NUCLEIC ACID (DNA OR RNA); SEVERE ACUTE RESPIRATORY SYNDROME CORONAVIRUS 2 (SARS-COV-2) (CORONAVIRUS DISEASE [COVID-19]), AMPLIFIED PROBE TECHNIQUE, MAKING USE OF HIGH THROUGHPUT TECHNOLOGIES AS DESCRIBED BY CMS-2020-01-R: HCPCS

## 2022-03-06 PROCEDURE — U0005 INFEC AGEN DETEC AMPLI PROBE: HCPCS

## 2022-03-07 LAB
SARS-COV-2 RNA RESP QL NAA+PROBE: NOT DETECTED
SARS-COV-2- CYCLE NUMBER: NORMAL

## 2022-03-09 ENCOUNTER — HOSPITAL ENCOUNTER (OUTPATIENT)
Facility: HOSPITAL | Age: 67
Discharge: HOME | End: 2022-03-09
Attending: ANESTHESIOLOGY | Admitting: ANESTHESIOLOGY
Payer: MEDICARE

## 2022-03-09 VITALS
SYSTOLIC BLOOD PRESSURE: 125 MMHG | RESPIRATION RATE: 16 BRPM | BODY MASS INDEX: 29.03 KG/M2 | DIASTOLIC BLOOD PRESSURE: 86 MMHG | OXYGEN SATURATION: 98 % | HEIGHT: 59 IN | TEMPERATURE: 97 F | HEART RATE: 54 BPM | WEIGHT: 144 LBS

## 2022-03-09 DIAGNOSIS — M51.36 DEGENERATIVE DISC DISEASE, LUMBAR: Primary | ICD-10-CM

## 2022-03-09 PROCEDURE — 63600175 PHARM REV CODE 636 W HCPCS: Performed by: ANESTHESIOLOGY

## 2022-03-09 PROCEDURE — 62323 PR INJ LUMBAR/SACRAL, W/IMAGING GUIDANCE: ICD-10-PCS | Mod: ,,, | Performed by: ANESTHESIOLOGY

## 2022-03-09 PROCEDURE — 25000003 PHARM REV CODE 250: Performed by: ANESTHESIOLOGY

## 2022-03-09 PROCEDURE — 62323 NJX INTERLAMINAR LMBR/SAC: CPT | Mod: RT | Performed by: ANESTHESIOLOGY

## 2022-03-09 PROCEDURE — 62323 NJX INTERLAMINAR LMBR/SAC: CPT | Mod: ,,, | Performed by: ANESTHESIOLOGY

## 2022-03-09 PROCEDURE — 25500020 PHARM REV CODE 255: Performed by: ANESTHESIOLOGY

## 2022-03-09 RX ORDER — FENTANYL CITRATE 50 UG/ML
INJECTION, SOLUTION INTRAMUSCULAR; INTRAVENOUS
Status: DISCONTINUED | OUTPATIENT
Start: 2022-03-09 | End: 2022-03-09 | Stop reason: HOSPADM

## 2022-03-09 RX ORDER — METHYLPREDNISOLONE ACETATE 80 MG/ML
INJECTION, SUSPENSION INTRA-ARTICULAR; INTRALESIONAL; INTRAMUSCULAR; SOFT TISSUE
Status: DISCONTINUED | OUTPATIENT
Start: 2022-03-09 | End: 2022-03-09 | Stop reason: HOSPADM

## 2022-03-09 RX ORDER — SODIUM CHLORIDE, SODIUM LACTATE, POTASSIUM CHLORIDE, CALCIUM CHLORIDE 600; 310; 30; 20 MG/100ML; MG/100ML; MG/100ML; MG/100ML
INJECTION, SOLUTION INTRAVENOUS ONCE AS NEEDED
Status: COMPLETED | OUTPATIENT
Start: 2022-03-09 | End: 2022-03-09

## 2022-03-09 RX ORDER — LIDOCAINE HYDROCHLORIDE 10 MG/ML
INJECTION INFILTRATION; PERINEURAL
Status: DISCONTINUED | OUTPATIENT
Start: 2022-03-09 | End: 2022-03-09 | Stop reason: HOSPADM

## 2022-03-09 RX ORDER — MIDAZOLAM HYDROCHLORIDE 1 MG/ML
INJECTION, SOLUTION INTRAMUSCULAR; INTRAVENOUS
Status: DISCONTINUED | OUTPATIENT
Start: 2022-03-09 | End: 2022-03-09 | Stop reason: HOSPADM

## 2022-03-09 RX ADMIN — SODIUM CHLORIDE, SODIUM LACTATE, POTASSIUM CHLORIDE, AND CALCIUM CHLORIDE: .6; .31; .03; .02 INJECTION, SOLUTION INTRAVENOUS at 06:03

## 2022-03-09 NOTE — PROGRESS NOTES
Pt able to dress herself and ambulate to wheel chair, pt escorted to front of hospital via wheel chair, spouse here to drive her home, pt instructions in hand

## 2022-03-09 NOTE — OP NOTE
PROCEDURE DATE: 3/9/2022    PROCEDURE:  Lumbar interlaminar epidural steroid injection at L5-S1 under fluoroscopic guidance (right paramedian approach)    DIAGNOSIS: Lumbar radiculopathy  POSTOP DIAGNOSIS: SAME    PHYSICIAN: Seb Blackwell M.D.    MEDICATIONS INJECTED: 80 mg depo-medrol with 4 ml of preservative free NaCl    LOCAL ANESTHETIC INJECTED:    Lidocaine 1% 4 ml total    SEDATION MEDICATIONS: RN IV sedation    ESTIMATED BLOOD LOSS:  none    COMPLICATIONS:  none    TECHNIQUE:  Time-out taken to identify patient and procedure prior to starting the procedure.  With the patient laying in a prone position, the area was prepped and draped in the usual sterile fashion using ChloraPrep and a fenestrated drape.  After determining the target level with an AP fluoroscopic view, local anesthetic was given using a 25-gauge 1.5 inch needle by raising a wheal and then infiltrating toward the interlaminar entry space.  A 3.5 inch 20-gauge Touhy needle was introduced under AP fluoroscopic guidance to the interlaminar space of L5-S1. Once the trajectory was established, the needle was visualized in the lateral view and advanced using loss of resistance technique. Once in the desired position, 2 mL contrast was injected to confirm placement and there was no vascular uptake nor intrathecal spread.  The medication was then injected slowly. The patient tolerated the procedure well.      The patient was monitored after the procedure.   They were given post-procedure and discharge instructions to follow at home.  The patient was discharged in a stable condition.

## 2022-03-09 NOTE — DISCHARGE SUMMARY
[facility]  Discharge Note  Short Stay    Procedure(s) (LRB):  Injection, Steroid, Epidural Interlaminar L5-S1 righ paramedian approach (Bilateral)    OUTCOME: Patient tolerated treatment/procedure well without complication and is now ready for discharge.    DISPOSITION: Home or Self Care    FINAL DIAGNOSIS:  Degenerative disc disease, lumbar    FOLLOWUP: In clinic    DISCHARGE INSTRUCTIONS:    Discharge Procedure Orders   Diet general     Call MD for:  temperature >100.4     Call MD for:  persistent nausea and vomiting     Call MD for:  severe uncontrolled pain     Call MD for:  difficulty breathing, headache or visual disturbances     Call MD for:  redness, tenderness, or signs of infection (pain, swelling, redness, odor or green/yellow discharge around incision site)     Call MD for:  hives     Call MD for:  persistent dizziness or light-headedness     Call MD for:  extreme fatigue        TIME SPENT ON DISCHARGE: 30 minutes

## 2022-03-10 ENCOUNTER — TELEPHONE (OUTPATIENT)
Dept: PAIN MEDICINE | Facility: CLINIC | Age: 67
End: 2022-03-10
Payer: MEDICARE

## 2022-03-10 DIAGNOSIS — M54.16 LUMBAR RADICULOPATHY: ICD-10-CM

## 2022-03-10 DIAGNOSIS — M48.07 SPINAL STENOSIS, LUMBOSACRAL REGION: ICD-10-CM

## 2022-03-10 DIAGNOSIS — M51.36 DDD (DEGENERATIVE DISC DISEASE), LUMBAR: ICD-10-CM

## 2022-03-10 RX ORDER — GABAPENTIN 100 MG/1
100 CAPSULE ORAL 3 TIMES DAILY
Qty: 90 CAPSULE | Refills: 2 | Status: SHIPPED | OUTPATIENT
Start: 2022-03-10 | End: 2022-05-06 | Stop reason: DRUGHIGH

## 2022-03-10 NOTE — TELEPHONE ENCOUNTER
3/10/22 808a Pt called regarding medication refill. The hydrocodone 5-325 mg was already sent in and can be filled today. She would like the gabapentin 100 mg sent in as well. Inez Williamson. RAINA

## 2022-04-07 ENCOUNTER — OFFICE VISIT (OUTPATIENT)
Dept: PAIN MEDICINE | Facility: CLINIC | Age: 67
End: 2022-04-07
Payer: MEDICARE

## 2022-04-07 VITALS
RESPIRATION RATE: 16 BRPM | BODY MASS INDEX: 29.03 KG/M2 | DIASTOLIC BLOOD PRESSURE: 82 MMHG | HEART RATE: 54 BPM | SYSTOLIC BLOOD PRESSURE: 141 MMHG | HEIGHT: 59 IN | WEIGHT: 144 LBS

## 2022-04-07 DIAGNOSIS — M51.36 DDD (DEGENERATIVE DISC DISEASE), LUMBAR: ICD-10-CM

## 2022-04-07 DIAGNOSIS — M70.61 TROCHANTERIC BURSITIS OF RIGHT HIP: Primary | ICD-10-CM

## 2022-04-07 DIAGNOSIS — M54.16 LUMBAR RADICULOPATHY: ICD-10-CM

## 2022-04-07 DIAGNOSIS — F11.90 CHRONIC, CONTINUOUS USE OF OPIOIDS: ICD-10-CM

## 2022-04-07 DIAGNOSIS — G89.4 CHRONIC PAIN DISORDER: ICD-10-CM

## 2022-04-07 PROCEDURE — 99214 PR OFFICE/OUTPT VISIT, EST, LEVL IV, 30-39 MIN: ICD-10-PCS | Mod: 25,S$GLB,,

## 2022-04-07 PROCEDURE — 3077F PR MOST RECENT SYSTOLIC BLOOD PRESSURE >= 140 MM HG: ICD-10-PCS | Mod: CPTII,S$GLB,,

## 2022-04-07 PROCEDURE — 3288F FALL RISK ASSESSMENT DOCD: CPT | Mod: CPTII,S$GLB,,

## 2022-04-07 PROCEDURE — 20610 DRAIN/INJ JOINT/BURSA W/O US: CPT | Mod: RT,S$GLB,,

## 2022-04-07 PROCEDURE — 3008F PR BODY MASS INDEX (BMI) DOCUMENTED: ICD-10-PCS | Mod: CPTII,S$GLB,,

## 2022-04-07 PROCEDURE — 1125F AMNT PAIN NOTED PAIN PRSNT: CPT | Mod: CPTII,S$GLB,,

## 2022-04-07 PROCEDURE — 1125F PR PAIN SEVERITY QUANTIFIED, PAIN PRESENT: ICD-10-PCS | Mod: CPTII,S$GLB,,

## 2022-04-07 PROCEDURE — 20610 LARGE JOINT ASPIRATION/INJECTION: R GREATER TROCHANTERIC BURSA: ICD-10-PCS | Mod: RT,S$GLB,,

## 2022-04-07 PROCEDURE — 99999 PR PBB SHADOW E&M-EST. PATIENT-LVL III: CPT | Mod: PBBFAC,,,

## 2022-04-07 PROCEDURE — 3077F SYST BP >= 140 MM HG: CPT | Mod: CPTII,S$GLB,,

## 2022-04-07 PROCEDURE — 3288F PR FALLS RISK ASSESSMENT DOCUMENTED: ICD-10-PCS | Mod: CPTII,S$GLB,,

## 2022-04-07 PROCEDURE — 1101F PR PT FALLS ASSESS DOC 0-1 FALLS W/OUT INJ PAST YR: ICD-10-PCS | Mod: CPTII,S$GLB,,

## 2022-04-07 PROCEDURE — 3008F BODY MASS INDEX DOCD: CPT | Mod: CPTII,S$GLB,,

## 2022-04-07 PROCEDURE — 99214 OFFICE O/P EST MOD 30 MIN: CPT | Mod: 25,S$GLB,,

## 2022-04-07 PROCEDURE — 3079F PR MOST RECENT DIASTOLIC BLOOD PRESSURE 80-89 MM HG: ICD-10-PCS | Mod: CPTII,S$GLB,,

## 2022-04-07 PROCEDURE — 80307 DRUG TEST PRSMV CHEM ANLYZR: CPT

## 2022-04-07 PROCEDURE — 1101F PT FALLS ASSESS-DOCD LE1/YR: CPT | Mod: CPTII,S$GLB,,

## 2022-04-07 PROCEDURE — 3079F DIAST BP 80-89 MM HG: CPT | Mod: CPTII,S$GLB,,

## 2022-04-07 PROCEDURE — 99999 PR PBB SHADOW E&M-EST. PATIENT-LVL III: ICD-10-PCS | Mod: PBBFAC,,,

## 2022-04-07 RX ORDER — HYDROCODONE BITARTRATE AND ACETAMINOPHEN 5; 325 MG/1; MG/1
1 TABLET ORAL EVERY 8 HOURS PRN
Qty: 90 TABLET | Refills: 0 | Status: SHIPPED | OUTPATIENT
Start: 2022-04-08 | End: 2022-04-08

## 2022-04-07 RX ORDER — METHYLPREDNISOLONE ACETATE 80 MG/ML
40 INJECTION, SUSPENSION INTRA-ARTICULAR; INTRALESIONAL; INTRAMUSCULAR; SOFT TISSUE
Status: DISCONTINUED | OUTPATIENT
Start: 2022-04-07 | End: 2022-04-07 | Stop reason: HOSPADM

## 2022-04-07 RX ADMIN — METHYLPREDNISOLONE ACETATE 40 MG: 80 INJECTION, SUSPENSION INTRA-ARTICULAR; INTRALESIONAL; INTRAMUSCULAR; SOFT TISSUE at 08:04

## 2022-04-07 NOTE — PROGRESS NOTES
"FOLLOW UP NOTE:     CHIEF COMPLAINT: neck and back pain    INITIAL HISTORY OF PRESENT ILLNESS: Teresa Clay is a 64 y.o. female with PMH significant for HTN presents as an established patient for Dr. Cohen (new to me) for the continued management of back and shoulder pain.     The patient reports that her pain began in 2012 when she slipped and fell at home. The patient reports of low back pain (R > L) with radiation down the postero-lateral aspect of her LE's down her feet. The patient does report of intermittent numbness in her LE's. The patient describes her back pain as a constant aching type of pain. The patient reports that her current pain is a 7/10. Patient denies of any urinary/fecal incontinence, saddle anesthesia, or weakness. Of note, the patient does mention that her right hip bothers her a fair bit as well. Patient is unsure as to whether that is related to her back.      The patient currently works as a .      The patient reports that her shoulder pain has been present for a few years. The patient localizes her pain to her bilateral trapezius area. The patient reports of intermittent radiation to her left thumb. The patient describes her pain as a "stiff" type of pain. The patient reports that her current pain is a 4/10. The patient denies of hand weakness.        INTERVAL HISTORY OF PRESENT ILLNESS: Teresa Clay is a 66 y.o. female with PMH significant for HTN presents as an established patient for continued management of back and neck pain.  She is s/p Lumbar interlaminar epidural steroid injection at L5-S1 right paramedian approach on 3/9/2022 and reports 100% relief.    Reports pain is present but is manageable and she is able to do daily activities.  She has increased pain at night but reports benefit with current pain medication regimen.  Today she localizes her pain to her bilateral hips R>L.  Currently taking Gabapentin 100 mg QHS, reports it helps her sleep and " relieves the pain in legs and feet but does not feel benefit from medication into the next day.  Currently taking Norco 5 mg approximately 3 times a day.  The patient denies of any significant changes in her health since her last appointment. The patient also denies of any changes in the character of her pain since her last appointment. She denies trauma or injury. Patient denies of any urinary/fecal incontinence, saddle anesthesia, or weakness.         INTERVENTIONAL PAIN HISTORY:  3/9/2022: Lumbar interlaminar epidural steroid injection at L5-S1(right paramedian approach)-  100% relief.    1/26/22: Radiofrequency ablation of the L2, L3, L4, and L5 medial branch nerves on the bilateral- 80% for about a week.    10/27/21: Bilateral L2, L3, L4, and L5 medial branch nerve blocks- minimal improvement   9/27/21: Bilateral L4-L5 transforaminal epidural steroid injection   8/12/2021: L5 - S1 with right paramedian approach (Dr. Blackwell) - 30% benefit  5/27/2021: L5 - S1 DEAN (Dr. Blackwell) - 90% benefit until about a week ago  10/15/2020:  L5 - S1 DEAN (Dr. Blackwell) - 50% benefit for about 4 weeks  7/22/2020: Cervical TPI's - good benefit x 1 week  7/02/2020: L5 - S1 Intralaminar Epidural Steroid Injection (Dr. Blackwell 100% benefit)     she reports of benefit with injections by Dr. Dueñas and Dr. Cohen     1/27/2020: L5/S1 Interlaminar Epidural Steroid Injection via Dr. Cohen - reports of at least one month of good relief  10/7/2019: L5/S1 Interlaminar Epidural Steroid Injection via Dr. Cohen  2/4/2019: Bilateral L4-5 transforaminal epidural steroid injection under fluoroscopy via Dr. Dueñas  5/6/2019: Bilateral L4-5 transforaminal epidural steroid injection under fluoroscopy via Dr. Dueñas    CURRENT PAIN MEDICATIONS:   Voltaren gel as needed   Norco 5-325 mg PO QHS as needed  Gabapentin 100 mg PO QHS    :      IMAGING: No new imaging to review    ROS:  Review of Systems   Constitutional: Negative for chills and fever.   HENT: Negative for sore  "throat.    Eyes: Negative for visual disturbance.   Respiratory: Negative for shortness of breath.    Cardiovascular: Negative for chest pain.   Gastrointestinal: Negative for nausea and vomiting.   Genitourinary: Negative for difficulty urinating.   Musculoskeletal: Positive for back pain, myalgias and neck stiffness. Negative for neck pain.   Skin: Negative for rash.   Allergic/Immunologic: Negative for immunocompromised state.   Neurological: Positive for numbness (in feet ). Negative for syncope.   Hematological: Does not bruise/bleed easily.   Psychiatric/Behavioral: Negative for suicidal ideas.   All other systems reviewed and are negative.       MEDICAL, SURGICAL, FAMILY, SOCIAL HX: reviewed    MEDICATIONS/ALLERGIES: reviewed    PHYSICAL EXAM:    VITALS:   Vitals:    04/07/22 0815   BP: (!) 141/82   Pulse: (!) 54   Resp: 16   Weight: 65.3 kg (144 lb)   Height: 4' 11" (1.499 m)   PainSc:   5       Physical Exam  Vitals and nursing note reviewed.   Constitutional:       Appearance: She is not diaphoretic.   HENT:      Head: Normocephalic and atraumatic.   Eyes:      General:         Right eye: No discharge.         Left eye: No discharge.      Conjunctiva/sclera: Conjunctivae normal.   Cardiovascular:      Rate and Rhythm: Normal rate.   Pulmonary:      Effort: Pulmonary effort is normal. No respiratory distress.      Breath sounds: Normal breath sounds.   Abdominal:      Palpations: Abdomen is soft.   Musculoskeletal:      Right hip: Tenderness present.   Skin:     General: Skin is warm and dry.      Findings: No rash.   Neurological:      Mental Status: She is alert and oriented to person, place, and time.   Psychiatric:         Mood and Affect: Mood and affect normal.         Cognition and Memory: Memory normal.         Judgment: Judgment normal.        UPPER EXTREMITIES: Normal alignment, normal range of motion, no atrophy, no skin changes,  hair growth and nail growth normal and equal bilaterally. No " swelling, no tenderness.    LOWER EXTREMITIES:  Normal alignment, normal range of motion, no atrophy, no skin changes,  hair growth and nail growth normal and equal bilaterally. No swelling, no tenderness.    LUMBAR SPINE:    Lumbar spine: ROM is full with flexion extension and oblique extension with increased pain with flexion and extension.    ((--)) Supine straight leg raise mild bilaterally  ((--)) Facet loading bilaterally  Internal and external rotation of the hip causes increased pain on right side.  Myofascial exam:  Tenderness to palpation across R GTB      ((--)) TTP at the SI joint bilateral  ((+)) SIN's test on the Right side   ((--)) One leg stand to the right side > left side  ((--)) Distraction test    CRANIAL NERVES:  II:  PERRL bilaterally,   III,IV,VI: EOMI.    V:  Facial sensation equal bilaterally  VII:  Facial motor function normal.  VIII:  Hearing equal to finger rub bilaterally  IX/X: Gag normal, palate symmetric  XI:  Shoulder shrug equal, head turn equal  XII:  Tongue midline without fasciculations      MOTOR: Tone and bulk: normal bilateral upper and lower Strength: normal   Delt Bi Tri WE WF     R 5 5 5 5 5 5   L 5 5 5 5 5 5     IP ADD ABD Quad TA Gas HAM  R 5 5 5 5 5 5 5  L 5 5 5 5 5 5 5      NEUROLOGICAL:    Gen: No clonus or spasticity.   Gait: Normal without antalgic lean. Normal rise, base, steps, and arm swing.    BABINSKI: Absent bilaterally  Sensory: Intact to light touch and proprioception BLE  REFLEXES: normal, symmetric, nonbrisk.  Toes down, no clonus. Negative muir's sign bilaterally.      ASSESSMENT: Teresa Clay is a 66 y.o. female with PMH significant for HTN presents as an established patient that presents continued management of back and neck pain.  She is s/p  DEAN Lumbar interlaminar epidural steroid injection at L5-S1 Right Paramedian approach on 3/9/22 and she reports of 100% relief. Today she localizes her pain to her hips bilaterally, R>L.   She has  tenderness to palpation on her GTB R>L. Discussed increasing Gabapentin 100 mg QHS to 300 mg QHS.  Plan below:      1. Lumbar radiculopathy     2. DDD (degenerative disc disease), lumbar     3. Chronic, continuous use of opioids  Pain Clinic Drug Screen       PLAN:    1. Refill Norco 5-325 mg PO to take TID as needed, discussed taking medication as prescribed for breakthrough pain.    2. UDS collected today-last took Solen last night.    3. Right GTB injection done today in clinic.    4. Continue current medication regimen   5. Increase Gabapentin to 300 mg QHS, pt will send message and let me know if she can tolerate the increase.    6. I have stressed the importance of physical activity and a home exercise plan to help with chronic pain and improve health.  7. I discussed with the patient potential secondary side effects of medication prescribed and urged the patient to read all FDA approved materials that the pharmacy provides with the prescription.  8. Discussed safety in and around the home to prevent falls and injury. Discussed any environmental changes that can be made to help with safety.   9. RTC 4 weeks or sooner  All medication management performed by Dr. Blackwell.   Alayna Bronson, JOSE

## 2022-04-07 NOTE — PROCEDURES
Large Joint Aspiration/Injection: R greater trochanteric bursa    Date/Time: 4/7/2022 8:00 AM  Performed by: Alayna Bronson NP  Authorized by: Alayna Bronson NP     Consent Done?:  Yes (Written)  Indications:  Pain  Site marked: the procedure site was marked    Timeout: prior to procedure the correct patient, procedure, and site was verified    Prep: patient was prepped and draped in usual sterile fashion      Local anesthesia used?: Yes    Anesthesia:  Local infiltration  Local anesthetic:  Bupivacaine 0.25% without epinephrine  Anesthetic total (ml):  4      Details:  Needle Size:  25 G  Ultrasonic Guidance for needle placement?: No    Approach:  Lateral  Location:  Hip  Site:  R greater trochanteric bursa  Medications:  40 mg methylPREDNISolone acetate 80 mg/mL    Right Greater Trochanteric Bursa Injection-    All medications, allergies, and relevant histories were reviewed. No recent antibiotics or infections.  A time-out was taken to verify the correct patient, procedure, laterality, and appropriate medications/allergies.    After informed consent was taken patient is placed in left lateral decubitus position and the right hip area exposed. Patient was prepped in sterile fashion and then using a 25-gauge 1.5 inch needle local anesthetic was injected to make a skin wheal. Then a 22-gauge 3.5 inch spinal needle was introduced into the greater trochanter. After negative aspiration, and no paresthesia, a 5 ml solution containing 0.25% bupivacaine and 40 mg Depo-Medrol was intermittently injected. Patient tolerated procedure well with no complications.

## 2022-04-08 DIAGNOSIS — G89.4 CHRONIC PAIN DISORDER: Primary | ICD-10-CM

## 2022-04-08 RX ORDER — HYDROCODONE BITARTRATE AND ACETAMINOPHEN 5; 325 MG/1; MG/1
1 TABLET ORAL EVERY 8 HOURS PRN
Qty: 90 TABLET | Refills: 0 | Status: CANCELLED | OUTPATIENT
Start: 2022-04-08

## 2022-04-08 RX ORDER — HYDROCODONE BITARTRATE AND ACETAMINOPHEN 5; 325 MG/1; MG/1
1 TABLET ORAL EVERY 8 HOURS PRN
Qty: 90 TABLET | Refills: 0 | Status: SHIPPED | OUTPATIENT
Start: 2022-04-08 | End: 2022-05-06 | Stop reason: SDUPTHER

## 2022-04-12 ENCOUNTER — TELEPHONE (OUTPATIENT)
Dept: PAIN MEDICINE | Facility: CLINIC | Age: 67
End: 2022-04-12
Payer: MEDICARE

## 2022-04-12 LAB
6MAM UR QL: NOT DETECTED
7AMINOCLONAZEPAM UR QL: NOT DETECTED
A-OH ALPRAZ UR QL: NOT DETECTED
ALPHA-OH-MIDAZOLAM: NOT DETECTED
ALPRAZ UR QL: NOT DETECTED
AMPHET UR QL SCN: NOT DETECTED
ANNOTATION COMMENT IMP: NORMAL
ANNOTATION COMMENT IMP: NORMAL
BARBITURATES UR QL: NOT DETECTED
BUPRENORPHINE UR QL: NOT DETECTED
BZE UR QL: NOT DETECTED
CARBOXYTHC UR QL: NOT DETECTED
CARISOPRODOL UR QL: NOT DETECTED
CLONAZEPAM UR QL: NOT DETECTED
CODEINE UR QL: NOT DETECTED
CREAT UR-MCNC: 228.4 MG/DL (ref 20–400)
DIAZEPAM UR QL: NOT DETECTED
ETHYL GLUCURONIDE UR QL: NOT DETECTED
FENTANYL UR QL: NOT DETECTED
GABAPENTIN: PRESENT
HYDROCODONE UR QL: PRESENT
HYDROMORPHONE UR QL: PRESENT
LORAZEPAM UR QL: NOT DETECTED
MDA UR QL: NOT DETECTED
MDEA UR QL: NOT DETECTED
MDMA UR QL: NOT DETECTED
ME-PHENIDATE UR QL: NOT DETECTED
METHADONE UR QL: NOT DETECTED
METHAMPHET UR QL: NOT DETECTED
MIDAZOLAM UR QL SCN: NOT DETECTED
MORPHINE UR QL: NOT DETECTED
NALOXONE: NOT DETECTED
NORBUPRENORPHINE UR QL CFM: NOT DETECTED
NORDIAZEPAM UR QL: NOT DETECTED
NORFENTANYL UR QL: NOT DETECTED
NORHYDROCODONE UR QL CFM: PRESENT
NORMEPERIDINE UR QL CFM: NOT DETECTED
NOROXYCODONE UR QL CFM: NOT DETECTED
NOROXYMORPHONE UR QL SCN: NOT DETECTED
OXAZEPAM UR QL: NOT DETECTED
OXYCODONE UR QL: NOT DETECTED
OXYMORPHONE UR QL: NOT DETECTED
PATHOLOGY STUDY: NORMAL
PCP UR QL: NOT DETECTED
PHENTERMINE UR QL: NOT DETECTED
PREGABALIN: NOT DETECTED
SERVICE CMNT-IMP: NORMAL
TAPENTADOL UR QL SCN: NOT DETECTED
TAPENTADOL UR QL SCN: NOT DETECTED
TEMAZEPAM UR QL: NOT DETECTED
TRAMADOL UR QL: NOT DETECTED
ZOLPIDEM METABOLITE: NOT DETECTED
ZOLPIDEM UR QL: NOT DETECTED

## 2022-04-12 NOTE — TELEPHONE ENCOUNTER
----- Message from Filiberto Krause sent at 4/12/2022  9:46 AM CDT -----  Type:  Patient Returning Call    Who Called:  Patient  Who Left Message for Patient: NA  Does the patient know what this is regarding?:   Best Call Back Number:  [phone]  Additional Information:

## 2022-05-03 ENCOUNTER — TELEPHONE (OUTPATIENT)
Dept: PAIN MEDICINE | Facility: CLINIC | Age: 67
End: 2022-05-03
Payer: MEDICARE

## 2022-05-03 NOTE — TELEPHONE ENCOUNTER
----- Message from Shirley Lawson sent at 5/3/2022 12:56 PM CDT -----  Regarding: advice  Contact: self  Type: Needs Medical Advice  Who Called:  self  Symptoms (please be specific):    How long has patient had these symptoms:    Pharmacy name and phone #:    Best Call Back Number: [phone]  Additional Information:  Patient has a question for the nurse.

## 2022-05-03 NOTE — TELEPHONE ENCOUNTER
Pt called to notify that she was back home from being out of town. She confirmed her appointment and I gave her the number to call and check on her FA. She was inquiring about her physical therapy and I informed her that when she comes to her appointment on Friday we can take care of that referral at that time.

## 2022-05-04 ENCOUNTER — TELEPHONE (OUTPATIENT)
Dept: PAIN MEDICINE | Facility: CLINIC | Age: 67
End: 2022-05-04
Payer: MEDICARE

## 2022-05-04 NOTE — TELEPHONE ENCOUNTER
----- Message from Mali Ibrahim LPN sent at 5/2/2022  4:09 PM CDT -----  Contact: self    ----- Message -----  From: Karyn Fernández  Sent: 5/2/2022   3:57 PM CDT  To: Aiyana Catalan Staff    Patient was asked to call and speak Alayna after she got back from vacation.  Please hav e her call back at [phone] and thanks

## 2022-05-06 ENCOUNTER — OFFICE VISIT (OUTPATIENT)
Dept: PAIN MEDICINE | Facility: CLINIC | Age: 67
End: 2022-05-06
Payer: MEDICARE

## 2022-05-06 VITALS
HEART RATE: 46 BPM | SYSTOLIC BLOOD PRESSURE: 127 MMHG | BODY MASS INDEX: 27.24 KG/M2 | HEIGHT: 59 IN | WEIGHT: 135.13 LBS | DIASTOLIC BLOOD PRESSURE: 71 MMHG

## 2022-05-06 DIAGNOSIS — G89.4 CHRONIC PAIN DISORDER: Primary | ICD-10-CM

## 2022-05-06 DIAGNOSIS — G89.4 CHRONIC PAIN DISORDER: ICD-10-CM

## 2022-05-06 DIAGNOSIS — M54.16 LUMBAR RADICULOPATHY: ICD-10-CM

## 2022-05-06 DIAGNOSIS — M51.36 DDD (DEGENERATIVE DISC DISEASE), LUMBAR: Primary | ICD-10-CM

## 2022-05-06 PROCEDURE — 99999 PR PBB SHADOW E&M-EST. PATIENT-LVL III: ICD-10-PCS | Mod: PBBFAC,,,

## 2022-05-06 PROCEDURE — 1159F PR MEDICATION LIST DOCUMENTED IN MEDICAL RECORD: ICD-10-PCS | Mod: CPTII,S$GLB,,

## 2022-05-06 PROCEDURE — 1160F PR REVIEW ALL MEDS BY PRESCRIBER/CLIN PHARMACIST DOCUMENTED: ICD-10-PCS | Mod: CPTII,S$GLB,,

## 2022-05-06 PROCEDURE — 3074F PR MOST RECENT SYSTOLIC BLOOD PRESSURE < 130 MM HG: ICD-10-PCS | Mod: CPTII,S$GLB,,

## 2022-05-06 PROCEDURE — 3078F PR MOST RECENT DIASTOLIC BLOOD PRESSURE < 80 MM HG: ICD-10-PCS | Mod: CPTII,S$GLB,,

## 2022-05-06 PROCEDURE — 1101F PT FALLS ASSESS-DOCD LE1/YR: CPT | Mod: CPTII,S$GLB,,

## 2022-05-06 PROCEDURE — 3288F PR FALLS RISK ASSESSMENT DOCUMENTED: ICD-10-PCS | Mod: CPTII,S$GLB,,

## 2022-05-06 PROCEDURE — 3288F FALL RISK ASSESSMENT DOCD: CPT | Mod: CPTII,S$GLB,,

## 2022-05-06 PROCEDURE — 99214 PR OFFICE/OUTPT VISIT, EST, LEVL IV, 30-39 MIN: ICD-10-PCS | Mod: S$GLB,,,

## 2022-05-06 PROCEDURE — 3074F SYST BP LT 130 MM HG: CPT | Mod: CPTII,S$GLB,,

## 2022-05-06 PROCEDURE — 1125F AMNT PAIN NOTED PAIN PRSNT: CPT | Mod: CPTII,S$GLB,,

## 2022-05-06 PROCEDURE — 1160F RVW MEDS BY RX/DR IN RCRD: CPT | Mod: CPTII,S$GLB,,

## 2022-05-06 PROCEDURE — 1159F MED LIST DOCD IN RCRD: CPT | Mod: CPTII,S$GLB,,

## 2022-05-06 PROCEDURE — 80307 DRUG TEST PRSMV CHEM ANLYZR: CPT

## 2022-05-06 PROCEDURE — 3008F BODY MASS INDEX DOCD: CPT | Mod: CPTII,S$GLB,,

## 2022-05-06 PROCEDURE — 99999 PR PBB SHADOW E&M-EST. PATIENT-LVL III: CPT | Mod: PBBFAC,,,

## 2022-05-06 PROCEDURE — 99214 OFFICE O/P EST MOD 30 MIN: CPT | Mod: S$GLB,,,

## 2022-05-06 PROCEDURE — 1101F PR PT FALLS ASSESS DOC 0-1 FALLS W/OUT INJ PAST YR: ICD-10-PCS | Mod: CPTII,S$GLB,,

## 2022-05-06 PROCEDURE — 1125F PR PAIN SEVERITY QUANTIFIED, PAIN PRESENT: ICD-10-PCS | Mod: CPTII,S$GLB,,

## 2022-05-06 PROCEDURE — 3008F PR BODY MASS INDEX (BMI) DOCUMENTED: ICD-10-PCS | Mod: CPTII,S$GLB,,

## 2022-05-06 PROCEDURE — 3078F DIAST BP <80 MM HG: CPT | Mod: CPTII,S$GLB,,

## 2022-05-06 RX ORDER — GABAPENTIN 300 MG/1
300 CAPSULE ORAL NIGHTLY
Qty: 30 CAPSULE | Refills: 0 | Status: SHIPPED | OUTPATIENT
Start: 2022-05-06 | End: 2022-06-06 | Stop reason: SDUPTHER

## 2022-05-06 RX ORDER — HYDROCODONE BITARTRATE AND ACETAMINOPHEN 5; 325 MG/1; MG/1
1 TABLET ORAL EVERY 8 HOURS PRN
Qty: 90 TABLET | Refills: 0 | Status: SHIPPED | OUTPATIENT
Start: 2022-05-06 | End: 2022-06-06 | Stop reason: SDUPTHER

## 2022-05-06 NOTE — PROGRESS NOTES
"FOLLOW UP NOTE:     CHIEF COMPLAINT: neck and back pain    INITIAL HISTORY OF PRESENT ILLNESS: Teresa Clay is a 64 y.o. female with PMH significant for HTN presents as an established patient for Dr. Cohen (new to me) for the continued management of back and shoulder pain.     The patient reports that her pain began in 2012 when she slipped and fell at home. The patient reports of low back pain (R > L) with radiation down the postero-lateral aspect of her LE's down her feet. The patient does report of intermittent numbness in her LE's. The patient describes her back pain as a constant aching type of pain. The patient reports that her current pain is a 7/10. Patient denies of any urinary/fecal incontinence, saddle anesthesia, or weakness. Of note, the patient does mention that her right hip bothers her a fair bit as well. Patient is unsure as to whether that is related to her back.      The patient currently works as a .      The patient reports that her shoulder pain has been present for a few years. The patient localizes her pain to her bilateral trapezius area. The patient reports of intermittent radiation to her left thumb. The patient describes her pain as a "stiff" type of pain. The patient reports that her current pain is a 4/10. The patient denies of hand weakness.        INTERVAL HISTORY OF PRESENT ILLNESS: Teresa Clay is a 66 y.o. female with PMH significant for HTN presents as an established patient for continued management of back and neck pain.  Today the patient continues to report of pain across her lower back and reports radiation down the posterior aspect of her BLE to her feet. The patient reports the pain is beginning to return the same character prior to her last DEAN.  The patient reports the pain is "aching" in character.  She reports pain is worse at night.  Reports pain is present but is manageable and she is able to do daily activities.  Currently taking Gabapentin " 300 mg QHS, the patient reports minimal SE with this dosage. She reports she is receiving benefit from medication into the next day.   Currently taking Norco 5 mg approximately 3 times a day.  The patient denies of any significant changes in her health since her last appointment. The patient also denies of any changes in the character of her pain since her last appointment. She denies trauma or injury. Patient denies of any urinary/fecal incontinence, saddle anesthesia, or weakness.         INTERVENTIONAL PAIN HISTORY:  3/9/2022: Lumbar interlaminar epidural steroid injection at L5-S1(right paramedian approach)-  100% relief.    1/26/22: Radiofrequency ablation of the L2, L3, L4, and L5 medial branch nerves on the bilateral- 80% for about a week.    10/27/21: Bilateral L2, L3, L4, and L5 medial branch nerve blocks- minimal improvement   9/27/21: Bilateral L4-L5 transforaminal epidural steroid injection   8/12/2021: L5 - S1 with right paramedian approach (Dr. Blackwell) - 30% benefit  5/27/2021: L5 - S1 DEAN (Dr. Blackwell) - 90% benefit until about a week ago  10/15/2020:  L5 - S1 DEAN (Dr. Blackwell) - 50% benefit for about 4 weeks  7/22/2020: Cervical TPI's - good benefit x 1 week  7/02/2020: L5 - S1 Intralaminar Epidural Steroid Injection (Dr. Blackwell 100% benefit)     she reports of benefit with injections by Dr. Dueñas and Dr. Cohen     1/27/2020: L5/S1 Interlaminar Epidural Steroid Injection via Dr. Cohen - reports of at least one month of good relief  10/7/2019: L5/S1 Interlaminar Epidural Steroid Injection via Dr. Cohen  2/4/2019: Bilateral L4-5 transforaminal epidural steroid injection under fluoroscopy via Dr. Dueñas  5/6/2019: Bilateral L4-5 transforaminal epidural steroid injection under fluoroscopy via Dr. Dueñas    CURRENT PAIN MEDICATIONS:   Voltaren gel as needed   Norco 5-325 mg PO QHS as needed  Gabapentin 300 mg PO QHS    :    IMAGING: No new imaging to review    ROS:  Review of Systems   Constitutional: Negative for chills  "and fever.   HENT: Negative for sore throat.    Eyes: Negative for visual disturbance.   Respiratory: Negative for shortness of breath.    Cardiovascular: Negative for chest pain.   Gastrointestinal: Negative for nausea and vomiting.   Genitourinary: Negative for difficulty urinating.   Musculoskeletal: Positive for back pain, myalgias and neck stiffness. Negative for neck pain.   Skin: Negative for rash.   Allergic/Immunologic: Negative for immunocompromised state.   Neurological: Negative for syncope. Numbness: in feet    Hematological: Does not bruise/bleed easily.   Psychiatric/Behavioral: Negative for suicidal ideas.   All other systems reviewed and are negative.       MEDICAL, SURGICAL, FAMILY, SOCIAL HX: reviewed    MEDICATIONS/ALLERGIES: reviewed    PHYSICAL EXAM:    VITALS:   Vitals:    05/06/22 0831   BP: 127/71   Pulse: (!) 46   Weight: 61.3 kg (135 lb 2.3 oz)   Height: 4' 11" (1.499 m)   PainSc:   8       Physical Exam  Vitals and nursing note reviewed.   Constitutional:       Appearance: She is not diaphoretic.   HENT:      Head: Normocephalic and atraumatic.   Eyes:      General:         Right eye: No discharge.         Left eye: No discharge.      Conjunctiva/sclera: Conjunctivae normal.   Cardiovascular:      Rate and Rhythm: Normal rate.   Pulmonary:      Effort: Pulmonary effort is normal. No respiratory distress.      Breath sounds: Normal breath sounds.   Abdominal:      Palpations: Abdomen is soft.   Musculoskeletal:      Lumbar back: Tenderness present.   Skin:     General: Skin is warm and dry.      Findings: No rash.   Neurological:      Mental Status: She is alert and oriented to person, place, and time.   Psychiatric:         Mood and Affect: Mood and affect normal.         Cognition and Memory: Memory normal.         Judgment: Judgment normal.        UPPER EXTREMITIES: Normal alignment, normal range of motion, no atrophy, no skin changes,  hair growth and nail growth normal and equal " bilaterally. No swelling, no tenderness.    LOWER EXTREMITIES:  Normal alignment, normal range of motion, no atrophy, no skin changes,  hair growth and nail growth normal and equal bilaterally. No swelling, no tenderness.    LUMBAR SPINE:    Lumbar spine: ROM is full with flexion extension and oblique extension with increased pain with flexion and extension.    ((--)) Supine straight leg raise mild bilaterally  ((--)) Facet loading bilaterally  Internal and external rotation of the hip causes increased pain on right side.  Myofascial exam:  Tenderness to palpation across lumbar spinous process.      ((--)) TTP at the SI joint bilateral  ((+)) SIN's test on the Right side   ((--)) One leg stand to the right side > left side  ((--)) Distraction test    CRANIAL NERVES:  II:  PERRL bilaterally,   III,IV,VI: EOMI.    V:  Facial sensation equal bilaterally  VII:  Facial motor function normal.  VIII:  Hearing equal to finger rub bilaterally  IX/X: Gag normal, palate symmetric  XI:  Shoulder shrug equal, head turn equal  XII:  Tongue midline without fasciculations      MOTOR: Tone and bulk: normal bilateral upper and lower Strength: normal   Delt Bi Tri WE WF     R 5 5 5 5 5 5   L 5 5 5 5 5 5     IP ADD ABD Quad TA Gas HAM  R 5 5 5 5 5 5 5  L 5 5 5 5 5 5 5      NEUROLOGICAL:    Gen: No clonus or spasticity.   Gait: Normal without antalgic lean. Normal rise, base, steps, and arm swing.    BABINSKI: Absent bilaterally  Sensory: Intact to light touch and proprioception BLE  REFLEXES: normal, symmetric, nonbrisk.  Toes down, no clonus. Negative muir's sign bilaterally.      ASSESSMENT: Teresa Clay is a 66 y.o. female with PMH significant for HTN presents as an established patient that presents continued management of back and neck pain. Today the patient localizes her pain to the area across her lower back and radiates down BLE to her feet.  She reports benefit with current medication regimen.  The patient  would also like to repeat last DEAN before June 30th, as she received excellent benefit with injection. In regards the patient's vital signs, her HR is 46, the patient is asymptomatic and reports she took her metoprolol and pain medication this morning.  Plan below:      1. DDD (degenerative disc disease), lumbar     2. Chronic pain disorder  Pain Clinic Drug Screen   3. Lumbar radiculopathy         PLAN:    1. Refilled Norco 5-325 mg PO to take TID as needed, discussed taking medication as prescribed for breakthrough pain.    2. UDS collected today-last took Norco this morning.    3. Schedule Lumbar interlaminar epidural steroid injection at L5-S1(right paramedian approach) after June 10th.  4. Continue current medication regimen   5. Refilled Gabapentin to 300 mg QHS, discuss increase to BID if needed at follow up.   6. I have stressed the importance of physical activity and a home exercise plan to help with chronic pain and improve health.  7. I discussed with the patient potential secondary side effects of medication prescribed and urged the patient to read all FDA approved materials that the pharmacy provides with the prescription.   8. Discussed safety in and around the home to prevent falls and injury. Discussed any environmental changes that can be made to help with safety.   9. RTC 4 weeks or sooner if needed  All medication management performed by Dr. Blackwell.   Alayna Bronson, JOSE

## 2022-05-12 DIAGNOSIS — M54.41 LOW BACK PAIN WITH RIGHT-SIDED SCIATICA: Primary | ICD-10-CM

## 2022-05-12 LAB
6MAM UR QL: NOT DETECTED
7AMINOCLONAZEPAM UR QL: NOT DETECTED
A-OH ALPRAZ UR QL: NOT DETECTED
ALPHA-OH-MIDAZOLAM: NOT DETECTED
ALPRAZ UR QL: NOT DETECTED
AMPHET UR QL SCN: NOT DETECTED
ANNOTATION COMMENT IMP: NORMAL
ANNOTATION COMMENT IMP: NORMAL
BARBITURATES UR QL: NOT DETECTED
BUPRENORPHINE UR QL: NOT DETECTED
BZE UR QL: NOT DETECTED
CARBOXYTHC UR QL: NOT DETECTED
CARISOPRODOL UR QL: NOT DETECTED
CLONAZEPAM UR QL: NOT DETECTED
CODEINE UR QL: NOT DETECTED
CREAT UR-MCNC: 355.7 MG/DL (ref 20–400)
DIAZEPAM UR QL: NOT DETECTED
ETHYL GLUCURONIDE UR QL: NOT DETECTED
FENTANYL UR QL: NOT DETECTED
GABAPENTIN: PRESENT
HYDROCODONE UR QL: PRESENT
HYDROMORPHONE UR QL: PRESENT
LORAZEPAM UR QL: NOT DETECTED
MDA UR QL: NOT DETECTED
MDEA UR QL: NOT DETECTED
MDMA UR QL: NOT DETECTED
ME-PHENIDATE UR QL: NOT DETECTED
METHADONE UR QL: NOT DETECTED
METHAMPHET UR QL: NOT DETECTED
MIDAZOLAM UR QL SCN: NOT DETECTED
MORPHINE UR QL: NOT DETECTED
NALOXONE: NOT DETECTED
NORBUPRENORPHINE UR QL CFM: NOT DETECTED
NORDIAZEPAM UR QL: NOT DETECTED
NORFENTANYL UR QL: NOT DETECTED
NORHYDROCODONE UR QL CFM: PRESENT
NORMEPERIDINE UR QL CFM: NOT DETECTED
NOROXYCODONE UR QL CFM: NOT DETECTED
NOROXYMORPHONE UR QL SCN: NOT DETECTED
OXAZEPAM UR QL: NOT DETECTED
OXYCODONE UR QL: NOT DETECTED
OXYMORPHONE UR QL: NOT DETECTED
PATHOLOGY STUDY: NORMAL
PCP UR QL: NOT DETECTED
PHENTERMINE UR QL: NOT DETECTED
PREGABALIN: NOT DETECTED
SERVICE CMNT-IMP: NORMAL
TAPENTADOL UR QL SCN: NOT DETECTED
TAPENTADOL UR QL SCN: NOT DETECTED
TEMAZEPAM UR QL: NOT DETECTED
TRAMADOL UR QL: NOT DETECTED
ZOLPIDEM METABOLITE: NOT DETECTED
ZOLPIDEM UR QL: NOT DETECTED

## 2022-06-06 ENCOUNTER — OFFICE VISIT (OUTPATIENT)
Dept: PAIN MEDICINE | Facility: CLINIC | Age: 67
End: 2022-06-06
Payer: MEDICARE

## 2022-06-06 VITALS
HEART RATE: 55 BPM | SYSTOLIC BLOOD PRESSURE: 136 MMHG | WEIGHT: 135 LBS | DIASTOLIC BLOOD PRESSURE: 80 MMHG | HEIGHT: 59 IN | BODY MASS INDEX: 27.21 KG/M2 | RESPIRATION RATE: 18 BRPM

## 2022-06-06 DIAGNOSIS — Z79.891 CHRONIC USE OF OPIATE FOR THERAPEUTIC PURPOSE: ICD-10-CM

## 2022-06-06 DIAGNOSIS — G89.4 CHRONIC PAIN DISORDER: Primary | ICD-10-CM

## 2022-06-06 DIAGNOSIS — M51.36 DDD (DEGENERATIVE DISC DISEASE), LUMBAR: Primary | ICD-10-CM

## 2022-06-06 DIAGNOSIS — M48.07 SPINAL STENOSIS, LUMBOSACRAL REGION: ICD-10-CM

## 2022-06-06 DIAGNOSIS — M54.16 LUMBAR RADICULOPATHY: ICD-10-CM

## 2022-06-06 PROCEDURE — 1125F PR PAIN SEVERITY QUANTIFIED, PAIN PRESENT: ICD-10-PCS | Mod: CPTII,S$GLB,,

## 2022-06-06 PROCEDURE — 3288F FALL RISK ASSESSMENT DOCD: CPT | Mod: CPTII,S$GLB,,

## 2022-06-06 PROCEDURE — 3075F PR MOST RECENT SYSTOLIC BLOOD PRESS GE 130-139MM HG: ICD-10-PCS | Mod: CPTII,S$GLB,,

## 2022-06-06 PROCEDURE — 80307 DRUG TEST PRSMV CHEM ANLYZR: CPT

## 2022-06-06 PROCEDURE — 3075F SYST BP GE 130 - 139MM HG: CPT | Mod: CPTII,S$GLB,,

## 2022-06-06 PROCEDURE — 99999 PR PBB SHADOW E&M-EST. PATIENT-LVL III: ICD-10-PCS | Mod: PBBFAC,,,

## 2022-06-06 PROCEDURE — 1159F PR MEDICATION LIST DOCUMENTED IN MEDICAL RECORD: ICD-10-PCS | Mod: CPTII,S$GLB,,

## 2022-06-06 PROCEDURE — 3008F PR BODY MASS INDEX (BMI) DOCUMENTED: ICD-10-PCS | Mod: CPTII,S$GLB,,

## 2022-06-06 PROCEDURE — 1125F AMNT PAIN NOTED PAIN PRSNT: CPT | Mod: CPTII,S$GLB,,

## 2022-06-06 PROCEDURE — 1101F PT FALLS ASSESS-DOCD LE1/YR: CPT | Mod: CPTII,S$GLB,,

## 2022-06-06 PROCEDURE — 99214 OFFICE O/P EST MOD 30 MIN: CPT | Mod: S$GLB,,,

## 2022-06-06 PROCEDURE — 3288F PR FALLS RISK ASSESSMENT DOCUMENTED: ICD-10-PCS | Mod: CPTII,S$GLB,,

## 2022-06-06 PROCEDURE — 1101F PR PT FALLS ASSESS DOC 0-1 FALLS W/OUT INJ PAST YR: ICD-10-PCS | Mod: CPTII,S$GLB,,

## 2022-06-06 PROCEDURE — 99214 PR OFFICE/OUTPT VISIT, EST, LEVL IV, 30-39 MIN: ICD-10-PCS | Mod: S$GLB,,,

## 2022-06-06 PROCEDURE — 3079F DIAST BP 80-89 MM HG: CPT | Mod: CPTII,S$GLB,,

## 2022-06-06 PROCEDURE — 3079F PR MOST RECENT DIASTOLIC BLOOD PRESSURE 80-89 MM HG: ICD-10-PCS | Mod: CPTII,S$GLB,,

## 2022-06-06 PROCEDURE — 1159F MED LIST DOCD IN RCRD: CPT | Mod: CPTII,S$GLB,,

## 2022-06-06 PROCEDURE — 3008F BODY MASS INDEX DOCD: CPT | Mod: CPTII,S$GLB,,

## 2022-06-06 PROCEDURE — 1160F PR REVIEW ALL MEDS BY PRESCRIBER/CLIN PHARMACIST DOCUMENTED: ICD-10-PCS | Mod: CPTII,S$GLB,,

## 2022-06-06 PROCEDURE — 1160F RVW MEDS BY RX/DR IN RCRD: CPT | Mod: CPTII,S$GLB,,

## 2022-06-06 PROCEDURE — 99999 PR PBB SHADOW E&M-EST. PATIENT-LVL III: CPT | Mod: PBBFAC,,,

## 2022-06-06 RX ORDER — GABAPENTIN 300 MG/1
300 CAPSULE ORAL NIGHTLY
Qty: 90 CAPSULE | Refills: 0 | Status: SHIPPED | OUTPATIENT
Start: 2022-06-06 | End: 2022-11-23 | Stop reason: SDUPTHER

## 2022-06-06 RX ORDER — HYDROCODONE BITARTRATE AND ACETAMINOPHEN 5; 325 MG/1; MG/1
1 TABLET ORAL EVERY 8 HOURS PRN
Qty: 90 TABLET | Refills: 0 | Status: SHIPPED | OUTPATIENT
Start: 2022-06-06 | End: 2022-07-20 | Stop reason: SDUPTHER

## 2022-06-06 RX ORDER — HYDROCODONE BITARTRATE AND ACETAMINOPHEN 5; 325 MG/1; MG/1
1 TABLET ORAL EVERY 8 HOURS PRN
Qty: 90 TABLET | Refills: 0 | Status: SHIPPED | OUTPATIENT
Start: 2022-07-04 | End: 2022-07-20 | Stop reason: SDUPTHER

## 2022-06-06 RX ORDER — HYDROCODONE BITARTRATE AND ACETAMINOPHEN 5; 325 MG/1; MG/1
1 TABLET ORAL EVERY 8 HOURS PRN
Qty: 90 TABLET | Refills: 0 | Status: SHIPPED | OUTPATIENT
Start: 2022-08-01 | End: 2022-08-29 | Stop reason: SDUPTHER

## 2022-06-06 NOTE — PROGRESS NOTES
"FOLLOW UP NOTE:     CHIEF COMPLAINT: neck and back pain    INITIAL HISTORY OF PRESENT ILLNESS: Teresa Clay is a 64 y.o. female with PMH significant for HTN presents as an established patient for Dr. Cohen (new to me) for the continued management of back and shoulder pain.     The patient reports that her pain began in 2012 when she slipped and fell at home. The patient reports of low back pain (R > L) with radiation down the postero-lateral aspect of her LE's down her feet. The patient does report of intermittent numbness in her LE's. The patient describes her back pain as a constant aching type of pain. The patient reports that her current pain is a 7/10. Patient denies of any urinary/fecal incontinence, saddle anesthesia, or weakness. Of note, the patient does mention that her right hip bothers her a fair bit as well. Patient is unsure as to whether that is related to her back.      The patient currently works as a .      The patient reports that her shoulder pain has been present for a few years. The patient localizes her pain to her bilateral trapezius area. The patient reports of intermittent radiation to her left thumb. The patient describes her pain as a "stiff" type of pain. The patient reports that her current pain is a 4/10. The patient denies of hand weakness.        INTERVAL HISTORY OF PRESENT ILLNESS: Teresa Clay is a 66 y.o. female with PMH significant for HTN presents as an established patient for continued management of back and neck pain. The patient presents today for a med refill.   Today the patient continues to report of pain across her lower back and reports radiation down the posterior aspect of her BLE to her feet. The patient reports the pain is beginning to return the same character prior to her last DEAN.  The patient reports the pain is "aching" in character.  She reports pain is worse at night.  Reports pain is present but is manageable and she is able to do " daily activities.  Currently taking Gabapentin 300 mg QHS, the patient reports minimal SE with this dosage. She reports she is receiving benefit from medication into the next day.   Currently taking Norco 5 mg approximately 3 times a day.  The patient denies of any significant changes in her health since her last appointment. The patient also denies of any changes in the character of her pain since her last appointment. She denies trauma or injury. Patient denies of any urinary/fecal incontinence, saddle anesthesia, or weakness.         INTERVENTIONAL PAIN HISTORY:  3/9/2022: Lumbar interlaminar epidural steroid injection at L5-S1(right paramedian approach)-  100% relief.    1/26/22: Radiofrequency ablation of the L2, L3, L4, and L5 medial branch nerves on the bilateral- 80% for about a week.    10/27/21: Bilateral L2, L3, L4, and L5 medial branch nerve blocks- minimal improvement   9/27/21: Bilateral L4-L5 transforaminal epidural steroid injection   8/12/2021: L5 - S1 with right paramedian approach (Dr. Blackwell) - 30% benefit  5/27/2021: L5 - S1 DEAN (Dr. Blackwell) - 90% benefit until about a week ago  10/15/2020:  L5 - S1 DEAN (Dr. Blackwell) - 50% benefit for about 4 weeks  7/22/2020: Cervical TPI's - good benefit x 1 week  7/02/2020: L5 - S1 Intralaminar Epidural Steroid Injection (Dr. Blackwell 100% benefit)     she reports of benefit with injections by Dr. Dueñas and Dr. Cohen     1/27/2020: L5/S1 Interlaminar Epidural Steroid Injection via Dr. Cohen - reports of at least one month of good relief  10/7/2019: L5/S1 Interlaminar Epidural Steroid Injection via Dr. Cohen  2/4/2019: Bilateral L4-5 transforaminal epidural steroid injection under fluoroscopy via Dr. Dueñas  5/6/2019: Bilateral L4-5 transforaminal epidural steroid injection under fluoroscopy via Dr. Dueñas    CURRENT PAIN MEDICATIONS:   Voltaren gel as needed   Norco 5-325 mg PO QHS as needed  Gabapentin 300 mg PO QHS    :      IMAGING: No new imaging to review    ROS:  Review  "of Systems   Constitutional: Negative for chills and fever.   HENT: Negative for sore throat.    Eyes: Negative for visual disturbance.   Respiratory: Negative for shortness of breath.    Cardiovascular: Negative for chest pain.   Gastrointestinal: Negative for nausea and vomiting.   Genitourinary: Negative for difficulty urinating.   Musculoskeletal: Positive for back pain, myalgias and neck stiffness. Negative for neck pain.   Skin: Negative for rash.   Allergic/Immunologic: Negative for immunocompromised state.   Neurological: Negative for syncope. Numbness: in feet    Hematological: Does not bruise/bleed easily.   Psychiatric/Behavioral: Negative for suicidal ideas.   All other systems reviewed and are negative.       MEDICAL, SURGICAL, FAMILY, SOCIAL HX: reviewed    MEDICATIONS/ALLERGIES: reviewed    PHYSICAL EXAM:    VITALS:   Vitals:    06/06/22 0839   BP: 136/80   Pulse: (!) 55   Resp: 18   Weight: 61.2 kg (135 lb)   Height: 4' 11" (1.499 m)   PainSc:   8       Physical Exam  Vitals and nursing note reviewed.   Constitutional:       Appearance: She is not diaphoretic.   HENT:      Head: Normocephalic and atraumatic.   Eyes:      General:         Right eye: No discharge.         Left eye: No discharge.      Conjunctiva/sclera: Conjunctivae normal.   Cardiovascular:      Rate and Rhythm: Normal rate.   Pulmonary:      Effort: Pulmonary effort is normal. No respiratory distress.      Breath sounds: Normal breath sounds.   Abdominal:      Palpations: Abdomen is soft.   Musculoskeletal:      Lumbar back: Tenderness present.   Skin:     General: Skin is warm and dry.      Findings: No rash.   Neurological:      Mental Status: She is alert and oriented to person, place, and time.   Psychiatric:         Mood and Affect: Mood and affect normal.         Cognition and Memory: Memory normal.         Judgment: Judgment normal.        UPPER EXTREMITIES: Normal alignment, normal range of motion, no atrophy, no skin " changes,  hair growth and nail growth normal and equal bilaterally. No swelling, no tenderness.    LOWER EXTREMITIES:  Normal alignment, normal range of motion, no atrophy, no skin changes,  hair growth and nail growth normal and equal bilaterally. No swelling, no tenderness.    LUMBAR SPINE:    Lumbar spine: ROM is full with flexion extension and oblique extension with increased pain with flexion and extension.    ((--)) Supine straight leg raise mild bilaterally  ((--)) Facet loading bilaterally  Internal and external rotation of the hip causes increased pain on right side.  Myofascial exam:  Tenderness to palpation across lumbar spinous process.      ((--)) TTP at the SI joint bilateral  ((+)) SIN's test on the Right side   ((--)) One leg stand to the right side > left side  ((--)) Distraction test    CRANIAL NERVES:  II:  PERRL bilaterally,   III,IV,VI: EOMI.    V:  Facial sensation equal bilaterally  VII:  Facial motor function normal.  VIII:  Hearing equal to finger rub bilaterally  IX/X: Gag normal, palate symmetric  XI:  Shoulder shrug equal, head turn equal  XII:  Tongue midline without fasciculations      MOTOR: Tone and bulk: normal bilateral upper and lower Strength: normal   Delt Bi Tri WE WF     R 5 5 5 5 5 5   L 5 5 5 5 5 5     IP ADD ABD Quad TA Gas HAM  R 5 5 5 5 5 5 5  L 5 5 5 5 5 5 5      NEUROLOGICAL:    Gen: No clonus or spasticity.   Gait: Normal without antalgic lean. Normal rise, base, steps, and arm swing.    BABINSKI: Absent bilaterally  Sensory: Intact to light touch and proprioception BLE  REFLEXES: normal, symmetric, nonbrisk.  Toes down, no clonus. Negative muir's sign bilaterally.      ASSESSMENT: Teresa Clay is a 66 y.o. female with PMH significant for HTN presents as an established patient that presents continued management of back and neck pain. The patient presents today for med refill. Today the patient localizes her pain to the area across her lower back and  radiates down BLE to her feet.  She reports benefit with current medication regimen.    Plan below:      1. DDD (degenerative disc disease), lumbar  gabapentin (NEURONTIN) 300 MG capsule   2. Chronic use of opiate for therapeutic purpose  [facility] Drug Screen   3. Lumbar radiculopathy  gabapentin (NEURONTIN) 300 MG capsule   4. Spinal stenosis, lumbosacral region         PLAN:    1. Refilled Norco 5-325 mg PO to take TID as needed, #90, 2 refills.    2. UDS collected today-last took Norco this morning.    3. Keep scheduled Lumbar interlaminar epidural steroid injection at L5-S1(right paramedian approach) on 6/22/22.  4. Continue current medication regimen   5. Refilled Gabapentin to 300 mg QHS, #90.    6. I have stressed the importance of physical activity and a home exercise plan to help with chronic pain and improve health.  7. Discussed safety in and around the home to prevent falls and injury. Discussed any environmental changes that can be made to help with safety.   8. RTC for the procedure as outlined above  All medication management performed by Dr. Blackwell.   Alayna Bronson, NP

## 2022-06-11 LAB
6MAM UR QL: NOT DETECTED
7AMINOCLONAZEPAM UR QL: NOT DETECTED
A-OH ALPRAZ UR QL: NOT DETECTED
ALPHA-OH-MIDAZOLAM: NOT DETECTED
ALPRAZ UR QL: NOT DETECTED
AMPHET UR QL SCN: NOT DETECTED
ANNOTATION COMMENT IMP: NORMAL
ANNOTATION COMMENT IMP: NORMAL
BARBITURATES UR QL: NOT DETECTED
BUPRENORPHINE UR QL: NOT DETECTED
BZE UR QL: NOT DETECTED
CARBOXYTHC UR QL: NOT DETECTED
CARISOPRODOL UR QL: NOT DETECTED
CLONAZEPAM UR QL: NOT DETECTED
CODEINE UR QL: NOT DETECTED
CREAT UR-MCNC: 286.3 MG/DL (ref 20–400)
DIAZEPAM UR QL: NOT DETECTED
ETHYL GLUCURONIDE UR QL: NOT DETECTED
FENTANYL UR QL: NOT DETECTED
GABAPENTIN: PRESENT
HYDROCODONE UR QL: PRESENT
HYDROMORPHONE UR QL: PRESENT
LORAZEPAM UR QL: NOT DETECTED
MDA UR QL: NOT DETECTED
MDEA UR QL: NOT DETECTED
MDMA UR QL: NOT DETECTED
ME-PHENIDATE UR QL: NOT DETECTED
METHADONE UR QL: NOT DETECTED
METHAMPHET UR QL: NOT DETECTED
MIDAZOLAM UR QL SCN: NOT DETECTED
MORPHINE UR QL: NOT DETECTED
NALOXONE: NOT DETECTED
NORBUPRENORPHINE UR QL CFM: NOT DETECTED
NORDIAZEPAM UR QL: NOT DETECTED
NORFENTANYL UR QL: NOT DETECTED
NORHYDROCODONE UR QL CFM: PRESENT
NORMEPERIDINE UR QL CFM: NOT DETECTED
NOROXYCODONE UR QL CFM: NOT DETECTED
NOROXYMORPHONE UR QL SCN: NOT DETECTED
OXAZEPAM UR QL: NOT DETECTED
OXYCODONE UR QL: NOT DETECTED
OXYMORPHONE UR QL: NOT DETECTED
PATHOLOGY STUDY: NORMAL
PCP UR QL: NOT DETECTED
PHENTERMINE UR QL: NOT DETECTED
PREGABALIN: NOT DETECTED
SERVICE CMNT-IMP: NORMAL
TAPENTADOL UR QL SCN: NOT DETECTED
TAPENTADOL UR QL SCN: NOT DETECTED
TEMAZEPAM UR QL: NOT DETECTED
TRAMADOL UR QL: NOT DETECTED
ZOLPIDEM METABOLITE: NOT DETECTED
ZOLPIDEM UR QL: NOT DETECTED

## 2022-06-21 ENCOUNTER — TELEPHONE (OUTPATIENT)
Dept: PAIN MEDICINE | Facility: CLINIC | Age: 67
End: 2022-06-21
Payer: MEDICARE

## 2022-06-21 NOTE — TELEPHONE ENCOUNTER
----- Message from Shravan Lawson sent at 6/21/2022  4:10 PM CDT -----  Contact: LIA TIERNEY [[id number]]  Type: Patient Call Back    Who called:LIA TIERNEY [[id number]]    What is the request in detail: The patient would like a call in regards to surgery.    Can the clinic reply by [facility]?    Would the patient rather a call back or a response via [facility]?     Best call back number: [phone] (mobile)    Additional Information:

## 2022-06-21 NOTE — TELEPHONE ENCOUNTER
Informed pt that she should have received a call from the surgery center. Advised patient that time in computer says 8:30am. She will arrive around that time.

## 2022-06-22 ENCOUNTER — HOSPITAL ENCOUNTER (OUTPATIENT)
Facility: HOSPITAL | Age: 67
Discharge: HOME | End: 2022-06-22
Attending: ANESTHESIOLOGY | Admitting: ANESTHESIOLOGY
Payer: MEDICARE

## 2022-06-22 VITALS
DIASTOLIC BLOOD PRESSURE: 89 MMHG | WEIGHT: 135 LBS | BODY MASS INDEX: 27.21 KG/M2 | OXYGEN SATURATION: 95 % | TEMPERATURE: 98 F | HEIGHT: 59 IN | SYSTOLIC BLOOD PRESSURE: 152 MMHG | RESPIRATION RATE: 17 BRPM | HEART RATE: 57 BPM

## 2022-06-22 DIAGNOSIS — M51.36 DEGENERATIVE DISC DISEASE, LUMBAR: ICD-10-CM

## 2022-06-22 DIAGNOSIS — M51.36 DDD (DEGENERATIVE DISC DISEASE), LUMBAR: Primary | ICD-10-CM

## 2022-06-22 PROCEDURE — 25500020 PHARM REV CODE 255: Performed by: ANESTHESIOLOGY

## 2022-06-22 PROCEDURE — 25000003 PHARM REV CODE 250: Performed by: ANESTHESIOLOGY

## 2022-06-22 PROCEDURE — 62323 NJX INTERLAMINAR LMBR/SAC: CPT | Performed by: ANESTHESIOLOGY

## 2022-06-22 PROCEDURE — 62323 NJX INTERLAMINAR LMBR/SAC: CPT | Mod: ,,, | Performed by: ANESTHESIOLOGY

## 2022-06-22 PROCEDURE — 62323 PR INJ LUMBAR/SACRAL, W/IMAGING GUIDANCE: ICD-10-PCS | Mod: ,,, | Performed by: ANESTHESIOLOGY

## 2022-06-22 PROCEDURE — 63600175 PHARM REV CODE 636 W HCPCS: Performed by: ANESTHESIOLOGY

## 2022-06-22 RX ORDER — SODIUM CHLORIDE, SODIUM LACTATE, POTASSIUM CHLORIDE, CALCIUM CHLORIDE 600; 310; 30; 20 MG/100ML; MG/100ML; MG/100ML; MG/100ML
INJECTION, SOLUTION INTRAVENOUS ONCE AS NEEDED
Status: COMPLETED | OUTPATIENT
Start: 2022-06-22 | End: 2022-06-22

## 2022-06-22 RX ORDER — FENTANYL CITRATE 50 UG/ML
INJECTION, SOLUTION INTRAMUSCULAR; INTRAVENOUS
Status: DISCONTINUED | OUTPATIENT
Start: 2022-06-22 | End: 2022-06-22 | Stop reason: HOSPADM

## 2022-06-22 RX ORDER — LIDOCAINE HYDROCHLORIDE 10 MG/ML
INJECTION INFILTRATION; PERINEURAL
Status: DISCONTINUED | OUTPATIENT
Start: 2022-06-22 | End: 2022-06-22 | Stop reason: HOSPADM

## 2022-06-22 RX ORDER — METHYLPREDNISOLONE ACETATE 80 MG/ML
INJECTION, SUSPENSION INTRA-ARTICULAR; INTRALESIONAL; INTRAMUSCULAR; SOFT TISSUE
Status: DISCONTINUED | OUTPATIENT
Start: 2022-06-22 | End: 2022-06-22 | Stop reason: HOSPADM

## 2022-06-22 RX ORDER — MIDAZOLAM HYDROCHLORIDE 1 MG/ML
INJECTION, SOLUTION INTRAMUSCULAR; INTRAVENOUS
Status: DISCONTINUED | OUTPATIENT
Start: 2022-06-22 | End: 2022-06-22 | Stop reason: HOSPADM

## 2022-06-22 RX ADMIN — SODIUM CHLORIDE, SODIUM LACTATE, POTASSIUM CHLORIDE, AND CALCIUM CHLORIDE: .6; .31; .03; .02 INJECTION, SOLUTION INTRAVENOUS at 09:06

## 2022-06-22 NOTE — DISCHARGE SUMMARY
[facility]  Discharge Note  Short Stay    Procedure(s) (LRB):  Injection, Steroid, Epidural LESI L5-S1 (Right)    OUTCOME: Patient tolerated treatment/procedure well without complication and is now ready for discharge.    DISPOSITION: Home or Self Care    FINAL DIAGNOSIS:  Lumbar radiculopathy    FOLLOWUP: In clinic    DISCHARGE INSTRUCTIONS:    Discharge Procedure Orders   Diet general     Call MD for:  temperature >100.4     Call MD for:  persistent nausea and vomiting     Call MD for:  severe uncontrolled pain     Call MD for:  difficulty breathing, headache or visual disturbances     Call MD for:  redness, tenderness, or signs of infection (pain, swelling, redness, odor or green/yellow discharge around incision site)     Call MD for:  hives     Call MD for:  persistent dizziness or light-headedness     Call MD for:  extreme fatigue        TIME SPENT ON DISCHARGE: 30 minutes

## 2022-06-22 NOTE — OP NOTE
PROCEDURE DATE: 6/22/2022    PROCEDURE:  Lumbar interlaminar epidural steroid injection at L5-S1 under fluoroscopic guidance (right paramedian approach)    DIAGNOSIS: Lumbar radiculopathy  POSTOP DIAGNOSIS: SAME    PHYSICIAN: Seb Blackwell M.D.    MEDICATIONS INJECTED: 80 mg depo-medrol with 4 ml of preservative free NaCl    LOCAL ANESTHETIC INJECTED:    Lidocaine 1% 4 ml total    SEDATION MEDICATIONS: RN IV sedation    ESTIMATED BLOOD LOSS:  none    COMPLICATIONS:  none    TECHNIQUE:  Time-out taken to identify patient and procedure prior to starting the procedure.  With the patient laying in a prone position, the area was prepped and draped in the usual sterile fashion using ChloraPrep and a fenestrated drape.  After determining the target level with an AP fluoroscopic view, local anesthetic was given using a 25-gauge 1.5 inch needle by raising a wheal and then infiltrating toward the interlaminar entry space.  A 3.5 inch 20-gauge Touhy needle was introduced under AP fluoroscopic guidance to the interlaminar space of L5-S1. Once the trajectory was established, the needle was visualized in the lateral view and advanced using loss of resistance technique. Once in the desired position, 2 mL contrast was injected to confirm placement and there was no vascular uptake nor intrathecal spread.  The medication was then injected slowly. The patient tolerated the procedure well.      The patient was monitored after the procedure.   They were given post-procedure and discharge instructions to follow at home.  The patient was discharged in a stable condition.

## 2022-07-08 ENCOUNTER — HOSPITAL ENCOUNTER (EMERGENCY)
Facility: HOSPITAL | Age: 67
Discharge: HOME | End: 2022-07-08
Attending: EMERGENCY MEDICINE
Payer: MEDICARE

## 2022-07-08 VITALS
WEIGHT: 133 LBS | HEART RATE: 68 BPM | TEMPERATURE: 98 F | RESPIRATION RATE: 16 BRPM | BODY MASS INDEX: 26.81 KG/M2 | SYSTOLIC BLOOD PRESSURE: 165 MMHG | OXYGEN SATURATION: 94 % | DIASTOLIC BLOOD PRESSURE: 84 MMHG | HEIGHT: 59 IN

## 2022-07-08 DIAGNOSIS — M62.838 MUSCLE SPASM: Primary | ICD-10-CM

## 2022-07-08 DIAGNOSIS — R25.2 MUSCLE CRAMPING: ICD-10-CM

## 2022-07-08 LAB
ALBUMIN SERPL BCP-MCNC: 3.7 G/DL (ref 3.5–5.2)
ALP SERPL-CCNC: 81 U/L (ref 55–135)
ALT SERPL W/O P-5'-P-CCNC: 17 U/L (ref 10–44)
ANION GAP SERPL CALC-SCNC: 10 MMOL/L (ref 8–16)
AST SERPL-CCNC: 16 U/L (ref 10–40)
BASOPHILS # BLD AUTO: 0.07 K/UL (ref 0–0.2)
BASOPHILS NFR BLD: 0.9 % (ref 0–1.9)
BILIRUB SERPL-MCNC: 0.9 MG/DL (ref 0.1–1)
BUN SERPL-MCNC: 14 MG/DL (ref 8–23)
CALCIUM SERPL-MCNC: 9.5 MG/DL (ref 8.7–10.5)
CHLORIDE SERPL-SCNC: 103 MMOL/L (ref 95–110)
CO2 SERPL-SCNC: 27 MMOL/L (ref 23–29)
CREAT SERPL-MCNC: 0.7 MG/DL (ref 0.5–1.4)
DIFFERENTIAL METHOD: ABNORMAL
EOSINOPHIL # BLD AUTO: 0.3 K/UL (ref 0–0.5)
EOSINOPHIL NFR BLD: 3.2 % (ref 0–8)
ERYTHROCYTE [DISTWIDTH] IN BLOOD BY AUTOMATED COUNT: 13.2 % (ref 11.5–14.5)
EST. GFR  (AFRICAN AMERICAN): >60 ML/MIN/1.73 M^2
EST. GFR  (NON AFRICAN AMERICAN): >60 ML/MIN/1.73 M^2
GLUCOSE SERPL-MCNC: 100 MG/DL (ref 70–110)
HCT VFR BLD AUTO: 40.1 % (ref 37–48.5)
HGB BLD-MCNC: 12.8 G/DL (ref 12–16)
IMM GRANULOCYTES # BLD AUTO: 0.02 K/UL (ref 0–0.04)
IMM GRANULOCYTES NFR BLD AUTO: 0.2 % (ref 0–0.5)
LYMPHOCYTES # BLD AUTO: 1.6 K/UL (ref 1–4.8)
LYMPHOCYTES NFR BLD: 19.1 % (ref 18–48)
MAGNESIUM SERPL-MCNC: 2 MG/DL (ref 1.6–2.6)
MCH RBC QN AUTO: 28.4 PG (ref 27–31)
MCHC RBC AUTO-ENTMCNC: 31.9 G/DL (ref 32–36)
MCV RBC AUTO: 89 FL (ref 82–98)
MONOCYTES # BLD AUTO: 0.8 K/UL (ref 0.3–1)
MONOCYTES NFR BLD: 10.3 % (ref 4–15)
NEUTROPHILS # BLD AUTO: 5.4 K/UL (ref 1.8–7.7)
NEUTROPHILS NFR BLD: 66.3 % (ref 38–73)
NRBC BLD-RTO: 0 /100 WBC
PLATELET # BLD AUTO: 248 K/UL (ref 150–450)
PMV BLD AUTO: 9.1 FL (ref 9.2–12.9)
POTASSIUM SERPL-SCNC: 4.2 MMOL/L (ref 3.5–5.1)
PROT SERPL-MCNC: 6.6 G/DL (ref 6–8.4)
RBC # BLD AUTO: 4.51 M/UL (ref 4–5.4)
SODIUM SERPL-SCNC: 140 MMOL/L (ref 136–145)
TROPONIN I SERPL DL<=0.01 NG/ML-MCNC: <0.006 NG/ML (ref 0–0.03)
WBC # BLD AUTO: 8.13 K/UL (ref 3.9–12.7)

## 2022-07-08 PROCEDURE — 99284 EMERGENCY DEPT VISIT MOD MDM: CPT

## 2022-07-08 PROCEDURE — 93010 ELECTROCARDIOGRAM REPORT: CPT | Mod: ,,, | Performed by: INTERNAL MEDICINE

## 2022-07-08 PROCEDURE — 80053 COMPREHEN METABOLIC PANEL: CPT | Performed by: EMERGENCY MEDICINE

## 2022-07-08 PROCEDURE — 83735 ASSAY OF MAGNESIUM: CPT | Performed by: EMERGENCY MEDICINE

## 2022-07-08 PROCEDURE — 84484 ASSAY OF TROPONIN QUANT: CPT | Performed by: EMERGENCY MEDICINE

## 2022-07-08 PROCEDURE — 36415 COLL VENOUS BLD VENIPUNCTURE: CPT | Performed by: EMERGENCY MEDICINE

## 2022-07-08 PROCEDURE — 85025 COMPLETE CBC W/AUTO DIFF WBC: CPT | Performed by: EMERGENCY MEDICINE

## 2022-07-08 PROCEDURE — 93005 ELECTROCARDIOGRAM TRACING: CPT

## 2022-07-08 PROCEDURE — 93010 EKG 12-LEAD: ICD-10-PCS | Mod: ,,, | Performed by: INTERNAL MEDICINE

## 2022-07-08 NOTE — ED PROVIDER NOTES
"Encounter Date: 7/8/2022       History     Chief Complaint   Patient presents with    Spasms    Muscle Pain     Pt stated she has had muscle cramping in arms and legs and stated she feels "different" for a few days. Pt stated she has a hx of low K+ and was instructed by her  To be seen in the ER. Pt also c/o of facial "numbness" for a few days.    Dizziness     66-year-old female here complaining of muscle cramps or spasms, and tingling around her mouth.  Symptoms started 3 days ago while she was on a cruise, and have continued since getting home.  She states that she has a history of low potassium in the past.  She talked to her primary care provider about her symptoms and was told to come to the emergency department.  She denies any stroke-like symptoms.  No unilateral weakness, no confusion, no gait disturbance, speech deficit, visual deficit.  Nothing she does makes her symptoms any better or worse.  Denies any cramping at the present time.        Review of patient's allergies indicates:   Allergen Reactions    Betamethasone acet,sod phos Other (See Comments)     " Makes me feel flushed!"    Codeine Rash     Other reaction(s): Hives     Past Medical History:   Diagnosis Date    Chronic back pain     Diverticulitis     Encounter for long-term (current) use of medications     Hypertension      Past Surgical History:   Procedure Laterality Date    APPENDECTOMY      CHOLECYSTECTOMY      COLONOSCOPY      COLONOSCOPY N/A 7/3/2019    Procedure: COLONOSCOPY;  Surgeon: Jean Jamison MD;  Location: [facility] ENDO;  Service: General;  Laterality: N/A;    EPIDURAL STEROID INJECTION N/A 10/7/2019    Procedure: Injection, Steroid, Epidural - L5/S1 LUMBAR EPIDURAL STEROID INJECTION;  Surgeon: Gail Cohen MD;  Location: [facility] OR;  Service: Pain Management;  Laterality: N/A;    EPIDURAL STEROID INJECTION N/A 1/27/2020    Procedure: Injection, Steroid, Epidural - L5/S1 INTERLAMINAR EPIDURAL STEROID INJECTION;  " Surgeon: Gail Cohen MD;  Location: [facility] OR;  Service: Pain Management;  Laterality: N/A;    EPIDURAL STEROID INJECTION N/A 7/2/2020    Procedure: DEAN Lumbar L5-S1;  Surgeon: Seb Blackwell MD;  Location: [facility] OR;  Service: Pain Management;  Laterality: N/A;    EPIDURAL STEROID INJECTION N/A 10/15/2020    Procedure: DEAN Lumbar L5-S1;  Surgeon: Seb Blackwell MD;  Location: [facility] OR;  Service: Pain Management;  Laterality: N/A;    EPIDURAL STEROID INJECTION N/A 5/27/2021    Procedure: Injection, Steroid, Epidural, L5 - S1;  Surgeon: Seb Blackwell MD;  Location: [facility] OR;  Service: Pain Management;  Laterality: N/A;    EPIDURAL STEROID INJECTION N/A 8/12/2021    Procedure: Injection, Steroid, Epidural Lumbar;  Surgeon: Seb Blackwell MD;  Location: [facility] OR;  Service: Pain Management;  Laterality: N/A;  L5-S1      EPIDURAL STEROID INJECTION Right 3/9/2022    Procedure: Injection, Steroid, Epidural Interlaminar L5-S1 righ paramedian approach;  Surgeon: Seb Blackwell MD;  Location: [facility] OR;  Service: Pain Management;  Laterality: Right;    EPIDURAL STEROID INJECTION Right 6/22/2022    Procedure: Injection, Steroid, Epidural LESI L5-S1;  Surgeon: Seb Blackwell MD;  Location: [facility] OR;  Service: Pain Management;  Laterality: Right;  R paramedian approach    ESOPHAGOGASTRODUODENOSCOPY N/A 7/3/2019    Procedure: ESOPHAGOGASTRODUODENOSCOPY (EGD);  Surgeon: Jean Jamison MD;  Location: [facility] ENDO;  Service: General;  Laterality: N/A;    gastric sleeve      INJECTION OF NERVE Bilateral 10/27/2021    Procedure: INJECTION, NERVE; Bilateral L2, L3, L4, L5 MBB's;  Surgeon: Seb Blackwell MD;  Location: [facility] OR;  Service: Anesthesiology;  Laterality: Bilateral;    RADIOFREQUENCY ABLATION Bilateral 1/26/2022    Procedure: Radiofrequency Ablation;  Surgeon: Seb Blackwell MD;  Location: [facility] OR;  Service: Pain Management;  Laterality: Bilateral;  L2,3,4,5     TRANSFORAMINAL EPIDURAL INJECTION OF  STEROID Bilateral 5/6/2019    Procedure: Injection,steroid,epidural,transforaminal approach;  Surgeon: Trey Dueñas MD;  Location: [facility] OR;  Service: Pain Management;  Laterality: Bilateral;  L4-5    TRANSFORAMINAL EPIDURAL INJECTION OF STEROID Bilateral 9/27/2021    Procedure: Injection,steroid,epidural,transforaminal approach;  Surgeon: Seb Blackwell MD;  Location: [facility] OR;  Service: Pain Management;  Laterality: Bilateral;  L4-L5    TUBAL LIGATION       Family History   Problem Relation Age of Onset    Diabetes Brother     Cancer Brother     Breast cancer Maternal Aunt      Social History     Tobacco Use    Smoking status: Current Some Day Smoker     Packs/day: 0.50     Types: Cigarettes    Smokeless tobacco: Never Used    Tobacco comment: stop approx. 6 months ago   Substance Use Topics    Alcohol use: No    Drug use: No     Review of Systems   Constitutional: Negative.    HENT: Negative.    Eyes: Negative.    Respiratory: Negative.    Cardiovascular: Negative.    Gastrointestinal: Negative.    Endocrine: Negative.    Genitourinary: Negative.    Musculoskeletal: Negative.         Complains of muscle spasms and cramps in general   Skin: Negative.    Neurological: Negative.    Psychiatric/Behavioral: Negative.        Physical Exam     Initial Vitals [07/08/22 1754]   BP Pulse Resp Temp SpO2   (!) 155/76 76 18 98.3 °F (36.8 °C) (!) 94 %      MAP       --         Physical Exam    Nursing note and vitals reviewed.  Constitutional: She appears well-developed and well-nourished. She is not diaphoretic. No distress.   HENT:   Head: Normocephalic and atraumatic.   Nose: Nose normal.   Mouth/Throat: Oropharynx is clear and moist. No oropharyngeal exudate.   Eyes: Conjunctivae and EOM are normal. Pupils are equal, round, and reactive to light. No scleral icterus.   Neck: Neck supple. No JVD present.   Normal range of motion.  Cardiovascular: Normal rate, regular rhythm, normal heart sounds and intact distal  pulses.   No murmur heard.  Pulmonary/Chest: Breath sounds normal. No stridor. No respiratory distress. She has no wheezes.   Abdominal: Abdomen is soft. Bowel sounds are normal. She exhibits no distension.   Musculoskeletal:         General: No tenderness or edema. Normal range of motion.      Cervical back: Normal range of motion and neck supple.     Neurological: She is alert and oriented to person, place, and time. She has normal strength and normal reflexes. She displays normal reflexes. No cranial nerve deficit or sensory deficit. GCS score is 15. GCS eye subscore is 4. GCS verbal subscore is 5. GCS motor subscore is 6.   Skin: Skin is warm and dry. Capillary refill takes less than 2 seconds. No rash noted. No erythema.   Psychiatric: She has a normal mood and affect.         ED Course   Procedures  Labs Reviewed   CBC W/ AUTO DIFFERENTIAL - Abnormal; Notable for the following components:       Result Value    MCHC 31.9 (*)     MPV 9.1 (*)     All other components within normal limits   COMPREHENSIVE METABOLIC PANEL   TROPONIN I   MAGNESIUM        ECG Results          EKG 12-lead (Final result)  Result time 07/11/22 11:29:38    Final result by Interface, Lab In [facility] (07/11/22 11:29:38)                 Narrative:    Test Reason : R25.2,    Vent. Rate : 072 BPM     Atrial Rate : 072 BPM     P-R Int : 192 ms          QRS Dur : 082 ms      QT Int : 406 ms       P-R-T Axes : 070 093 062 degrees     QTc Int : 444 ms    Normal sinus rhythm  Rightward axis  Borderline Abnormal ECG  When compared with ECG of 25-JUN-2021 12:58,  No significant change was found  Confirmed by Yakov Mcguire MD ([id number]) on 7/11/2022 11:29:28 AM    Referred By: AMBER   SELF           Confirmed By:Yakov Mcguire MD                            Imaging Results    None          Medications - No data to display  Medical Decision Making:   Differential Diagnosis:   Electrolyte abnormality, mild dehydration, anxiety, etc  ED Management:  Patient has no  focal neurologic deficits.  No nystagmus, no neurologic weakness.  She states she has some numbness in her cheeks, but she feels light touch without any difficulty.  Muscle strength in all extremities is 5/5, and sensory function is intact in the extremities as well.  normal sensorium.  Her electrolytes are normal and there were no other lab abnormalities either.  Unsure of the exact etiology of this patient's symptoms.  I do not suspect CVA.  I believe that she is safe for discharge home follow up with her PCP.  She has an appointment to see her on Monday.  She will return here as needed or if worse in any way.                      Clinical Impression:   Final diagnoses:  [R25.2] Muscle cramping  [M62.838] Muscle spasm (Primary)          ED Disposition Condition    Discharge Stable        ED Prescriptions     None        Follow-up Information     Follow up With Specialties Details Why Contact Lyle Villatoro NP Family Medicine  Monday, as scheduled [address] [place] [address] Dr  [place] [address] [phone]      [facility] Emergency Dept Emergency Medicine  As needed, If symptoms worsen [address] [facility] [address] [phone]           Kemal Page MD  07/08/22 [id number]       Kemal Page MD  08/14/22 [id number]

## 2022-07-08 NOTE — ED NOTES
Pt states that for the last week she has been having cramping in her bilateral legs and hands. Pt states that these cramps have continued to get worse. Pt states that she is also having numbness under both of her eyes and across the bridge of her nose. Pt states that she has been having intermittent episodes of dizziness. Pt states that she has been having a mild generalized headache all day. Pt denies any other symptoms. Pt provided with call light provided pt with call light

## 2022-07-09 NOTE — DISCHARGE INSTRUCTIONS
As we discussed, your electrolytes are all normal.  There is no sign of infection.  You may, however, be mildly dehydrated, so concentrate on drinking more water over the weekend.  Follow up with your primary care provider on Monday as scheduled, and return here for any worsening

## 2022-07-09 NOTE — ED NOTES
Discussed with pt and pt's spouse regarding proper diet for cramps and dehydration. Both verbalize understanding.

## 2022-07-20 ENCOUNTER — OFFICE VISIT (OUTPATIENT)
Dept: PAIN MEDICINE | Facility: CLINIC | Age: 67
End: 2022-07-20
Payer: MEDICARE

## 2022-07-20 VITALS — RESPIRATION RATE: 16 BRPM | WEIGHT: 133 LBS | HEIGHT: 59 IN | BODY MASS INDEX: 26.81 KG/M2

## 2022-07-20 DIAGNOSIS — M51.36 DDD (DEGENERATIVE DISC DISEASE), LUMBAR: Primary | ICD-10-CM

## 2022-07-20 DIAGNOSIS — M54.16 LUMBAR RADICULOPATHY: ICD-10-CM

## 2022-07-20 DIAGNOSIS — M43.06 SPONDYLOLYSIS OF LUMBAR REGION: ICD-10-CM

## 2022-07-20 PROCEDURE — 99999 PR PBB SHADOW E&M-EST. PATIENT-LVL III: CPT | Mod: PBBFAC,,,

## 2022-07-20 PROCEDURE — 3008F PR BODY MASS INDEX (BMI) DOCUMENTED: ICD-10-PCS | Mod: CPTII,S$GLB,,

## 2022-07-20 PROCEDURE — 99213 PR OFFICE/OUTPT VISIT, EST, LEVL III, 20-29 MIN: ICD-10-PCS | Mod: S$GLB,,,

## 2022-07-20 PROCEDURE — 1159F PR MEDICATION LIST DOCUMENTED IN MEDICAL RECORD: ICD-10-PCS | Mod: CPTII,S$GLB,,

## 2022-07-20 PROCEDURE — 3288F FALL RISK ASSESSMENT DOCD: CPT | Mod: CPTII,S$GLB,,

## 2022-07-20 PROCEDURE — 1101F PT FALLS ASSESS-DOCD LE1/YR: CPT | Mod: CPTII,S$GLB,,

## 2022-07-20 PROCEDURE — 1125F PR PAIN SEVERITY QUANTIFIED, PAIN PRESENT: ICD-10-PCS | Mod: CPTII,S$GLB,,

## 2022-07-20 PROCEDURE — 1125F AMNT PAIN NOTED PAIN PRSNT: CPT | Mod: CPTII,S$GLB,,

## 2022-07-20 PROCEDURE — 1160F RVW MEDS BY RX/DR IN RCRD: CPT | Mod: CPTII,S$GLB,,

## 2022-07-20 PROCEDURE — 1159F MED LIST DOCD IN RCRD: CPT | Mod: CPTII,S$GLB,,

## 2022-07-20 PROCEDURE — 3288F PR FALLS RISK ASSESSMENT DOCUMENTED: ICD-10-PCS | Mod: CPTII,S$GLB,,

## 2022-07-20 PROCEDURE — 99213 OFFICE O/P EST LOW 20 MIN: CPT | Mod: S$GLB,,,

## 2022-07-20 PROCEDURE — 3008F BODY MASS INDEX DOCD: CPT | Mod: CPTII,S$GLB,,

## 2022-07-20 PROCEDURE — 1101F PR PT FALLS ASSESS DOC 0-1 FALLS W/OUT INJ PAST YR: ICD-10-PCS | Mod: CPTII,S$GLB,,

## 2022-07-20 PROCEDURE — 99999 PR PBB SHADOW E&M-EST. PATIENT-LVL III: ICD-10-PCS | Mod: PBBFAC,,,

## 2022-07-20 PROCEDURE — 1160F PR REVIEW ALL MEDS BY PRESCRIBER/CLIN PHARMACIST DOCUMENTED: ICD-10-PCS | Mod: CPTII,S$GLB,,

## 2022-07-20 RX ORDER — ACYCLOVIR 400 MG/1
400 TABLET ORAL EVERY 8 HOURS
COMMUNITY
Start: 2022-07-06

## 2022-07-20 RX ORDER — PANTOPRAZOLE SODIUM 40 MG/1
40 TABLET, DELAYED RELEASE ORAL DAILY
COMMUNITY
Start: 2022-06-14

## 2022-07-20 NOTE — PROGRESS NOTES
"FOLLOW UP NOTE:     CHIEF COMPLAINT: neck and back pain    INITIAL HISTORY OF PRESENT ILLNESS: Teresa Clay is a 64 y.o. female with PMH significant for HTN presents as an established patient for Dr. Cohen (new to me) for the continued management of back and shoulder pain.     The patient reports that her pain began in 2012 when she slipped and fell at home. The patient reports of low back pain (R > L) with radiation down the postero-lateral aspect of her LE's down her feet. The patient does report of intermittent numbness in her LE's. The patient describes her back pain as a constant aching type of pain. The patient reports that her current pain is a 7/10. Patient denies of any urinary/fecal incontinence, saddle anesthesia, or weakness. Of note, the patient does mention that her right hip bothers her a fair bit as well. Patient is unsure as to whether that is related to her back.      The patient currently works as a .      The patient reports that her shoulder pain has been present for a few years. The patient localizes her pain to her bilateral trapezius area. The patient reports of intermittent radiation to her left thumb. The patient describes her pain as a "stiff" type of pain. The patient reports that her current pain is a 4/10. The patient denies of hand weakness.        INTERVAL HISTORY OF PRESENT ILLNESS: Teresa Clay is a 66 y.o. female with PMH significant for HTN presents as an established patient for continued management of back and neck pain. The patient is s/p Lumbar interlaminar epidural steroid injection at L5-S1 on 6/22/2022 and reports of 70% relief for a few weeks. Of note, the patient does report she went on a cruise and felt great during the cruise and did a lot more activity than she usually does.  Today,  the patient continues to report of pain across her lower back and reports radiation down the posterior aspect of her BLE to her feet.  The patient reports the pain " "is "aching" in character.  She reports pain is worse at night.  Reports pain is present but is manageable and she is able to do daily activities.  Currently taking Gabapentin 300 mg QHS, the patient reports minimal SE with this dosage.  Currently taking Norco 5 mg approximately 3 times a day reports benefit with medication.  The patient denies of any significant changes in her health since her last appointment. The patient also denies of any changes in the character of her pain since her last appointment. She denies trauma or injury. Patient denies of any urinary/fecal incontinence, saddle anesthesia, or weakness.       INTERVENTIONAL PAIN HISTORY:  6/22/2022: Lumbar interlaminar epidural steroid injection at L5-S1-70% relief for about two weeks.    3/9/2022: Lumbar interlaminar epidural steroid injection at L5-S1(right paramedian approach)-  100% relief.    1/26/22: Radiofrequency ablation of the L2, L3, L4, and L5 medial branch nerves on the bilateral- 80% for about a week.    10/27/21: Bilateral L2, L3, L4, and L5 medial branch nerve blocks- minimal improvement   9/27/21: Bilateral L4-L5 transforaminal epidural steroid injection   8/12/2021: L5 - S1 with right paramedian approach (Dr. Blackwell) - 30% benefit  5/27/2021: L5 - S1 DEAN (Dr. Blackwell) - 90% benefit until about a week ago  10/15/2020:  L5 - S1 DEAN (Dr. Blackwell) - 50% benefit for about 4 weeks  7/22/2020: Cervical TPI's - good benefit x 1 week  7/02/2020: L5 - S1 Intralaminar Epidural Steroid Injection (Dr. Blackwell 100% benefit)     she reports of benefit with injections by Dr. Dueñas and Dr. Cohen     1/27/2020: L5/S1 Interlaminar Epidural Steroid Injection via Dr. Cohen - reports of at least one month of good relief  10/7/2019: L5/S1 Interlaminar Epidural Steroid Injection via Dr. Cohen  2/4/2019: Bilateral L4-5 transforaminal epidural steroid injection under fluoroscopy via Dr. Dueñas  5/6/2019: Bilateral L4-5 transforaminal epidural steroid injection under " "fluoroscopy via Dr. Dueñas    CURRENT PAIN MEDICATIONS:   Voltaren gel as needed   Norco 5-325 mg PO QHS as needed  Gabapentin 300 mg PO QHS    :      IMAGING: No new imaging to review    ROS:  Review of Systems   Constitutional: Negative for chills and fever.   HENT: Negative for sore throat.    Eyes: Negative for visual disturbance.   Respiratory: Negative for shortness of breath.    Cardiovascular: Negative for chest pain.   Gastrointestinal: Negative for nausea and vomiting.   Genitourinary: Negative for difficulty urinating.   Musculoskeletal: Positive for back pain, myalgias and neck stiffness. Negative for neck pain.   Skin: Negative for rash.   Allergic/Immunologic: Negative for immunocompromised state.   Neurological: Negative for syncope. Numbness: in feet    Hematological: Does not bruise/bleed easily.   Psychiatric/Behavioral: Negative for suicidal ideas.   All other systems reviewed and are negative.       MEDICAL, SURGICAL, FAMILY, SOCIAL HX: reviewed    MEDICATIONS/ALLERGIES: reviewed    PHYSICAL EXAM:    VITALS:   Vitals:    07/20/22 0838   Resp: 16   Weight: 60.3 kg (133 lb)   Height: 4' 11" (1.499 m)   PainSc:   6       Physical Exam  Vitals and nursing note reviewed.   Constitutional:       Appearance: She is not diaphoretic.   HENT:      Head: Normocephalic and atraumatic.   Eyes:      General:         Right eye: No discharge.         Left eye: No discharge.      Conjunctiva/sclera: Conjunctivae normal.   Cardiovascular:      Rate and Rhythm: Normal rate.   Pulmonary:      Effort: Pulmonary effort is normal. No respiratory distress.      Breath sounds: Normal breath sounds.   Abdominal:      Palpations: Abdomen is soft.   Musculoskeletal:      Lumbar back: Tenderness present.   Skin:     General: Skin is warm and dry.      Findings: No rash.   Neurological:      Mental Status: She is alert and oriented to person, place, and time.   Psychiatric:         Mood and Affect: Mood and affect normal.    "      Cognition and Memory: Memory normal.         Judgment: Judgment normal.        UPPER EXTREMITIES: Normal alignment, normal range of motion, no atrophy, no skin changes,  hair growth and nail growth normal and equal bilaterally. No swelling, no tenderness.    LOWER EXTREMITIES:  Normal alignment, normal range of motion, no atrophy, no skin changes,  hair growth and nail growth normal and equal bilaterally. No swelling, no tenderness.    LUMBAR SPINE:    Lumbar spine: ROM is full with flexion extension and oblique extension with increased pain with flexion and extension.    ((--)) Supine straight leg raise mild bilaterally  ((--)) Facet loading bilaterally  Internal and external rotation of the hip causes increased pain on right side.  Myofascial exam:  Tenderness to palpation across lumbar spinous process.      ((--)) TTP at the SI joint bilateral  ((+)) SIN's test on the Right side   ((--)) One leg stand to the right side > left side  ((--)) Distraction test    CRANIAL NERVES:  II:  PERRL bilaterally,   III,IV,VI: EOMI.    V:  Facial sensation equal bilaterally  VII:  Facial motor function normal.  VIII:  Hearing equal to finger rub bilaterally  IX/X: Gag normal, palate symmetric  XI:  Shoulder shrug equal, head turn equal  XII:  Tongue midline without fasciculations      MOTOR: Tone and bulk: normal bilateral upper and lower Strength: normal   Delt Bi Tri WE WF     R 5 5 5 5 5 5   L 5 5 5 5 5 5     IP ADD ABD Quad TA Gas HAM  R 5 5 5 5 5 5 5  L 5 5 5 5 5 5 5      NEUROLOGICAL:    Gen: No clonus or spasticity.   Gait: Normal without antalgic lean. Normal rise, base, steps, and arm swing.    BABINSKI: Absent bilaterally  Sensory: Intact to light touch and proprioception BLE  REFLEXES: normal, symmetric, nonbrisk.  Toes down, no clonus. Negative muir's sign bilaterally.      ASSESSMENT: Teresa Clay is a 66 y.o. female with PMH significant for HTN presents as an established patient that presents  continued management of back and neck pain. The patient is s/p Lumbar interlaminar epidural steroid injection at L5-S1 on 6/22/2022 and reports of 70% relief for a few weeks.  Today,  the patient localizes her pain to the area across her lower back and radiates down BLE to her feet.  She reports benefit with current medication regimen.    Treatment Plan below:      1. DDD (degenerative disc disease), lumbar     2. Lumbar radiculopathy     3. Spondylolysis of lumbar region         PLAN:    1. Continue Norco and Gabapentin.   2.  Monitor progress from Lumbar interlaminar epidural steroid injection at L5-S1  3. I have stressed the importance of physical activity and a home exercise plan to help with chronic pain and improve health.  4. Discussed safety in and around the home to prevent falls and injury. Discussed any environmental changes that can be made to help with safety.   5. F/U 6-8 weeks for med refill and UDS    All medication management performed by Dr. Blackwell.   Alayna Bronson, NP

## 2022-08-09 NOTE — PLAN OF CARE
Stable, States ready to go home, kajal po fluids, denies pain, able to hold legs off bed and stand, ambulated  To car with RN to    
no chest pain and no edema.

## 2022-08-29 ENCOUNTER — OFFICE VISIT (OUTPATIENT)
Dept: PAIN MEDICINE | Facility: CLINIC | Age: 67
End: 2022-08-29
Payer: MEDICARE

## 2022-08-29 VITALS — WEIGHT: 133 LBS | BODY MASS INDEX: 26.81 KG/M2 | HEIGHT: 59 IN | RESPIRATION RATE: 18 BRPM

## 2022-08-29 DIAGNOSIS — M54.16 LUMBAR RADICULOPATHY: ICD-10-CM

## 2022-08-29 DIAGNOSIS — G89.4 CHRONIC PAIN DISORDER: Primary | ICD-10-CM

## 2022-08-29 DIAGNOSIS — M43.06 SPONDYLOLYSIS OF LUMBAR REGION: ICD-10-CM

## 2022-08-29 DIAGNOSIS — Z79.891 CHRONIC USE OF OPIATE FOR THERAPEUTIC PURPOSE: ICD-10-CM

## 2022-08-29 DIAGNOSIS — M51.36 DDD (DEGENERATIVE DISC DISEASE), LUMBAR: Primary | ICD-10-CM

## 2022-08-29 PROCEDURE — 99214 PR OFFICE/OUTPT VISIT, EST, LEVL IV, 30-39 MIN: ICD-10-PCS | Mod: S$GLB,,,

## 2022-08-29 PROCEDURE — 1125F PR PAIN SEVERITY QUANTIFIED, PAIN PRESENT: ICD-10-PCS | Mod: CPTII,S$GLB,,

## 2022-08-29 PROCEDURE — 1160F PR REVIEW ALL MEDS BY PRESCRIBER/CLIN PHARMACIST DOCUMENTED: ICD-10-PCS | Mod: CPTII,S$GLB,,

## 2022-08-29 PROCEDURE — 99999 PR PBB SHADOW E&M-EST. PATIENT-LVL IV: CPT | Mod: PBBFAC,,,

## 2022-08-29 PROCEDURE — 3288F FALL RISK ASSESSMENT DOCD: CPT | Mod: CPTII,S$GLB,,

## 2022-08-29 PROCEDURE — 99999 PR PBB SHADOW E&M-EST. PATIENT-LVL IV: ICD-10-PCS | Mod: PBBFAC,,,

## 2022-08-29 PROCEDURE — 1159F PR MEDICATION LIST DOCUMENTED IN MEDICAL RECORD: ICD-10-PCS | Mod: CPTII,S$GLB,,

## 2022-08-29 PROCEDURE — 3008F BODY MASS INDEX DOCD: CPT | Mod: CPTII,S$GLB,,

## 2022-08-29 PROCEDURE — 1101F PT FALLS ASSESS-DOCD LE1/YR: CPT | Mod: CPTII,S$GLB,,

## 2022-08-29 PROCEDURE — 1160F RVW MEDS BY RX/DR IN RCRD: CPT | Mod: CPTII,S$GLB,,

## 2022-08-29 PROCEDURE — 1159F MED LIST DOCD IN RCRD: CPT | Mod: CPTII,S$GLB,,

## 2022-08-29 PROCEDURE — 1101F PR PT FALLS ASSESS DOC 0-1 FALLS W/OUT INJ PAST YR: ICD-10-PCS | Mod: CPTII,S$GLB,,

## 2022-08-29 PROCEDURE — 3008F PR BODY MASS INDEX (BMI) DOCUMENTED: ICD-10-PCS | Mod: CPTII,S$GLB,,

## 2022-08-29 PROCEDURE — 80307 DRUG TEST PRSMV CHEM ANLYZR: CPT

## 2022-08-29 PROCEDURE — 1125F AMNT PAIN NOTED PAIN PRSNT: CPT | Mod: CPTII,S$GLB,,

## 2022-08-29 PROCEDURE — 99214 OFFICE O/P EST MOD 30 MIN: CPT | Mod: S$GLB,,,

## 2022-08-29 PROCEDURE — 80326 AMPHETAMINES 5 OR MORE: CPT

## 2022-08-29 PROCEDURE — 3288F PR FALLS RISK ASSESSMENT DOCUMENTED: ICD-10-PCS | Mod: CPTII,S$GLB,,

## 2022-08-29 RX ORDER — HYDROCODONE BITARTRATE AND ACETAMINOPHEN 5; 325 MG/1; MG/1
1 TABLET ORAL EVERY 8 HOURS PRN
Qty: 90 TABLET | Refills: 0 | Status: SHIPPED | OUTPATIENT
Start: 2022-08-30 | End: 2022-10-28 | Stop reason: SDUPTHER

## 2022-08-29 RX ORDER — HYDROCODONE BITARTRATE AND ACETAMINOPHEN 5; 325 MG/1; MG/1
1 TABLET ORAL EVERY 8 HOURS PRN
Qty: 90 TABLET | Refills: 0 | Status: SHIPPED | OUTPATIENT
Start: 2022-09-27 | End: 2022-10-28 | Stop reason: SDUPTHER

## 2022-08-29 NOTE — H&P (VIEW-ONLY)
"FOLLOW UP NOTE:     CHIEF COMPLAINT: neck and back pain    INITIAL HISTORY OF PRESENT ILLNESS: Teresa Clay is a 64 y.o. female with PMH significant for HTN presents as an established patient for Dr. Cohen (new to me) for the continued management of back and shoulder pain.     The patient reports that her pain began in 2012 when she slipped and fell at home. The patient reports of low back pain (R > L) with radiation down the postero-lateral aspect of her LE's down her feet. The patient does report of intermittent numbness in her LE's. The patient describes her back pain as a constant aching type of pain. The patient reports that her current pain is a 7/10. Patient denies of any urinary/fecal incontinence, saddle anesthesia, or weakness. Of note, the patient does mention that her right hip bothers her a fair bit as well. Patient is unsure as to whether that is related to her back.      The patient currently works as a .      The patient reports that her shoulder pain has been present for a few years. The patient localizes her pain to her bilateral trapezius area. The patient reports of intermittent radiation to her left thumb. The patient describes her pain as a "stiff" type of pain. The patient reports that her current pain is a 4/10. The patient denies of hand weakness.        INTERVAL HISTORY OF PRESENT ILLNESS: Teresa Clay is a 66 y.o. female with PMH significant for HTN presents as an established patient for continued management of back and neck pain.  The patient presents today for medication refill.  Today,  the patient continues to report of pain across her lower back and reports radiation down the posterior aspect of her BLE to her feet.  The patient reports the pain is "aching" in character.  She reports pain is worse at night.  Reports pain is present but is manageable and she is able to do daily activities. The patient reports that her current pain has responded well to Lumbar " Interlaminar DEAN in the past and would like to schedule a repeat procedure.  Currently taking Gabapentin 300 mg QHS, the patient reports minimal SE with this dosage.  Currently taking Norco 5 mg approximately 3 times a day reports benefit with medication.  The patient denies of any significant changes in her health since her last appointment. The patient also denies of any changes in the character of her pain since her last appointment. She denies trauma or injury. Patient denies of any urinary/fecal incontinence, saddle anesthesia, or weakness.       INTERVENTIONAL PAIN HISTORY:  6/22/2022: Lumbar interlaminar epidural steroid injection at [id number] relief.  3/9/2022: Lumbar interlaminar epidural steroid injection at L5-S1(right paramedian approach)-  100% relief.    1/26/22: Radiofrequency ablation of the L2, L3, L4, and L5 medial branch nerves on the bilateral- 80% for about a week.    10/27/21: Bilateral L2, L3, L4, and L5 medial branch nerve blocks- minimal improvement   9/27/21: Bilateral L4-L5 transforaminal epidural steroid injection   8/12/2021: L5 - S1 with right paramedian approach (Dr. Blackwell) - 30% benefit  5/27/2021: L5 - S1 DEAN (Dr. Blackwell) - 90% benefit until about a week ago  10/15/2020:  L5 - S1 DEAN (Dr. Blackwell) - 50% benefit for about 4 weeks  7/22/2020: Cervical TPI's - good benefit x 1 week  7/02/2020: L5 - S1 Intralaminar Epidural Steroid Injection (Dr. Blackwell 100% benefit)     she reports of benefit with injections by Dr. Dueñas and Dr. Cohen     1/27/2020: L5/S1 Interlaminar Epidural Steroid Injection via Dr. Cohen - reports of at least one month of good relief  10/7/2019: L5/S1 Interlaminar Epidural Steroid Injection via Dr. Cohen  2/4/2019: Bilateral L4-5 transforaminal epidural steroid injection under fluoroscopy via Dr. Dueñas  5/6/2019: Bilateral L4-5 transforaminal epidural steroid injection under fluoroscopy via Dr. Dueñas    CURRENT PAIN MEDICATIONS:   Voltaren gel as needed   Norco 5-325 mg PO QHS  "as needed  Gabapentin 300 mg PO QHS    :      IMAGING: No new imaging to review    ROS:  Review of Systems   Constitutional:  Negative for chills and fever.   HENT:  Negative for sore throat.    Eyes:  Negative for visual disturbance.   Respiratory:  Negative for shortness of breath.    Cardiovascular:  Negative for chest pain.   Gastrointestinal:  Negative for nausea and vomiting.   Genitourinary:  Negative for difficulty urinating.   Musculoskeletal:  Positive for back pain, myalgias and neck stiffness. Negative for neck pain.   Skin:  Negative for rash.   Allergic/Immunologic: Negative for immunocompromised state.   Neurological:  Negative for syncope. Numbness: in feet .  Hematological:  Does not bruise/bleed easily.   Psychiatric/Behavioral:  Negative for suicidal ideas.    All other systems reviewed and are negative.     MEDICAL, SURGICAL, FAMILY, SOCIAL HX: reviewed    MEDICATIONS/ALLERGIES: reviewed    PHYSICAL EXAM:    VITALS:   Vitals:    08/29/22 0923   Resp: 18   Weight: 60.3 kg (133 lb)   Height: 4' 11" (1.499 m)   PainSc:   8       Physical Exam  Vitals and nursing note reviewed.   Constitutional:       Appearance: She is not diaphoretic.   HENT:      Head: Normocephalic and atraumatic.   Eyes:      General:         Right eye: No discharge.         Left eye: No discharge.      Conjunctiva/sclera: Conjunctivae normal.   Cardiovascular:      Rate and Rhythm: Normal rate.   Pulmonary:      Effort: Pulmonary effort is normal. No respiratory distress.      Breath sounds: Normal breath sounds.   Abdominal:      Palpations: Abdomen is soft.   Musculoskeletal:      Lumbar back: Tenderness present.   Skin:     General: Skin is warm and dry.      Findings: No rash.   Neurological:      Mental Status: She is alert and oriented to person, place, and time.   Psychiatric:         Mood and Affect: Mood and affect normal.         Cognition and Memory: Memory normal.         Judgment: Judgment normal.      UPPER " EXTREMITIES: Normal alignment, normal range of motion, no atrophy, no skin changes,  hair growth and nail growth normal and equal bilaterally. No swelling, no tenderness.    LOWER EXTREMITIES:  Normal alignment, normal range of motion, no atrophy, no skin changes,  hair growth and nail growth normal and equal bilaterally. No swelling, no tenderness.    LUMBAR SPINE:    Lumbar spine: ROM is full with flexion extension and oblique extension with increased pain with flexion and extension.    ((--)) Supine straight leg raise mild bilaterally  ((--)) Facet loading bilaterally  Internal and external rotation of the hip causes increased pain on right side.  Myofascial exam:  Tenderness to palpation across lumbar spinous process.      ((--)) TTP at the SI joint bilateral  ((+)) SIN's test on the Right side   ((--)) One leg stand to the right side > left side  ((--)) Distraction test    CRANIAL NERVES:  II:  PERRL bilaterally,   III,IV,VI: EOMI.    V:  Facial sensation equal bilaterally  VII:  Facial motor function normal.  VIII:  Hearing equal to finger rub bilaterally  IX/X: Gag normal, palate symmetric  XI:  Shoulder shrug equal, head turn equal  XII:  Tongue midline without fasciculations      MOTOR: Tone and bulk: normal bilateral upper and lower Strength: normal   Delt Bi Tri WE WF     R 5 5 5 5 5 5   L 5 5 5 5 5 5     IP ADD ABD Quad TA Gas HAM  R 5 5 5 5 5 5 5  L 5 5 5 5 5 5 5      NEUROLOGICAL:    Gen: No clonus or spasticity.   Gait: Normal without antalgic lean. Normal rise, base, steps, and arm swing.    BABINSKI: Absent bilaterally  Sensory: Intact to light touch and proprioception BLE  REFLEXES: normal, symmetric, nonbrisk.  Toes down, no clonus. Negative muir's sign bilaterally.      ASSESSMENT: Teresa Clay is a 66 y.o. female with PMH significant for HTN presents as an established patient that presents continued management of back and neck pain. The patient presents today for medication  refill. Today, the patient localizes her pain to the area across her lower back and radiates down BLE to her feet.  She reports benefit with current medication regimen and with previous Interlaminar DAEN of lumbar spine. The patient reports she has an upcoming vacation that she would like to have an injection prior to vacation. Treatment Plan below:      1. DDD (degenerative disc disease), lumbar        2. Chronic use of opiate for therapeutic purpose  [facility] Drug Screen      3. Lumbar radiculopathy  Case Request Operating Room: Injection, Steroid, Epidural  LESI L5-S1      4. Spondylolysis of lumbar region            PLAN:    1. Refilled Norco 5-325 mg PO q8h PRN breakthrough pain, #90, 1 refill.   2. UDS collected today, last had pain medication this morning   3.  Schedule for repeat Lumbar interlaminar epidural steroid injection at L5-S1 at end of September.   4. I have stressed the importance of physical activity and a home exercise plan to help with chronic pain and improve health.  5. Schedule patient for Toradol and steroid IM injection on Friday.   6. RTC for the procedure as outlined above   All medication management performed by Dr. Blackwell.   Alayna Bronson, JOSE

## 2022-08-29 NOTE — PROGRESS NOTES
"FOLLOW UP NOTE:     CHIEF COMPLAINT: neck and back pain    INITIAL HISTORY OF PRESENT ILLNESS: Teresa Clay is a 64 y.o. female with PMH significant for HTN presents as an established patient for Dr. Cohen (new to me) for the continued management of back and shoulder pain.     The patient reports that her pain began in 2012 when she slipped and fell at home. The patient reports of low back pain (R > L) with radiation down the postero-lateral aspect of her LE's down her feet. The patient does report of intermittent numbness in her LE's. The patient describes her back pain as a constant aching type of pain. The patient reports that her current pain is a 7/10. Patient denies of any urinary/fecal incontinence, saddle anesthesia, or weakness. Of note, the patient does mention that her right hip bothers her a fair bit as well. Patient is unsure as to whether that is related to her back.      The patient currently works as a .      The patient reports that her shoulder pain has been present for a few years. The patient localizes her pain to her bilateral trapezius area. The patient reports of intermittent radiation to her left thumb. The patient describes her pain as a "stiff" type of pain. The patient reports that her current pain is a 4/10. The patient denies of hand weakness.        INTERVAL HISTORY OF PRESENT ILLNESS: Teresa Clay is a 66 y.o. female with PMH significant for HTN presents as an established patient for continued management of back and neck pain.  The patient presents today for medication refill.  Today,  the patient continues to report of pain across her lower back and reports radiation down the posterior aspect of her BLE to her feet.  The patient reports the pain is "aching" in character.  She reports pain is worse at night.  Reports pain is present but is manageable and she is able to do daily activities. The patient reports that her current pain has responded well to Lumbar " Interlaminar DEAN in the past and would like to schedule a repeat procedure.  Currently taking Gabapentin 300 mg QHS, the patient reports minimal SE with this dosage.  Currently taking Norco 5 mg approximately 3 times a day reports benefit with medication.  The patient denies of any significant changes in her health since her last appointment. The patient also denies of any changes in the character of her pain since her last appointment. She denies trauma or injury. Patient denies of any urinary/fecal incontinence, saddle anesthesia, or weakness.       INTERVENTIONAL PAIN HISTORY:  6/22/2022: Lumbar interlaminar epidural steroid injection at [id number] relief.  3/9/2022: Lumbar interlaminar epidural steroid injection at L5-S1(right paramedian approach)-  100% relief.    1/26/22: Radiofrequency ablation of the L2, L3, L4, and L5 medial branch nerves on the bilateral- 80% for about a week.    10/27/21: Bilateral L2, L3, L4, and L5 medial branch nerve blocks- minimal improvement   9/27/21: Bilateral L4-L5 transforaminal epidural steroid injection   8/12/2021: L5 - S1 with right paramedian approach (Dr. Blackwell) - 30% benefit  5/27/2021: L5 - S1 DEAN (Dr. Blackwell) - 90% benefit until about a week ago  10/15/2020:  L5 - S1 DEAN (Dr. Blackwell) - 50% benefit for about 4 weeks  7/22/2020: Cervical TPI's - good benefit x 1 week  7/02/2020: L5 - S1 Intralaminar Epidural Steroid Injection (Dr. Blackwell 100% benefit)     she reports of benefit with injections by Dr. Dueñas and Dr. Cohen     1/27/2020: L5/S1 Interlaminar Epidural Steroid Injection via Dr. Cohen - reports of at least one month of good relief  10/7/2019: L5/S1 Interlaminar Epidural Steroid Injection via Dr. Cohen  2/4/2019: Bilateral L4-5 transforaminal epidural steroid injection under fluoroscopy via Dr. Dueñas  5/6/2019: Bilateral L4-5 transforaminal epidural steroid injection under fluoroscopy via Dr. Dueñas    CURRENT PAIN MEDICATIONS:   Voltaren gel as needed   Norco 5-325 mg PO QHS  "as needed  Gabapentin 300 mg PO QHS    :      IMAGING: No new imaging to review    ROS:  Review of Systems   Constitutional:  Negative for chills and fever.   HENT:  Negative for sore throat.    Eyes:  Negative for visual disturbance.   Respiratory:  Negative for shortness of breath.    Cardiovascular:  Negative for chest pain.   Gastrointestinal:  Negative for nausea and vomiting.   Genitourinary:  Negative for difficulty urinating.   Musculoskeletal:  Positive for back pain, myalgias and neck stiffness. Negative for neck pain.   Skin:  Negative for rash.   Allergic/Immunologic: Negative for immunocompromised state.   Neurological:  Negative for syncope. Numbness: in feet .  Hematological:  Does not bruise/bleed easily.   Psychiatric/Behavioral:  Negative for suicidal ideas.    All other systems reviewed and are negative.     MEDICAL, SURGICAL, FAMILY, SOCIAL HX: reviewed    MEDICATIONS/ALLERGIES: reviewed    PHYSICAL EXAM:    VITALS:   Vitals:    08/29/22 0923   Resp: 18   Weight: 60.3 kg (133 lb)   Height: 4' 11" (1.499 m)   PainSc:   8       Physical Exam  Vitals and nursing note reviewed.   Constitutional:       Appearance: She is not diaphoretic.   HENT:      Head: Normocephalic and atraumatic.   Eyes:      General:         Right eye: No discharge.         Left eye: No discharge.      Conjunctiva/sclera: Conjunctivae normal.   Cardiovascular:      Rate and Rhythm: Normal rate.   Pulmonary:      Effort: Pulmonary effort is normal. No respiratory distress.      Breath sounds: Normal breath sounds.   Abdominal:      Palpations: Abdomen is soft.   Musculoskeletal:      Lumbar back: Tenderness present.   Skin:     General: Skin is warm and dry.      Findings: No rash.   Neurological:      Mental Status: She is alert and oriented to person, place, and time.   Psychiatric:         Mood and Affect: Mood and affect normal.         Cognition and Memory: Memory normal.         Judgment: Judgment normal.      UPPER " EXTREMITIES: Normal alignment, normal range of motion, no atrophy, no skin changes,  hair growth and nail growth normal and equal bilaterally. No swelling, no tenderness.    LOWER EXTREMITIES:  Normal alignment, normal range of motion, no atrophy, no skin changes,  hair growth and nail growth normal and equal bilaterally. No swelling, no tenderness.    LUMBAR SPINE:    Lumbar spine: ROM is full with flexion extension and oblique extension with increased pain with flexion and extension.    ((--)) Supine straight leg raise mild bilaterally  ((--)) Facet loading bilaterally  Internal and external rotation of the hip causes increased pain on right side.  Myofascial exam:  Tenderness to palpation across lumbar spinous process.      ((--)) TTP at the SI joint bilateral  ((+)) SIN's test on the Right side   ((--)) One leg stand to the right side > left side  ((--)) Distraction test    CRANIAL NERVES:  II:  PERRL bilaterally,   III,IV,VI: EOMI.    V:  Facial sensation equal bilaterally  VII:  Facial motor function normal.  VIII:  Hearing equal to finger rub bilaterally  IX/X: Gag normal, palate symmetric  XI:  Shoulder shrug equal, head turn equal  XII:  Tongue midline without fasciculations      MOTOR: Tone and bulk: normal bilateral upper and lower Strength: normal   Delt Bi Tri WE WF     R 5 5 5 5 5 5   L 5 5 5 5 5 5     IP ADD ABD Quad TA Gas HAM  R 5 5 5 5 5 5 5  L 5 5 5 5 5 5 5      NEUROLOGICAL:    Gen: No clonus or spasticity.   Gait: Normal without antalgic lean. Normal rise, base, steps, and arm swing.    BABINSKI: Absent bilaterally  Sensory: Intact to light touch and proprioception BLE  REFLEXES: normal, symmetric, nonbrisk.  Toes down, no clonus. Negative muir's sign bilaterally.      ASSESSMENT: Teresa Clay is a 66 y.o. female with PMH significant for HTN presents as an established patient that presents continued management of back and neck pain. The patient presents today for medication  refill. Today, the patient localizes her pain to the area across her lower back and radiates down BLE to her feet.  She reports benefit with current medication regimen and with previous Interlaminar DEAN of lumbar spine. The patient reports she has an upcoming vacation that she would like to have an injection prior to vacation. Treatment Plan below:      1. DDD (degenerative disc disease), lumbar        2. Chronic use of opiate for therapeutic purpose  [facility] Drug Screen      3. Lumbar radiculopathy  Case Request Operating Room: Injection, Steroid, Epidural  LESI L5-S1      4. Spondylolysis of lumbar region            PLAN:    1. Refilled Norco 5-325 mg PO q8h PRN breakthrough pain, #90, 1 refill.   2. UDS collected today, last had pain medication this morning   3.  Schedule for repeat Lumbar interlaminar epidural steroid injection at L5-S1 at end of September.   4. I have stressed the importance of physical activity and a home exercise plan to help with chronic pain and improve health.  5. Schedule patient for Toradol and steroid IM injection on Friday.   6. RTC for the procedure as outlined above   All medication management performed by Dr. lBackwell.   Alayna Bronson, JOSE

## 2022-09-02 ENCOUNTER — CLINICAL SUPPORT (OUTPATIENT)
Dept: PAIN MEDICINE | Facility: CLINIC | Age: 67
End: 2022-09-02
Payer: MEDICARE

## 2022-09-02 VITALS — HEIGHT: 59 IN | WEIGHT: 133 LBS | BODY MASS INDEX: 26.81 KG/M2

## 2022-09-02 DIAGNOSIS — M79.18 MYOFASCIAL PAIN: Primary | ICD-10-CM

## 2022-09-02 DIAGNOSIS — M54.16 LUMBAR RADICULOPATHY: ICD-10-CM

## 2022-09-02 PROCEDURE — 99999 PR PBB SHADOW E&M-EST. PATIENT-LVL I: ICD-10-PCS | Mod: PBBFAC,,,

## 2022-09-02 PROCEDURE — 96372 THER/PROPH/DIAG INJ SC/IM: CPT | Mod: S$GLB,,,

## 2022-09-02 PROCEDURE — 96372 PR INJECTION,THERAP/PROPH/DIAG2ST, IM OR SUBCUT: ICD-10-PCS | Mod: S$GLB,,,

## 2022-09-02 PROCEDURE — 99999 PR PBB SHADOW E&M-EST. PATIENT-LVL I: CPT | Mod: PBBFAC,,,

## 2022-09-02 RX ORDER — KETOROLAC TROMETHAMINE 30 MG/ML
30 INJECTION, SOLUTION INTRAMUSCULAR; INTRAVENOUS ONCE
Status: COMPLETED | OUTPATIENT
Start: 2022-09-02 | End: 2022-09-02

## 2022-09-02 RX ORDER — METHYLPREDNISOLONE ACETATE 40 MG/ML
40 INJECTION, SUSPENSION INTRA-ARTICULAR; INTRALESIONAL; INTRAMUSCULAR; SOFT TISSUE ONCE
Status: COMPLETED | OUTPATIENT
Start: 2022-09-02 | End: 2022-09-02

## 2022-09-02 RX ADMIN — KETOROLAC TROMETHAMINE 30 MG: 30 INJECTION, SOLUTION INTRAMUSCULAR; INTRAVENOUS at 09:09

## 2022-09-02 RX ADMIN — METHYLPREDNISOLONE ACETATE 40 MG: 40 INJECTION, SUSPENSION INTRA-ARTICULAR; INTRALESIONAL; INTRAMUSCULAR; SOFT TISSUE at 09:09

## 2022-09-03 LAB
6MAM UR QL: NOT DETECTED
7AMINOCLONAZEPAM UR QL: NOT DETECTED
A-OH ALPRAZ UR QL: NOT DETECTED
ALPHA-OH-MIDAZOLAM: NOT DETECTED
ALPRAZ UR QL: NOT DETECTED
AMPHET UR QL SCN: NOT DETECTED
ANNOTATION COMMENT IMP: NORMAL
ANNOTATION COMMENT IMP: NORMAL
BARBITURATES UR QL: NOT DETECTED
BUPRENORPHINE UR QL: NOT DETECTED
BZE UR QL: NOT DETECTED
CARBOXYTHC UR QL: NOT DETECTED
CARISOPRODOL UR QL: NOT DETECTED
CLONAZEPAM UR QL: NOT DETECTED
CODEINE UR QL: NOT DETECTED
CREAT UR-MCNC: 288.8 MG/DL (ref 20–400)
DIAZEPAM UR QL: NOT DETECTED
ETHYL GLUCURONIDE UR QL: NOT DETECTED
FENTANYL UR QL: NOT DETECTED
GABAPENTIN: PRESENT
HYDROCODONE UR QL: PRESENT
HYDROMORPHONE UR QL: PRESENT
LORAZEPAM UR QL: NOT DETECTED
MDA UR QL: NOT DETECTED
MDEA UR QL: NOT DETECTED
MDMA UR QL: NOT DETECTED
ME-PHENIDATE UR QL: NOT DETECTED
METHADONE UR QL: NOT DETECTED
METHAMPHET UR QL: NOT DETECTED
MIDAZOLAM UR QL SCN: NOT DETECTED
MORPHINE UR QL: NOT DETECTED
NALOXONE: NOT DETECTED
NORBUPRENORPHINE UR QL CFM: NOT DETECTED
NORDIAZEPAM UR QL: NOT DETECTED
NORFENTANYL UR QL: NOT DETECTED
NORHYDROCODONE UR QL CFM: PRESENT
NORMEPERIDINE UR QL CFM: NOT DETECTED
NOROXYCODONE UR QL CFM: NOT DETECTED
NOROXYMORPHONE UR QL SCN: NOT DETECTED
OXAZEPAM UR QL: NOT DETECTED
OXYCODONE UR QL: NOT DETECTED
OXYMORPHONE UR QL: NOT DETECTED
PATHOLOGY STUDY: NORMAL
PCP UR QL: NOT DETECTED
PHENTERMINE UR QL: NOT DETECTED
PREGABALIN: NOT DETECTED
SERVICE CMNT-IMP: NORMAL
TAPENTADOL UR QL SCN: NOT DETECTED
TAPENTADOL UR QL SCN: NOT DETECTED
TEMAZEPAM UR QL: NOT DETECTED
TRAMADOL UR QL: NOT DETECTED
ZOLPIDEM METABOLITE: NOT DETECTED
ZOLPIDEM UR QL: NOT DETECTED

## 2022-09-13 ENCOUNTER — HOSPITAL ENCOUNTER (EMERGENCY)
Facility: HOSPITAL | Age: 67
Discharge: HOME | End: 2022-09-13
Payer: MEDICARE

## 2022-09-13 VITALS
HEIGHT: 59 IN | HEART RATE: 71 BPM | SYSTOLIC BLOOD PRESSURE: 123 MMHG | RESPIRATION RATE: 20 BRPM | OXYGEN SATURATION: 95 % | BODY MASS INDEX: 26.81 KG/M2 | DIASTOLIC BLOOD PRESSURE: 69 MMHG | TEMPERATURE: 98 F | WEIGHT: 133 LBS

## 2022-09-13 DIAGNOSIS — U07.1 COVID-19 VIRUS DETECTED: ICD-10-CM

## 2022-09-13 DIAGNOSIS — H65.93 BILATERAL SEROUS OTITIS MEDIA, UNSPECIFIED CHRONICITY: ICD-10-CM

## 2022-09-13 DIAGNOSIS — U07.1 COVID: Primary | ICD-10-CM

## 2022-09-13 LAB — SARS-COV-2 RDRP RESP QL NAA+PROBE: POSITIVE

## 2022-09-13 PROCEDURE — U0002 COVID-19 LAB TEST NON-CDC: HCPCS | Performed by: EMERGENCY MEDICINE

## 2022-09-13 PROCEDURE — 99283 EMERGENCY DEPT VISIT LOW MDM: CPT

## 2022-09-13 RX ORDER — LORATADINE 10 MG/1
10 TABLET ORAL DAILY
Qty: 30 TABLET | Refills: 0 | Status: SHIPPED | OUTPATIENT
Start: 2022-09-13 | End: 2023-02-14

## 2022-09-13 NOTE — DISCHARGE INSTRUCTIONS
Continue taking all previous prescribed medication as directed.  Start taking Claritin daily day with serous otitis media and sinus drainage.  You may consider taking zinc, vitamin-D, vitamin-C day with her COVID symptoms.  Follow-up with your primary care provider in next 3-5 days if symptoms worsen, return emergency room if develops any signs and shortness of breath, fever unresolved by Tylenol are per.

## 2022-09-13 NOTE — ED PROVIDER NOTES
"Encounter Date: 9/13/2022       History     Chief Complaint   Patient presents with    COVID-19 Concerns      covid + today     Patient is a 66 year female presents emergency room with clear sinus drainage and possible COVID.  Patient states her  was positive for COVID.  Patient denies any shortness of breath, chest pain, nausea, vomiting, fevers, chills.  Patient states she did have diarrhea stay.  Past medical history medications all been reviewed.    Review of patient's allergies indicates:   Allergen Reactions    Betamethasone acet,sod phos Other (See Comments)     " Makes me feel flushed!"    Codeine Rash     Other reaction(s): Hives     Past Medical History:   Diagnosis Date    Chronic back pain     Diverticulitis     Encounter for long-term (current) use of medications     Hypertension      Past Surgical History:   Procedure Laterality Date    APPENDECTOMY      CHOLECYSTECTOMY      COLONOSCOPY      COLONOSCOPY N/A 07/03/2019    Procedure: COLONOSCOPY;  Surgeon: Jean Jamison MD;  Location: [facility] ENDO;  Service: General;  Laterality: N/A;    EPIDURAL STEROID INJECTION N/A 10/07/2019    Procedure: Injection, Steroid, Epidural - L5/S1 LUMBAR EPIDURAL STEROID INJECTION;  Surgeon: Gail Cohen MD;  Location: [facility] OR;  Service: Pain Management;  Laterality: N/A;    EPIDURAL STEROID INJECTION N/A 01/27/2020    Procedure: Injection, Steroid, Epidural - L5/S1 INTERLAMINAR EPIDURAL STEROID INJECTION;  Surgeon: Gail Cohen MD;  Location: [facility] OR;  Service: Pain Management;  Laterality: N/A;    EPIDURAL STEROID INJECTION N/A 07/02/2020    Procedure: DEAN Lumbar L5-S1;  Surgeon: Seb Blackwell MD;  Location: [facility] OR;  Service: Pain Management;  Laterality: N/A;    EPIDURAL STEROID INJECTION N/A 10/15/2020    Procedure: DEAN Lumbar L5-S1;  Surgeon: Seb Blackwell MD;  Location: [facility] OR;  Service: Pain Management;  Laterality: N/A;    EPIDURAL STEROID INJECTION N/A 05/27/2021    Procedure: " Injection, Steroid, Epidural, L5 - S1;  Surgeon: Seb Blackwell MD;  Location: [facility] OR;  Service: Pain Management;  Laterality: N/A;    EPIDURAL STEROID INJECTION N/A 08/12/2021    Procedure: Injection, Steroid, Epidural Lumbar;  Surgeon: Seb Blackwell MD;  Location: [facility] OR;  Service: Pain Management;  Laterality: N/A;  L5-S1      EPIDURAL STEROID INJECTION Right 03/09/2022    Procedure: Injection, Steroid, Epidural Interlaminar L5-S1 righ paramedian approach;  Surgeon: Seb Blackwell MD;  Location: [facility] OR;  Service: Pain Management;  Laterality: Right;    EPIDURAL STEROID INJECTION Right 06/22/2022    Procedure: Injection, Steroid, Epidural LESI L5-S1;  Surgeon: Seb Blackwell MD;  Location: [facility] OR;  Service: Pain Management;  Laterality: Right;  R paramedian approach    ESOPHAGOGASTRODUODENOSCOPY N/A 07/03/2019    Procedure: ESOPHAGOGASTRODUODENOSCOPY (EGD);  Surgeon: Jean Jamison MD;  Location: [facility] ENDO;  Service: General;  Laterality: N/A;    gastric sleeve  12/13/2021    INJECTION OF NERVE Bilateral 10/27/2021    Procedure: INJECTION, NERVE; Bilateral L2, L3, L4, L5 MBB's;  Surgeon: Seb Blackwell MD;  Location: [facility] OR;  Service: Anesthesiology;  Laterality: Bilateral;    RADIOFREQUENCY ABLATION Bilateral 01/26/2022    Procedure: Radiofrequency Ablation;  Surgeon: Seb Blackwell MD;  Location: [facility] OR;  Service: Pain Management;  Laterality: Bilateral;  L2,3,4,5     TRANSFORAMINAL EPIDURAL INJECTION OF STEROID Bilateral 05/06/2019    Procedure: Injection,steroid,epidural,transforaminal approach;  Surgeon: Trey Dueñas MD;  Location: [facility] OR;  Service: Pain Management;  Laterality: Bilateral;  L4-5    TRANSFORAMINAL EPIDURAL INJECTION OF STEROID Bilateral 09/27/2021    Procedure: Injection,steroid,epidural,transforaminal approach;  Surgeon: Seb Blackwell MD;  Location: [facility] OR;  Service: Pain Management;  Laterality: Bilateral;  L4-L5    TUBAL LIGATION       Family History   Problem  Relation Age of Onset    Diabetes Brother     Cancer Brother     Breast cancer Maternal Aunt      Social History     Tobacco Use    Smoking status: Some Days     Packs/day: 0.50     Types: Cigarettes    Smokeless tobacco: Never    Tobacco comments:     stop approx. 6 months ago   Substance Use Topics    Alcohol use: No    Drug use: No     Review of Systems   Constitutional: Negative.    HENT:  Positive for postnasal drip and rhinorrhea. Negative for congestion, ear pain and sore throat.    Eyes: Negative.    Respiratory: Negative.     Cardiovascular: Negative.    Gastrointestinal: Negative.    Endocrine: Negative.    Genitourinary: Negative.    Musculoskeletal: Negative.    Allergic/Immunologic: Negative for food allergies.   Neurological: Negative.    Hematological: Negative.    Psychiatric/Behavioral: Negative.       Physical Exam     Initial Vitals   BP Pulse Resp Temp SpO2   09/13/22 1406 09/13/22 1404 09/13/22 1404 09/13/22 1404 09/13/22 1404   123/69 71 20 98.3 °F (36.8 °C) 95 %      MAP       --                Physical Exam    Constitutional: She appears well-developed and well-nourished. She is not diaphoretic. No distress.   HENT:   Head: Normocephalic and atraumatic.   Right Ear: Tympanic membrane and external ear normal.   Left Ear: Tympanic membrane and external ear normal.   Mouth/Throat: Oropharynx is clear and moist. No oropharyngeal exudate.   Serous otitis media noted bilaterally.   Eyes: EOM are normal. Pupils are equal, round, and reactive to light. No scleral icterus.   Neck: No thyromegaly present.   Normal range of motion.  Cardiovascular:  Normal rate, regular rhythm and normal heart sounds.     Exam reveals no friction rub.       No murmur heard.  Pulmonary/Chest: Breath sounds normal. She is in respiratory distress. She has no wheezes. She has no rales.   Abdominal: Abdomen is soft. Bowel sounds are normal. She exhibits no distension. There is no abdominal tenderness.   Musculoskeletal:          General: No edema. Normal range of motion.      Cervical back: Normal range of motion.     Lymphadenopathy:     She has no cervical adenopathy.   Neurological: She is alert and oriented to person, place, and time. She has normal strength.   Skin: Skin is dry. Capillary refill takes 2 to 3 seconds. No rash noted. No erythema.   Psychiatric: She has a normal mood and affect.       ED Course   Procedures  Labs Reviewed   SARS-COV-2 RNA AMPLIFICATION, QUAL - Abnormal; Notable for the following components:       Result Value    SARS-CoV-2 RNA, Amplification, Qual Positive (*)     All other components within normal limits    Narrative:     Is the patient symptomatic?->Yes          Imaging Results    None          Medications - No data to display  Medical Decision Making:   Initial Assessment:   Patient seen examined, assessment noted above.  No acute distress noted.  Differential Diagnosis:   Sinusitis, COVID, allergic rhinitis, pneumonia, viral illness.  Clinical Tests:   Lab Tests: Ordered and Reviewed       <> Summary of Lab: COVID positive  ED Management:  Patient seen as an emergency room.  Patient is COVID positive.  Encouraged patient take vitamin regimen in addition to Claritin or Zyrtec daily.                        Clinical Impression:   Final diagnoses:  [U07.1] COVID (Primary)  [H65.93] Bilateral serous otitis media, unspecified chronicity      ED Disposition Condition    Discharge Stable          ED Prescriptions       Medication Sig Dispense Start Date End Date Auth. Provider    loratadine (CLARITIN) 10 mg tablet Take 1 tablet (10 mg total) by mouth once daily. 30 tablet 9/13/2022 9/13/2023 Henrik Tran NP          Follow-up Information       Follow up With Specialties Details Why Contact Info    Maite Villatoro NP Family Medicine In 1 week If symptoms worsen, As needed [address] [place] [address] Dr  [place] [address] [phone]               Henrik Tran NP  09/13/22 [id number]

## 2022-09-28 ENCOUNTER — HOSPITAL ENCOUNTER (OUTPATIENT)
Facility: HOSPITAL | Age: 67
Discharge: HOME | End: 2022-09-28
Attending: ANESTHESIOLOGY | Admitting: ANESTHESIOLOGY
Payer: MEDICARE

## 2022-09-28 ENCOUNTER — DOCUMENTATION ONLY (OUTPATIENT)
Dept: SURGERY | Facility: HOSPITAL | Age: 67
End: 2022-09-28

## 2022-09-28 VITALS
TEMPERATURE: 97 F | HEART RATE: 56 BPM | WEIGHT: 133 LBS | DIASTOLIC BLOOD PRESSURE: 73 MMHG | RESPIRATION RATE: 16 BRPM | OXYGEN SATURATION: 93 % | BODY MASS INDEX: 26.81 KG/M2 | SYSTOLIC BLOOD PRESSURE: 131 MMHG | HEIGHT: 59 IN

## 2022-09-28 DIAGNOSIS — M51.36 DEGENERATIVE DISC DISEASE, LUMBAR: Primary | ICD-10-CM

## 2022-09-28 LAB
CTP QC/QA: YES
SARS-COV-2 AG RESP QL IA.RAPID: NEGATIVE

## 2022-09-28 PROCEDURE — 25000003 PHARM REV CODE 250: Performed by: ANESTHESIOLOGY

## 2022-09-28 PROCEDURE — 62323 NJX INTERLAMINAR LMBR/SAC: CPT | Mod: ,,, | Performed by: ANESTHESIOLOGY

## 2022-09-28 PROCEDURE — 25500020 PHARM REV CODE 255: Performed by: ANESTHESIOLOGY

## 2022-09-28 PROCEDURE — 63600175 PHARM REV CODE 636 W HCPCS: Performed by: ANESTHESIOLOGY

## 2022-09-28 PROCEDURE — 62323 NJX INTERLAMINAR LMBR/SAC: CPT | Mod: RT | Performed by: ANESTHESIOLOGY

## 2022-09-28 PROCEDURE — 62323 PR INJ LUMBAR/SACRAL, W/IMAGING GUIDANCE: ICD-10-PCS | Mod: ,,, | Performed by: ANESTHESIOLOGY

## 2022-09-28 RX ORDER — SODIUM CHLORIDE, SODIUM LACTATE, POTASSIUM CHLORIDE, CALCIUM CHLORIDE 600; 310; 30; 20 MG/100ML; MG/100ML; MG/100ML; MG/100ML
INJECTION, SOLUTION INTRAVENOUS ONCE AS NEEDED
Status: ACTIVE | OUTPATIENT
Start: 2022-09-28 | End: 2034-02-24

## 2022-09-28 RX ORDER — MIDAZOLAM HYDROCHLORIDE 5 MG/ML
INJECTION INTRAMUSCULAR; INTRAVENOUS
Status: DISCONTINUED | OUTPATIENT
Start: 2022-09-28 | End: 2022-09-28 | Stop reason: HOSPADM

## 2022-09-28 RX ORDER — FENTANYL CITRATE 50 UG/ML
INJECTION, SOLUTION INTRAMUSCULAR; INTRAVENOUS
Status: DISCONTINUED | OUTPATIENT
Start: 2022-09-28 | End: 2022-09-28 | Stop reason: HOSPADM

## 2022-09-28 RX ORDER — LIDOCAINE HYDROCHLORIDE 10 MG/ML
INJECTION INFILTRATION; PERINEURAL
Status: DISCONTINUED | OUTPATIENT
Start: 2022-09-28 | End: 2022-09-28 | Stop reason: HOSPADM

## 2022-09-28 RX ORDER — METHYLPREDNISOLONE ACETATE 80 MG/ML
INJECTION, SUSPENSION INTRA-ARTICULAR; INTRALESIONAL; INTRAMUSCULAR; SOFT TISSUE
Status: DISCONTINUED | OUTPATIENT
Start: 2022-09-28 | End: 2022-09-28 | Stop reason: HOSPADM

## 2022-09-28 NOTE — DISCHARGE SUMMARY
[facility]  Discharge Note  Short Stay    Procedure(s) (LRB):  Injection, Steroid, Epidural  LESI L5-S1 (N/A)    OUTCOME: Patient tolerated treatment/procedure well without complication and is now ready for discharge.    DISPOSITION: Home or Self Care    FINAL DIAGNOSIS:  Lumbar radiculopathy    FOLLOWUP: In clinic    DISCHARGE INSTRUCTIONS:    Discharge Procedure Orders   Diet general     Call MD for:  temperature >100.4     Call MD for:  persistent nausea and vomiting     Call MD for:  severe uncontrolled pain     Call MD for:  difficulty breathing, headache or visual disturbances     Call MD for:  redness, tenderness, or signs of infection (pain, swelling, redness, odor or green/yellow discharge around incision site)     Call MD for:  hives     Call MD for:  persistent dizziness or light-headedness     Call MD for:  extreme fatigue        TIME SPENT ON DISCHARGE: 30 minutes

## 2022-09-28 NOTE — OP NOTE
PROCEDURE DATE: 9/28/2022    PROCEDURE: Lumbar interlaminar epidural steroid injection at L5-S1 under fluoroscopic guidance (right paramedian approach)    DIAGNOSIS: Lumbar radiculopathy  POSTOP DIAGNOSIS: SAME    PHYSICIAN: Seb Blackwell M.D.    MEDICATIONS INJECTED: 80 mg depo-medrol with 4 ml of preservative free NaCl    LOCAL ANESTHETIC INJECTED:    Lidocaine 1% 4 ml total    SEDATION MEDICATIONS: RN IV sedation    ESTIMATED BLOOD LOSS:  none    COMPLICATIONS:  none    TECHNIQUE:  Time-out taken to identify patient and procedure prior to starting the procedure.  With the patient laying in a prone position, the area was prepped and draped in the usual sterile fashion using ChloraPrep and a fenestrated drape.  After determining the target level with an AP fluoroscopic view, local anesthetic was given using a 25-gauge 1.5 inch needle by raising a wheal and then infiltrating toward the interlaminar entry space.  A 3.5 inch 20-gauge Touhy needle was introduced under AP fluoroscopic guidance to the interlaminar space of L5-S1. Once the trajectory was established, the needle was visualized in the lateral view and advanced using loss of resistance technique. Once in the desired position, 2 mL contrast was injected to confirm placement and there was no vascular uptake nor intrathecal spread.  The medication was then injected slowly. The patient tolerated the procedure well.      The patient was monitored after the procedure.   They were given post-procedure and discharge instructions to follow at home.  The patient was discharged in a stable condition.

## 2022-10-04 ENCOUNTER — HOSPITAL ENCOUNTER (OUTPATIENT)
Dept: RADIOLOGY | Facility: HOSPITAL | Age: 67
Discharge: HOME | End: 2022-10-04
Attending: NURSE PRACTITIONER
Payer: MEDICARE

## 2022-10-04 DIAGNOSIS — Z12.31 BREAST CANCER SCREENING BY MAMMOGRAM: ICD-10-CM

## 2022-10-04 PROCEDURE — 77063 MAMMO DIGITAL SCREENING BILAT WITH TOMO: ICD-10-PCS | Mod: 26,,, | Performed by: RADIOLOGY

## 2022-10-04 PROCEDURE — 77067 MAMMO DIGITAL SCREENING BILAT WITH TOMO: ICD-10-PCS | Mod: 26,,, | Performed by: RADIOLOGY

## 2022-10-04 PROCEDURE — 77067 SCR MAMMO BI INCL CAD: CPT | Mod: TC

## 2022-10-04 PROCEDURE — 77067 SCR MAMMO BI INCL CAD: CPT | Mod: 26,,, | Performed by: RADIOLOGY

## 2022-10-04 PROCEDURE — 77063 BREAST TOMOSYNTHESIS BI: CPT | Mod: TC

## 2022-10-04 PROCEDURE — 77063 BREAST TOMOSYNTHESIS BI: CPT | Mod: 26,,, | Performed by: RADIOLOGY

## 2022-10-28 ENCOUNTER — OFFICE VISIT (OUTPATIENT)
Dept: PAIN MEDICINE | Facility: CLINIC | Age: 67
End: 2022-10-28
Payer: MEDICARE

## 2022-10-28 VITALS — RESPIRATION RATE: 18 BRPM | HEIGHT: 59 IN | BODY MASS INDEX: 26.81 KG/M2 | WEIGHT: 133 LBS

## 2022-10-28 DIAGNOSIS — G89.4 CHRONIC PAIN DISORDER: ICD-10-CM

## 2022-10-28 DIAGNOSIS — M54.16 LUMBAR RADICULOPATHY: Primary | ICD-10-CM

## 2022-10-28 DIAGNOSIS — Z79.891 CHRONIC USE OF OPIATE FOR THERAPEUTIC PURPOSE: ICD-10-CM

## 2022-10-28 DIAGNOSIS — G89.4 CHRONIC PAIN DISORDER: Primary | ICD-10-CM

## 2022-10-28 DIAGNOSIS — M53.3 SACROILIAC JOINT PAIN: ICD-10-CM

## 2022-10-28 DIAGNOSIS — M51.36 DDD (DEGENERATIVE DISC DISEASE), LUMBAR: ICD-10-CM

## 2022-10-28 PROCEDURE — 1125F AMNT PAIN NOTED PAIN PRSNT: CPT | Mod: CPTII,S$GLB,,

## 2022-10-28 PROCEDURE — 1125F PR PAIN SEVERITY QUANTIFIED, PAIN PRESENT: ICD-10-PCS | Mod: CPTII,S$GLB,,

## 2022-10-28 PROCEDURE — 99214 PR OFFICE/OUTPT VISIT, EST, LEVL IV, 30-39 MIN: ICD-10-PCS | Mod: S$GLB,,,

## 2022-10-28 PROCEDURE — 99999 PR PBB SHADOW E&M-EST. PATIENT-LVL III: ICD-10-PCS | Mod: PBBFAC,,,

## 2022-10-28 PROCEDURE — 1160F RVW MEDS BY RX/DR IN RCRD: CPT | Mod: CPTII,S$GLB,,

## 2022-10-28 PROCEDURE — 1101F PT FALLS ASSESS-DOCD LE1/YR: CPT | Mod: CPTII,S$GLB,,

## 2022-10-28 PROCEDURE — 3288F PR FALLS RISK ASSESSMENT DOCUMENTED: ICD-10-PCS | Mod: CPTII,S$GLB,,

## 2022-10-28 PROCEDURE — 1159F PR MEDICATION LIST DOCUMENTED IN MEDICAL RECORD: ICD-10-PCS | Mod: CPTII,S$GLB,,

## 2022-10-28 PROCEDURE — 99214 OFFICE O/P EST MOD 30 MIN: CPT | Mod: S$GLB,,,

## 2022-10-28 PROCEDURE — 3288F FALL RISK ASSESSMENT DOCD: CPT | Mod: CPTII,S$GLB,,

## 2022-10-28 PROCEDURE — 99999 PR PBB SHADOW E&M-EST. PATIENT-LVL III: CPT | Mod: PBBFAC,,,

## 2022-10-28 PROCEDURE — 1160F PR REVIEW ALL MEDS BY PRESCRIBER/CLIN PHARMACIST DOCUMENTED: ICD-10-PCS | Mod: CPTII,S$GLB,,

## 2022-10-28 PROCEDURE — 1101F PR PT FALLS ASSESS DOC 0-1 FALLS W/OUT INJ PAST YR: ICD-10-PCS | Mod: CPTII,S$GLB,,

## 2022-10-28 PROCEDURE — 80326 AMPHETAMINES 5 OR MORE: CPT

## 2022-10-28 PROCEDURE — 1159F MED LIST DOCD IN RCRD: CPT | Mod: CPTII,S$GLB,,

## 2022-10-28 RX ORDER — HYDROCODONE BITARTRATE AND ACETAMINOPHEN 5; 325 MG/1; MG/1
1 TABLET ORAL EVERY 8 HOURS PRN
Qty: 90 TABLET | Refills: 0 | Status: SHIPPED | OUTPATIENT
Start: 2022-10-28 | End: 2022-11-23 | Stop reason: SDUPTHER

## 2022-10-28 RX ORDER — HYDROCODONE BITARTRATE AND ACETAMINOPHEN 5; 325 MG/1; MG/1
1 TABLET ORAL EVERY 8 HOURS PRN
Qty: 90 TABLET | Refills: 0 | Status: SHIPPED | OUTPATIENT
Start: 2022-10-28 | End: 2022-10-28

## 2022-10-28 RX ORDER — HYDROCODONE BITARTRATE AND ACETAMINOPHEN 5; 325 MG/1; MG/1
1 TABLET ORAL EVERY 8 HOURS PRN
Qty: 90 TABLET | Refills: 0 | Status: CANCELLED | OUTPATIENT
Start: 2022-10-28

## 2022-10-28 NOTE — PROGRESS NOTES
"FOLLOW UP NOTE:     CHIEF COMPLAINT: neck and back pain    INITIAL HISTORY OF PRESENT ILLNESS: Teresa Clay is a 64 y.o. female with PMH significant for HTN presents as an established patient for Dr. Cohen (new to me) for the continued management of back and shoulder pain.     The patient reports that her pain began in 2012 when she slipped and fell at home. The patient reports of low back pain (R > L) with radiation down the postero-lateral aspect of her LE's down her feet. The patient does report of intermittent numbness in her LE's. The patient describes her back pain as a constant aching type of pain. The patient reports that her current pain is a 7/10. Patient denies of any urinary/fecal incontinence, saddle anesthesia, or weakness. Of note, the patient does mention that her right hip bothers her a fair bit as well. Patient is unsure as to whether that is related to her back.      The patient currently works as a .      The patient reports that her shoulder pain has been present for a few years. The patient localizes her pain to her bilateral trapezius area. The patient reports of intermittent radiation to her left thumb. The patient describes her pain as a "stiff" type of pain. The patient reports that her current pain is a 4/10. The patient denies of hand weakness.        INTERVAL HISTORY OF PRESENT ILLNESS: Teresa Clay is a 67 y.o. female with PMH significant for HTN presents as an established patient for continued management of back and neck pain.  The patient presents today for medication refill. The patient is s/p Lumbar interlaminar epidural steroid injection at L5-S1 under fluoroscopic guidance (right paramedian approach) on 9/28/22 and reports of 80% relief.    Today,  the patient reports of lower back/buttocks pain with radiation into her hips and groin; L>R.  The patient reports the pain began shortly after wearing a high heel to a wedding. The patient reports the pain has " improved in the last few days.  Reports pain is present but is manageable and she is able to do daily activities. Currently taking Gabapentin 300 mg QHS, the patient reports minimal SE with this dosage.  Currently taking Norco 5 mg approximately 3 times a day reports benefit with medication.  The patient denies of any significant changes in her health since her last appointment. The patient also denies of any changes in the character of her pain since her last appointment. She denies trauma or injury. Patient denies of any urinary/fecal incontinence, saddle anesthesia, or weakness.     Of note, the patient presents today for [facility] refill.     INTERVENTIONAL PAIN HISTORY:  9/28/2022: Lumbar interlaminar epidural steroid injection at L5-S1 under fluoroscopic guidance (right paramedian approach)- 80% relief.   6/22/2022: Lumbar interlaminar epidural steroid injection at [id number] relief.  3/9/2022: Lumbar interlaminar epidural steroid injection at L5-S1(right paramedian approach)-  100% relief.    1/26/22: Radiofrequency ablation of the L2, L3, L4, and L5 medial branch nerves on the bilateral- 80% for about a week.    10/27/21: Bilateral L2, L3, L4, and L5 medial branch nerve blocks- minimal improvement   9/27/21: Bilateral L4-L5 transforaminal epidural steroid injection   8/12/2021: L5 - S1 with right paramedian approach (Dr. Blackwell) - 30% benefit  5/27/2021: L5 - S1 DEAN (Dr. Blackwell) - 90% benefit until about a week ago  10/15/2020:  L5 - S1 DEAN (Dr. Blackwell) - 50% benefit for about 4 weeks  7/22/2020: Cervical TPI's - good benefit x 1 week  7/02/2020: L5 - S1 Intralaminar Epidural Steroid Injection (Dr. Blackwell 100% benefit)     she reports of benefit with injections by Dr. Dueñas and Dr. Cohen     1/27/2020: L5/S1 Interlaminar Epidural Steroid Injection via Dr. Cohen - reports of at least one month of good relief  10/7/2019: L5/S1 Interlaminar Epidural Steroid Injection via Dr. Cohen  2/4/2019: Bilateral L4-5 transforaminal  "epidural steroid injection under fluoroscopy via Dr. Dueñas  5/6/2019: Bilateral L4-5 transforaminal epidural steroid injection under fluoroscopy via Dr. Dueñas    CURRENT PAIN MEDICATIONS:   Voltaren gel as needed   Norco 5-325 mg PO QHS as needed  Gabapentin 300 mg PO QHS    :      IMAGING: No new imaging to review    ROS:  Review of Systems   Constitutional:  Negative for chills and fever.   HENT:  Negative for sore throat.    Eyes:  Negative for visual disturbance.   Respiratory:  Negative for shortness of breath.    Cardiovascular:  Negative for chest pain.   Gastrointestinal:  Negative for nausea and vomiting.   Genitourinary:  Negative for difficulty urinating.   Musculoskeletal:  Positive for back pain, myalgias and neck stiffness. Negative for neck pain.   Skin:  Negative for rash.   Allergic/Immunologic: Negative for immunocompromised state.   Neurological:  Negative for syncope. Numbness: in feet .  Hematological:  Does not bruise/bleed easily.   Psychiatric/Behavioral:  Negative for suicidal ideas.    All other systems reviewed and are negative.     MEDICAL, SURGICAL, FAMILY, SOCIAL HX: reviewed    MEDICATIONS/ALLERGIES: reviewed    PHYSICAL EXAM:    VITALS:   Vitals:    10/28/22 0859   Resp: 18   Weight: 60.3 kg (133 lb)   Height: 4' 11" (1.499 m)   PainSc:   6       Physical Exam  Vitals and nursing note reviewed.   Constitutional:       Appearance: She is not diaphoretic.   HENT:      Head: Normocephalic and atraumatic.   Eyes:      General:         Right eye: No discharge.         Left eye: No discharge.      Conjunctiva/sclera: Conjunctivae normal.   Cardiovascular:      Rate and Rhythm: Normal rate.   Pulmonary:      Effort: Pulmonary effort is normal. No respiratory distress.      Breath sounds: Normal breath sounds.   Abdominal:      Palpations: Abdomen is soft.   Musculoskeletal:      Lumbar back: Tenderness present.   Skin:     General: Skin is warm and dry.      Findings: No rash.   Neurological: "      Mental Status: She is alert and oriented to person, place, and time.   Psychiatric:         Mood and Affect: Mood and affect normal.         Cognition and Memory: Memory normal.         Judgment: Judgment normal.      UPPER EXTREMITIES: Normal alignment, normal range of motion, no atrophy, no skin changes,  hair growth and nail growth normal and equal bilaterally. No swelling, no tenderness.    LOWER EXTREMITIES:  Normal alignment, normal range of motion, no atrophy, no skin changes,  hair growth and nail growth normal and equal bilaterally. No swelling, no tenderness.    LUMBAR SPINE:    Lumbar spine: ROM is full with flexion extension and oblique extension with increased pain with flexion and extension.    ((+)) Supine straight leg raise mild bilaterally  ((--)) Facet loading bilaterally  Internal and external rotation of the hip causes increased pain on left side.  Myofascial exam:  Tenderness to palpation across lumbar spinous process.      ((+)) TTP at the SI joint bilateral  ((+)) SIN's test on the bilateral side   ((+)) One leg stand to the bilateral side  ((--)) Distraction test    CRANIAL NERVES:  II:  PERRL bilaterally,   III,IV,VI: EOMI.    V:  Facial sensation equal bilaterally  VII:  Facial motor function normal.  VIII:  Hearing equal to finger rub bilaterally  IX/X: Gag normal, palate symmetric  XI:  Shoulder shrug equal, head turn equal  XII:  Tongue midline without fasciculations      MOTOR: Tone and bulk: normal bilateral upper and lower Strength: normal   Delt Bi Tri WE WF     R 5 5 5 5 5 5   L 5 5 5 5 5 5     IP ADD ABD Quad TA Gas HAM  R 5 5 5 5 5 5 5  L 5 5 5 5 5 5 5      NEUROLOGICAL:    Gen: No clonus or spasticity.   Gait: Normal without antalgic lean. Normal rise, base, steps, and arm swing.    BABINSKI: Absent bilaterally  Sensory: Intact to light touch and proprioception BLE  REFLEXES: normal, symmetric, nonbrisk.  Toes down, no clonus. Negative muir's sign  bilaterally.      ASSESSMENT: Teresa Clay is a 67 y.o. female with PMH significant for HTN presents as an established patient that presents continued management of back and neck pain. The patient presents today for medication refill. The patient is s/p Lumbar interlaminar epidural steroid injection at L5-S1 under fluoroscopic guidance (right paramedian approach) on 9/28/22 and reports of 80% relief. The patient presents today for med refill.  The patient reports of pain into her lower back with radiation into her hips and groin bilaterally, L>R. The patient does have some reproducible pain with SI Joint provocation to the left side.  Treatment Plan below:      1. Lumbar radiculopathy        2. Chronic pain disorder        3. Chronic use of opiate for therapeutic purpose  [facility] Drug Screen      4. DDD (degenerative disc disease), lumbar        5. Sacroiliac joint pain              PLAN:    1. Refilled Norco 5-325 mg PO q8h PRN breakthrough pain, #90.  reviewed and consistent.   2. UDS collected today, last had pain medication this morning   3. Reviewed SI joint stretches for the patient to complete at home.   4. I have stressed the importance of physical activity and a home exercise plan to help with chronic pain and improve health.  5. F/U 4 weeks or sooner if needed.     All medication management performed by Dr. Blackwell.   Alayna Bronson, JOSE

## 2022-11-03 LAB
6MAM UR QL: NOT DETECTED
7AMINOCLONAZEPAM UR QL: NOT DETECTED
A-OH ALPRAZ UR QL: NOT DETECTED
ALPHA-OH-MIDAZOLAM: NOT DETECTED
ALPRAZ UR QL: NOT DETECTED
AMPHET UR QL SCN: NOT DETECTED
ANNOTATION COMMENT IMP: NORMAL
ANNOTATION COMMENT IMP: NORMAL
BARBITURATES UR QL: NOT DETECTED
BUPRENORPHINE UR QL: NOT DETECTED
BZE UR QL: NOT DETECTED
CARBOXYTHC UR QL: NOT DETECTED
CARISOPRODOL UR QL: NOT DETECTED
CLONAZEPAM UR QL: NOT DETECTED
CODEINE UR QL: NOT DETECTED
CREAT UR-MCNC: 225.3 MG/DL (ref 20–400)
DIAZEPAM UR QL: NOT DETECTED
ETHYL GLUCURONIDE UR QL: NOT DETECTED
FENTANYL UR QL: NOT DETECTED
GABAPENTIN: PRESENT
HYDROCODONE UR QL: PRESENT
HYDROMORPHONE UR QL: PRESENT
LORAZEPAM UR QL: NOT DETECTED
MDA UR QL: NOT DETECTED
MDEA UR QL: NOT DETECTED
MDMA UR QL: NOT DETECTED
ME-PHENIDATE UR QL: NOT DETECTED
METHADONE UR QL: NOT DETECTED
METHAMPHET UR QL: NOT DETECTED
MIDAZOLAM UR QL SCN: NOT DETECTED
MORPHINE UR QL: NOT DETECTED
NALOXONE: NOT DETECTED
NORBUPRENORPHINE UR QL CFM: NOT DETECTED
NORDIAZEPAM UR QL: NOT DETECTED
NORFENTANYL UR QL: NOT DETECTED
NORHYDROCODONE UR QL CFM: PRESENT
NORMEPERIDINE UR QL CFM: NOT DETECTED
NOROXYCODONE UR QL CFM: NOT DETECTED
NOROXYMORPHONE UR QL SCN: NOT DETECTED
OXAZEPAM UR QL: NOT DETECTED
OXYCODONE UR QL: NOT DETECTED
OXYMORPHONE UR QL: NOT DETECTED
PATHOLOGY STUDY: NORMAL
PCP UR QL: NOT DETECTED
PHENTERMINE UR QL: NOT DETECTED
PREGABALIN: NOT DETECTED
SERVICE CMNT-IMP: NORMAL
TAPENTADOL UR QL SCN: NOT DETECTED
TAPENTADOL UR QL SCN: NOT DETECTED
TEMAZEPAM UR QL: NOT DETECTED
TRAMADOL UR QL: NOT DETECTED
ZOLPIDEM METABOLITE: NOT DETECTED
ZOLPIDEM UR QL: NOT DETECTED

## 2022-11-08 ENCOUNTER — TELEPHONE (OUTPATIENT)
Dept: PAIN MEDICINE | Facility: CLINIC | Age: 67
End: 2022-11-08

## 2022-11-08 ENCOUNTER — CLINICAL SUPPORT (OUTPATIENT)
Dept: PAIN MEDICINE | Facility: CLINIC | Age: 67
End: 2022-11-08
Payer: MEDICARE

## 2022-11-08 DIAGNOSIS — G89.4 CHRONIC PAIN DISORDER: Primary | ICD-10-CM

## 2022-11-08 PROCEDURE — 96372 THER/PROPH/DIAG INJ SC/IM: CPT | Mod: S$GLB,,,

## 2022-11-08 PROCEDURE — 99999 PR PBB SHADOW E&M-EST. PATIENT-LVL II: CPT | Mod: PBBFAC,,,

## 2022-11-08 PROCEDURE — 96372 PR INJECTION,THERAP/PROPH/DIAG2ST, IM OR SUBCUT: ICD-10-PCS | Mod: S$GLB,,,

## 2022-11-08 PROCEDURE — 99999 PR PBB SHADOW E&M-EST. PATIENT-LVL II: ICD-10-PCS | Mod: PBBFAC,,,

## 2022-11-08 RX ORDER — KETOROLAC TROMETHAMINE 30 MG/ML
30 INJECTION, SOLUTION INTRAMUSCULAR; INTRAVENOUS ONCE
Status: COMPLETED | OUTPATIENT
Start: 2022-11-08 | End: 2022-11-08

## 2022-11-08 RX ADMIN — KETOROLAC TROMETHAMINE 30 MG: 30 INJECTION, SOLUTION INTRAMUSCULAR; INTRAVENOUS at 09:11

## 2022-11-08 NOTE — PROGRESS NOTES
Pt presented to clinic in request to see NP for generalized joint pain. Rates pain 8/10. NP ordered Toradol 30mg IM. Injection into R dorsagluteal. Pt tolerated well. No complaints at this time.

## 2022-11-08 NOTE — TELEPHONE ENCOUNTER
----- Message from Chelo Ann LPN sent at 11/7/2022  4:26 PM CST -----  Contact: self    ----- Message -----  From: Jasmina Zayas  Sent: 11/7/2022   4:18 PM CST  To: Rosalva Grigsby Staff    Type: Needs Medical Advice  Who Called: Patient   Best Call Back Number: [phone]  Additional Information: Patient states  she just needs a call back from the office. Plz call pt today. Didn't disclose what she wanted. Pt stated she wants the [facility] office to call her. Thanks

## 2022-11-23 ENCOUNTER — OFFICE VISIT (OUTPATIENT)
Dept: PAIN MEDICINE | Facility: CLINIC | Age: 67
End: 2022-11-23
Payer: MEDICARE

## 2022-11-23 ENCOUNTER — PATIENT MESSAGE (OUTPATIENT)
Dept: PAIN MEDICINE | Facility: CLINIC | Age: 67
End: 2022-11-23

## 2022-11-23 DIAGNOSIS — G89.4 CHRONIC PAIN DISORDER: Primary | ICD-10-CM

## 2022-11-23 DIAGNOSIS — Z79.891 CHRONIC USE OF OPIATE FOR THERAPEUTIC PURPOSE: ICD-10-CM

## 2022-11-23 DIAGNOSIS — M54.16 LUMBAR RADICULOPATHY: Primary | ICD-10-CM

## 2022-11-23 DIAGNOSIS — M51.36 DDD (DEGENERATIVE DISC DISEASE), LUMBAR: ICD-10-CM

## 2022-11-23 PROCEDURE — 80326 AMPHETAMINES 5 OR MORE: CPT

## 2022-11-23 PROCEDURE — 1159F MED LIST DOCD IN RCRD: CPT | Mod: CPTII,S$GLB,,

## 2022-11-23 PROCEDURE — 3288F FALL RISK ASSESSMENT DOCD: CPT | Mod: CPTII,S$GLB,,

## 2022-11-23 PROCEDURE — 99999 PR PBB SHADOW E&M-EST. PATIENT-LVL III: CPT | Mod: PBBFAC,,,

## 2022-11-23 PROCEDURE — 99214 OFFICE O/P EST MOD 30 MIN: CPT | Mod: S$GLB,,,

## 2022-11-23 PROCEDURE — 99999 PR PBB SHADOW E&M-EST. PATIENT-LVL III: ICD-10-PCS | Mod: PBBFAC,,,

## 2022-11-23 PROCEDURE — 3288F PR FALLS RISK ASSESSMENT DOCUMENTED: ICD-10-PCS | Mod: CPTII,S$GLB,,

## 2022-11-23 PROCEDURE — 1160F RVW MEDS BY RX/DR IN RCRD: CPT | Mod: CPTII,S$GLB,,

## 2022-11-23 PROCEDURE — 1101F PT FALLS ASSESS-DOCD LE1/YR: CPT | Mod: CPTII,S$GLB,,

## 2022-11-23 PROCEDURE — 99214 PR OFFICE/OUTPT VISIT, EST, LEVL IV, 30-39 MIN: ICD-10-PCS | Mod: S$GLB,,,

## 2022-11-23 PROCEDURE — 1160F PR REVIEW ALL MEDS BY PRESCRIBER/CLIN PHARMACIST DOCUMENTED: ICD-10-PCS | Mod: CPTII,S$GLB,,

## 2022-11-23 PROCEDURE — 1159F PR MEDICATION LIST DOCUMENTED IN MEDICAL RECORD: ICD-10-PCS | Mod: CPTII,S$GLB,,

## 2022-11-23 PROCEDURE — 1101F PR PT FALLS ASSESS DOC 0-1 FALLS W/OUT INJ PAST YR: ICD-10-PCS | Mod: CPTII,S$GLB,,

## 2022-11-23 RX ORDER — HYDROCODONE BITARTRATE AND ACETAMINOPHEN 5; 325 MG/1; MG/1
1 TABLET ORAL EVERY 8 HOURS PRN
Qty: 90 TABLET | Refills: 0 | Status: SHIPPED | OUTPATIENT
Start: 2022-12-21 | End: 2023-01-23 | Stop reason: SDUPTHER

## 2022-11-23 RX ORDER — HYDROCODONE BITARTRATE AND ACETAMINOPHEN 5; 325 MG/1; MG/1
1 TABLET ORAL EVERY 8 HOURS PRN
Qty: 90 TABLET | Refills: 0 | Status: SHIPPED | OUTPATIENT
Start: 2022-11-23 | End: 2023-01-23 | Stop reason: SDUPTHER

## 2022-11-23 RX ORDER — GABAPENTIN 300 MG/1
300 CAPSULE ORAL NIGHTLY
Qty: 90 CAPSULE | Refills: 0 | Status: SHIPPED | OUTPATIENT
Start: 2022-11-23 | End: 2023-03-30

## 2022-11-23 RX ORDER — HYDROCODONE BITARTRATE AND ACETAMINOPHEN 5; 325 MG/1; MG/1
1 TABLET ORAL EVERY 8 HOURS PRN
Qty: 90 TABLET | Refills: 0 | Status: SHIPPED | OUTPATIENT
Start: 2023-01-18

## 2022-11-23 NOTE — PROGRESS NOTES
"FOLLOW UP NOTE:     CHIEF COMPLAINT: neck and back pain    INITIAL HISTORY OF PRESENT ILLNESS: Teresa Clay is a 64 y.o. female with PMH significant for HTN presents as an established patient for Dr. Cohen (new to me) for the continued management of back and shoulder pain.     The patient reports that her pain began in 2012 when she slipped and fell at home. The patient reports of low back pain (R > L) with radiation down the postero-lateral aspect of her LE's down her feet. The patient does report of intermittent numbness in her LE's. The patient describes her back pain as a constant aching type of pain. The patient reports that her current pain is a 7/10. Patient denies of any urinary/fecal incontinence, saddle anesthesia, or weakness. Of note, the patient does mention that her right hip bothers her a fair bit as well. Patient is unsure as to whether that is related to her back.      The patient currently works as a .      The patient reports that her shoulder pain has been present for a few years. The patient localizes her pain to her bilateral trapezius area. The patient reports of intermittent radiation to her left thumb. The patient describes her pain as a "stiff" type of pain. The patient reports that her current pain is a 4/10. The patient denies of hand weakness.        INTERVAL HISTORY OF PRESENT ILLNESS: Teresa Clay is a 67 y.o. female with PMH significant for HTN presents as an established patient for continued management of back and neck pain.  The patient presents today for medication refill.    Today,  the patient reports of lower back/buttocks pain with radiation into her hips and groin; L>R.  The patient would like to schedule a repeat DEAN.  Reports pain is present but is manageable and she is able to do daily activities. Currently taking Gabapentin 300 mg QHS, the patient reports minimal SE with this dosage.  Currently taking Norco 5 mg approximately 3 times a day reports " benefit with medication.  The patient denies of any significant changes in her health since her last appointment. The patient also denies of any changes in the character of her pain since her last appointment. She denies trauma or injury. Patient denies of any urinary/fecal incontinence, saddle anesthesia, or weakness.     Of note, the patient presents today for med refill.     INTERVENTIONAL PAIN HISTORY:  9/28/2022: Lumbar interlaminar epidural steroid injection at L5-S1 under fluoroscopic guidance (right paramedian approach)- 80% relief.   6/22/2022: Lumbar interlaminar epidural steroid injection at [id number] relief.  3/9/2022: Lumbar interlaminar epidural steroid injection at L5-S1(right paramedian approach)-  100% relief.    1/26/22: Radiofrequency ablation of the L2, L3, L4, and L5 medial branch nerves on the bilateral- 80% for about a week.    10/27/21: Bilateral L2, L3, L4, and L5 medial branch nerve blocks- minimal improvement   9/27/21: Bilateral L4-L5 transforaminal epidural steroid injection   8/12/2021: L5 - S1 with right paramedian approach (Dr. Blackwell) - 30% benefit  5/27/2021: L5 - S1 DEAN (Dr. Blackwell) - 90% benefit until about a week ago  10/15/2020:  L5 - S1 DEAN (Dr. Blackwell) - 50% benefit for about 4 weeks  7/22/2020: Cervical TPI's - good benefit x 1 week  7/02/2020: L5 - S1 Intralaminar Epidural Steroid Injection (Dr. Blackwell 100% benefit)     she reports of benefit with injections by Dr. Dueñas and Dr. Cohen     1/27/2020: L5/S1 Interlaminar Epidural Steroid Injection via Dr. Cohen - reports of at least one month of good relief  10/7/2019: L5/S1 Interlaminar Epidural Steroid Injection via Dr. Cohen  2/4/2019: Bilateral L4-5 transforaminal epidural steroid injection under fluoroscopy via Dr. Dueñas  5/6/2019: Bilateral L4-5 transforaminal epidural steroid injection under fluoroscopy via Dr. Dueñas    CURRENT PAIN MEDICATIONS:   Voltaren gel as needed   Norco 5-325 mg PO QHS as needed  Gabapentin 300 mg PO  QHS    :      IMAGING: No new imaging to review    ROS:  Review of Systems   Constitutional:  Negative for chills and fever.   HENT:  Negative for sore throat.    Eyes:  Negative for visual disturbance.   Respiratory:  Negative for shortness of breath.    Cardiovascular:  Negative for chest pain.   Gastrointestinal:  Negative for nausea and vomiting.   Genitourinary:  Negative for difficulty urinating.   Musculoskeletal:  Positive for back pain, myalgias and neck stiffness. Negative for neck pain.   Skin:  Negative for rash.   Allergic/Immunologic: Negative for immunocompromised state.   Neurological:  Negative for syncope. Numbness: in feet .  Hematological:  Does not bruise/bleed easily.   Psychiatric/Behavioral:  Negative for suicidal ideas.    All other systems reviewed and are negative.     MEDICAL, SURGICAL, FAMILY, SOCIAL HX: reviewed    MEDICATIONS/ALLERGIES: reviewed    PHYSICAL EXAM:    VITALS:   There were no vitals filed for this visit.      Physical Exam  Vitals and nursing note reviewed.   Constitutional:       Appearance: She is not diaphoretic.   HENT:      Head: Normocephalic and atraumatic.   Eyes:      General:         Right eye: No discharge.         Left eye: No discharge.      Conjunctiva/sclera: Conjunctivae normal.   Cardiovascular:      Rate and Rhythm: Normal rate.   Pulmonary:      Effort: Pulmonary effort is normal. No respiratory distress.      Breath sounds: Normal breath sounds.   Abdominal:      Palpations: Abdomen is soft.   Musculoskeletal:      Lumbar back: Tenderness present.   Skin:     General: Skin is warm and dry.      Findings: No rash.   Neurological:      Mental Status: She is alert and oriented to person, place, and time.   Psychiatric:         Mood and Affect: Mood and affect normal.         Cognition and Memory: Memory normal.         Judgment: Judgment normal.      UPPER EXTREMITIES: Normal alignment, normal range of motion, no atrophy, no skin changes,  hair growth  and nail growth normal and equal bilaterally. No swelling, no tenderness.    LOWER EXTREMITIES:  Normal alignment, normal range of motion, no atrophy, no skin changes,  hair growth and nail growth normal and equal bilaterally. No swelling, no tenderness.    LUMBAR SPINE:    Lumbar spine: ROM is full with flexion extension and oblique extension with increased pain with flexion and extension.    ((+)) Supine straight leg raise mild bilaterally  ((--)) Facet loading bilaterally  Internal and external rotation of the hip causes increased pain on left side.  Myofascial exam:  Tenderness to palpation across lumbar spinous process.      ((+)) TTP at the SI joint bilateral  ((+)) SIN's test on the bilateral side   ((+)) One leg stand to the bilateral side  ((--)) Distraction test    CRANIAL NERVES:  II:  PERRL bilaterally,   III,IV,VI: EOMI.    V:  Facial sensation equal bilaterally  VII:  Facial motor function normal.  VIII:  Hearing equal to finger rub bilaterally  IX/X: Gag normal, palate symmetric  XI:  Shoulder shrug equal, head turn equal  XII:  Tongue midline without fasciculations      MOTOR: Tone and bulk: normal bilateral upper and lower Strength: normal   Delt Bi Tri WE WF     R 5 5 5 5 5 5   L 5 5 5 5 5 5     IP ADD ABD Quad TA Gas HAM  R 5 5 5 5 5 5 5  L 5 5 5 5 5 5 5      NEUROLOGICAL:    Gen: No clonus or spasticity.   Gait: Normal without antalgic lean. Normal rise, base, steps, and arm swing.    BABINSKI: Absent bilaterally  Sensory: Intact to light touch and proprioception BLE  REFLEXES: normal, symmetric, nonbrisk.  Toes down, no clonus. Negative muir's sign bilaterally.      ASSESSMENT: Teresa Clay is a 67 y.o. female with PMH significant for HTN presents as an established patient that presents continued management of back and neck pain. The patient presents today for medication refill.  The patient presents today for med refill.  The patient reports of pain into her lower back with  radiation into her hips and groin bilaterally, L>R.   The patient would like to schedule a repeat DEAN.  Treatment Plan below:      1. Lumbar radiculopathy        2. Chronic use of opiate for therapeutic purpose  [facility] Drug Screen      3. DDD (degenerative disc disease), lumbar              PLAN:    1. Refilled Norco 5-325 mg PO q8h PRN breakthrough pain, #90, 2 refills.  reviewed and consistent.   2. UDS collected today, last had pain medication this morning   3. Schedule for repeat Lumbar interlaminar epidural steroid injection at L5-S1 under fluoroscopic guidance (right paramedian approach) with Dr. Dueñas in January.   4. I have stressed the importance of physical activity and a home exercise plan to help with chronic pain and improve health.  5. RTC for the procedure as outlined above      All medication management performed by Dr. Blackwell.   Alayna Bronson, NP

## 2022-12-01 LAB
6MAM UR QL: NOT DETECTED
7AMINOCLONAZEPAM UR QL: NOT DETECTED
A-OH ALPRAZ UR QL: NOT DETECTED
ALPHA-OH-MIDAZOLAM: NOT DETECTED
ALPRAZ UR QL: NOT DETECTED
AMPHET UR QL SCN: NOT DETECTED
ANNOTATION COMMENT IMP: NORMAL
ANNOTATION COMMENT IMP: NORMAL
BARBITURATES UR QL: NOT DETECTED
BUPRENORPHINE UR QL: NOT DETECTED
BZE UR QL: NOT DETECTED
CARBOXYTHC UR QL: NOT DETECTED
CARISOPRODOL UR QL: NOT DETECTED
CLONAZEPAM UR QL: NOT DETECTED
CODEINE UR QL: NOT DETECTED
CREAT UR-MCNC: 189.3 MG/DL (ref 20–400)
DIAZEPAM UR QL: NOT DETECTED
ETHYL GLUCURONIDE UR QL: NOT DETECTED
FENTANYL UR QL: NOT DETECTED
GABAPENTIN: PRESENT
HYDROCODONE UR QL: PRESENT
HYDROMORPHONE UR QL: PRESENT
LORAZEPAM UR QL: NOT DETECTED
MDA UR QL: NOT DETECTED
MDEA UR QL: NOT DETECTED
MDMA UR QL: NOT DETECTED
ME-PHENIDATE UR QL: NOT DETECTED
METHADONE UR QL: NOT DETECTED
METHAMPHET UR QL: NOT DETECTED
MIDAZOLAM UR QL SCN: NOT DETECTED
MORPHINE UR QL: NOT DETECTED
NALOXONE: NOT DETECTED
NORBUPRENORPHINE UR QL CFM: NOT DETECTED
NORDIAZEPAM UR QL: NOT DETECTED
NORFENTANYL UR QL: NOT DETECTED
NORHYDROCODONE UR QL CFM: PRESENT
NORMEPERIDINE UR QL CFM: NOT DETECTED
NOROXYCODONE UR QL CFM: NOT DETECTED
NOROXYMORPHONE UR QL SCN: NOT DETECTED
OXAZEPAM UR QL: NOT DETECTED
OXYCODONE UR QL: NOT DETECTED
OXYMORPHONE UR QL: NOT DETECTED
PATHOLOGY STUDY: NORMAL
PCP UR QL: NOT DETECTED
PHENTERMINE UR QL: NOT DETECTED
PREGABALIN: NOT DETECTED
SERVICE CMNT-IMP: NORMAL
TAPENTADOL UR QL SCN: NOT DETECTED
TAPENTADOL UR QL SCN: NOT DETECTED
TEMAZEPAM UR QL: NOT DETECTED
TRAMADOL UR QL: NOT DETECTED
ZOLPIDEM METABOLITE: NOT DETECTED
ZOLPIDEM UR QL: NOT DETECTED

## 2023-01-03 ENCOUNTER — TELEPHONE (OUTPATIENT)
Dept: PAIN MEDICINE | Facility: CLINIC | Age: 68
End: 2023-01-03
Payer: MEDICARE

## 2023-01-08 ENCOUNTER — HOSPITAL ENCOUNTER (EMERGENCY)
Facility: HOSPITAL | Age: 68
Discharge: HOME | End: 2023-01-08
Attending: INTERNAL MEDICINE
Payer: MEDICARE

## 2023-01-08 VITALS
DIASTOLIC BLOOD PRESSURE: 79 MMHG | OXYGEN SATURATION: 95 % | SYSTOLIC BLOOD PRESSURE: 160 MMHG | HEART RATE: 63 BPM | RESPIRATION RATE: 20 BRPM | BODY MASS INDEX: 26.21 KG/M2 | TEMPERATURE: 98 F | HEIGHT: 59 IN | WEIGHT: 130 LBS

## 2023-01-08 DIAGNOSIS — L25.9 CONTACT DERMATITIS, UNSPECIFIED CONTACT DERMATITIS TYPE, UNSPECIFIED TRIGGER: ICD-10-CM

## 2023-01-08 DIAGNOSIS — R35.0 URINARY FREQUENCY: Primary | ICD-10-CM

## 2023-01-08 LAB
BILIRUB UR QL STRIP: ABNORMAL
CLARITY UR: CLEAR
COLOR UR: YELLOW
GLUCOSE UR QL STRIP: NEGATIVE
HGB UR QL STRIP: NEGATIVE
KETONES UR QL STRIP: NEGATIVE
LEUKOCYTE ESTERASE UR QL STRIP: NEGATIVE
NITRITE UR QL STRIP: NEGATIVE
PH UR STRIP: 5 [PH] (ref 5–8)
PROT UR QL STRIP: ABNORMAL
SP GR UR STRIP: >=1.03 (ref 1–1.03)
URN SPEC COLLECT METH UR: ABNORMAL
UROBILINOGEN UR STRIP-ACNC: NEGATIVE EU/DL

## 2023-01-08 PROCEDURE — 81003 URINALYSIS AUTO W/O SCOPE: CPT | Performed by: INTERNAL MEDICINE

## 2023-01-08 PROCEDURE — 99284 EMERGENCY DEPT VISIT MOD MDM: CPT

## 2023-01-08 RX ORDER — FAMOTIDINE 20 MG/1
20 TABLET, FILM COATED ORAL 2 TIMES DAILY
COMMUNITY
Start: 2022-12-21

## 2023-01-08 RX ORDER — PHENAZOPYRIDINE HYDROCHLORIDE 100 MG/1
100 TABLET, FILM COATED ORAL 3 TIMES DAILY PRN
Qty: 30 TABLET | Refills: 0 | Status: SHIPPED | OUTPATIENT
Start: 2023-01-08 | End: 2023-01-18

## 2023-01-08 RX ORDER — MUPIROCIN 20 MG/G
OINTMENT TOPICAL 3 TIMES DAILY
Qty: 21 G | Refills: 2 | Status: SHIPPED | OUTPATIENT
Start: 2023-01-08 | End: 2023-02-14

## 2023-01-08 NOTE — ED PROVIDER NOTES
"Encounter Date: 1/8/2023       History     Chief Complaint   Patient presents with    Rash     Rash to tips of finger x 1 week    Urinary Frequency     X 1 week     POV the ED alone.  Patient complains of rash on her fingertips.  For 1 week.  Complains of frequent urination for several days.  History of UTIs in the past several times.  Denies fever vomiting.  No other complaints    The history is provided by the patient.   Review of patient's allergies indicates:   Allergen Reactions    Betamethasone acet,sod phos Other (See Comments)     " Makes me feel flushed!"    Codeine Rash     Other reaction(s): Hives     Past Medical History:   Diagnosis Date    Chronic back pain     Diverticulitis     Encounter for long-term (current) use of medications     Hypertension      Past Surgical History:   Procedure Laterality Date    APPENDECTOMY      CHOLECYSTECTOMY      COLONOSCOPY      COLONOSCOPY N/A 07/03/2019    Procedure: COLONOSCOPY;  Surgeon: Jean Jamison MD;  Location: [facility] ENDO;  Service: General;  Laterality: N/A;    EPIDURAL STEROID INJECTION N/A 10/07/2019    Procedure: Injection, Steroid, Epidural - L5/S1 LUMBAR EPIDURAL STEROID INJECTION;  Surgeon: Gail Cohen MD;  Location: [facility] OR;  Service: Pain Management;  Laterality: N/A;    EPIDURAL STEROID INJECTION N/A 01/27/2020    Procedure: Injection, Steroid, Epidural - L5/S1 INTERLAMINAR EPIDURAL STEROID INJECTION;  Surgeon: Gail Cohen MD;  Location: [facility] OR;  Service: Pain Management;  Laterality: N/A;    EPIDURAL STEROID INJECTION N/A 07/02/2020    Procedure: DEAN Lumbar L5-S1;  Surgeon: Seb Blackwell MD;  Location: [facility] OR;  Service: Pain Management;  Laterality: N/A;    EPIDURAL STEROID INJECTION N/A 10/15/2020    Procedure: DEAN Lumbar L5-S1;  Surgeon: Seb Blackwell MD;  Location: [facility] OR;  Service: Pain Management;  Laterality: N/A;    EPIDURAL STEROID INJECTION N/A 05/27/2021    Procedure: Injection, Steroid, Epidural, L5 - S1;  Surgeon: " Seb Blackwell MD;  Location: [facility] OR;  Service: Pain Management;  Laterality: N/A;    EPIDURAL STEROID INJECTION N/A 08/12/2021    Procedure: Injection, Steroid, Epidural Lumbar;  Surgeon: Seb Blackwell MD;  Location: [facility] OR;  Service: Pain Management;  Laterality: N/A;  L5-S1      EPIDURAL STEROID INJECTION Right 03/09/2022    Procedure: Injection, Steroid, Epidural Interlaminar L5-S1 righ paramedian approach;  Surgeon: Seb Blackwell MD;  Location: [facility] OR;  Service: Pain Management;  Laterality: Right;    EPIDURAL STEROID INJECTION Right 06/22/2022    Procedure: Injection, Steroid, Epidural LESI L5-S1;  Surgeon: Seb Blackwell MD;  Location: [facility] OR;  Service: Pain Management;  Laterality: Right;  R paramedian approach    EPIDURAL STEROID INJECTION N/A 09/28/2022    Procedure: Injection, Steroid, Epidural  LESI L5-S1;  Surgeon: Seb Blackwell MD;  Location: [facility] OR;  Service: Pain Management;  Laterality: N/A;    ESOPHAGOGASTRODUODENOSCOPY N/A 07/03/2019    Procedure: ESOPHAGOGASTRODUODENOSCOPY (EGD);  Surgeon: Jean Jamison MD;  Location: [facility] ENDO;  Service: General;  Laterality: N/A;    gastric sleeve  12/13/2021    INJECTION OF NERVE Bilateral 10/27/2021    Procedure: INJECTION, NERVE; Bilateral L2, L3, L4, L5 MBB's;  Surgeon: Seb Blackwell MD;  Location: [facility] OR;  Service: Anesthesiology;  Laterality: Bilateral;    RADIOFREQUENCY ABLATION Bilateral 01/26/2022    Procedure: Radiofrequency Ablation;  Surgeon: Seb Blackwell MD;  Location: [facility] OR;  Service: Pain Management;  Laterality: Bilateral;  L2,3,4,5     TOTAL REDUCTION MAMMOPLASTY  2021    TRANSFORAMINAL EPIDURAL INJECTION OF STEROID Bilateral 05/06/2019    Procedure: Injection,steroid,epidural,transforaminal approach;  Surgeon: Trey Dueñas MD;  Location: [facility] OR;  Service: Pain Management;  Laterality: Bilateral;  L4-5    TRANSFORAMINAL EPIDURAL INJECTION OF STEROID Bilateral 09/27/2021    Procedure:  Injection,steroid,epidural,transforaminal approach;  Surgeon: Seb Blackwell MD;  Location: [facility] OR;  Service: Pain Management;  Laterality: Bilateral;  L4-L5    TUBAL LIGATION       Family History   Problem Relation Age of Onset    Diabetes Brother     Cancer Brother     Breast cancer Maternal Aunt      Social History     Tobacco Use    Smoking status: Some Days     Packs/day: 0.50     Types: Cigarettes    Smokeless tobacco: Never    Tobacco comments:     stop approx. 6 months ago   Substance Use Topics    Alcohol use: No    Drug use: No     Review of Systems   Constitutional:  Negative for appetite change, chills and fever.   HENT:  Negative for ear pain and sore throat.    Respiratory:  Negative for cough and shortness of breath.    Cardiovascular:  Negative for chest pain.   Gastrointestinal:  Negative for abdominal pain, diarrhea and vomiting.   Genitourinary:  Positive for frequency and urgency. Negative for dysuria.   Musculoskeletal:  Negative for back pain.   Skin:  Positive for rash.   All other systems reviewed and are negative.    Physical Exam     Initial Vitals   BP Pulse Resp Temp SpO2   01/08/23 1326 01/08/23 1322 01/08/23 1322 01/08/23 1322 01/08/23 1322   (!) 160/79 63 20 98.2 °F (36.8 °C) 95 %      MAP       --                Physical Exam    Nursing note and vitals reviewed.  Constitutional: She appears well-developed and well-nourished.   HENT:   Head: Normocephalic and atraumatic.   Mouth/Throat: Oropharynx is clear and moist.   Eyes: Pupils are equal, round, and reactive to light.   Neck: Neck supple.   Normal range of motion.  Cardiovascular:  Normal rate and regular rhythm.           Pulmonary/Chest: Breath sounds normal.   Abdominal: Abdomen is soft.   Musculoskeletal:         General: Normal range of motion.      Cervical back: Normal range of motion and neck supple.     Neurological: She is alert and oriented to person, place, and time.   Skin: Skin is warm. No abscess noted.   Noted  dry flaky skin at pads of fingertips.  Patient is wearing acrylic nails.  She is advised to remove the acrylic nails, to not reapply any nail Polish etcetera to her fingernails for at least 6 months   Psychiatric: She has a normal mood and affect. Her behavior is normal. Thought content normal.       ED Course   Procedures  Labs Reviewed   URINALYSIS, REFLEX TO URINE CULTURE - Abnormal; Notable for the following components:       Result Value    Specific Gravity, UA >=1.030 (*)     Protein, UA Trace (*)     Bilirubin (UA) 1+ (*)     All other components within normal limits    Narrative:     Preferred Collection Type->Urine, Clean Catch  Specimen Source->Urine          Imaging Results    None          Medications - No data to display  Medical Decision Making:   Initial Assessment:   Evaluation of rash at her fingertips, she thought she had shingles, she is advised this is not a shingles presentation.  Urinary symptoms including frequency.  Urinalysis is collected, disposition pending  Differential Diagnosis:   UTI, dehydration, contact dermatitis, abscess, cellulitis  Clinical Tests:   Lab Tests: Ordered and Reviewed       <> Summary of Lab: UA negative for infection  ED Management:  Patient will be discharged home.  She will have prescription for Bactroban to apply to the fingertips as needed. she is to remove the acrylic nails and not apply them for at least 6 months, to return as needed,   Discharge home           ED Course as of 01/08/23 1508   Sun Jan 08, 2023   1449 Protein, UA(!): Trace  Final result 01/08 1353  Specific Gravity, UA >=1.030  Protein, UA Trace  Bilirubin (UA) 1+     [CP]      ED Course User Index  [CP] Sherrill Rosado NP                 Clinical Impression:   Final diagnoses:  [R35.0] Urinary frequency (Primary)  [L25.9] Contact dermatitis, unspecified contact dermatitis type, unspecified trigger        ED Disposition Condition    Discharge Stable          ED Prescriptions       Medication  Sig Dispense Start Date End Date Auth. Provider    mupirocin (BACTROBAN) 2 % ointment Apply topically 3 (three) times daily. 21 g 1/8/2023 -- Sherrill Rosado NP    phenazopyridine (PYRIDIUM) 100 MG tablet Take 1 tablet (100 mg total) by mouth 3 (three) times daily as needed for Pain. 30 tablet 1/8/2023 1/18/2023 Sherrill Rosado NP          Follow-up Information       Follow up With Specialties Details Why Contact Info    Maite Villatoro NP Family Medicine In 3 days For office recheck and reassessment.  Blood pressure recheck in 1 week [address] [place] [address] Dr  [place] [address] [phone]               Sherrill Rosado NP  01/08/23 2566

## 2023-01-08 NOTE — ED NOTES
In to see pt at this time. Pt aaox4. Pt states she came in because she has been having a rash as well as itching happening with her bilateral hand. Pt states she is concerned for shingles. Pt states this has been going on for about a week. Pt states she has been having some increased frequency when urinating as well so she would like to be checked for a uti as well. Pt states she has no other needs or complaints at this time.

## 2023-01-08 NOTE — DISCHARGE INSTRUCTIONS
Rest, increase fluids, lots of water and liquids.  Urine is negative for any bacteria today.  You need to remove the acrylic nails, do not reapply any polish are acrylic nails for at least 6 months.  Call office for recheck return as needed for worsening symptoms, fever, vomiting, bleeding from your fingers

## 2023-03-30 ENCOUNTER — HOSPITAL ENCOUNTER (OUTPATIENT)
Facility: HOSPITAL | Age: 68
Discharge: HOME | End: 2023-03-30
Attending: ANESTHESIOLOGY | Admitting: ANESTHESIOLOGY
Payer: MEDICARE

## 2023-03-30 VITALS
BODY MASS INDEX: 27.62 KG/M2 | RESPIRATION RATE: 16 BRPM | TEMPERATURE: 98 F | HEIGHT: 59 IN | DIASTOLIC BLOOD PRESSURE: 67 MMHG | SYSTOLIC BLOOD PRESSURE: 142 MMHG | OXYGEN SATURATION: 100 % | HEART RATE: 65 BPM | WEIGHT: 137 LBS

## 2023-03-30 DIAGNOSIS — M54.16 LUMBAR RADICULOPATHY: Primary | ICD-10-CM

## 2023-03-30 PROCEDURE — 62323 NJX INTERLAMINAR LMBR/SAC: CPT | Performed by: ANESTHESIOLOGY

## 2023-03-30 PROCEDURE — 25500020 PHARM REV CODE 255: Performed by: ANESTHESIOLOGY

## 2023-03-30 PROCEDURE — 99152 MOD SED SAME PHYS/QHP 5/>YRS: CPT | Performed by: ANESTHESIOLOGY

## 2023-03-30 PROCEDURE — 63600175 PHARM REV CODE 636 W HCPCS: Performed by: ANESTHESIOLOGY

## 2023-03-30 RX ORDER — FENTANYL CITRATE 50 UG/ML
INJECTION, SOLUTION INTRAMUSCULAR; INTRAVENOUS
Status: DISCONTINUED | OUTPATIENT
Start: 2023-03-30 | End: 2023-03-30 | Stop reason: HOSPADM

## 2023-03-30 RX ORDER — METHYLPREDNISOLONE ACETATE 80 MG/ML
INJECTION, SUSPENSION INTRA-ARTICULAR; INTRALESIONAL; INTRAMUSCULAR; SOFT TISSUE
Status: DISCONTINUED | OUTPATIENT
Start: 2023-03-30 | End: 2023-03-30 | Stop reason: HOSPADM

## 2023-03-30 RX ORDER — SODIUM CHLORIDE, SODIUM LACTATE, POTASSIUM CHLORIDE, CALCIUM CHLORIDE 600; 310; 30; 20 MG/100ML; MG/100ML; MG/100ML; MG/100ML
INJECTION, SOLUTION INTRAVENOUS ONCE AS NEEDED
Status: COMPLETED | OUTPATIENT
Start: 2023-03-30 | End: 2023-03-30

## 2023-03-30 RX ORDER — MIDAZOLAM HYDROCHLORIDE 1 MG/ML
INJECTION, SOLUTION INTRAMUSCULAR; INTRAVENOUS
Status: DISCONTINUED | OUTPATIENT
Start: 2023-03-30 | End: 2023-03-30 | Stop reason: HOSPADM

## 2023-03-30 RX ADMIN — SODIUM CHLORIDE, SODIUM LACTATE, POTASSIUM CHLORIDE, AND CALCIUM CHLORIDE: .6; .31; .03; .02 INJECTION, SOLUTION INTRAVENOUS at 11:03

## 2023-03-30 NOTE — PLAN OF CARE
Has met unit/department guidelines for discharge from each phase of the post procedure continuum. Leaving floor per w/c with RN. JENNIE x3. Resp even and unlabored room air. No distress noted. Tolerating Po fluids without c/o nausea/vomiting. All personal belongings returned to pt.

## 2023-03-30 NOTE — DISCHARGE SUMMARY
[facility] Surgery  Discharge Note  Short Stay    Procedure(s) (LRB):  LUMBAR EPIDURAL STEROID INJECTION L5-S1 INTERLAMINAR (N/A)      OUTCOME: Patient tolerated treatment/procedure well without complication and is now ready for discharge.    DISPOSITION: Home or Self Care    FINAL DIAGNOSIS:  Lumbar radiculopathy    FOLLOWUP: In clinic    DISCHARGE INSTRUCTIONS:    Discharge Procedure Orders   Diet general     Call MD for:  temperature >100.4     Call MD for:  persistent nausea and vomiting     Call MD for:  severe uncontrolled pain     Call MD for:  difficulty breathing, headache or visual disturbances     Call MD for:  redness, tenderness, or signs of infection (pain, swelling, redness, odor or green/yellow discharge around incision site)     Call MD for:  hives     Call MD for:  persistent dizziness or light-headedness     Call MD for:  extreme fatigue        TIME SPENT ON DISCHARGE: 30 minutes

## 2023-03-30 NOTE — DISCHARGE INSTRUCTIONS

## 2023-03-30 NOTE — H&P
"FOLLOW UP NOTE:     CHIEF COMPLAINT: back and leg pain    INTERVAL HISTORY OF PRESENT ILLNESS: Teresa Clay is a 67 y.o. female who presents for Lumbar interlaminar epidural steroid injection at L5-S1 under fluoroscopic guidance (right paramedian approach). The patient denies of any significant changes in her health since her last appointment. The patient also denies of any changes in the character of her pain since her last appointment.     ROS:  Review of Systems   Constitutional:  Negative for chills and fever.   HENT:  Negative for sore throat.    Eyes:  Negative for visual disturbance.   Respiratory:  Negative for shortness of breath.    Cardiovascular:  Negative for chest pain.   Gastrointestinal:  Negative for nausea and vomiting.   Genitourinary:  Negative for difficulty urinating.   Musculoskeletal:  Positive for back pain.   Skin:  Negative for rash.   Allergic/Immunologic: Negative for immunocompromised state.   Neurological:  Negative for syncope.   Hematological:  Does not bruise/bleed easily.   Psychiatric/Behavioral:  Negative for suicidal ideas.       MEDICAL, SURGICAL, FAMILY, SOCIAL HX: reviewed    MEDICATIONS/ALLERGIES: reviewed    PHYSICAL EXAM:    VITALS: Vitals reviewed.   Vitals:    03/30/23 1100   BP: (!) 162/81   Pulse: (!) 57   Resp: 14   Temp: 97.6 °F (36.4 °C)   SpO2: 98%   Weight: 62.1 kg (137 lb)   Height: 4' 11" (1.499 m)       Physical Exam  Vitals and nursing note reviewed.   Constitutional:       Appearance: Normal appearance. She is not toxic-appearing or diaphoretic.   HENT:      Head: Normocephalic and atraumatic.   Eyes:      General:         Right eye: No discharge.         Left eye: No discharge.      Extraocular Movements: Extraocular movements intact.      Conjunctiva/sclera: Conjunctivae normal.   Cardiovascular:      Rate and Rhythm: Normal rate.   Pulmonary:      Effort: Pulmonary effort is normal. No respiratory distress.      Breath sounds: Normal breath sounds. "   Abdominal:      Palpations: Abdomen is soft.   Skin:     General: Skin is warm and dry.      Findings: No rash.   Neurological:      Mental Status: She is alert and oriented to person, place, and time.   Psychiatric:         Mood and Affect: Mood and affect normal.         Cognition and Memory: Memory normal.         Judgment: Judgment normal.        EXTREMITIES:    Gen: No cyanosis, edema, varicosities, or tenderness to palpation BLE   Skin: Warm, pink, dry, no rashes, no lesions BLE   Strength: 5/5 motor strength BLE   ROM: hips, knees and ankles without pain or instability.     NEUROLOGICAL:    Gen: No clonus or spasticity.   Gait: Normal without antalgic lean   DTR's: 2+ in bilateral patellar, and ankle   BABINSKI: Absent bilaterally  Sensory: Intact to light touch and proprioception BLE    ASSESSMENT: Teresa Clay is a 67 y.o. female who presents for Lumbar interlaminar epidural steroid injection at L5-S1 under fluoroscopic guidance (right paramedian approach).     PLAN:  Proceed with Lumbar interlaminar epidural steroid injection at L5-S1 under fluoroscopic guidance (right paramedian approach) as previously discussed.    This patient has been cleared for surgery in an ambulatory surgical facility    ASA 3,  Mallampatti Score 3  No history of anesthetic complications  Plan for RN IV sedation    Seb Blackwell MD  Pain Management

## 2023-04-17 ENCOUNTER — HOSPITAL ENCOUNTER (OUTPATIENT)
Dept: RADIOLOGY | Facility: HOSPITAL | Age: 68
Discharge: HOME | End: 2023-04-17
Attending: NURSE PRACTITIONER
Payer: MEDICARE

## 2023-04-17 DIAGNOSIS — K21.9 GASTROESOPHAGEAL REFLUX DISEASE WITHOUT ESOPHAGITIS: ICD-10-CM

## 2023-04-17 DIAGNOSIS — N95.1 MENOPAUSAL STATE: ICD-10-CM

## 2023-04-25 ENCOUNTER — HOSPITAL ENCOUNTER (EMERGENCY)
Facility: HOSPITAL | Age: 68
Discharge: HOME | End: 2023-04-26
Attending: EMERGENCY MEDICINE
Payer: MEDICARE

## 2023-04-25 ENCOUNTER — HOSPITAL ENCOUNTER (EMERGENCY)
Facility: HOSPITAL | Age: 68
Discharge: HOME | End: 2023-04-25
Attending: FAMILY MEDICINE
Payer: MEDICARE

## 2023-04-25 ENCOUNTER — NURSE TRIAGE (OUTPATIENT)
Dept: ADMINISTRATIVE | Facility: CLINIC | Age: 68
End: 2023-04-25
Payer: MEDICARE

## 2023-04-25 VITALS
OXYGEN SATURATION: 96 % | HEIGHT: 59 IN | WEIGHT: 135 LBS | RESPIRATION RATE: 16 BRPM | DIASTOLIC BLOOD PRESSURE: 77 MMHG | BODY MASS INDEX: 27.21 KG/M2 | SYSTOLIC BLOOD PRESSURE: 174 MMHG | HEART RATE: 70 BPM | TEMPERATURE: 98 F

## 2023-04-25 DIAGNOSIS — A08.4 VIRAL GASTROENTERITIS: Primary | ICD-10-CM

## 2023-04-25 DIAGNOSIS — R51.9 ACUTE NONINTRACTABLE HEADACHE, UNSPECIFIED HEADACHE TYPE: Primary | ICD-10-CM

## 2023-04-25 LAB
ALBUMIN SERPL BCP-MCNC: 4.2 G/DL (ref 3.5–5.2)
ALP SERPL-CCNC: 92 U/L (ref 55–135)
ALT SERPL W/O P-5'-P-CCNC: 27 U/L (ref 10–44)
ANION GAP SERPL CALC-SCNC: 10 MMOL/L (ref 8–16)
AST SERPL-CCNC: 29 U/L (ref 10–40)
BASOPHILS # BLD AUTO: 0.06 K/UL (ref 0–0.2)
BASOPHILS NFR BLD: 0.8 % (ref 0–1.9)
BILIRUB SERPL-MCNC: 1.1 MG/DL (ref 0.1–1)
BILIRUB UR QL STRIP: ABNORMAL
BUN SERPL-MCNC: 13 MG/DL (ref 8–23)
CALCIUM SERPL-MCNC: 9.3 MG/DL (ref 8.7–10.5)
CHLORIDE SERPL-SCNC: 107 MMOL/L (ref 95–110)
CLARITY UR: CLEAR
CO2 SERPL-SCNC: 26 MMOL/L (ref 23–29)
COLOR UR: YELLOW
CREAT SERPL-MCNC: 0.7 MG/DL (ref 0.5–1.4)
DIFFERENTIAL METHOD: ABNORMAL
EOSINOPHIL # BLD AUTO: 0.1 K/UL (ref 0–0.5)
EOSINOPHIL NFR BLD: 1.5 % (ref 0–8)
ERYTHROCYTE [DISTWIDTH] IN BLOOD BY AUTOMATED COUNT: 12.7 % (ref 11.5–14.5)
EST. GFR  (NO RACE VARIABLE): >60 ML/MIN/1.73 M^2
GLUCOSE SERPL-MCNC: 90 MG/DL (ref 70–110)
GLUCOSE UR QL STRIP: NEGATIVE
HCT VFR BLD AUTO: 42.2 % (ref 37–48.5)
HGB BLD-MCNC: 13.4 G/DL (ref 12–16)
HGB UR QL STRIP: NEGATIVE
IMM GRANULOCYTES # BLD AUTO: 0.02 K/UL (ref 0–0.04)
IMM GRANULOCYTES NFR BLD AUTO: 0.3 % (ref 0–0.5)
KETONES UR QL STRIP: ABNORMAL
LEUKOCYTE ESTERASE UR QL STRIP: NEGATIVE
LIPASE SERPL-CCNC: 22 U/L (ref 4–60)
LYMPHOCYTES # BLD AUTO: 1.5 K/UL (ref 1–4.8)
LYMPHOCYTES NFR BLD: 20 % (ref 18–48)
MCH RBC QN AUTO: 28.6 PG (ref 27–31)
MCHC RBC AUTO-ENTMCNC: 31.8 G/DL (ref 32–36)
MCV RBC AUTO: 90 FL (ref 82–98)
MONOCYTES # BLD AUTO: 0.6 K/UL (ref 0.3–1)
MONOCYTES NFR BLD: 8.7 % (ref 4–15)
NEUTROPHILS # BLD AUTO: 5 K/UL (ref 1.8–7.7)
NEUTROPHILS NFR BLD: 68.7 % (ref 38–73)
NITRITE UR QL STRIP: NEGATIVE
NRBC BLD-RTO: 0 /100 WBC
PH UR STRIP: 6 [PH] (ref 5–8)
PLATELET # BLD AUTO: 224 K/UL (ref 150–450)
PMV BLD AUTO: 8.8 FL (ref 9.2–12.9)
POTASSIUM SERPL-SCNC: 3.9 MMOL/L (ref 3.5–5.1)
PROT SERPL-MCNC: 7.3 G/DL (ref 6–8.4)
PROT UR QL STRIP: ABNORMAL
RBC # BLD AUTO: 4.68 M/UL (ref 4–5.4)
SODIUM SERPL-SCNC: 143 MMOL/L (ref 136–145)
SP GR UR STRIP: >=1.03 (ref 1–1.03)
URN SPEC COLLECT METH UR: ABNORMAL
UROBILINOGEN UR STRIP-ACNC: NEGATIVE EU/DL
WBC # BLD AUTO: 7.24 K/UL (ref 3.9–12.7)

## 2023-04-25 PROCEDURE — 74177 CT ABD & PELVIS W/CONTRAST: CPT | Mod: 26,,, | Performed by: RADIOLOGY

## 2023-04-25 PROCEDURE — 74177 CT ABDOMEN PELVIS WITH CONTRAST: ICD-10-PCS | Mod: 26,,, | Performed by: RADIOLOGY

## 2023-04-25 PROCEDURE — C9113 INJ PANTOPRAZOLE SODIUM, VIA: HCPCS | Performed by: NURSE PRACTITIONER

## 2023-04-25 PROCEDURE — 74177 CT ABD & PELVIS W/CONTRAST: CPT | Mod: TC

## 2023-04-25 PROCEDURE — 83690 ASSAY OF LIPASE: CPT | Performed by: NURSE PRACTITIONER

## 2023-04-25 PROCEDURE — 96374 THER/PROPH/DIAG INJ IV PUSH: CPT

## 2023-04-25 PROCEDURE — 96361 HYDRATE IV INFUSION ADD-ON: CPT

## 2023-04-25 PROCEDURE — 96375 TX/PRO/DX INJ NEW DRUG ADDON: CPT

## 2023-04-25 PROCEDURE — 99284 EMERGENCY DEPT VISIT MOD MDM: CPT | Mod: 25

## 2023-04-25 PROCEDURE — 63600175 PHARM REV CODE 636 W HCPCS: Performed by: NURSE PRACTITIONER

## 2023-04-25 PROCEDURE — 85025 COMPLETE CBC W/AUTO DIFF WBC: CPT | Performed by: NURSE PRACTITIONER

## 2023-04-25 PROCEDURE — 25000003 PHARM REV CODE 250: Performed by: NURSE PRACTITIONER

## 2023-04-25 PROCEDURE — 80053 COMPREHEN METABOLIC PANEL: CPT | Performed by: NURSE PRACTITIONER

## 2023-04-25 PROCEDURE — 81003 URINALYSIS AUTO W/O SCOPE: CPT | Performed by: NURSE PRACTITIONER

## 2023-04-25 PROCEDURE — 25500020 PHARM REV CODE 255: Performed by: FAMILY MEDICINE

## 2023-04-25 PROCEDURE — 99285 EMERGENCY DEPT VISIT HI MDM: CPT | Mod: 25,27

## 2023-04-25 RX ORDER — OMEPRAZOLE 40 MG/1
40 CAPSULE, DELAYED RELEASE ORAL
COMMUNITY
Start: 2023-04-19

## 2023-04-25 RX ORDER — MORPHINE SULFATE 2 MG/ML
4 INJECTION, SOLUTION INTRAMUSCULAR; INTRAVENOUS
Status: COMPLETED | OUTPATIENT
Start: 2023-04-25 | End: 2023-04-25

## 2023-04-25 RX ORDER — BUTALBITAL, ACETAMINOPHEN AND CAFFEINE 50; 325; 40 MG/1; MG/1; MG/1
2 TABLET ORAL
Status: COMPLETED | OUTPATIENT
Start: 2023-04-26 | End: 2023-04-26

## 2023-04-25 RX ORDER — LORATADINE 10 MG/1
10 TABLET ORAL DAILY PRN
COMMUNITY
Start: 2023-03-14

## 2023-04-25 RX ORDER — ONDANSETRON 4 MG/1
4 TABLET, ORALLY DISINTEGRATING ORAL
Status: COMPLETED | OUTPATIENT
Start: 2023-04-26 | End: 2023-04-26

## 2023-04-25 RX ORDER — NYSTATIN 100000 U/G
OINTMENT TOPICAL 2 TIMES DAILY
COMMUNITY
Start: 2023-04-19

## 2023-04-25 RX ORDER — FAMOTIDINE 40 MG/1
40 TABLET, FILM COATED ORAL
COMMUNITY
Start: 2023-04-19

## 2023-04-25 RX ORDER — POTASSIUM CHLORIDE 750 MG/1
TABLET, EXTENDED RELEASE ORAL
COMMUNITY
Start: 2023-04-17

## 2023-04-25 RX ORDER — DICLOFENAC SODIUM 10 MG/G
GEL TOPICAL
COMMUNITY
Start: 2023-04-12

## 2023-04-25 RX ORDER — PANTOPRAZOLE SODIUM 40 MG/10ML
40 INJECTION, POWDER, LYOPHILIZED, FOR SOLUTION INTRAVENOUS
Status: COMPLETED | OUTPATIENT
Start: 2023-04-25 | End: 2023-04-25

## 2023-04-25 RX ORDER — HYDROCODONE BITARTRATE AND ACETAMINOPHEN 7.5; 325 MG/1; MG/1
TABLET ORAL
COMMUNITY
Start: 2023-01-10

## 2023-04-25 RX ORDER — DICYCLOMINE HYDROCHLORIDE 10 MG/1
CAPSULE ORAL
Qty: 30 CAPSULE | Refills: 0 | Status: SHIPPED | OUTPATIENT
Start: 2023-04-25

## 2023-04-25 RX ORDER — ONDANSETRON 2 MG/ML
4 INJECTION INTRAMUSCULAR; INTRAVENOUS
Status: COMPLETED | OUTPATIENT
Start: 2023-04-25 | End: 2023-04-25

## 2023-04-25 RX ORDER — PROMETHAZINE HYDROCHLORIDE 25 MG/1
25 TABLET ORAL EVERY 6 HOURS PRN
Qty: 30 TABLET | Refills: 1 | Status: SHIPPED | OUTPATIENT
Start: 2023-04-25

## 2023-04-25 RX ADMIN — SODIUM CHLORIDE 500 ML: 9 INJECTION, SOLUTION INTRAVENOUS at 12:04

## 2023-04-25 RX ADMIN — MORPHINE SULFATE 4 MG: 2 INJECTION, SOLUTION INTRAMUSCULAR; INTRAVENOUS at 12:04

## 2023-04-25 RX ADMIN — PANTOPRAZOLE SODIUM 40 MG: 40 INJECTION, POWDER, LYOPHILIZED, FOR SOLUTION INTRAVENOUS at 12:04

## 2023-04-25 RX ADMIN — ONDANSETRON HYDROCHLORIDE 4 MG: 2 SOLUTION INTRAMUSCULAR; INTRAVENOUS at 12:04

## 2023-04-25 RX ADMIN — IOHEXOL 75 ML: 350 INJECTION, SOLUTION INTRAVENOUS at 01:04

## 2023-04-25 NOTE — ED PROVIDER NOTES
"Encounter Date: 4/25/2023       History     Chief Complaint   Patient presents with    Abdominal Pain     Upper abdominal pain x 3-4 days     Diarrhea     Diarrhea x 3 days     POV to the ED with family.  Patient complains of abdominal pain with diarrhea for 2-3 days.  Denies fever or vomiting.  States she feels weak at times.  She denies chest pain or shortness of breath.  She has had follow up care with her clinic for the same complaint.  She was scheduled to have an EGD in June.  Her family wants to know if she can just have this test today.  Her family member is advised that is not an emergency room test.  Patient will have lab work and CT scan today.  History of gastric sleeve.  Patient and family member agree with plan of care    The history is provided by the patient and a relative.   Review of patient's allergies indicates:   Allergen Reactions    Betamethasone acet,sod phos Other (See Comments)     " Makes me feel flushed!"    Codeine Rash     Other reaction(s): Hives     Past Medical History:   Diagnosis Date    Chronic back pain     Diverticulitis     Encounter for long-term (current) use of medications     GERD (gastroesophageal reflux disease)     H. pylori infection     Hypertension      Past Surgical History:   Procedure Laterality Date    APPENDECTOMY      CHOLECYSTECTOMY      COLONOSCOPY      COLONOSCOPY N/A 07/03/2019    Procedure: COLONOSCOPY;  Surgeon: Jean Jamison MD;  Location: [facility] ENDO;  Service: General;  Laterality: N/A;    EPIDURAL STEROID INJECTION N/A 10/07/2019    Procedure: Injection, Steroid, Epidural - L5/S1 LUMBAR EPIDURAL STEROID INJECTION;  Surgeon: Gail Cohen MD;  Location: [facility] OR;  Service: Pain Management;  Laterality: N/A;    EPIDURAL STEROID INJECTION N/A 01/27/2020    Procedure: Injection, Steroid, Epidural - L5/S1 INTERLAMINAR EPIDURAL STEROID INJECTION;  Surgeon: Gail Cohen MD;  Location: [facility] OR;  Service: Pain Management;  Laterality: N/A;    EPIDURAL " STEROID INJECTION N/A 07/02/2020    Procedure: DEAN Lumbar L5-S1;  Surgeon: Seb Blackwell MD;  Location: [facility] OR;  Service: Pain Management;  Laterality: N/A;    EPIDURAL STEROID INJECTION N/A 10/15/2020    Procedure: DEAN Lumbar L5-S1;  Surgeon: Seb Blackwell MD;  Location: [facility] OR;  Service: Pain Management;  Laterality: N/A;    EPIDURAL STEROID INJECTION N/A 05/27/2021    Procedure: Injection, Steroid, Epidural, L5 - S1;  Surgeon: Seb Blackwell MD;  Location: [facility] OR;  Service: Pain Management;  Laterality: N/A;    EPIDURAL STEROID INJECTION N/A 08/12/2021    Procedure: Injection, Steroid, Epidural Lumbar;  Surgeon: Seb Blackwell MD;  Location: [facility] OR;  Service: Pain Management;  Laterality: N/A;  L5-S1      EPIDURAL STEROID INJECTION Right 03/09/2022    Procedure: Injection, Steroid, Epidural Interlaminar L5-S1 righ paramedian approach;  Surgeon: Seb Blackwell MD;  Location: [facility] OR;  Service: Pain Management;  Laterality: Right;    EPIDURAL STEROID INJECTION Right 06/22/2022    Procedure: Injection, Steroid, Epidural LESI L5-S1;  Surgeon: Seb Blackwell MD;  Location: [facility] OR;  Service: Pain Management;  Laterality: Right;  R paramedian approach    EPIDURAL STEROID INJECTION N/A 09/28/2022    Procedure: Injection, Steroid, Epidural  LESI L5-S1;  Surgeon: Seb Blackwell MD;  Location: [facility] OR;  Service: Pain Management;  Laterality: N/A;    EPIDURAL STEROID INJECTION N/A 3/30/2023    Procedure: LUMBAR EPIDURAL STEROID INJECTION L5-S1 INTERLAMINAR;  Surgeon: Seb Blackwell MD;  Location: [facility] OR;  Service: Pain Management;  Laterality: N/A;    ESOPHAGOGASTRODUODENOSCOPY N/A 07/03/2019    Procedure: ESOPHAGOGASTRODUODENOSCOPY (EGD);  Surgeon: Jean Jamison MD;  Location: [facility] ENDO;  Service: General;  Laterality: N/A;    gastric sleeve  12/13/2021    INJECTION OF NERVE Bilateral 10/27/2021    Procedure: INJECTION, NERVE; Bilateral L2, L3, L4, L5 MBB's;  Surgeon: Seb Blackwell MD;   Location: [facility] OR;  Service: Anesthesiology;  Laterality: Bilateral;    RADIOFREQUENCY ABLATION Bilateral 01/26/2022    Procedure: Radiofrequency Ablation;  Surgeon: Seb Blackwell MD;  Location: [facility] OR;  Service: Pain Management;  Laterality: Bilateral;  L2,3,4,5     TOTAL REDUCTION MAMMOPLASTY  2021    TRANSFORAMINAL EPIDURAL INJECTION OF STEROID Bilateral 05/06/2019    Procedure: Injection,steroid,epidural,transforaminal approach;  Surgeon: Trey Dueñas MD;  Location: [facility] OR;  Service: Pain Management;  Laterality: Bilateral;  L4-5    TRANSFORAMINAL EPIDURAL INJECTION OF STEROID Bilateral 09/27/2021    Procedure: Injection,steroid,epidural,transforaminal approach;  Surgeon: Seb Blackwell MD;  Location: [facility] OR;  Service: Pain Management;  Laterality: Bilateral;  L4-L5    TUBAL LIGATION       Family History   Problem Relation Age of Onset    Diabetes Brother     Cancer Brother     Breast cancer Maternal Aunt      Social History     Tobacco Use    Smoking status: Some Days     Packs/day: 0.50     Types: Cigarettes    Smokeless tobacco: Never    Tobacco comments:     stop approx. 6 months ago   Substance Use Topics    Alcohol use: No    Drug use: No     Review of Systems   Constitutional:  Negative for chills and fever.   HENT:  Negative for ear pain and sore throat.    Respiratory:  Negative for cough and shortness of breath.    Cardiovascular:  Negative for chest pain.   Gastrointestinal:  Positive for abdominal pain and diarrhea. Negative for nausea and vomiting.   Genitourinary:  Negative for dysuria.   Musculoskeletal:  Negative for back pain.   Neurological:  Positive for weakness. Negative for dizziness and syncope.   All other systems reviewed and are negative.    Physical Exam     Initial Vitals   BP Pulse Resp Temp SpO2   04/25/23 1016 04/25/23 1013 04/25/23 1013 04/25/23 1013 04/25/23 1013   (!) 160/85 68 20 97.9 °F (36.6 °C) 98 %      MAP       --                Physical Exam    Nursing note and  vitals reviewed.  Constitutional: She appears well-developed and well-nourished. No distress.   HENT:   Head: Normocephalic.   Mouth/Throat: Oropharynx is clear and moist.   Eyes: Pupils are equal, round, and reactive to light.   Neck:   Normal range of motion.  Cardiovascular:  Normal rate, regular rhythm and normal heart sounds.           Pulmonary/Chest: Breath sounds normal.   Abdominal: Abdomen is soft. Bowel sounds are normal. She exhibits no distension.   Tenderness noted to the left upper quadrant, right upper quadrant, epigastric area There is no rebound and no guarding.   Musculoskeletal:         General: Normal range of motion.      Cervical back: Normal range of motion.     Neurological: She is alert and oriented to person, place, and time. She has normal strength. GCS score is 15. GCS eye subscore is 4. GCS verbal subscore is 5. GCS motor subscore is 6.   Skin: Skin is warm and dry.   Psychiatric: She has a normal mood and affect.       ED Course   Procedures  Labs Reviewed   CBC W/ AUTO DIFFERENTIAL - Abnormal; Notable for the following components:       Result Value    MCHC 31.8 (*)     MPV 8.8 (*)     All other components within normal limits   COMPREHENSIVE METABOLIC PANEL - Abnormal; Notable for the following components:    Total Bilirubin 1.1 (*)     All other components within normal limits   URINALYSIS, REFLEX TO URINE CULTURE - Abnormal; Notable for the following components:    Specific Gravity, UA >=1.030 (*)     Protein, UA Trace (*)     Ketones, UA Trace (*)     Bilirubin (UA) 1+ (*)     All other components within normal limits    Narrative:     Preferred Collection Type->Urine, Clean Catch  Specimen Source->Urine   LIPASE          Imaging Results              CT Abdomen Pelvis With Contrast (Final result)  Result time 04/25/23 14:08:07      Final result by Crispin Kellogg MD (04/25/23 14:08:07)                   Impression:      1. Small hiatal hernia.  2. Prior gastric surgery.  3.  Previous cholecystectomy with moderate intra and extrahepatic biliary ductal dilatation.  Further evaluation with nonemergent MRCP as deemed clinically necessary.  4. Diverticulosis  5. Fluid distended but nondilated loops of distal small bowel can be seen with gastroenteritis.      Electronically signed by: Crispin Kellogg  Date:    04/25/2023  Time:    14:08               Narrative:    EXAMINATION:  CT ABDOMEN PELVIS WITH CONTRAST    CLINICAL HISTORY:  pain;    TECHNIQUE:  Low dose axial images, sagittal and coronal reformations were obtained from the lung bases to the pubic symphysis following the IV administration of 75 mL of Omnipaque 350 .  Oral contrast was not given.    COMPARISON:  CT 05/21/2018.    FINDINGS:  There is a small hiatal hernia.  Prior gastric surgery.  The liver and spleen are normal in size and attenuation.  Previous cholecystectomy with moderate intra and extrahepatic biliary ductal dilatation.  Distal common bile duct measures up to 11 mm.  The pancreas and adrenal glands are unremarkable.    Kidneys are normal in size and enhance homogeneously.  No renal calculi.  No changes of hydronephrosis.  No perinephric inflammatory change.    Air and stool throughout the colon and rectum.  Scattered diverticula within the distal colon.  No mesenteric inflammatory change.  The distal loops of small bowel are fluid distended but nondilated.  This can be seen with a gastroenteritis.  The appendix is not identified.  No pericecal inflammatory change to suggest acute appendicitis.    The bladder is partially distended.  Uterus and ovaries are atrophic.  No free fluid in cul-de-sac.    No significant mesenteric or retroperitoneal lymphadenopathy.  Small fat containing umbilical hernia.                                    X-Rays:   Independently Interpreted Readings:   Other Readings:  EXAMINATION:  CT ABDOMEN PELVIS WITH CONTRAST     CLINICAL HISTORY:  pain;     TECHNIQUE:  Low dose axial images, sagittal  and coronal reformations were obtained from the lung bases to the pubic symphysis following the IV administration of 75 mL of Omnipaque 350 .  Oral contrast was not given.     COMPARISON:  CT 05/21/2018.     FINDINGS:  There is a small hiatal hernia.  Prior gastric surgery.  The liver and spleen are normal in size and attenuation.  Previous cholecystectomy with moderate intra and extrahepatic biliary ductal dilatation.  Distal common bile duct measures up to 11 mm.  The pancreas and adrenal glands are unremarkable.     Kidneys are normal in size and enhance homogeneously.  No renal calculi.  No changes of hydronephrosis.  No perinephric inflammatory change.     Air and stool throughout the colon and rectum.  Scattered diverticula within the distal colon.  No mesenteric inflammatory change.  The distal loops of small bowel are fluid distended but nondilated.  This can be seen with a gastroenteritis.  The appendix is not identified.  No pericecal inflammatory change to suggest acute appendicitis.     The bladder is partially distended.  Uterus and ovaries are atrophic.  No free fluid in cul-de-sac.     No significant mesenteric or retroperitoneal lymphadenopathy.  Small fat containing umbilical hernia.     Impression:     1. Small hiatal hernia.  2. Prior gastric surgery.  3. Previous cholecystectomy with moderate intra and extrahepatic biliary ductal dilatation.  Further evaluation with nonemergent MRCP as deemed clinically necessary.  4. Diverticulosis  5. Fluid distended but nondilated loops of distal small bowel can be seen with gastroenteritis.  Medications   sodium chloride 0.9% bolus 500 mL 500 mL (0 mLs Intravenous Stopped 4/25/23 1400)   morphine injection 4 mg (4 mg Intravenous Given 4/25/23 1255)   ondansetron injection 4 mg (4 mg Intravenous Given 4/25/23 1254)   pantoprazole injection 40 mg (40 mg Intravenous Given 4/25/23 1253)   iohexoL (OMNIPAQUE 350) injection 75 mL (75 mLs Intravenous Given 4/25/23  1353)     Medical Decision Making:   Initial Assessment:   Presents for evaluation of abdominal pain with diarrhea, lab work ordered, CT scan will be ordered.  Disposition pending  Differential Diagnosis:   Dehydration, volume depletion, abnormal electrolytes, anemia, pancreatitis, GERD, UTI, bowel obstruction, mesenteric adenitis, mesenteric lymphadenopathy, gastroenteritis  ED Management:  No acute finding on lab work or CT scan requiring additional testing or admission.  Patient will be discharged home.  Prescription for antiemetic and Bentyl.  To follow up with her clinic.  Keep scheduled procedure planned for June.  Normal saline hydration in the ED.  Medicated with morphine and Zofran.  She will be discharged home, she agrees with plan of care  Other:   I discussed test(s) with the performing physician.       <> Summary of the Findings: Dr Hoffman           ED Course as of 04/25/23 1439   Tue Apr 25, 2023   1423 Complete Blood Count (CBC)   Final result 04/25 1259    MCHC 31.8  MPV 8.8     Comprehensive Metabolic Panel (CMP)                 Final result 04/25 1259    BILIRUBIN TOTAL 1.1    Urinalysis, Reflex to Urine Culture Urine, Clean Catch                 Final result 04/25 1033    Specific Gravity, UA >=1.030  Protein, UA Trace  Ketones, UA Trace  Bilirubin (UA) 1+       [CP]      ED Course User Index  [CP] Sherrill Rosado NP                 Clinical Impression:   Final diagnoses:  [A08.4] Viral gastroenteritis (Primary)        ED Disposition Condition    Discharge Stable          ED Prescriptions       Medication Sig Dispense Start Date End Date Auth. Provider    promethazine (PHENERGAN) 25 MG tablet Take 1 tablet (25 mg total) by mouth every 6 (six) hours as needed for Nausea. 30 tablet 4/25/2023 -- Sherrill Rosado NP    dicyclomine (BENTYL) 10 MG capsule 1 p.o. t.i.d. p.r.n. pain 30 capsule 4/25/2023 -- Sherrill Rosado NP          Follow-up Information       Follow up With Specialties Details  Why Contact Info    Maite Villatoro NP Family Medicine Call in 3 days For office recheck, blood pressure recheck in 1 week [address] [place] [address] [address]  [place] [address] [phone]               Sherrill Rosado NP  04/25/23 [id number]

## 2023-04-25 NOTE — DISCHARGE INSTRUCTIONS
Rest, increase fluids, lots of water and liquids.  Clear liquids for 24 hours, then may try a bland diet. Mashed potatoes.  Call your office for recheck.  Return as needed.  Keep schedule procedure, EGD, planned for June

## 2023-04-25 NOTE — PROVIDER PROGRESS NOTES - EMERGENCY DEPT.
Encounter Date: 4/25/2023    ED Physician Progress Notes        Physician Note:   Awaiting lab results

## 2023-04-26 VITALS
HEART RATE: 75 BPM | HEIGHT: 59 IN | TEMPERATURE: 98 F | SYSTOLIC BLOOD PRESSURE: 132 MMHG | WEIGHT: 135 LBS | BODY MASS INDEX: 27.21 KG/M2 | RESPIRATION RATE: 18 BRPM | DIASTOLIC BLOOD PRESSURE: 61 MMHG | OXYGEN SATURATION: 99 %

## 2023-04-26 PROCEDURE — 25000003 PHARM REV CODE 250: Performed by: EMERGENCY MEDICINE

## 2023-04-26 RX ORDER — ONDANSETRON 4 MG/1
4 TABLET, ORALLY DISINTEGRATING ORAL EVERY 6 HOURS PRN
Qty: 20 TABLET | Refills: 0 | Status: SHIPPED | OUTPATIENT
Start: 2023-04-26

## 2023-04-26 RX ORDER — BUTALBITAL, ACETAMINOPHEN AND CAFFEINE 50; 325; 40 MG/1; MG/1; MG/1
2 TABLET ORAL EVERY 6 HOURS PRN
Qty: 30 TABLET | Refills: 0 | Status: SHIPPED | OUTPATIENT
Start: 2023-04-26 | End: 2023-05-26

## 2023-04-26 RX ADMIN — ONDANSETRON 4 MG: 4 TABLET, ORALLY DISINTEGRATING ORAL at 12:04

## 2023-04-26 RX ADMIN — BUTALBITAL, ACETAMINOPHEN, AND CAFFEINE 2 TABLET: 50; 325; 40 TABLET ORAL at 12:04

## 2023-04-26 NOTE — TELEPHONE ENCOUNTER
Patient was seen earlier in the ER and now she is having a real real bad headache. She had a CT with dye earlier. They are already at ER and being seen.     Reason for Disposition   Patient already left for the hospital/clinic.    Additional Information   Negative: Difficult to awaken or acting confused (e.g., disoriented, slurred speech)   Negative: [1] Weakness of the face, arm or leg on one side of the body AND [2] new-onset   Negative: [1] Numbness of the face, arm or leg on one side of the body AND [2] new-onset   Negative: [1] Loss of speech or garbled speech AND [2] new-onset   Negative: Passed out (i.e., lost consciousness, collapsed and was not responding)   Negative: Sounds like a life-threatening emergency to the triager    Protocols used: Headache-A-AH, No Contact or Duplicate Contact Call-A-AH

## 2023-04-26 NOTE — ED NOTES
"Pt here for headache that she reports started after receiving a contrasted study this morning while in the ER.  Pt states ever since study she has had a migraine and has just felt "sick".  Pt stating pain to head is 9/10.  Pt not forth coming with information when asked to describe headache, pt stating "it just hurts".  Pt voicing no other complaints at this time.  NAD noted.  Will continue to monitor.  "

## 2023-04-26 NOTE — ED NOTES
MD at bedside to discuss CT results.Pt updated on plan of care.  Voices no needs currently.  NAD noted.  Will continue to monitor.

## 2023-04-26 NOTE — ED PROVIDER NOTES
"Encounter Date: 4/25/2023       History     Chief Complaint   Patient presents with    Migraine     Seen here today.  Now has migraine.  Feeling "sick" since ct earlier.      Pt here with diffuse throbbing HA onset today while here getting seen for abd pain. She says the pain started not long after getting morphine for her abd pain. No hx of HA. Onset was gradual and now it is severe. No weakness or numbness. No vision changes but she does have some photophobia. Abd pain has resolved.     The history is provided by the patient and the spouse.   Headache   This is a new problem. The current episode started today. The problem occurs constantly. The problem has been unchanged. The pain is located in the Bilateral region. The pain does not radiate. The quality of the pain is described as dull and throbbing. The pain is at a severity of 9/10. Associated symptoms include nausea and photophobia. Pertinent negatives include no abdominal pain, abnormal behavior, anorexia, back pain, blurred vision, coughing, dizziness, drainage, ear pain, eye pain, eye redness, eye watering, facial sweating, fever, hearing loss, insomnia, loss of balance, muscle aches, neck pain, numbness, phonophobia, rhinorrhea, scalp tenderness, seizures, sinus pressure, sore throat, swollen glands, tingling, tinnitus, visual change, vomiting, weakness or weight loss. Nothing aggravates the symptoms. She has tried nothing for the symptoms. Her past medical history is significant for hypertension. There is no history of migraine headaches.   Review of patient's allergies indicates:   Allergen Reactions    Betamethasone acet,sod phos Other (See Comments)     " Makes me feel flushed!"    Codeine Rash     Other reaction(s): Hives     Past Medical History:   Diagnosis Date    Chronic back pain     Diverticulitis     Encounter for long-term (current) use of medications     GERD (gastroesophageal reflux disease)     H. pylori infection     Hypertension      Past " Surgical History:   Procedure Laterality Date    APPENDECTOMY      CHOLECYSTECTOMY      COLONOSCOPY      COLONOSCOPY N/A 07/03/2019    Procedure: COLONOSCOPY;  Surgeon: Jean Jamison MD;  Location: [facility] ENDO;  Service: General;  Laterality: N/A;    EPIDURAL STEROID INJECTION N/A 10/07/2019    Procedure: Injection, Steroid, Epidural - L5/S1 LUMBAR EPIDURAL STEROID INJECTION;  Surgeon: Gail Cohen MD;  Location: [facility] OR;  Service: Pain Management;  Laterality: N/A;    EPIDURAL STEROID INJECTION N/A 01/27/2020    Procedure: Injection, Steroid, Epidural - L5/S1 INTERLAMINAR EPIDURAL STEROID INJECTION;  Surgeon: Gail Cohen MD;  Location: [facility] OR;  Service: Pain Management;  Laterality: N/A;    EPIDURAL STEROID INJECTION N/A 07/02/2020    Procedure: DEAN Lumbar L5-S1;  Surgeon: Seb Blackwell MD;  Location: [facility] OR;  Service: Pain Management;  Laterality: N/A;    EPIDURAL STEROID INJECTION N/A 10/15/2020    Procedure: DEAN Lumbar L5-S1;  Surgeon: Seb Blackwell MD;  Location: [facility] OR;  Service: Pain Management;  Laterality: N/A;    EPIDURAL STEROID INJECTION N/A 05/27/2021    Procedure: Injection, Steroid, Epidural, L5 - S1;  Surgeon: Seb Blackwell MD;  Location: [facility] OR;  Service: Pain Management;  Laterality: N/A;    EPIDURAL STEROID INJECTION N/A 08/12/2021    Procedure: Injection, Steroid, Epidural Lumbar;  Surgeon: Seb Blackwell MD;  Location: [facility] OR;  Service: Pain Management;  Laterality: N/A;  L5-S1      EPIDURAL STEROID INJECTION Right 03/09/2022    Procedure: Injection, Steroid, Epidural Interlaminar L5-S1 righ paramedian approach;  Surgeon: Seb Blackwell MD;  Location: [facility] OR;  Service: Pain Management;  Laterality: Right;    EPIDURAL STEROID INJECTION Right 06/22/2022    Procedure: Injection, Steroid, Epidural LESI L5-S1;  Surgeon: Seb Blackwell MD;  Location: [facility] OR;  Service: Pain Management;  Laterality: Right;  R paramedian approach    EPIDURAL STEROID INJECTION N/A  09/28/2022    Procedure: Injection, Steroid, Epidural  LESI L5-S1;  Surgeon: Seb Blackwell MD;  Location: [facility] OR;  Service: Pain Management;  Laterality: N/A;    EPIDURAL STEROID INJECTION N/A 3/30/2023    Procedure: LUMBAR EPIDURAL STEROID INJECTION L5-S1 INTERLAMINAR;  Surgeon: Seb Blackwell MD;  Location: [facility] OR;  Service: Pain Management;  Laterality: N/A;    ESOPHAGOGASTRODUODENOSCOPY N/A 07/03/2019    Procedure: ESOPHAGOGASTRODUODENOSCOPY (EGD);  Surgeon: Jean Jamison MD;  Location: [facility] ENDO;  Service: General;  Laterality: N/A;    gastric sleeve  12/13/2021    INJECTION OF NERVE Bilateral 10/27/2021    Procedure: INJECTION, NERVE; Bilateral L2, L3, L4, L5 MBB's;  Surgeon: Seb Blackwell MD;  Location: [facility] OR;  Service: Anesthesiology;  Laterality: Bilateral;    RADIOFREQUENCY ABLATION Bilateral 01/26/2022    Procedure: Radiofrequency Ablation;  Surgeon: Seb Blackwell MD;  Location: [facility] OR;  Service: Pain Management;  Laterality: Bilateral;  L2,3,4,5     TOTAL REDUCTION MAMMOPLASTY  2021    TRANSFORAMINAL EPIDURAL INJECTION OF STEROID Bilateral 05/06/2019    Procedure: Injection,steroid,epidural,transforaminal approach;  Surgeon: Trey Dueñas MD;  Location: [facility];  Service: Pain Management;  Laterality: Bilateral;  L4-5    TRANSFORAMINAL EPIDURAL INJECTION OF STEROID Bilateral 09/27/2021    Procedure: Injection,steroid,epidural,transforaminal approach;  Surgeon: Seb Blackwell MD;  Location: [facility] OR;  Service: Pain Management;  Laterality: Bilateral;  L4-L5    TUBAL LIGATION       Family History   Problem Relation Age of Onset    Diabetes Brother     Cancer Brother     Breast cancer Maternal Aunt      Social History     Tobacco Use    Smoking status: Some Days     Packs/day: 0.50     Types: Cigarettes    Smokeless tobacco: Never    Tobacco comments:     stop approx. 6 months ago   Substance Use Topics    Alcohol use: No    Drug use: No     Review of Systems   Constitutional:  Negative  for fever and weight loss.   HENT:  Negative for ear pain, hearing loss, rhinorrhea, sinus pressure, sore throat and tinnitus.    Eyes:  Positive for photophobia. Negative for blurred vision, pain and redness.   Respiratory:  Negative for cough.    Gastrointestinal:  Positive for nausea. Negative for abdominal pain, anorexia and vomiting.   Musculoskeletal:  Negative for back pain and neck pain.   Neurological:  Positive for headaches. Negative for dizziness, tingling, seizures, weakness, numbness and loss of balance.   Psychiatric/Behavioral:  The patient does not have insomnia.    All other systems reviewed and are negative.    Physical Exam     Initial Vitals [04/25/23 2226]   BP Pulse Resp Temp SpO2   (!) 170/88 74 18 98.4 °F (36.9 °C) 97 %      MAP       --         Physical Exam    Nursing note and vitals reviewed.  Constitutional: She appears well-developed and well-nourished. She is not diaphoretic. No distress.   Uncomfortable, nontoxic   HENT:   Head: Normocephalic and atraumatic.   Mouth/Throat: Oropharynx is clear and moist.   Eyes: Conjunctivae and EOM are normal. Pupils are equal, round, and reactive to light.   Neck: Neck supple.   Normal range of motion.  Cardiovascular:  Normal rate, regular rhythm, normal heart sounds and intact distal pulses.           Pulmonary/Chest: Breath sounds normal.   Abdominal: Abdomen is soft. She exhibits no distension. There is no abdominal tenderness.   Musculoskeletal:         General: No tenderness or edema. Normal range of motion.      Cervical back: Normal range of motion and neck supple.     Neurological: She is alert and oriented to person, place, and time. She has normal strength. No cranial nerve deficit or sensory deficit. GCS score is 15. GCS eye subscore is 4. GCS verbal subscore is 5. GCS motor subscore is 6.   Skin: Skin is warm and dry. Capillary refill takes less than 2 seconds. No rash noted. No erythema. No pallor.   Psychiatric: She has a normal mood  and affect.       ED Course   Procedures  Labs Reviewed - No data to display       Imaging Results              CT Head Without Contrast (In process)                      Medications   butalbital-acetaminophen-caffeine -40 mg per tablet 2 tablet (2 tablets Oral Given 4/26/23 0000)   ondansetron disintegrating tablet 4 mg (4 mg Oral Given 4/26/23 0000)     Medical Decision Making:   Differential Diagnosis:   Migraine, tension HA, hypertension  ED Management:  Pt presented with HA onset not long after being seen here earlier today for GI illness. Pt neuro intact but uncomfortable appearing. CT head was performed and neg. She was given fioricet and zofran with improvement in her pain. 1/10 now. Rx fioricet. Return precautions discussed.            ED Course as of 04/26/23 0122 Wed Apr 26, 2023   0108 CT head neg per rads [DC]      ED Course User Index  [DC] Cirilo Thomas Jr., MD                 Clinical Impression:   Final diagnoses:  [R51.9] Acute nonintractable headache, unspecified headache type (Primary)        ED Disposition Condition    Discharge Stable          ED Prescriptions       Medication Sig Dispense Start Date End Date Auth. Provider    butalbital-acetaminophen-caffeine -40 mg (FIORICET, ESGIC) -40 mg per tablet Take 2 tablets by mouth every 6 (six) hours as needed for Pain. 30 tablet 4/26/2023 5/26/2023 Cirilo Thomas Jr., MD    ondansetron (ZOFRAN-ODT) 4 MG TbDL Take 1 tablet (4 mg total) by mouth every 6 (six) hours as needed. 20 tablet 4/26/2023 -- Cirilo Thomas Jr., MD          Follow-up Information       Follow up With Specialties Details Why Contact Info    Maite Villatoro NP Family Medicine Schedule an appointment as soon as possible for a visit   [address]  [place] [address] [phone]               Cirilo Thomas Jr., MD  04/26/23 0122

## 2023-05-02 ENCOUNTER — HOSPITAL ENCOUNTER (OUTPATIENT)
Facility: HOSPITAL | Age: 68
Discharge: HOME | End: 2023-05-02
Attending: ANESTHESIOLOGY | Admitting: ANESTHESIOLOGY
Payer: MEDICARE

## 2023-05-02 VITALS
BODY MASS INDEX: 27.82 KG/M2 | SYSTOLIC BLOOD PRESSURE: 152 MMHG | HEIGHT: 59 IN | HEART RATE: 64 BPM | DIASTOLIC BLOOD PRESSURE: 78 MMHG | OXYGEN SATURATION: 95 % | TEMPERATURE: 97 F | WEIGHT: 138 LBS | RESPIRATION RATE: 14 BRPM

## 2023-05-02 DIAGNOSIS — M51.36 DDD (DEGENERATIVE DISC DISEASE), LUMBAR: ICD-10-CM

## 2023-05-02 DIAGNOSIS — M54.10 RADICULAR LOW BACK PAIN: Primary | ICD-10-CM

## 2023-05-02 DIAGNOSIS — M54.16 LUMBAR RADICULOPATHY: ICD-10-CM

## 2023-05-02 PROCEDURE — 25000003 PHARM REV CODE 250: Performed by: ANESTHESIOLOGY

## 2023-05-02 PROCEDURE — 64484 NJX AA&/STRD TFRM EPI L/S EA: CPT | Mod: RT

## 2023-05-02 PROCEDURE — 63600175 PHARM REV CODE 636 W HCPCS: Performed by: ANESTHESIOLOGY

## 2023-05-02 PROCEDURE — 64483 NJX AA&/STRD TFRM EPI L/S 1: CPT | Mod: RT

## 2023-05-02 PROCEDURE — 25500020 PHARM REV CODE 255: Performed by: ANESTHESIOLOGY

## 2023-05-02 RX ORDER — LIDOCAINE HYDROCHLORIDE 10 MG/ML
INJECTION INFILTRATION; PERINEURAL
Status: DISCONTINUED | OUTPATIENT
Start: 2023-05-02 | End: 2023-05-02 | Stop reason: HOSPADM

## 2023-05-02 RX ORDER — SCOLOPAMINE TRANSDERMAL SYSTEM 1 MG/1
PATCH, EXTENDED RELEASE TRANSDERMAL
Status: DISCONTINUED
Start: 2023-05-02 | End: 2023-05-02 | Stop reason: HOSPADM

## 2023-05-02 RX ORDER — BUPIVACAINE HYDROCHLORIDE 5 MG/ML
INJECTION, SOLUTION EPIDURAL; INTRACAUDAL
Status: DISCONTINUED | OUTPATIENT
Start: 2023-05-02 | End: 2023-05-02 | Stop reason: HOSPADM

## 2023-05-02 RX ORDER — FENTANYL CITRATE 50 UG/ML
INJECTION, SOLUTION INTRAMUSCULAR; INTRAVENOUS
Status: DISCONTINUED | OUTPATIENT
Start: 2023-05-02 | End: 2023-05-02 | Stop reason: HOSPADM

## 2023-05-02 RX ORDER — MIDAZOLAM HYDROCHLORIDE 5 MG/ML
INJECTION INTRAMUSCULAR; INTRAVENOUS
Status: DISCONTINUED | OUTPATIENT
Start: 2023-05-02 | End: 2023-05-02 | Stop reason: HOSPADM

## 2023-05-02 RX ORDER — SODIUM CHLORIDE, SODIUM LACTATE, POTASSIUM CHLORIDE, CALCIUM CHLORIDE 600; 310; 30; 20 MG/100ML; MG/100ML; MG/100ML; MG/100ML
INJECTION, SOLUTION INTRAVENOUS ONCE AS NEEDED
Status: COMPLETED | OUTPATIENT
Start: 2023-05-02 | End: 2023-05-02

## 2023-05-02 RX ADMIN — SODIUM CHLORIDE, SODIUM LACTATE, POTASSIUM CHLORIDE, AND CALCIUM CHLORIDE: .6; .31; .03; .02 INJECTION, SOLUTION INTRAVENOUS at 11:05

## 2023-05-02 NOTE — DISCHARGE INSTRUCTIONS

## 2023-05-02 NOTE — OP NOTE
PROCEDURE DATE: 5/2/2023    PROCEDURE: Right L4-L5, L5-S1 transforaminal epidural steroid injection under fluoroscopy    DIAGNOSIS: M51.16 Intervertebral disc disorders with radiculopathy, lumbar region  Post op diagnosis: Same    PHYSICIAN: Seb Blackwell MD    MEDICATIONS INJECTED:  Dexamethasone 5mg (0.5ml) and 1.5ml 0.25% bupivicaine at each nerve root.     LOCAL ANESTHETIC INJECTED:  Lidocaine 1%. 3 ml per site.    SEDATION MEDICATIONS: RN IV sedation    ESTIMATED BLOOD LOSS:  none    COMPLICATIONS:  none    TECHNIQUE:   A time-out was taken to identify patient and procedure side prior to starting the procedure. The patient was placed in a prone position, prepped and draped in the usual sterile fashion using ChloraPrep and sterile towels.  The area to be injected was determined under fluoroscopic guidance in AP and oblique view.  Local anesthetic was given by raising a wheal and going down to the hub of a 25-gauge 1.5 inch needle.  In oblique view, a 5 inch 22-gauge bent-tip spinal needle was introduced towards 6 oclock position of the pedicle of each above named nerve root level.  The needle was walked medially then hinged into the neural foramen and position was confirmed in AP and lateral views.  3 mL contrast dye was injected to confirm appropriate placement and that there was no vascular uptake.  After negative aspiration for blood or CSF, the medication was then injected. This was performed at the L4-L5, L5-S1 level(s). The patient tolerated the procedure well.    The patient was monitored after the procedure.  Patient was given post procedure and discharge instructions to follow at home. The patient was discharged in a stable condition.

## 2023-05-02 NOTE — DISCHARGE SUMMARY
[facility] - Surgery  Discharge Note  Short Stay    Procedure(s) (LRB):  LUMBAR TF-DEAN RIGHT L4-L5, L5-S1 (Right)      OUTCOME: Patient tolerated treatment/procedure well without complication and is now ready for discharge.    DISPOSITION: Home or Self Care    FINAL DIAGNOSIS:  Lumbar radiculopathy    FOLLOWUP: In clinic    DISCHARGE INSTRUCTIONS:    Discharge Procedure Orders   Diet general     Call MD for:  temperature >100.4     Call MD for:  persistent nausea and vomiting     Call MD for:  severe uncontrolled pain     Call MD for:  difficulty breathing, headache or visual disturbances     Call MD for:  redness, tenderness, or signs of infection (pain, swelling, redness, odor or green/yellow discharge around incision site)     Call MD for:  hives     Call MD for:  persistent dizziness or light-headedness     Call MD for:  extreme fatigue        TIME SPENT ON DISCHARGE: 30 minutes

## 2023-05-02 NOTE — H&P
"FOLLOW UP NOTE:     CHIEF COMPLAINT: back and leg pain    INTERVAL HISTORY OF PRESENT ILLNESS: Teresa Clay is a 67 y.o. female who presents for right L4-L5, L5-S1 lumbar transforaminal epidural steroid injection. The patient denies of any significant changes in her health since her last appointment. The patient also denies of any changes in the character of her pain since her last appointment.     ROS:  Review of Systems   Constitutional:  Negative for chills and fever.   HENT:  Negative for sore throat.    Eyes:  Negative for visual disturbance.   Respiratory:  Negative for shortness of breath.    Cardiovascular:  Negative for chest pain.   Gastrointestinal:  Negative for nausea and vomiting.   Genitourinary:  Negative for difficulty urinating.   Musculoskeletal:  Positive for back pain.   Skin:  Negative for rash.   Allergic/Immunologic: Negative for immunocompromised state.   Neurological:  Negative for syncope.   Hematological:  Does not bruise/bleed easily.   Psychiatric/Behavioral:  Negative for suicidal ideas.       MEDICAL, SURGICAL, FAMILY, SOCIAL HX: reviewed    MEDICATIONS/ALLERGIES: reviewed    PHYSICAL EXAM:    VITALS: Vitals reviewed.   Vitals:    05/02/23 1105   BP: 125/81   Pulse: 62   Resp: 12   Temp: 97.1 °F (36.2 °C)   SpO2: 96%   Weight: 62.6 kg (138 lb)   Height: 4' 11" (1.499 m)       Physical Exam  Vitals and nursing note reviewed.   Constitutional:       Appearance: Normal appearance. She is not toxic-appearing or diaphoretic.   HENT:      Head: Normocephalic and atraumatic.   Eyes:      General:         Right eye: No discharge.         Left eye: No discharge.      Extraocular Movements: Extraocular movements intact.      Conjunctiva/sclera: Conjunctivae normal.   Cardiovascular:      Rate and Rhythm: Normal rate.   Pulmonary:      Effort: Pulmonary effort is normal. No respiratory distress.      Breath sounds: Normal breath sounds.   Abdominal:      Palpations: Abdomen is soft. "   Skin:     General: Skin is warm and dry.      Findings: No rash.   Neurological:      Mental Status: She is alert and oriented to person, place, and time.   Psychiatric:         Mood and Affect: Mood and affect normal.         Cognition and Memory: Memory normal.         Judgment: Judgment normal.        EXTREMITIES:    Gen: No cyanosis, edema, varicosities, or tenderness to palpation BLE   Skin: Warm, pink, dry, no rashes, no lesions BLE   Strength: 5/5 motor strength BLE   ROM: hips, knees and ankles without pain or instability.     NEUROLOGICAL:    Gen: No clonus or spasticity.   Gait: Normal without antalgic lean   DTR's: 2+ in bilateral patellar, and ankle   BABINSKI: Absent bilaterally  Sensory: Intact to light touch and proprioception BLE    ASSESSMENT: Teresa Clay is a 67 y.o. female who presents for right L4-L5, L5-S1 lumbar transforaminal epidural steroid injection.     PLAN:  Proceed with right L4-L5, L5-S1 lumbar transforaminal epidural steroid injection as previously discussed.    This patient has been cleared for surgery in an ambulatory surgical facility    ASA 3,  Mallampatti Score 3  No history of anesthetic complications  Plan for RN IV sedation    Seb Blackwell MD  Pain Management

## 2023-05-12 ENCOUNTER — HOSPITAL ENCOUNTER (OUTPATIENT)
Dept: RADIOLOGY | Facility: HOSPITAL | Age: 68
Discharge: HOME | End: 2023-05-12
Attending: ANESTHESIOLOGY
Payer: MEDICARE

## 2023-05-12 DIAGNOSIS — M54.16 LUMBAR RADICULOPATHY: ICD-10-CM

## 2023-05-12 PROCEDURE — 72148 MRI LUMBAR SPINE W/O DYE: CPT | Mod: 26,,, | Performed by: RADIOLOGY

## 2023-05-12 PROCEDURE — 72148 MRI LUMBAR SPINE WITHOUT CONTRAST: ICD-10-PCS | Mod: 26,,, | Performed by: RADIOLOGY

## 2023-05-12 PROCEDURE — 72148 MRI LUMBAR SPINE W/O DYE: CPT | Mod: TC

## 2023-06-04 ENCOUNTER — HOSPITAL ENCOUNTER (EMERGENCY)
Facility: HOSPITAL | Age: 68
Discharge: HOME | End: 2023-06-04
Attending: EMERGENCY MEDICINE
Payer: MEDICARE

## 2023-06-04 VITALS
BODY MASS INDEX: 27.82 KG/M2 | HEIGHT: 59 IN | RESPIRATION RATE: 20 BRPM | DIASTOLIC BLOOD PRESSURE: 69 MMHG | SYSTOLIC BLOOD PRESSURE: 135 MMHG | OXYGEN SATURATION: 98 % | WEIGHT: 138 LBS | TEMPERATURE: 98 F | HEART RATE: 114 BPM

## 2023-06-04 DIAGNOSIS — K52.9 GASTROENTERITIS: Primary | ICD-10-CM

## 2023-06-04 DIAGNOSIS — R52 PAIN: ICD-10-CM

## 2023-06-04 LAB
ALBUMIN SERPL BCP-MCNC: 4.4 G/DL (ref 3.5–5.2)
ALP SERPL-CCNC: 91 U/L (ref 55–135)
ALT SERPL W/O P-5'-P-CCNC: 16 U/L (ref 10–44)
ANION GAP SERPL CALC-SCNC: 13 MMOL/L (ref 8–16)
AST SERPL-CCNC: 17 U/L (ref 10–40)
BASOPHILS # BLD AUTO: 0.04 K/UL (ref 0–0.2)
BASOPHILS NFR BLD: 0.4 % (ref 0–1.9)
BILIRUB SERPL-MCNC: 1.6 MG/DL (ref 0.1–1)
BUN SERPL-MCNC: 21 MG/DL (ref 8–23)
CALCIUM SERPL-MCNC: 9.5 MG/DL (ref 8.7–10.5)
CHLORIDE SERPL-SCNC: 106 MMOL/L (ref 95–110)
CO2 SERPL-SCNC: 21 MMOL/L (ref 23–29)
CREAT SERPL-MCNC: 0.8 MG/DL (ref 0.5–1.4)
DIFFERENTIAL METHOD: ABNORMAL
EOSINOPHIL # BLD AUTO: 0 K/UL (ref 0–0.5)
EOSINOPHIL NFR BLD: 0.1 % (ref 0–8)
ERYTHROCYTE [DISTWIDTH] IN BLOOD BY AUTOMATED COUNT: 12.5 % (ref 11.5–14.5)
EST. GFR  (NO RACE VARIABLE): >60 ML/MIN/1.73 M^2
GLUCOSE SERPL-MCNC: 142 MG/DL (ref 70–110)
HCT VFR BLD AUTO: 45.1 % (ref 37–48.5)
HGB BLD-MCNC: 15.1 G/DL (ref 12–16)
IMM GRANULOCYTES # BLD AUTO: 0.02 K/UL (ref 0–0.04)
IMM GRANULOCYTES NFR BLD AUTO: 0.2 % (ref 0–0.5)
LIPASE SERPL-CCNC: 8 U/L (ref 4–60)
LYMPHOCYTES # BLD AUTO: 0.4 K/UL (ref 1–4.8)
LYMPHOCYTES NFR BLD: 3.8 % (ref 18–48)
MCH RBC QN AUTO: 29 PG (ref 27–31)
MCHC RBC AUTO-ENTMCNC: 33.5 G/DL (ref 32–36)
MCV RBC AUTO: 87 FL (ref 82–98)
MONOCYTES # BLD AUTO: 0.3 K/UL (ref 0.3–1)
MONOCYTES NFR BLD: 2.6 % (ref 4–15)
NEUTROPHILS # BLD AUTO: 8.9 K/UL (ref 1.8–7.7)
NEUTROPHILS NFR BLD: 92.9 % (ref 38–73)
NRBC BLD-RTO: 0 /100 WBC
PLATELET # BLD AUTO: 238 K/UL (ref 150–450)
PMV BLD AUTO: 8.9 FL (ref 9.2–12.9)
POTASSIUM SERPL-SCNC: 3.7 MMOL/L (ref 3.5–5.1)
PROT SERPL-MCNC: 7.5 G/DL (ref 6–8.4)
RBC # BLD AUTO: 5.2 M/UL (ref 4–5.4)
SODIUM SERPL-SCNC: 140 MMOL/L (ref 136–145)
WBC # BLD AUTO: 9.6 K/UL (ref 3.9–12.7)

## 2023-06-04 PROCEDURE — 80053 COMPREHEN METABOLIC PANEL: CPT | Performed by: EMERGENCY MEDICINE

## 2023-06-04 PROCEDURE — 74018 XR ABDOMEN AP 1 VIEW: ICD-10-PCS | Mod: 26,,, | Performed by: RADIOLOGY

## 2023-06-04 PROCEDURE — 83690 ASSAY OF LIPASE: CPT | Performed by: EMERGENCY MEDICINE

## 2023-06-04 PROCEDURE — 96374 THER/PROPH/DIAG INJ IV PUSH: CPT

## 2023-06-04 PROCEDURE — 99284 EMERGENCY DEPT VISIT MOD MDM: CPT | Mod: 25

## 2023-06-04 PROCEDURE — 74018 RADEX ABDOMEN 1 VIEW: CPT | Mod: TC

## 2023-06-04 PROCEDURE — 74018 RADEX ABDOMEN 1 VIEW: CPT | Mod: 26,,, | Performed by: RADIOLOGY

## 2023-06-04 PROCEDURE — 25000003 PHARM REV CODE 250: Performed by: EMERGENCY MEDICINE

## 2023-06-04 PROCEDURE — 63600175 PHARM REV CODE 636 W HCPCS: Performed by: EMERGENCY MEDICINE

## 2023-06-04 PROCEDURE — 85025 COMPLETE CBC W/AUTO DIFF WBC: CPT | Performed by: EMERGENCY MEDICINE

## 2023-06-04 PROCEDURE — 96361 HYDRATE IV INFUSION ADD-ON: CPT

## 2023-06-04 RX ORDER — PROMETHAZINE HYDROCHLORIDE 25 MG/1
25 TABLET ORAL EVERY 6 HOURS PRN
Qty: 15 TABLET | Refills: 0 | Status: SHIPPED | OUTPATIENT
Start: 2023-06-04

## 2023-06-04 RX ORDER — ONDANSETRON 4 MG/1
4 TABLET, FILM COATED ORAL EVERY 6 HOURS
Qty: 12 TABLET | Refills: 0 | Status: SHIPPED | OUTPATIENT
Start: 2023-06-04

## 2023-06-04 RX ORDER — ONDANSETRON 2 MG/ML
4 INJECTION INTRAMUSCULAR; INTRAVENOUS
Status: COMPLETED | OUTPATIENT
Start: 2023-06-04 | End: 2023-06-04

## 2023-06-04 RX ADMIN — ONDANSETRON 4 MG: 2 INJECTION INTRAMUSCULAR; INTRAVENOUS at 07:06

## 2023-06-04 RX ADMIN — SODIUM CHLORIDE 1000 ML: 9 INJECTION, SOLUTION INTRAVENOUS at 07:06

## 2023-06-04 NOTE — ED PROVIDER NOTES
"Encounter Date: 6/4/2023       History     Chief Complaint   Patient presents with    Diarrhea    Vomiting    Nausea     Patient states that she ate crawfish last night and was up all night with N/V/D. Hx of gastric bipass.     67-year-old female with complaints of crampy abdominal pain, vomiting and diarrhea that began yesterday.  Patient thinks she aggravated her gut with boiled crawfish yesterday.  Patient has history of gastric bypass about 2 years ago by Dr. Concepcion in [place].    The history is provided by the patient, the spouse and medical records.   Review of patient's allergies indicates:   Allergen Reactions    Betamethasone acet,sod phos Other (See Comments)     " Makes me feel flushed!"    Codeine Rash     Other reaction(s): Hives     Past Medical History:   Diagnosis Date    Chronic back pain     Diverticulitis     Encounter for long-term (current) use of medications     GERD (gastroesophageal reflux disease)     H. pylori infection     Hypertension      Past Surgical History:   Procedure Laterality Date    APPENDECTOMY      CHOLECYSTECTOMY      COLONOSCOPY      COLONOSCOPY N/A 07/03/2019    Procedure: COLONOSCOPY;  Surgeon: Jean Jamison MD;  Location: [facility] ENDO;  Service: General;  Laterality: N/A;    EPIDURAL STEROID INJECTION N/A 10/07/2019    Procedure: Injection, Steroid, Epidural - L5/S1 LUMBAR EPIDURAL STEROID INJECTION;  Surgeon: Gail Cohen MD;  Location: [facility] OR;  Service: Pain Management;  Laterality: N/A;    EPIDURAL STEROID INJECTION N/A 01/27/2020    Procedure: Injection, Steroid, Epidural - L5/S1 INTERLAMINAR EPIDURAL STEROID INJECTION;  Surgeon: Gail Cohen MD;  Location: [facility] OR;  Service: Pain Management;  Laterality: N/A;    EPIDURAL STEROID INJECTION N/A 07/02/2020    Procedure: DEAN Lumbar L5-S1;  Surgeon: Seb Blackwell MD;  Location: [facility] OR;  Service: Pain Management;  Laterality: N/A;    EPIDURAL STEROID INJECTION N/A 10/15/2020    Procedure: DEAN Lumbar L5-S1;  " Surgeon: Seb Blackwell MD;  Location: [facility] OR;  Service: Pain Management;  Laterality: N/A;    EPIDURAL STEROID INJECTION N/A 05/27/2021    Procedure: Injection, Steroid, Epidural, L5 - S1;  Surgeon: Seb Blackwell MD;  Location: [facility] OR;  Service: Pain Management;  Laterality: N/A;    EPIDURAL STEROID INJECTION N/A 08/12/2021    Procedure: Injection, Steroid, Epidural Lumbar;  Surgeon: Seb Blackwell MD;  Location: [facility] OR;  Service: Pain Management;  Laterality: N/A;  L5-S1      EPIDURAL STEROID INJECTION Right 03/09/2022    Procedure: Injection, Steroid, Epidural Interlaminar L5-S1 righ paramedian approach;  Surgeon: Seb Blackwell MD;  Location: [facility] OR;  Service: Pain Management;  Laterality: Right;    EPIDURAL STEROID INJECTION Right 06/22/2022    Procedure: Injection, Steroid, Epidural LESI L5-S1;  Surgeon: Seb Blackwell MD;  Location: [facility] OR;  Service: Pain Management;  Laterality: Right;  R paramedian approach    EPIDURAL STEROID INJECTION N/A 09/28/2022    Procedure: Injection, Steroid, Epidural  LESI L5-S1;  Surgeon: Seb Blackwell MD;  Location: [facility] OR;  Service: Pain Management;  Laterality: N/A;    EPIDURAL STEROID INJECTION N/A 3/30/2023    Procedure: LUMBAR EPIDURAL STEROID INJECTION L5-S1 INTERLAMINAR;  Surgeon: Seb Blackwell MD;  Location: [facility] OR;  Service: Pain Management;  Laterality: N/A;    EPIDURAL STEROID INJECTION Right 5/2/2023    Procedure: LUMBAR TF-DEAN RIGHT L4-L5, L5-S1;  Surgeon: Seb Blackwell MD;  Location: [facility] OR;  Service: Pain Management;  Laterality: Right;    ESOPHAGOGASTRODUODENOSCOPY N/A 07/03/2019    Procedure: ESOPHAGOGASTRODUODENOSCOPY (EGD);  Surgeon: Jean Jamison MD;  Location: [facility] ENDO;  Service: General;  Laterality: N/A;    gastric sleeve  12/13/2021    INJECTION OF NERVE Bilateral 10/27/2021    Procedure: INJECTION, NERVE; Bilateral L2, L3, L4, L5 MBB's;  Surgeon: Seb Blackwell MD;  Location: [facility] OR;  Service: Anesthesiology;   Laterality: Bilateral;    RADIOFREQUENCY ABLATION Bilateral 01/26/2022    Procedure: Radiofrequency Ablation;  Surgeon: Seb Blackwell MD;  Location: [facility] OR;  Service: Pain Management;  Laterality: Bilateral;  L2,3,4,5     TOTAL REDUCTION MAMMOPLASTY  2021    TRANSFORAMINAL EPIDURAL INJECTION OF STEROID Bilateral 05/06/2019    Procedure: Injection,steroid,epidural,transforaminal approach;  Surgeon: Trey Dueñas MD;  [facility]  Service: Pain Management;  Laterality: Bilateral;  L4-5    TRANSFORAMINAL EPIDURAL INJECTION OF STEROID Bilateral 09/27/2021    Procedure: Injection,steroid,epidural,transforaminal approach;  Surgeon: Seb Blackwell MD;  [facility];  Service: Pain Management;  Laterality: Bilateral;  L4-L5    TUBAL LIGATION       Family History   Problem Relation Age of Onset    Diabetes Brother     Cancer Brother     Breast cancer Maternal Aunt      Social History     Tobacco Use    Smoking status: Some Days     Packs/day: 0.50     Types: Cigarettes    Smokeless tobacco: Never    Tobacco comments:     stop approx. 6 months ago   Substance Use Topics    Alcohol use: No    Drug use: No     Review of Systems   Gastrointestinal:  Positive for abdominal pain, diarrhea and vomiting.   All other systems reviewed and are negative.    Physical Exam     Initial Vitals [06/04/23 0650]   BP Pulse Resp Temp SpO2   (!) 159/82 (!) 114 20 97.7 °F (36.5 °C) 98 %      MAP       --         Physical Exam    Nursing note and vitals reviewed.  Constitutional: She appears well-developed and well-nourished.   HENT:   Head: Normocephalic and atraumatic.   Eyes: EOM are normal.   Neck: Neck supple.   Cardiovascular:            Tachycardic.   Pulmonary/Chest: No respiratory distress.   Abdominal: Abdomen is soft. There is abdominal tenderness.   Mild epigastric and periumbilical TTP.   Musculoskeletal:         General: No edema.      Cervical back: Neck supple.     Neurological: She is alert and oriented to person,  place, and time.   Skin: Skin is warm and dry.   Psychiatric: She has a normal mood and affect.       ED Course   Procedures  Labs Reviewed   CBC W/ AUTO DIFFERENTIAL - Abnormal; Notable for the following components:       Result Value    MPV 8.9 (*)     Gran # (ANC) 8.9 (*)     Lymph # 0.4 (*)     Gran % 92.9 (*)     Lymph % 3.8 (*)     Mono % 2.6 (*)     All other components within normal limits   COMPREHENSIVE METABOLIC PANEL - Abnormal; Notable for the following components:    CO2 21 (*)     Glucose 142 (*)     Total Bilirubin 1.6 (*)     All other components within normal limits   LIPASE          Imaging Results              X-Ray Abdomen AP 1 View (Final result)  Result time 06/04/23 08:13:30      Final result by Dejan Vu MD (06/04/23 08:13:30)                   Narrative:    EXAMINATION:  XR ABDOMEN AP 1 VIEW    CLINICAL HISTORY:  Pain, unspecified    TECHNIQUE:  AP View(s) of the abdomen was performed.    COMPARISON:  04/17/2023    FINDINGS:  Nonobstructive bowel gas pattern.  Surgical clips and staple line left upper abdominal quadrant.  Additional cholecystectomy clips.  No pathologic abdominal calcification.  Lower lumbar degenerative change.      Electronically signed by: Dejan Vu  Date:    06/04/2023  Time:    08:13                                     Medications   sodium chloride 0.9% bolus 1,000 mL 1,000 mL (1,000 mLs Intravenous New Bag 6/4/23 0722)   ondansetron injection 4 mg (4 mg Intravenous Given 6/4/23 0724)     Medical Decision Making:   Differential Diagnosis:   Food intolerance, viral illness, metabolic derangement, bowel obstruction  ED Management:  67-year-old female with history of gastric bypass here with vomiting, diarrhea and abdominal cramping since yesterday.  We will send labs, image and rehydrate.           ED Course as of 06/04/23 0821   Sun Jun 04, 2023   0806 Tolerating p.o. at discharge. [RJ]      ED Course User Index  [RJ] Crispin Nolasco MD                  Clinical Impression:   Final diagnoses:  [R52] Pain  [K52.9] Gastroenteritis (Primary)        ED Disposition Condition    Discharge Stable          ED Prescriptions       Medication Sig Dispense Start Date End Date Auth. Provider    ondansetron (ZOFRAN) 4 MG tablet Take 1 tablet (4 mg total) by mouth every 6 (six) hours. 12 tablet 6/4/2023 -- Crispin Nolasco MD    promethazine (PHENERGAN) 25 MG tablet Take 1 tablet (25 mg total) by mouth every 6 (six) hours as needed for Nausea. 15 tablet 6/4/2023 -- Crispin Nolasco MD          Follow-up Information       Follow up With Specialties Details Why Contact Info    [facility] - Emergency Dept Emergency Medicine  As needed [address] [facility] [address] [phone]             Crispin Nolasco MD  06/04/23 0841

## 2023-06-04 NOTE — ED TRIAGE NOTES
Patient reports that she ate crawfish last evening. She believes she may have food poisoning from that. Up all night with N/V/D.   Patient reports a history of gastric bi pass. Concerned about vomiting.

## 2023-09-12 ENCOUNTER — HOSPITAL ENCOUNTER (OUTPATIENT)
Facility: HOSPITAL | Age: 68
Discharge: HOME | End: 2023-09-12
Attending: ANESTHESIOLOGY | Admitting: ANESTHESIOLOGY
Payer: MEDICARE

## 2023-09-12 VITALS
RESPIRATION RATE: 9 BRPM | TEMPERATURE: 98 F | BODY MASS INDEX: 27.82 KG/M2 | OXYGEN SATURATION: 99 % | WEIGHT: 138 LBS | HEART RATE: 55 BPM | HEIGHT: 59 IN | DIASTOLIC BLOOD PRESSURE: 75 MMHG | SYSTOLIC BLOOD PRESSURE: 137 MMHG

## 2023-09-12 DIAGNOSIS — M54.16 LUMBAR RADICULITIS: ICD-10-CM

## 2023-09-12 DIAGNOSIS — M54.16 LUMBAR RADICULOPATHY: Primary | ICD-10-CM

## 2023-09-12 DIAGNOSIS — M51.36 DEGENERATIVE DISC DISEASE, LUMBAR: ICD-10-CM

## 2023-09-12 PROCEDURE — 25000003 PHARM REV CODE 250: Performed by: ANESTHESIOLOGY

## 2023-09-12 PROCEDURE — 62323 NJX INTERLAMINAR LMBR/SAC: CPT | Performed by: ANESTHESIOLOGY

## 2023-09-12 PROCEDURE — 25500020 PHARM REV CODE 255: Performed by: ANESTHESIOLOGY

## 2023-09-12 PROCEDURE — 63600175 PHARM REV CODE 636 W HCPCS: Performed by: ANESTHESIOLOGY

## 2023-09-12 RX ORDER — METHYLPREDNISOLONE ACETATE 80 MG/ML
INJECTION, SUSPENSION INTRA-ARTICULAR; INTRALESIONAL; INTRAMUSCULAR; SOFT TISSUE
Status: DISCONTINUED | OUTPATIENT
Start: 2023-09-12 | End: 2023-09-12 | Stop reason: HOSPADM

## 2023-09-12 RX ORDER — LIDOCAINE HYDROCHLORIDE 10 MG/ML
INJECTION INFILTRATION; PERINEURAL
Status: DISCONTINUED | OUTPATIENT
Start: 2023-09-12 | End: 2023-09-12 | Stop reason: HOSPADM

## 2023-09-12 RX ORDER — SODIUM CHLORIDE, SODIUM LACTATE, POTASSIUM CHLORIDE, CALCIUM CHLORIDE 600; 310; 30; 20 MG/100ML; MG/100ML; MG/100ML; MG/100ML
INJECTION, SOLUTION INTRAVENOUS ONCE AS NEEDED
Status: DISCONTINUED | OUTPATIENT
Start: 2023-09-12 | End: 2023-09-12 | Stop reason: HOSPADM

## 2023-09-12 RX ORDER — FENTANYL CITRATE 50 UG/ML
INJECTION, SOLUTION INTRAMUSCULAR; INTRAVENOUS
Status: DISCONTINUED | OUTPATIENT
Start: 2023-09-12 | End: 2023-09-12 | Stop reason: HOSPADM

## 2023-09-12 RX ORDER — MIDAZOLAM HYDROCHLORIDE 5 MG/ML
INJECTION INTRAMUSCULAR; INTRAVENOUS
Status: DISCONTINUED | OUTPATIENT
Start: 2023-09-12 | End: 2023-09-12 | Stop reason: HOSPADM

## 2023-09-12 NOTE — OP NOTE
PROCEDURE DATE: 9/12/2023    PROCEDURE: Lumbar interlaminar epidural steroid injection at L4-L5 under fluoroscopic guidance (right paramedian approach)    DIAGNOSIS: Lumbar radiculopathy  POSTOP DIAGNOSIS: SAME    PHYSICIAN: Seb Blackwell M.D.    MEDICATIONS INJECTED: 80 mg depo-medrol with 2 ml of preservative free NaCl and 3 mL of 1% lidocaine    LOCAL ANESTHETIC INJECTED:    Lidocaine 1% 4 ml total    SEDATION MEDICATIONS: RN IV sedation    ESTIMATED BLOOD LOSS:  none    COMPLICATIONS:  none    TECHNIQUE:  Time-out taken to identify patient and procedure prior to starting the procedure.  With the patient laying in a prone position, the area was prepped and draped in the usual sterile fashion using ChloraPrep and a fenestrated drape.  After determining the target level with an AP fluoroscopic view, local anesthetic was given using a 25-gauge 1.5 inch needle by raising a wheal and then infiltrating toward the interlaminar entry space.  A 3.5 inch 20-gauge Touhy needle was introduced under AP fluoroscopic guidance to the interlaminar space of L4-L5. Once the trajectory was established, the needle was visualized in the lateral view and advanced using loss of resistance technique. Once in the desired position, 2 mL contrast was injected to confirm placement and there was no vascular uptake nor intrathecal spread.  The medication was then injected slowly. The patient tolerated the procedure well.      The patient was monitored after the procedure.   They were given post-procedure and discharge instructions to follow at home.  The patient was discharged in a stable condition.

## 2023-09-12 NOTE — H&P
FOLLOW UP NOTE:     CHIEF COMPLAINT: back and leg pain    INTERVAL HISTORY OF PRESENT ILLNESS: Teresa Clay is a 67 y.o. female who presents for right paramedian L4-L5 lumbar interlaminar epidural steroid injection. The patient denies of any significant changes in her health since her last appointment. The patient also denies of any changes in the character of her pain since her last appointment.     ROS:  Review of Systems   Constitutional:  Negative for chills and fever.   HENT:  Negative for sore throat.    Eyes:  Negative for visual disturbance.   Respiratory:  Negative for shortness of breath.    Cardiovascular:  Negative for chest pain.   Gastrointestinal:  Negative for nausea and vomiting.   Genitourinary:  Negative for difficulty urinating.   Musculoskeletal:  Positive for back pain.   Skin:  Negative for rash.   Allergic/Immunologic: Negative for immunocompromised state.   Neurological:  Negative for syncope.   Hematological:  Does not bruise/bleed easily.   Psychiatric/Behavioral:  Negative for suicidal ideas.         MEDICAL, SURGICAL, FAMILY, SOCIAL HX: reviewed    MEDICATIONS/ALLERGIES: reviewed    PHYSICAL EXAM:    VITALS: Vitals reviewed. There were no vitals filed for this visit.    Physical Exam  Vitals and nursing note reviewed.   Constitutional:       Appearance: Normal appearance. She is not toxic-appearing or diaphoretic.   HENT:      Head: Normocephalic and atraumatic.   Eyes:      General:         Right eye: No discharge.         Left eye: No discharge.      Extraocular Movements: Extraocular movements intact.      Conjunctiva/sclera: Conjunctivae normal.   Cardiovascular:      Rate and Rhythm: Normal rate.   Pulmonary:      Effort: Pulmonary effort is normal. No respiratory distress.      Breath sounds: Normal breath sounds.   Abdominal:      Palpations: Abdomen is soft.   Skin:     General: Skin is warm and dry.      Findings: No rash.   Neurological:      Mental Status: She is alert  and oriented to person, place, and time.   Psychiatric:         Mood and Affect: Mood and affect normal.         Cognition and Memory: Memory normal.         Judgment: Judgment normal.          EXTREMITIES:    Gen: No cyanosis, edema, varicosities, or tenderness to palpation BLE   Skin: Warm, pink, dry, no rashes, no lesions BLE   Strength: 5/5 motor strength BLE   ROM: hips, knees and ankles without pain or instability.     NEUROLOGICAL:    Gen: No clonus or spasticity.   Gait: Normal without antalgic lean   DTR's: 2+ in bilateral patellar, and ankle   BABINSKI: Absent bilaterally  Sensory: Intact to light touch and proprioception BLE    ASSESSMENT: Teresa Clay is a 67 y.o. female who presents for right paramedian L4-L5 lumbar interlaminar epidural steroid injection.     PLAN:  Proceed with right paramedian L4-L5 lumbar interlaminar epidural steroid injection as previously discussed.    This patient has been cleared for surgery in an ambulatory surgical facility    ASA 3,  Mallampatti Score 3  No history of anesthetic complications  Plan for RN IV sedation    Seb Blackwell MD  Pain Management

## 2023-09-12 NOTE — PLAN OF CARE
Has met unit/department guidelines for discharge from each phase of the post procedure continuum. Leaving floor per w/c with RN. JENNIE x3. Resp even and unlabored room air. No distress noted. Tolerating Po fluids without c/o nausea/vomiting. Denies c/o pain or any other bothersome symptoms. All personal belongings returned to pt.

## 2023-09-12 NOTE — PLAN OF CARE
IV dc'd from left hand with jelco tip intact. No noted problems to site. Pressure dsg applied to site. Tolerated well.

## 2023-09-12 NOTE — PLAN OF CARE
20G IV site to left hand. LR infusing via gravity at KVO. No noted problems to site. Dressing clean, dry and intact.

## 2023-09-12 NOTE — DISCHARGE SUMMARY
[facility], [place] [facility]  Discharge Note  Short Stay    Procedure(s) (LRB):  LUMBAR DEAN L4-L5 INTERLAMINAR RIGHT PARAMEDIAN APPROACH (N/A)      OUTCOME: Patient tolerated treatment/procedure well without complication and is now ready for discharge.    DISPOSITION: Home or Self Care    FINAL DIAGNOSIS:  Lumbar radiculopathy    FOLLOWUP: In clinic    DISCHARGE INSTRUCTIONS:    Discharge Procedure Orders   Diet general     Call MD for:  temperature >100.4     Call MD for:  persistent nausea and vomiting     Call MD for:  severe uncontrolled pain     Call MD for:  difficulty breathing, headache or visual disturbances     Call MD for:  redness, tenderness, or signs of infection (pain, swelling, redness, odor or green/yellow discharge around incision site)     Call MD for:  hives     Call MD for:  persistent dizziness or light-headedness     Call MD for:  extreme fatigue        TIME SPENT ON DISCHARGE: 30 minutes

## 2023-09-19 ENCOUNTER — HOSPITAL ENCOUNTER (OUTPATIENT)
Dept: RADIOLOGY | Facility: HOSPITAL | Age: 68
Discharge: HOME | End: 2023-09-19
Attending: NURSE PRACTITIONER
Payer: MEDICARE

## 2023-09-19 DIAGNOSIS — M54.6 PAIN IN THORACIC SPINE: ICD-10-CM

## 2023-09-19 PROCEDURE — 72070 X-RAY EXAM THORAC SPINE 2VWS: CPT | Mod: TC

## 2023-09-19 PROCEDURE — 72070 X-RAY EXAM THORAC SPINE 2VWS: CPT | Mod: 26,,, | Performed by: RADIOLOGY

## 2023-09-19 PROCEDURE — 72070 XR THORACIC SPINE AP LATERAL: ICD-10-PCS | Mod: 26,,, | Performed by: RADIOLOGY

## 2023-10-19 ENCOUNTER — HOSPITAL ENCOUNTER (OUTPATIENT)
Dept: RADIOLOGY | Facility: HOSPITAL | Age: 68
Discharge: HOME | End: 2023-10-19
Attending: NURSE PRACTITIONER
Payer: MEDICARE

## 2023-10-19 DIAGNOSIS — Z12.31 ENCOUNTER FOR SCREENING MAMMOGRAM FOR BREAST CANCER: ICD-10-CM

## 2023-10-19 PROCEDURE — 77067 SCR MAMMO BI INCL CAD: CPT | Mod: TC

## 2023-10-19 PROCEDURE — 77067 MAMMO DIGITAL SCREENING BILAT WITH TOMO: ICD-10-PCS | Mod: 26,,, | Performed by: RADIOLOGY

## 2023-10-19 PROCEDURE — 77067 SCR MAMMO BI INCL CAD: CPT | Mod: 26,,, | Performed by: RADIOLOGY

## 2023-10-19 PROCEDURE — 77063 BREAST TOMOSYNTHESIS BI: CPT | Mod: 26,,, | Performed by: RADIOLOGY

## 2023-10-19 PROCEDURE — 77063 MAMMO DIGITAL SCREENING BILAT WITH TOMO: ICD-10-PCS | Mod: 26,,, | Performed by: RADIOLOGY

## 2023-10-23 RX ORDER — SODIUM CHLORIDE, SODIUM LACTATE, POTASSIUM CHLORIDE, CALCIUM CHLORIDE 600; 310; 30; 20 MG/100ML; MG/100ML; MG/100ML; MG/100ML
INJECTION, SOLUTION INTRAVENOUS CONTINUOUS
Status: CANCELLED | OUTPATIENT
Start: 2023-10-23

## 2023-10-24 ENCOUNTER — HOSPITAL ENCOUNTER (OUTPATIENT)
Facility: HOSPITAL | Age: 68
Discharge: HOME | End: 2023-10-24
Attending: ANESTHESIOLOGY | Admitting: ANESTHESIOLOGY
Payer: MEDICARE

## 2023-10-24 VITALS
BODY MASS INDEX: 27.21 KG/M2 | RESPIRATION RATE: 16 BRPM | OXYGEN SATURATION: 96 % | WEIGHT: 135 LBS | DIASTOLIC BLOOD PRESSURE: 66 MMHG | TEMPERATURE: 98 F | SYSTOLIC BLOOD PRESSURE: 122 MMHG | HEART RATE: 67 BPM | HEIGHT: 59 IN

## 2023-10-24 DIAGNOSIS — M53.3 SACROILIAC JOINT DYSFUNCTION: Primary | ICD-10-CM

## 2023-10-24 PROCEDURE — 63600175 PHARM REV CODE 636 W HCPCS: Performed by: ANESTHESIOLOGY

## 2023-10-24 PROCEDURE — 25000003 PHARM REV CODE 250: Performed by: ANESTHESIOLOGY

## 2023-10-24 PROCEDURE — 27096 INJECT SACROILIAC JOINT: CPT | Mod: RT | Performed by: ANESTHESIOLOGY

## 2023-10-24 PROCEDURE — 25500020 PHARM REV CODE 255: Performed by: ANESTHESIOLOGY

## 2023-10-24 RX ORDER — LIDOCAINE HYDROCHLORIDE 10 MG/ML
INJECTION INFILTRATION; PERINEURAL
Status: DISCONTINUED | OUTPATIENT
Start: 2023-10-24 | End: 2023-10-24 | Stop reason: HOSPADM

## 2023-10-24 RX ORDER — METHYLPREDNISOLONE ACETATE 80 MG/ML
INJECTION, SUSPENSION INTRA-ARTICULAR; INTRALESIONAL; INTRAMUSCULAR; SOFT TISSUE
Status: DISCONTINUED | OUTPATIENT
Start: 2023-10-24 | End: 2023-10-24 | Stop reason: HOSPADM

## 2023-10-24 RX ORDER — MIDAZOLAM HYDROCHLORIDE 5 MG/ML
INJECTION INTRAMUSCULAR; INTRAVENOUS
Status: DISCONTINUED | OUTPATIENT
Start: 2023-10-24 | End: 2023-10-24 | Stop reason: HOSPADM

## 2023-10-24 RX ORDER — BUPIVACAINE HYDROCHLORIDE 5 MG/ML
INJECTION, SOLUTION EPIDURAL; INTRACAUDAL
Status: DISCONTINUED | OUTPATIENT
Start: 2023-10-24 | End: 2023-10-24 | Stop reason: HOSPADM

## 2023-10-24 RX ORDER — FENTANYL CITRATE 50 UG/ML
INJECTION, SOLUTION INTRAMUSCULAR; INTRAVENOUS
Status: DISCONTINUED | OUTPATIENT
Start: 2023-10-24 | End: 2023-10-24 | Stop reason: HOSPADM

## 2023-10-24 NOTE — H&P
"FOLLOW UP NOTE:     CHIEF COMPLAINT: back and hip pain    INTERVAL HISTORY OF PRESENT ILLNESS: Teresa Clay is a 68 y.o. female who presents for right sacroiliac joint injection. The patient denies of any significant changes in her health since her last appointment. The patient also denies of any changes in the character of her pain since her last appointment.     ROS:  Review of Systems   Constitutional:  Negative for chills and fever.   HENT:  Negative for sore throat.    Eyes:  Negative for visual disturbance.   Respiratory:  Negative for shortness of breath.    Cardiovascular:  Negative for chest pain.   Gastrointestinal:  Negative for nausea and vomiting.   Genitourinary:  Negative for difficulty urinating.   Musculoskeletal:  Positive for arthralgias and back pain.   Skin:  Negative for rash.   Allergic/Immunologic: Negative for immunocompromised state.   Neurological:  Negative for syncope.   Hematological:  Does not bruise/bleed easily.   Psychiatric/Behavioral:  Negative for suicidal ideas.         MEDICAL, SURGICAL, FAMILY, SOCIAL HX: reviewed    MEDICATIONS/ALLERGIES: reviewed    PHYSICAL EXAM:    VITALS: Vitals reviewed.   Vitals:    10/24/23 0751   BP: (!) 165/86   Pulse: 61   Resp: 18   Temp: 98.2 °F (36.8 °C)   TempSrc: Tympanic   SpO2: 95%   Weight: 61.2 kg (135 lb)   Height: 4' 11" (1.499 m)       Physical Exam  Vitals and nursing note reviewed.   Constitutional:       Appearance: Normal appearance. She is not toxic-appearing or diaphoretic.   HENT:      Head: Normocephalic and atraumatic.   Eyes:      General:         Right eye: No discharge.         Left eye: No discharge.      Extraocular Movements: Extraocular movements intact.      Conjunctiva/sclera: Conjunctivae normal.   Cardiovascular:      Rate and Rhythm: Normal rate.   Pulmonary:      Effort: Pulmonary effort is normal. No respiratory distress.      Breath sounds: Normal breath sounds.   Abdominal:      Palpations: Abdomen is " soft.   Skin:     General: Skin is warm and dry.      Findings: No rash.   Neurological:      Mental Status: She is alert and oriented to person, place, and time.   Psychiatric:         Mood and Affect: Mood and affect normal.         Cognition and Memory: Memory normal.         Judgment: Judgment normal.          EXTREMITIES:    Gen: No cyanosis, edema, varicosities, or tenderness to palpation BLE   Skin: Warm, pink, dry, no rashes, no lesions BLE   Strength: 5/5 motor strength BLE   ROM: hips, knees and ankles without pain or instability.     NEUROLOGICAL:    Gen: No clonus or spasticity.   Gait: Normal without antalgic lean   DTR's: 2+ in bilateral patellar, and ankle   BABINSKI: Absent bilaterally  Sensory: Intact to light touch and proprioception BLE    ASSESSMENT: Teresa Clay is a 68 y.o. female who presents for right sacroiliac joint injection.     PLAN:  Proceed with right sacroiliac joint injection as previously discussed.    This patient has been cleared for surgery in an ambulatory surgical facility    ASA 3,  Mallampatti Score 3  No history of anesthetic complications  Plan for RN IV sedation    Seb Blackwell MD  Pain Management

## 2023-10-24 NOTE — DISCHARGE SUMMARY
[facility] Surgery  Discharge Note  Short Stay    Procedure(s) (LRB):  INJECTION,SACROILIAC JOINT (Right)      OUTCOME: Patient tolerated treatment/procedure well without complication and is now ready for discharge.    DISPOSITION: Home or Self Care    FINAL DIAGNOSIS: Sacroiliac joint dysfunction    FOLLOWUP: In clinic    DISCHARGE INSTRUCTIONS:    Discharge Procedure Orders   Diet general     Call MD for:  temperature >100.4     Call MD for:  persistent nausea and vomiting     Call MD for:  severe uncontrolled pain     Call MD for:  difficulty breathing, headache or visual disturbances     Call MD for:  redness, tenderness, or signs of infection (pain, swelling, redness, odor or green/yellow discharge around incision site)     Call MD for:  hives     Call MD for:  persistent dizziness or light-headedness     Call MD for:  extreme fatigue        TIME SPENT ON DISCHARGE: 30 minutes

## 2023-10-24 NOTE — OP NOTE
Procedure Date: 10/24/2023    PROCEDURE:  Right sacroiliac joint injection utilizing fluoroscopy.    DIAGNOSIS: Right sided sacroiliitis.  Post op diagnosis: same    PHYSICIAN: Seb Blackwell MD    MEDICATIONS INJECTED:  Methylprednisone 80 mg and 2 ml Bupivacaine 0.5%.    LOCAL ANESTHETIC USED: Lidocaine 1%,5 ml total.     SEDATION MEDICATIONS: RN IV sedation    ESTIMATED BLOOD LOSS: none    COMPLICATIONS: none    TECHNIQUE:   Time-out taken to identify patient and procedure side prior to starting the procedure. After placing the patient in the prone position, the patient was prepped and draped in the usual sterile fashion using ChloraPrep and sterile towels.  The area was determined under fluoroscopy in the AP view.  Lidocaine was injected by raising a wheal and going down to the periosteum using a 25-gauge 1.5 inch needle.  The 3.5 inch 22-gauge spinal needle was introduced into inferior opening of the right sacroiliac joint.  Negative pressure applied to confirm no intravascular placement.  2 ml of contrast dye was injected to confirm placement and to confirm that there was no vascular uptake. The medication was then injected slowly. The patient tolerated the procedure well.    The patient was monitored after the procedure.  The patient was given post procedure and discharge instructions to follow at home. The patient was discharged in a stable condition

## 2024-04-30 ENCOUNTER — HOSPITAL ENCOUNTER (OUTPATIENT)
Dept: RADIOLOGY | Facility: HOSPITAL | Age: 69
Discharge: HOME | End: 2024-04-30
Attending: NURSE PRACTITIONER
Payer: MEDICARE

## 2024-04-30 DIAGNOSIS — J44.9 COPD (CHRONIC OBSTRUCTIVE PULMONARY DISEASE): ICD-10-CM

## 2024-04-30 PROCEDURE — 71271 CT THORAX LUNG CANCER SCR C-: CPT | Mod: TC

## 2024-04-30 PROCEDURE — 71271 CT THORAX LUNG CANCER SCR C-: CPT | Mod: 26,,, | Performed by: RADIOLOGY

## 2024-06-03 RX ORDER — BUTALBITAL, ACETAMINOPHEN AND CAFFEINE 50; 325; 40 MG/1; MG/1; MG/1
1 TABLET ORAL EVERY 4 HOURS PRN
Status: CANCELLED | OUTPATIENT
Start: 2024-06-03

## 2024-06-04 ENCOUNTER — HOSPITAL ENCOUNTER (OUTPATIENT)
Facility: HOSPITAL | Age: 69
Discharge: HOME | End: 2024-06-04
Attending: ANESTHESIOLOGY | Admitting: ANESTHESIOLOGY
Payer: MEDICARE

## 2024-06-04 VITALS
DIASTOLIC BLOOD PRESSURE: 67 MMHG | OXYGEN SATURATION: 96 % | TEMPERATURE: 98 F | WEIGHT: 104 LBS | HEIGHT: 59 IN | BODY MASS INDEX: 20.96 KG/M2 | HEART RATE: 65 BPM | SYSTOLIC BLOOD PRESSURE: 129 MMHG | RESPIRATION RATE: 16 BRPM

## 2024-06-04 DIAGNOSIS — M54.10 RADICULAR LOW BACK PAIN: Primary | ICD-10-CM

## 2024-06-04 DIAGNOSIS — M54.16 LUMBAR RADICULOPATHY: ICD-10-CM

## 2024-06-04 PROCEDURE — 99152 MOD SED SAME PHYS/QHP 5/>YRS: CPT | Performed by: ANESTHESIOLOGY

## 2024-06-04 PROCEDURE — 62323 NJX INTERLAMINAR LMBR/SAC: CPT | Performed by: ANESTHESIOLOGY

## 2024-06-04 PROCEDURE — 25000003 PHARM REV CODE 250: Performed by: ANESTHESIOLOGY

## 2024-06-04 PROCEDURE — 25500020 PHARM REV CODE 255: Performed by: ANESTHESIOLOGY

## 2024-06-04 PROCEDURE — 63600175 PHARM REV CODE 636 W HCPCS: Performed by: ANESTHESIOLOGY

## 2024-06-04 RX ORDER — MIDAZOLAM HYDROCHLORIDE 5 MG/ML
INJECTION INTRAMUSCULAR; INTRAVENOUS
Status: DISCONTINUED | OUTPATIENT
Start: 2024-06-04 | End: 2024-06-04 | Stop reason: HOSPADM

## 2024-06-04 RX ORDER — FENTANYL CITRATE 50 UG/ML
INJECTION, SOLUTION INTRAMUSCULAR; INTRAVENOUS
Status: DISCONTINUED | OUTPATIENT
Start: 2024-06-04 | End: 2024-06-04 | Stop reason: HOSPADM

## 2024-06-04 RX ORDER — METHYLPREDNISOLONE ACETATE 80 MG/ML
INJECTION, SUSPENSION INTRA-ARTICULAR; INTRALESIONAL; INTRAMUSCULAR; SOFT TISSUE
Status: DISCONTINUED | OUTPATIENT
Start: 2024-06-04 | End: 2024-06-04 | Stop reason: HOSPADM

## 2024-06-04 RX ORDER — SODIUM CHLORIDE, SODIUM LACTATE, POTASSIUM CHLORIDE, CALCIUM CHLORIDE 600; 310; 30; 20 MG/100ML; MG/100ML; MG/100ML; MG/100ML
INJECTION, SOLUTION INTRAVENOUS CONTINUOUS
Status: DISCONTINUED | OUTPATIENT
Start: 2024-06-04 | End: 2024-06-04 | Stop reason: HOSPADM

## 2024-06-04 RX ORDER — LIDOCAINE HYDROCHLORIDE 10 MG/ML
INJECTION INFILTRATION; PERINEURAL
Status: DISCONTINUED | OUTPATIENT
Start: 2024-06-04 | End: 2024-06-04 | Stop reason: HOSPADM

## 2024-06-04 NOTE — OP NOTE
PROCEDURE DATE: 6/4/2024    PROCEDURE:  Lumbar interlaminar epidural steroid injection at L4-L5 under fluoroscopic guidance (right paramedian approach)    DIAGNOSIS: Lumbar radiculopathy  POSTOP DIAGNOSIS: SAME    PHYSICIAN: Seb Blackwell M.D.    MEDICATIONS INJECTED: 80 mg depo-medrol with 2 ml of preservative free NaCl and 3 mL of 1% lidocaine    LOCAL ANESTHETIC INJECTED:    Lidocaine 1% 5 ml total    SEDATION MEDICATIONS: RN IV sedation    ESTIMATED BLOOD LOSS:  none    COMPLICATIONS:  nonee    TECHNIQUE:  Time-out taken to identify patient and procedure prior to starting the procedure.  With the patient laying in a prone position, the area was prepped and draped in the usual sterile fashion using ChloraPrep and a fenestrated drape.  After determining the target level with an AP fluoroscopic view, local anesthetic was given using a 25-gauge 1.5 inch needle by raising a wheal and then infiltrating toward the interlaminar entry space.  A 3.5 inch 20-gauge Touhy needle was introduced under AP fluoroscopic guidance to the interlaminar space of L4-L5. Once the trajectory was established, the needle was visualized in the lateral view and advanced using loss of resistance technique. Once in the desired position, 2 mL contrast was injected to confirm placement and there was no vascular uptake nor intrathecal spread.  The medication was then injected slowly. The patient tolerated the procedure well.      The patient was monitored after the procedure.   They were given post-procedure and discharge instructions to follow at home.  The patient was discharged in a stable condition.

## 2024-06-04 NOTE — DISCHARGE SUMMARY
[facility] Surgery  Discharge Note  Short Stay    Procedure(s) (LRB):  LUMBAR DEAN L4-L5 INTERLAMINAR (N/A)      OUTCOME: Patient tolerated treatment/procedure well without complication and is now ready for discharge.    DISPOSITION: Home or Self Care    FINAL DIAGNOSIS: Lumbar radiculopathy    FOLLOWUP: In clinic    DISCHARGE INSTRUCTIONS:    Discharge Procedure Orders   Diet general     Call MD for:  temperature >100.4     Call MD for:  persistent nausea and vomiting     Call MD for:  severe uncontrolled pain     Call MD for:  difficulty breathing, headache or visual disturbances     Call MD for:  redness, tenderness, or signs of infection (pain, swelling, redness, odor or green/yellow discharge around incision site)     Call MD for:  hives     Call MD for:  persistent dizziness or light-headedness     Call MD for:  extreme fatigue        TIME SPENT ON DISCHARGE: 30 minutes

## 2024-06-04 NOTE — H&P
"FOLLOW UP NOTE:     CHIEF COMPLAINT: back pain    INTERVAL HISTORY OF PRESENT ILLNESS: Teresa Clay is a 68 y.o. female who presents for LUMBAR DEAN L4-L5 INTERLAMINAR . The patient denies of any significant changes in her health since her last appointment. The patient also denies of any changes in the character of her pain since her last appointment.     ROS:  Review of Systems   Constitutional:  Negative for chills and fever.   HENT:  Negative for sore throat.    Eyes:  Negative for visual disturbance.   Respiratory:  Negative for shortness of breath.    Cardiovascular:  Negative for chest pain.   Gastrointestinal:  Negative for nausea and vomiting.   Genitourinary:  Negative for difficulty urinating.   Musculoskeletal:  Positive for back pain.   Skin:  Negative for rash.   Allergic/Immunologic: Negative for immunocompromised state.   Neurological:  Negative for syncope.   Hematological:  Does not bruise/bleed easily.   Psychiatric/Behavioral:  Negative for suicidal ideas.         MEDICAL, SURGICAL, FAMILY, SOCIAL HX: reviewed    MEDICATIONS/ALLERGIES: reviewed    PHYSICAL EXAM:    VITALS: Vitals reviewed.   Vitals:    06/04/24 0642   BP: (!) 175/80   Pulse: 67   Resp: 12   Temp: 97 °F (36.1 °C)   TempSrc: Temporal   SpO2: 96%   Weight: 47.2 kg (104 lb)   Height: 4' 11" (1.499 m)       Physical Exam  Vitals and nursing note reviewed.   Constitutional:       Appearance: Normal appearance. She is not toxic-appearing or diaphoretic.   HENT:      Head: Normocephalic and atraumatic.   Eyes:      General:         Right eye: No discharge.         Left eye: No discharge.      Extraocular Movements: Extraocular movements intact.      Conjunctiva/sclera: Conjunctivae normal.   Cardiovascular:      Rate and Rhythm: Normal rate.   Pulmonary:      Effort: Pulmonary effort is normal. No respiratory distress.      Breath sounds: Normal breath sounds.   Abdominal:      Palpations: Abdomen is soft.   Skin:     General: Skin " is warm and dry.      Findings: No rash.   Neurological:      Mental Status: She is alert and oriented to person, place, and time.   Psychiatric:         Mood and Affect: Mood and affect normal.         Cognition and Memory: Memory normal.         Judgment: Judgment normal.          EXTREMITIES:    Gen: No cyanosis, edema, varicosities, or tenderness to palpation BLE   Skin: Warm, pink, dry, no rashes, no lesions BLE   Strength: 5/5 motor strength BLE   ROM: hips, knees and ankles without pain or instability.     NEUROLOGICAL:    Gen: No clonus or spasticity.   Gait: Normal without antalgic lean   DTR's: 2+ in bilateral patellar, and ankle   BABINSKI: Absent bilaterally  Sensory: Intact to light touch and proprioception BLE    ASSESSMENT: Teresa Clay is a 68 y.o. female who presents for LUMBAR DEAN L4-L5 INTERLAMINAR .     PLAN:  Proceed with LUMBAR DEAN L4-L5 INTERLAMINAR  as previously discussed.    This patient has been cleared for surgery in an ambulatory surgical facility    ASA 3,  Mallampatti Score 3  No history of anesthetic complications  Plan for RN IV sedation    Seb Blackwell MD  Pain Management

## 2024-06-27 ENCOUNTER — HOSPITAL ENCOUNTER (OUTPATIENT)
Dept: RADIOLOGY | Facility: HOSPITAL | Age: 69
Discharge: HOME | End: 2024-06-27
Attending: NURSE PRACTITIONER
Payer: MEDICARE

## 2024-06-27 DIAGNOSIS — N63.10 BREAST MASS, RIGHT: ICD-10-CM

## 2024-06-27 PROCEDURE — 77061 BREAST TOMOSYNTHESIS UNI: CPT | Mod: TC,RT

## 2024-06-27 PROCEDURE — 76642 ULTRASOUND BREAST LIMITED: CPT | Mod: TC,RT

## 2024-07-03 ENCOUNTER — HOSPITAL ENCOUNTER (OUTPATIENT)
Dept: RADIOLOGY | Facility: HOSPITAL | Age: 69
Discharge: HOME | End: 2024-07-03
Attending: ANESTHESIOLOGY
Payer: MEDICARE

## 2024-07-03 DIAGNOSIS — M53.2X6 SPINAL INSTABILITIES OF LUMBAR REGION: ICD-10-CM

## 2024-07-03 PROCEDURE — 72131 CT LUMBAR SPINE W/O DYE: CPT | Mod: 26,,, | Performed by: RADIOLOGY

## 2024-07-03 PROCEDURE — 72110 X-RAY EXAM L-2 SPINE 4/>VWS: CPT | Mod: TC

## 2024-07-03 PROCEDURE — 72110 X-RAY EXAM L-2 SPINE 4/>VWS: CPT | Mod: 26,,, | Performed by: RADIOLOGY

## 2024-07-03 PROCEDURE — 72131 CT LUMBAR SPINE W/O DYE: CPT | Mod: TC

## 2024-11-07 ENCOUNTER — HOSPITAL ENCOUNTER (OUTPATIENT)
Dept: RADIOLOGY | Facility: HOSPITAL | Age: 69
Discharge: HOME | End: 2024-11-07
Attending: NURSE PRACTITIONER
Payer: MEDICARE

## 2024-11-07 DIAGNOSIS — Z72.0 TOBACCO USE: ICD-10-CM

## 2024-11-07 PROCEDURE — 71271 CT THORAX LUNG CANCER SCR C-: CPT | Mod: TC

## 2025-01-13 ENCOUNTER — HOSPITAL ENCOUNTER (OUTPATIENT)
Dept: RADIOLOGY | Facility: HOSPITAL | Age: 70
Discharge: HOME | End: 2025-01-13
Attending: NURSE PRACTITIONER
Payer: MEDICARE

## 2025-01-13 DIAGNOSIS — Z12.31 ENCOUNTER FOR SCREENING MAMMOGRAM FOR MALIGNANT NEOPLASM OF BREAST: ICD-10-CM

## 2025-01-13 PROCEDURE — 77063 BREAST TOMOSYNTHESIS BI: CPT | Mod: TC

## 2025-01-13 PROCEDURE — 77063 BREAST TOMOSYNTHESIS BI: CPT | Mod: 26,,, | Performed by: RADIOLOGY

## 2025-01-13 PROCEDURE — 77067 SCR MAMMO BI INCL CAD: CPT | Mod: 26,,, | Performed by: RADIOLOGY

## 2025-01-28 ENCOUNTER — HOSPITAL ENCOUNTER (OUTPATIENT)
Dept: RADIOLOGY | Facility: HOSPITAL | Age: 70
Discharge: HOME | End: 2025-01-28
Attending: ANESTHESIOLOGY
Payer: MEDICARE

## 2025-01-28 DIAGNOSIS — M53.3 SACROCOCCYGEAL DISORDERS, NOT ELSEWHERE CLASSIFIED: ICD-10-CM

## 2025-02-14 ENCOUNTER — HOSPITAL ENCOUNTER (OUTPATIENT)
Dept: RADIOLOGY | Facility: HOSPITAL | Age: 70
Discharge: HOME | End: 2025-02-14
Attending: NURSE PRACTITIONER
Payer: MEDICARE

## 2025-02-14 DIAGNOSIS — K21.9 GERD WITHOUT ESOPHAGITIS: ICD-10-CM

## 2025-02-14 PROCEDURE — 76700 US EXAM ABDOM COMPLETE: CPT | Mod: TC

## 2025-02-14 PROCEDURE — 76700 US EXAM ABDOM COMPLETE: CPT | Mod: 26,,, | Performed by: RADIOLOGY

## 2025-02-28 ENCOUNTER — HOSPITAL ENCOUNTER (OUTPATIENT)
Dept: RADIOLOGY | Facility: HOSPITAL | Age: 70
Discharge: HOME | End: 2025-02-28
Attending: NURSE PRACTITIONER
Payer: MEDICARE

## 2025-02-28 DIAGNOSIS — R93.2 ABNORMAL FINDINGS ON IMAGING OF BILIARY TRACT: ICD-10-CM

## 2025-03-19 NOTE — TELEPHONE ENCOUNTER
It will need to be rescheduled unless the patient wants to assume the potential liability   Pt transferred to bed and transported back to recovery area at this time. Report given to Viki BARCENAS and verbalized understanding of all.

## 2025-04-15 ENCOUNTER — HOSPITAL ENCOUNTER (OUTPATIENT)
Facility: HOSPITAL | Age: 70
Discharge: HOME | End: 2025-04-15
Attending: ANESTHESIOLOGY | Admitting: ANESTHESIOLOGY
Payer: MEDICARE

## 2025-04-15 VITALS
DIASTOLIC BLOOD PRESSURE: 75 MMHG | TEMPERATURE: 98 F | BODY MASS INDEX: 20.98 KG/M2 | WEIGHT: 104.06 LBS | SYSTOLIC BLOOD PRESSURE: 169 MMHG | RESPIRATION RATE: 14 BRPM | OXYGEN SATURATION: 96 % | HEART RATE: 63 BPM | HEIGHT: 59 IN

## 2025-04-15 DIAGNOSIS — M54.16 LUMBAR RADICULITIS: Primary | ICD-10-CM

## 2025-04-15 PROCEDURE — 64484 NJX AA&/STRD TFRM EPI L/S EA: CPT | Mod: RT | Performed by: ANESTHESIOLOGY

## 2025-04-15 PROCEDURE — 25500020 PHARM REV CODE 255: Performed by: ANESTHESIOLOGY

## 2025-04-15 PROCEDURE — 63600175 PHARM REV CODE 636 W HCPCS: Performed by: ANESTHESIOLOGY

## 2025-04-15 PROCEDURE — 64483 NJX AA&/STRD TFRM EPI L/S 1: CPT | Mod: RT | Performed by: ANESTHESIOLOGY

## 2025-04-15 RX ORDER — SODIUM CHLORIDE, SODIUM LACTATE, POTASSIUM CHLORIDE, CALCIUM CHLORIDE 600; 310; 30; 20 MG/100ML; MG/100ML; MG/100ML; MG/100ML
INJECTION, SOLUTION INTRAVENOUS CONTINUOUS
Status: DISCONTINUED | OUTPATIENT
Start: 2025-04-15 | End: 2025-04-15 | Stop reason: HOSPADM

## 2025-04-15 RX ORDER — BUPIVACAINE HYDROCHLORIDE 2.5 MG/ML
INJECTION, SOLUTION EPIDURAL; INFILTRATION; INTRACAUDAL; PERINEURAL
Status: DISCONTINUED | OUTPATIENT
Start: 2025-04-15 | End: 2025-04-15 | Stop reason: HOSPADM

## 2025-04-15 RX ORDER — MIDAZOLAM HYDROCHLORIDE 5 MG/ML
INJECTION INTRAMUSCULAR; INTRAVENOUS
Status: DISCONTINUED | OUTPATIENT
Start: 2025-04-15 | End: 2025-04-15 | Stop reason: HOSPADM

## 2025-04-15 RX ORDER — LIDOCAINE HYDROCHLORIDE 10 MG/ML
INJECTION, SOLUTION INFILTRATION; PERINEURAL
Status: DISCONTINUED | OUTPATIENT
Start: 2025-04-15 | End: 2025-04-15 | Stop reason: HOSPADM

## 2025-04-15 RX ORDER — DEXAMETHASONE SODIUM PHOSPHATE 4 MG/ML
INJECTION, SOLUTION INTRA-ARTICULAR; INTRALESIONAL; INTRAMUSCULAR; INTRAVENOUS; SOFT TISSUE
Status: DISCONTINUED | OUTPATIENT
Start: 2025-04-15 | End: 2025-04-15 | Stop reason: HOSPADM

## 2025-04-15 RX ORDER — FENTANYL CITRATE 50 UG/ML
INJECTION, SOLUTION INTRAMUSCULAR; INTRAVENOUS
Status: DISCONTINUED | OUTPATIENT
Start: 2025-04-15 | End: 2025-04-15 | Stop reason: HOSPADM

## 2025-04-15 NOTE — H&P
"FOLLOW UP NOTE:     CHIEF COMPLAINT: back and leg pain    INTERVAL HISTORY OF PRESENT ILLNESS: Teresa Clay is a 69 y.o. female who presents for LUMBAR TF-DEAN RIGHT L3-L4, L4-L5 . The patient denies of any significant changes in her health since her last appointment. The patient also denies of any changes in the character of her pain since her last appointment.     ROS:  Review of Systems   Constitutional:  Negative for chills and fever.   HENT:  Negative for sore throat.    Eyes:  Negative for visual disturbance.   Respiratory:  Negative for shortness of breath.    Cardiovascular:  Negative for chest pain.   Gastrointestinal:  Negative for nausea and vomiting.   Genitourinary:  Negative for difficulty urinating.   Musculoskeletal:  Positive for back pain.   Skin:  Negative for rash.   Allergic/Immunologic: Negative for immunocompromised state.   Neurological:  Negative for syncope.   Hematological:  Does not bruise/bleed easily.   Psychiatric/Behavioral:  Negative for suicidal ideas.         MEDICAL, SURGICAL, FAMILY, SOCIAL HX: reviewed    MEDICATIONS/ALLERGIES: reviewed    PHYSICAL EXAM:    VITALS: Vitals reviewed.   Vitals:    04/15/25 1018   BP: 121/89   Pulse: (!) 58   Resp: 16   Temp: 98 °F (36.7 °C)   TempSrc: Temporal   SpO2: 98%   Weight: 47.2 kg (104 lb 0.9 oz)   Height: 4' 11" (1.499 m)       Physical Exam  Vitals and nursing note reviewed.   Constitutional:       Appearance: Normal appearance. She is not toxic-appearing or diaphoretic.   HENT:      Head: Normocephalic and atraumatic.   Eyes:      General:         Right eye: No discharge.         Left eye: No discharge.      Extraocular Movements: Extraocular movements intact.      Conjunctiva/sclera: Conjunctivae normal.   Cardiovascular:      Rate and Rhythm: Normal rate.   Pulmonary:      Effort: Pulmonary effort is normal. No respiratory distress.      Breath sounds: Normal breath sounds.   Abdominal:      Palpations: Abdomen is soft. "   Skin:     General: Skin is warm and dry.      Findings: No rash.   Neurological:      Mental Status: She is alert and oriented to person, place, and time.   Psychiatric:         Mood and Affect: Mood and affect normal.         Cognition and Memory: Memory normal.         Judgment: Judgment normal.          EXTREMITIES:    Gen: No cyanosis, edema, varicosities, or tenderness to palpation BLE   Skin: Warm, pink, dry, no rashes, no lesions BLE   Strength: 5/5 motor strength BLE   ROM: hips, knees and ankles without pain or instability.     NEUROLOGICAL:    Gen: No clonus or spasticity.   Gait: Normal without antalgic lean   DTR's: 2+ in bilateral patellar, and ankle   BABINSKI: Absent bilaterally  Sensory: Intact to light touch and proprioception BLE    ASSESSMENT: Teresa Clay is a 69 y.o. female who presents for LUMBAR TF-DEAN RIGHT L3-L4, L4-L5 .     PLAN:  Proceed with LUMBAR TF-DEAN RIGHT L3-L4, L4-L5  as previously discussed.    This patient has been cleared for surgery in an ambulatory surgical facility    ASA 3,  Mallampatti Score 3  No history of anesthetic complications  Plan for RN IV sedation    Seb Blackwell MD  Pain Management

## 2025-04-15 NOTE — DISCHARGE SUMMARY
[facility] - Surgery  Discharge Note  Short Stay    Procedure(s) (LRB):  LUMBAR TF-DEAN RIGHT L3-L4, L4-L5 (Right)  INJECTION, SPINE, LUMBOSACRAL, TRANSFORAMINAL APPROACH, EACH ADD'L LEVEL (Right)      OUTCOME: Patient tolerated treatment/procedure well without complication and is now ready for discharge.    DISPOSITION: Home or Self Care    FINAL DIAGNOSIS: Lumbar radiculitis     FOLLOWUP: In clinic    DISCHARGE INSTRUCTIONS:    Discharge Procedure Orders   Diet general     Call MD for:  temperature >100.4     Call MD for:  persistent nausea and vomiting     Call MD for:  severe uncontrolled pain     Call MD for:  difficulty breathing, headache or visual disturbances     Call MD for:  redness, tenderness, or signs of infection (pain, swelling, redness, odor or green/yellow discharge around incision site)     Call MD for:  hives     Call MD for:  persistent dizziness or light-headedness     Call MD for:  extreme fatigue        TIME SPENT ON DISCHARGE: 30 minutes

## 2025-04-15 NOTE — OP NOTE
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     LOCAL ANESTHETIC INJECTED:  Lidocaine 1%. 3 ml per site.    SEDATION MEDICATIONS: RN IV sedation    ESTIMATED BLOOD LOSS:  none    COMPLICATIONS:  none    TECHNIQUE:   A time-out was taken to identify patient and procedure side prior to starting the procedure. The patient was placed in a prone position, prepped and draped in the usual sterile fashion using ChloraPrep and sterile towels.  The area to be injected was determined under fluoroscopic guidance in AP and oblique view.  Local anesthetic was given by raising a wheal and going down to the hub of a 25-gauge 1.5 inch needle.  In oblique view, a 3.5 inch 22-gauge bent-tip spinal needle was introduced towards 6 oclock position of the pedicle of each above named nerve root level.  The needle was walked medially then hinged into the neural foramen and position was confirmed in AP and lateral views.  4 mL contrast dye was injected to confirm appropriate placement and that there was no vascular uptake.  After negative aspiration for blood or CSF, the medication was then injected. This was performed at the L3-L4, L4-L5 level(s). The patient tolerated the procedure well.    The patient was monitored after the procedure.  Patient was given post procedure and discharge instructions to follow at home. The patient was discharged in a stable condition.

## 2025-06-06 ENCOUNTER — HOSPITAL ENCOUNTER (OUTPATIENT)
Dept: RADIOLOGY | Facility: HOSPITAL | Age: 70
Discharge: HOME | End: 2025-06-06
Attending: ANESTHESIOLOGY
Payer: MEDICARE

## 2025-06-06 DIAGNOSIS — M54.89 OTHER DORSALGIA: ICD-10-CM

## 2025-06-06 PROCEDURE — 72148 MRI LUMBAR SPINE W/O DYE: CPT | Mod: 26,,, | Performed by: RADIOLOGY

## 2025-06-06 PROCEDURE — 72148 MRI LUMBAR SPINE W/O DYE: CPT | Mod: TC

## 2025-08-22 ENCOUNTER — HOSPITAL ENCOUNTER (OUTPATIENT)
Dept: RADIOLOGY | Facility: HOSPITAL | Age: 70
Discharge: HOME | End: 2025-08-22
Attending: NURSE PRACTITIONER
Payer: MEDICARE

## 2025-08-22 DIAGNOSIS — M54.50 LUMBAGO: ICD-10-CM

## 2025-08-22 DIAGNOSIS — M25.559 PAIN IN UNSPECIFIED HIP: ICD-10-CM

## 2025-08-22 PROCEDURE — 72110 X-RAY EXAM L-2 SPINE 4/>VWS: CPT | Mod: TC

## 2025-08-22 PROCEDURE — 73522 X-RAY EXAM HIPS BI 3-4 VIEWS: CPT | Mod: TC

## 2025-08-22 PROCEDURE — 72110 X-RAY EXAM L-2 SPINE 4/>VWS: CPT | Mod: 26,,, | Performed by: RADIOLOGY

## 2025-09-02 ENCOUNTER — HOSPITAL ENCOUNTER (OUTPATIENT)
Facility: HOSPITAL | Age: 70
Discharge: HOME | End: 2025-09-02
Attending: ANESTHESIOLOGY | Admitting: ANESTHESIOLOGY
Payer: MEDICARE

## 2025-09-02 VITALS
WEIGHT: 127 LBS | HEART RATE: 54 BPM | SYSTOLIC BLOOD PRESSURE: 189 MMHG | TEMPERATURE: 98 F | OXYGEN SATURATION: 95 % | HEIGHT: 59 IN | BODY MASS INDEX: 25.6 KG/M2 | RESPIRATION RATE: 12 BRPM | DIASTOLIC BLOOD PRESSURE: 80 MMHG

## 2025-09-02 DIAGNOSIS — M54.17 LUMBOSACRAL RADICULITIS: Primary | ICD-10-CM

## 2025-09-02 PROCEDURE — 25500020 PHARM REV CODE 255: Performed by: ANESTHESIOLOGY

## 2025-09-02 PROCEDURE — 63600175 PHARM REV CODE 636 W HCPCS: Performed by: ANESTHESIOLOGY

## 2025-09-02 PROCEDURE — 62323 NJX INTERLAMINAR LMBR/SAC: CPT | Performed by: ANESTHESIOLOGY

## 2025-09-02 RX ORDER — METHYLPREDNISOLONE ACETATE 80 MG/ML
INJECTION, SUSPENSION INTRA-ARTICULAR; INTRALESIONAL; INTRAMUSCULAR; SOFT TISSUE
Status: DISCONTINUED | OUTPATIENT
Start: 2025-09-02 | End: 2025-09-02 | Stop reason: HOSPADM

## 2025-09-02 RX ORDER — SODIUM CHLORIDE, SODIUM LACTATE, POTASSIUM CHLORIDE, CALCIUM CHLORIDE 600; 310; 30; 20 MG/100ML; MG/100ML; MG/100ML; MG/100ML
INJECTION, SOLUTION INTRAVENOUS CONTINUOUS
Status: DISCONTINUED | OUTPATIENT
Start: 2025-09-02 | End: 2025-09-02 | Stop reason: HOSPADM

## 2025-09-02 RX ORDER — MIDAZOLAM HYDROCHLORIDE 5 MG/ML
INJECTION INTRAMUSCULAR; INTRAVENOUS
Status: DISCONTINUED | OUTPATIENT
Start: 2025-09-02 | End: 2025-09-02 | Stop reason: HOSPADM

## 2025-09-02 RX ORDER — LIDOCAINE HYDROCHLORIDE 10 MG/ML
INJECTION, SOLUTION INFILTRATION; PERINEURAL
Status: DISCONTINUED | OUTPATIENT
Start: 2025-09-02 | End: 2025-09-02 | Stop reason: HOSPADM

## 2025-09-02 RX ORDER — FENTANYL CITRATE 50 UG/ML
INJECTION, SOLUTION INTRAMUSCULAR; INTRAVENOUS
Status: DISCONTINUED | OUTPATIENT
Start: 2025-09-02 | End: 2025-09-02 | Stop reason: HOSPADM

## 2025-09-05 ENCOUNTER — HOSPITAL ENCOUNTER (OUTPATIENT)
Dept: RADIOLOGY | Facility: HOSPITAL | Age: 70
Discharge: HOME | End: 2025-09-05
Attending: NURSE PRACTITIONER
Payer: MEDICARE

## 2025-09-05 DIAGNOSIS — N95.1 MENOPAUSAL STATE: ICD-10-CM

## 2025-09-05 DIAGNOSIS — R93.2 ABNORMAL FINDINGS ON IMAGING OF BILIARY TRACT: ICD-10-CM

## 2025-09-05 PROCEDURE — 77080 DXA BONE DENSITY AXIAL: CPT | Mod: TC

## 2025-09-05 PROCEDURE — 25500020 PHARM REV CODE 255: Performed by: NURSE PRACTITIONER

## 2025-09-05 PROCEDURE — A9585 GADOBUTROL INJECTION: HCPCS | Performed by: NURSE PRACTITIONER

## 2025-09-05 PROCEDURE — 74183 MRI ABD W/O CNTR FLWD CNTR: CPT | Mod: 26,,, | Performed by: RADIOLOGY

## 2025-09-05 PROCEDURE — 74183 MRI ABD W/O CNTR FLWD CNTR: CPT | Mod: TC

## 2025-09-05 RX ORDER — GADOBUTROL 604.72 MG/ML
6 INJECTION INTRAVENOUS
Status: COMPLETED | OUTPATIENT
Start: 2025-09-05 | End: 2025-09-05

## 2025-09-05 RX ADMIN — GADOBUTROL 6 ML: 604.72 INJECTION INTRAVENOUS at 10:09

## (undated) DEVICE — PAD PREPS ALCOHOL 2-PLY LARGE

## (undated) DEVICE — APPLICATOR CHLORAPREP CLR 10.5

## (undated) DEVICE — GLOVE SENSICARE PI SURG 7.5

## (undated) DEVICE — GLOVE PROTEXIS PI CLASSIC 7.5

## (undated) DEVICE — SYS LABEL CORRECT MED

## (undated) DEVICE — CANNULA ETCO2 ADULT 7 O2/CO2

## (undated) DEVICE — SYR LUER-LOCK STERILE 5ML

## (undated) DEVICE — NDL TUOHY EPIDRL PNK 18GX6IN

## (undated) DEVICE — SOL IRRI STRL WATER 1000ML

## (undated) DEVICE — GLOVE SURG ULTRA TOUCH 7.5

## (undated) DEVICE — KIT NERVE BLOCK PREP BAPTIST

## (undated) DEVICE — SYR 3CC LUER LOC

## (undated) DEVICE — MARKER SKIN STND TIP BLUE BARR

## (undated) DEVICE — SYR LUER SLIP GLASS 5ML

## (undated) DEVICE — STRAP OR TABLE 5IN X 72IN

## (undated) DEVICE — GLOVE PROTEXIS PI CLASSIC 6.5

## (undated) DEVICE — TUBING MINIBORE EXTENSION

## (undated) DEVICE — SYR LUER-LOCK STERILE 3ML

## (undated) DEVICE — NDL HYPODERMIC BLUNT 18G 1.5IN

## (undated) DEVICE — GLOVE GAMMEX 7 PF STERILE

## (undated) DEVICE — NDL SPINAL 22GA 3.5 IN QUINCKE

## (undated) DEVICE — CANNULA SUPERSOFT CO2 7FT

## (undated) DEVICE — NDL TUOHY EPIDURAL 20G X 3.5

## (undated) DEVICE — TOWEL OR DISP STRL BLUE 4/PK

## (undated) DEVICE — NDL BLUNT W/O FILTER 18GX1.5IN

## (undated) DEVICE — SYR DISP LL 5CC

## (undated) DEVICE — CANNULA AIRLIFE ETCO2 NSL 7FT

## (undated) DEVICE — NDL SPINAL SPINOCAN 22GX3.5

## (undated) DEVICE — NDL TOUHY 18GX3.5IN

## (undated) DEVICE — MARKER SKIN RULER STERILE

## (undated) DEVICE — SEE MEDLINE ITEM 153215

## (undated) DEVICE — NDL 10CC 20GAX1 COMBO

## (undated) DEVICE — PAD ALCOHOL PREP LG STRL 2PLY

## (undated) DEVICE — GLOVE PROTEXIS PI CLASSIC 6.0

## (undated) DEVICE — BANDAID GARFIELD

## (undated) DEVICE — CHLORAPREP 3ML APPLICATOR TINT

## (undated) DEVICE — NDL SAFETY 25G X 1.5 ECLIPSE

## (undated) DEVICE — KIT ENDO CARRY ON PROCEDURE

## (undated) DEVICE — SYR SLIP TIP 5CC

## (undated) DEVICE — GLOVE SURGEONS ULTRA TOUCH 6.5

## (undated) DEVICE — SOL LAC RINGERS 500CC

## (undated) DEVICE — NDL BLNT STD 1.5IN 18G

## (undated) DEVICE — NDL SPINAL 25GX3.5 SPINOCAN

## (undated) DEVICE — NDL TUOHY EPIDRL YLW 20G 3.5IN

## (undated) DEVICE — CANNULA RADIOPAQUE 20G CURVED

## (undated) DEVICE — SYR LL LOSS OF RESISTNC 8ML

## (undated) DEVICE — NDL BLUNT FILL PNK 18G 1-0.5IN

## (undated) DEVICE — CANISTER SUCTION 3000CC

## (undated) DEVICE — SYR 10CC LUER LOCK

## (undated) DEVICE — MARKER SKIN 6IN RULER 2 TIP MI

## (undated) DEVICE — GLOVE PROTEXIS PI SYN SURG 6.5

## (undated) DEVICE — DILATOR CRE 10-12MM

## (undated) DEVICE — TRAY CATH SPINAL 25G 3.5IN

## (undated) DEVICE — Device

## (undated) DEVICE — SYR GLASS 5CC LUER LOK

## (undated) DEVICE — NDL FILTER 19GA X 1-1/2

## (undated) DEVICE — PAD GROUNDING DISPER ELECTRODE

## (undated) DEVICE — NDL SPINAL 22GX5